# Patient Record
Sex: FEMALE | Race: WHITE | NOT HISPANIC OR LATINO | Employment: OTHER | URBAN - METROPOLITAN AREA
[De-identification: names, ages, dates, MRNs, and addresses within clinical notes are randomized per-mention and may not be internally consistent; named-entity substitution may affect disease eponyms.]

---

## 2023-11-04 ENCOUNTER — APPOINTMENT (OUTPATIENT)
Dept: RADIOLOGY | Facility: HOSPITAL | Age: 83
End: 2023-11-04
Payer: COMMERCIAL

## 2023-11-04 ENCOUNTER — HOSPITAL ENCOUNTER (EMERGENCY)
Facility: HOSPITAL | Age: 83
Discharge: HOME | End: 2023-11-04
Attending: EMERGENCY MEDICINE
Payer: COMMERCIAL

## 2023-11-04 VITALS
WEIGHT: 158 LBS | BODY MASS INDEX: 31.02 KG/M2 | TEMPERATURE: 98.1 F | RESPIRATION RATE: 18 BRPM | SYSTOLIC BLOOD PRESSURE: 145 MMHG | HEART RATE: 85 BPM | DIASTOLIC BLOOD PRESSURE: 76 MMHG | HEIGHT: 60 IN | OXYGEN SATURATION: 97 %

## 2023-11-04 DIAGNOSIS — S09.90XA CLOSED HEAD INJURY, INITIAL ENCOUNTER: Primary | ICD-10-CM

## 2023-11-04 PROCEDURE — 70450 CT HEAD/BRAIN W/O DYE: CPT

## 2023-11-04 PROCEDURE — 99285 EMERGENCY DEPT VISIT HI MDM: CPT | Mod: 25 | Performed by: EMERGENCY MEDICINE

## 2023-11-04 PROCEDURE — 70450 CT HEAD/BRAIN W/O DYE: CPT | Performed by: SURGERY

## 2023-11-04 PROCEDURE — 72125 CT NECK SPINE W/O DYE: CPT

## 2023-11-04 PROCEDURE — 72125 CT NECK SPINE W/O DYE: CPT | Performed by: SURGERY

## 2023-11-04 ASSESSMENT — PAIN - FUNCTIONAL ASSESSMENT: PAIN_FUNCTIONAL_ASSESSMENT: 0-10

## 2023-11-04 ASSESSMENT — COLUMBIA-SUICIDE SEVERITY RATING SCALE - C-SSRS
1. IN THE PAST MONTH, HAVE YOU WISHED YOU WERE DEAD OR WISHED YOU COULD GO TO SLEEP AND NOT WAKE UP?: NO
6. HAVE YOU EVER DONE ANYTHING, STARTED TO DO ANYTHING, OR PREPARED TO DO ANYTHING TO END YOUR LIFE?: NO
2. HAVE YOU ACTUALLY HAD ANY THOUGHTS OF KILLING YOURSELF?: NO

## 2023-11-04 ASSESSMENT — LIFESTYLE VARIABLES
EVER FELT BAD OR GUILTY ABOUT YOUR DRINKING: NO
HAVE PEOPLE ANNOYED YOU BY CRITICIZING YOUR DRINKING: NO
EVER HAD A DRINK FIRST THING IN THE MORNING TO STEADY YOUR NERVES TO GET RID OF A HANGOVER: NO
HAVE YOU EVER FELT YOU SHOULD CUT DOWN ON YOUR DRINKING: NO
REASON UNABLE TO ASSESS: NO

## 2023-11-04 ASSESSMENT — PAIN SCALES - GENERAL: PAINLEVEL_OUTOF10: 0 - NO PAIN

## 2023-11-04 NOTE — ED PROVIDER NOTES
HPI   Chief Complaint   Patient presents with    Fall     Dropped menu, tried to pick it up with foot and fell. Hit forehead and left back of head. No LOC, no thinners. No current complaints.        Patient presents to the emergency department secondary to head injury.  Patient was bending over to pick something up and lost her balance and hit her head.  She then lost her balance again and fell over backwards hitting the back of her head.  No loss of consciousness.  She is not on blood thinning medication.  No chest pain or shortness of breath.  No abdominal pain.  No nausea or vomiting.  No change in bowel movements.  No numbness or paresthesia in the extremities.  No back pain.  Patient denies actual head pain at this time also.                          No data recorded                Patient History   Past Medical History:   Diagnosis Date    Hypertensive heart disease without heart failure 12/17/2019    Hypertensive heart disease without CHF    Other nondisplaced fracture of seventh cervical vertebra, subsequent encounter for fracture with routine healing 12/17/2019    Other closed nondisplaced fracture of seventh cervical vertebra with routine healing, subsequent encounter    Other nondisplaced fracture of sixth cervical vertebra, subsequent encounter for fracture with routine healing 12/17/2019    Other closed nondisplaced fracture of sixth cervical vertebra with routine healing, subsequent encounter    Personal history of other diseases of the female genital tract 12/17/2019    History of uterine prolapse    Personal history of other endocrine, nutritional and metabolic disease 12/17/2019    History of hyperlipidemia    Personal history of other mental and behavioral disorders 06/14/2021    History of schizoaffective disorder    Personal history of other specified conditions 06/14/2021    History of chronic pain    Schizoaffective disorder, bipolar type (CMS/[facility]) 12/17/2019    Schizoaffective disorder,  bipolar type    Unspecified fracture of left femur, initial encounter for closed fracture (CMS/[facility]) 06/14/2021    Fracture of left femur     Past Surgical History:   Procedure Laterality Date    CT NECK ANGIO W AND WO IV CONTRAST  12/13/2019    CT NECK ANGIO W AND WO IV CONTRAST 12/13/2019 [facility]INICAL LEGACY    OTHER SURGICAL HISTORY  12/17/2019    Tonsillectomy    OTHER SURGICAL HISTORY  12/17/2019    Rotator cuff repair    OTHER SURGICAL HISTORY  12/17/2019    Wrist surgery    OTHER SURGICAL HISTORY  12/17/2019    Uterine surgery     No family history on file.  Social History     Tobacco Use    Smoking status: Not on file    Smokeless tobacco: Not on file   Substance Use Topics    Alcohol use: Not on file    Drug use: Not on file       Physical Exam   ED Triage Vitals [11/04/23 1934]   Temp Heart Rate Resp BP   36.7 °C (98.1 °F) 85 18 145/76      SpO2 Temp Source Heart Rate Source Patient Position   97 % Temporal Monitor Sitting      BP Location FiO2 (%)     Left arm --       Physical Exam  Vitals and nursing note reviewed.   Constitutional:       Appearance: Normal appearance.   HENT:      Head:      Comments: Patient has a small amount of ecchymosis to the anterior forehead between the eyes.     Right Ear: Tympanic membrane normal.      Left Ear: Tympanic membrane normal.      Nose: Nose normal.      Mouth/Throat:      Mouth: Mucous membranes are moist.   Eyes:      Extraocular Movements: Extraocular movements intact.      Pupils: Pupils are equal, round, and reactive to light.   Neck:      Comments: No step-offs or tenderness palpated.  Cardiovascular:      Rate and Rhythm: Normal rate and regular rhythm.      Pulses: Normal pulses.      Heart sounds: Normal heart sounds.   Pulmonary:      Effort: Pulmonary effort is normal.      Breath sounds: Normal breath sounds.   Abdominal:      General: Abdomen is flat. Bowel sounds are normal.      Palpations: Abdomen is soft.   Musculoskeletal:         General: No  swelling, tenderness or deformity. Normal range of motion.      Cervical back: Normal range of motion and neck supple.   Skin:     General: Skin is warm and dry.      Capillary Refill: Capillary refill takes less than 2 seconds.   Neurological:      General: No focal deficit present.      Mental Status: She is alert and oriented to person, place, and time.   Psychiatric:         Mood and Affect: Mood normal.         Behavior: Behavior normal.       Labs Reviewed - No data to display  Pain Management Panel           No data to display              CT head wo IV contrast   Final Result   No evidence of acute intracranial abnormality.        No acute cervical spine fracture or malalignment.             MACRO:   None        Signed by: Cornel Tolliver 11/4/2023 9:09 PM   Dictation workstation:   [id number]      CT cervical spine wo IV contrast   Final Result   No evidence of acute intracranial abnormality.        No acute cervical spine fracture or malalignment.             MACRO:   None        Signed by: Cornel Tolliver 11/4/2023 9:09 PM   Dictation workstation:   [id number]        ED Course & MDM   Diagnoses as of 11/04/23 2113   Closed head injury, initial encounter       Medical Decision Making  Patient presents secondary to head injury.  Patient is evaluated using CT head and cervical spine.  Patient does have high risk of occult injury secondary to history of osteoporosis and chronic prednisone use.  She does have a history of a spontaneous lumbar compression fracture.  CT head and cervical spine are negative for acute process.  At this time patient is hemodynamically stable with no new complaints.  She is discharged to follow-up with her primary care physician or return here for any worsening symptoms.        Procedure  Procedures     Francheska Del Cid MD  11/04/23 2113

## 2024-02-08 ENCOUNTER — APPOINTMENT (OUTPATIENT)
Dept: RADIOLOGY | Facility: HOSPITAL | Age: 84
DRG: 493 | End: 2024-02-08
Payer: COMMERCIAL

## 2024-02-08 ENCOUNTER — APPOINTMENT (OUTPATIENT)
Dept: RADIOLOGY | Facility: HOSPITAL | Age: 84
End: 2024-02-08
Payer: COMMERCIAL

## 2024-02-08 ENCOUNTER — HOSPITAL ENCOUNTER (EMERGENCY)
Facility: HOSPITAL | Age: 84
Discharge: OTHER INSTITUTION | End: 2024-02-08
Attending: EMERGENCY MEDICINE
Payer: COMMERCIAL

## 2024-02-08 ENCOUNTER — HOSPITAL ENCOUNTER (INPATIENT)
Facility: HOSPITAL | Age: 84
LOS: 8 days | Discharge: NURSING, REHAB OR LONG-TERM CARE | DRG: 493 | End: 2024-02-16
Attending: EMERGENCY MEDICINE | Admitting: STUDENT IN AN ORGANIZED HEALTH CARE EDUCATION/TRAINING PROGRAM
Payer: COMMERCIAL

## 2024-02-08 ENCOUNTER — CLINICAL SUPPORT (OUTPATIENT)
Dept: EMERGENCY MEDICINE | Facility: HOSPITAL | Age: 84
DRG: 493 | End: 2024-02-08
Payer: COMMERCIAL

## 2024-02-08 VITALS
DIASTOLIC BLOOD PRESSURE: 68 MMHG | TEMPERATURE: 96.7 F | SYSTOLIC BLOOD PRESSURE: 114 MMHG | HEIGHT: 61 IN | OXYGEN SATURATION: 95 % | BODY MASS INDEX: 28.32 KG/M2 | RESPIRATION RATE: 18 BRPM | HEART RATE: 98 BPM | WEIGHT: 150 LBS

## 2024-02-08 DIAGNOSIS — S42.412A CLOSED SUPRACONDYLAR FRACTURE OF LEFT HUMERUS, INITIAL ENCOUNTER: ICD-10-CM

## 2024-02-08 DIAGNOSIS — R01.1 CARDIAC MURMUR, UNSPECIFIED: ICD-10-CM

## 2024-02-08 DIAGNOSIS — S02.2XXA CLOSED FRACTURE OF NASAL BONE, INITIAL ENCOUNTER: ICD-10-CM

## 2024-02-08 DIAGNOSIS — S32.001A CLOSED BURST FRACTURE OF LUMBAR VERTEBRA, INITIAL ENCOUNTER (MULTI): Primary | ICD-10-CM
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
## 2024-02-08 DIAGNOSIS — S32.031A: ICD-10-CM

## 2024-02-08 DIAGNOSIS — I10 PRIMARY HYPERTENSION: ICD-10-CM

## 2024-02-08 DIAGNOSIS — S42.412A LEFT SUPRACONDYLAR HUMERUS FRACTURE, CLOSED, INITIAL ENCOUNTER: ICD-10-CM

## 2024-02-08 DIAGNOSIS — S42.409A CLOSED FRACTURE OF DISTAL END OF HUMERUS, UNSPECIFIED FRACTURE MORPHOLOGY, INITIAL ENCOUNTER: ICD-10-CM

## 2024-02-08 DIAGNOSIS — G47.33 OSA (OBSTRUCTIVE SLEEP APNEA): ICD-10-CM

## 2024-02-08 DIAGNOSIS — E11.9 TYPE 2 DIABETES MELLITUS WITHOUT COMPLICATION, UNSPECIFIED WHETHER LONG TERM INSULIN USE (MULTI): ICD-10-CM
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
## 2024-02-08 LAB
ABO GROUP (TYPE) IN BLOOD: NORMAL
ALBUMIN SERPL BCP-MCNC: 2.9 G/DL (ref 3.4–5)
ALP SERPL-CCNC: 67 U/L (ref 33–136)
ALT SERPL W P-5'-P-CCNC: 25 U/L (ref 7–45)
ANION GAP BLDV CALCULATED.4IONS-SCNC: 7 MMOL/L (ref 10–25)
ANION GAP SERPL CALC-SCNC: 12 MMOL/L (ref 10–20)
ANION GAP SERPL CALC-SCNC: 9 MMOL/L (ref 10–20)
ANTIBODY SCREEN: NORMAL
APTT PPP: 24 SECONDS (ref 27–38)
APTT PPP: 25 SECONDS (ref 27–38)
AST SERPL W P-5'-P-CCNC: 25 U/L (ref 9–39)
ATRIAL RATE: 98 BPM
BASE EXCESS BLDV CALC-SCNC: 1.4 MMOL/L (ref -2–3)
BASOPHILS # BLD AUTO: 0.02 X10*3/UL (ref 0–0.1)
BASOPHILS # BLD AUTO: 0.02 X10*3/UL (ref 0–0.1)
BASOPHILS NFR BLD AUTO: 0.2 %
BASOPHILS NFR BLD AUTO: 0.2 %
BILIRUB SERPL-MCNC: 0.3 MG/DL (ref 0–1.2)
BODY TEMPERATURE: 37 DEGREES CELSIUS
BUN SERPL-MCNC: 6 MG/DL (ref 6–23)
BUN SERPL-MCNC: 8 MG/DL (ref 6–23)
CA-I BLDV-SCNC: 1.11 MMOL/L (ref 1.1–1.33)
CALCIUM SERPL-MCNC: 8.1 MG/DL (ref 8.6–10.6)
CALCIUM SERPL-MCNC: 9 MG/DL (ref 8.6–10.3)
CHLORIDE BLDV-SCNC: 103 MMOL/L (ref 98–107)
CHLORIDE SERPL-SCNC: 103 MMOL/L (ref 98–107)
CHLORIDE SERPL-SCNC: 98 MMOL/L (ref 98–107)
CO2 SERPL-SCNC: 27 MMOL/L (ref 21–32)
CO2 SERPL-SCNC: 29 MMOL/L (ref 21–32)
CREAT SERPL-MCNC: 0.37 MG/DL (ref 0.5–1.05)
CREAT SERPL-MCNC: 0.47 MG/DL (ref 0.5–1.05)
EGFRCR SERPLBLD CKD-EPI 2021: >90 ML/MIN/1.73M*2
EGFRCR SERPLBLD CKD-EPI 2021: >90 ML/MIN/1.73M*2
EOSINOPHIL # BLD AUTO: 0.06 X10*3/UL (ref 0–0.4)
EOSINOPHIL # BLD AUTO: 0.09 X10*3/UL (ref 0–0.4)
EOSINOPHIL NFR BLD AUTO: 0.6 %
EOSINOPHIL NFR BLD AUTO: 1.1 %
ERYTHROCYTE [DISTWIDTH] IN BLOOD BY AUTOMATED COUNT: 17.2 % (ref 11.5–14.5)
ERYTHROCYTE [DISTWIDTH] IN BLOOD BY AUTOMATED COUNT: 17.2 % (ref 11.5–14.5)
EST. AVERAGE GLUCOSE BLD GHB EST-MCNC: 189 MG/DL
GLUCOSE BLDV-MCNC: 163 MG/DL (ref 74–99)
GLUCOSE SERPL-MCNC: 156 MG/DL (ref 74–99)
GLUCOSE SERPL-MCNC: 214 MG/DL (ref 74–99)
HBA1C MFR BLD: 8.2 %
HCO3 BLDV-SCNC: 28.3 MMOL/L (ref 22–26)
HCT VFR BLD AUTO: 30.9 % (ref 36–46)
HCT VFR BLD AUTO: 37.4 % (ref 36–46)
HCT VFR BLD EST: 33 % (ref 36–46)
HGB BLD-MCNC: 10.4 G/DL (ref 12–16)
HGB BLD-MCNC: 12.3 G/DL (ref 12–16)
HGB BLDV-MCNC: 10.9 G/DL (ref 12–16)
IMM GRANULOCYTES # BLD AUTO: 0.1 X10*3/UL (ref 0–0.5)
IMM GRANULOCYTES # BLD AUTO: 0.13 X10*3/UL (ref 0–0.5)
IMM GRANULOCYTES NFR BLD AUTO: 1 % (ref 0–0.9)
IMM GRANULOCYTES NFR BLD AUTO: 1.5 % (ref 0–0.9)
INHALED O2 CONCENTRATION: 0 %
INR PPP: 1.1 (ref 0.9–1.1)
INR PPP: 1.1 (ref 0.9–1.1)
LACTATE BLDV-SCNC: 1.1 MMOL/L (ref 0.4–2)
LYMPHOCYTES # BLD AUTO: 0.68 X10*3/UL (ref 0.8–3)
LYMPHOCYTES # BLD AUTO: 0.74 X10*3/UL (ref 0.8–3)
LYMPHOCYTES NFR BLD AUTO: 6.8 %
LYMPHOCYTES NFR BLD AUTO: 8.7 %
MCH RBC QN AUTO: 30.9 PG (ref 26–34)
MCH RBC QN AUTO: 31.1 PG (ref 26–34)
MCHC RBC AUTO-ENTMCNC: 32.9 G/DL (ref 32–36)
MCHC RBC AUTO-ENTMCNC: 33.7 G/DL (ref 32–36)
MCV RBC AUTO: 93 FL (ref 80–100)
MCV RBC AUTO: 94 FL (ref 80–100)
MONOCYTES # BLD AUTO: 0.61 X10*3/UL (ref 0.05–0.8)
MONOCYTES # BLD AUTO: 0.82 X10*3/UL (ref 0.05–0.8)
MONOCYTES NFR BLD AUTO: 7.2 %
MONOCYTES NFR BLD AUTO: 8.2 %
NEUTROPHILS # BLD AUTO: 6.93 X10*3/UL (ref 1.6–5.5)
NEUTROPHILS # BLD AUTO: 8.33 X10*3/UL (ref 1.6–5.5)
NEUTROPHILS NFR BLD AUTO: 81.3 %
NEUTROPHILS NFR BLD AUTO: 83.2 %
NRBC BLD-RTO: 0 /100 WBCS (ref 0–0)
NRBC BLD-RTO: 0 /100 WBCS (ref 0–0)
OXYHGB MFR BLDV: 45.4 % (ref 45–75)
P AXIS: 62 DEGREES
P OFFSET: 202 MS
P ONSET: 151 MS
PCO2 BLDV: 55 MM HG (ref 41–51)
PH BLDV: 7.32 PH (ref 7.33–7.43)
PLATELET # BLD AUTO: 283 X10*3/UL (ref 150–450)
PLATELET # BLD AUTO: 318 X10*3/UL (ref 150–450)
PO2 BLDV: 36 MM HG (ref 35–45)
POTASSIUM BLDV-SCNC: 3.2 MMOL/L (ref 3.5–5.3)
POTASSIUM SERPL-SCNC: 3.1 MMOL/L (ref 3.5–5.3)
POTASSIUM SERPL-SCNC: 3.5 MMOL/L (ref 3.5–5.3)
PR INTERVAL: 134 MS
PROT SERPL-MCNC: 5.6 G/DL (ref 6.4–8.2)
PROTHROMBIN TIME: 12.6 SECONDS (ref 9.8–12.8)
PROTHROMBIN TIME: 12.8 SECONDS (ref 9.8–12.8)
Q ONSET: 218 MS
QRS COUNT: 16 BEATS
QRS DURATION: 72 MS
QT INTERVAL: 342 MS
QTC CALCULATION(BAZETT): 436 MS
QTC FREDERICIA: 402 MS
R AXIS: 26 DEGREES
RBC # BLD AUTO: 3.34 X10*6/UL (ref 4–5.2)
RBC # BLD AUTO: 3.98 X10*6/UL (ref 4–5.2)
RH FACTOR (ANTIGEN D): NORMAL
SAO2 % BLDV: 46 % (ref 45–75)
SODIUM BLDV-SCNC: 135 MMOL/L (ref 136–145)
SODIUM SERPL-SCNC: 133 MMOL/L (ref 136–145)
SODIUM SERPL-SCNC: 138 MMOL/L (ref 136–145)
T AXIS: 47 DEGREES
T OFFSET: 389 MS
VENTRICULAR RATE: 98 BPM
WBC # BLD AUTO: 10 X10*3/UL (ref 4.4–11.3)
WBC # BLD AUTO: 8.5 X10*3/UL (ref 4.4–11.3)

## 2024-02-08 PROCEDURE — 51702 INSERT TEMP BLADDER CATH: CPT

## 2024-02-08 PROCEDURE — 73560 X-RAY EXAM OF KNEE 1 OR 2: CPT | Mod: LEFT SIDE | Performed by: RADIOLOGY

## 2024-02-08 PROCEDURE — 96374 THER/PROPH/DIAG INJ IV PUSH: CPT | Mod: 59

## 2024-02-08 PROCEDURE — 36415 COLL VENOUS BLD VENIPUNCTURE: CPT | Performed by: NURSE PRACTITIONER

## 2024-02-08 PROCEDURE — 99222 1ST HOSP IP/OBS MODERATE 55: CPT | Performed by: ORTHOPAEDIC SURGERY

## 2024-02-08 PROCEDURE — 72146 MRI CHEST SPINE W/O DYE: CPT

## 2024-02-08 PROCEDURE — 74177 CT ABD & PELVIS W/CONTRAST: CPT

## 2024-02-08 PROCEDURE — G0390 TRAUMA RESPONS W/HOSP CRITI: HCPCS

## 2024-02-08 PROCEDURE — 76376 3D RENDER W/INTRP POSTPROCES: CPT | Mod: LEFT SIDE | Performed by: RADIOLOGY

## 2024-02-08 PROCEDURE — 86901 BLOOD TYPING SEROLOGIC RH(D): CPT | Performed by: STUDENT IN AN ORGANIZED HEALTH CARE EDUCATION/TRAINING PROGRAM

## 2024-02-08 PROCEDURE — 73200 CT UPPER EXTREMITY W/O DYE: CPT | Mod: LT

## 2024-02-08 PROCEDURE — 85025 COMPLETE CBC W/AUTO DIFF WBC: CPT | Performed by: NURSE PRACTITIONER

## 2024-02-08 PROCEDURE — 72125 CT NECK SPINE W/O DYE: CPT | Performed by: RADIOLOGY

## 2024-02-08 PROCEDURE — 29105 APPLICATION LONG ARM SPLINT: CPT | Mod: LT

## 2024-02-08 PROCEDURE — 84132 ASSAY OF SERUM POTASSIUM: CPT | Performed by: STUDENT IN AN ORGANIZED HEALTH CARE EDUCATION/TRAINING PROGRAM

## 2024-02-08 PROCEDURE — 73110 X-RAY EXAM OF WRIST: CPT | Mod: LEFT SIDE | Performed by: RADIOLOGY

## 2024-02-08 PROCEDURE — 73090 X-RAY EXAM OF FOREARM: CPT | Mod: LT

## 2024-02-08 PROCEDURE — 73110 X-RAY EXAM OF WRIST: CPT | Mod: LT

## 2024-02-08 PROCEDURE — 36415 COLL VENOUS BLD VENIPUNCTURE: CPT | Performed by: STUDENT IN AN ORGANIZED HEALTH CARE EDUCATION/TRAINING PROGRAM

## 2024-02-08 PROCEDURE — 96376 TX/PRO/DX INJ SAME DRUG ADON: CPT | Mod: 59

## 2024-02-08 PROCEDURE — 2500000004 HC RX 250 GENERAL PHARMACY W/ HCPCS (ALT 636 FOR OP/ED)

## 2024-02-08 PROCEDURE — 99285 EMERGENCY DEPT VISIT HI MDM: CPT | Mod: 25 | Performed by: EMERGENCY MEDICINE

## 2024-02-08 PROCEDURE — 99285 EMERGENCY DEPT VISIT HI MDM: CPT | Performed by: EMERGENCY MEDICINE

## 2024-02-08 PROCEDURE — 72146 MRI CHEST SPINE W/O DYE: CPT | Performed by: STUDENT IN AN ORGANIZED HEALTH CARE EDUCATION/TRAINING PROGRAM

## 2024-02-08 PROCEDURE — 2500000004 HC RX 250 GENERAL PHARMACY W/ HCPCS (ALT 636 FOR OP/ED): Performed by: PHYSICIAN ASSISTANT

## 2024-02-08 PROCEDURE — 93010 ELECTROCARDIOGRAM REPORT: CPT | Performed by: NURSE PRACTITIONER

## 2024-02-08 PROCEDURE — 72131 CT LUMBAR SPINE W/O DYE: CPT

## 2024-02-08 PROCEDURE — 96374 THER/PROPH/DIAG INJ IV PUSH: CPT

## 2024-02-08 PROCEDURE — 99291 CRITICAL CARE FIRST HOUR: CPT | Mod: 25 | Performed by: NURSE PRACTITIONER

## 2024-02-08 PROCEDURE — 99221 1ST HOSP IP/OBS SF/LOW 40: CPT | Performed by: ORTHOPAEDIC SURGERY

## 2024-02-08 PROCEDURE — 96375 TX/PRO/DX INJ NEW DRUG ADDON: CPT | Mod: 59

## 2024-02-08 PROCEDURE — 72125 CT NECK SPINE W/O DYE: CPT

## 2024-02-08 PROCEDURE — 73060 X-RAY EXAM OF HUMERUS: CPT | Mod: LT

## 2024-02-08 PROCEDURE — 70450 CT HEAD/BRAIN W/O DYE: CPT

## 2024-02-08 PROCEDURE — 72128 CT CHEST SPINE W/O DYE: CPT | Performed by: RADIOLOGY

## 2024-02-08 PROCEDURE — 99222 1ST HOSP IP/OBS MODERATE 55: CPT | Performed by: PHYSICIAN ASSISTANT

## 2024-02-08 PROCEDURE — 2500000001 HC RX 250 WO HCPCS SELF ADMINISTERED DRUGS (ALT 637 FOR MEDICARE OP)

## 2024-02-08 PROCEDURE — 70450 CT HEAD/BRAIN W/O DYE: CPT | Performed by: RADIOLOGY

## 2024-02-08 PROCEDURE — 70486 CT MAXILLOFACIAL W/O DYE: CPT | Performed by: RADIOLOGY

## 2024-02-08 PROCEDURE — 73070 X-RAY EXAM OF ELBOW: CPT | Mod: LT

## 2024-02-08 PROCEDURE — 73560 X-RAY EXAM OF KNEE 1 OR 2: CPT | Mod: LT

## 2024-02-08 PROCEDURE — 80048 BASIC METABOLIC PNL TOTAL CA: CPT | Performed by: NURSE PRACTITIONER

## 2024-02-08 PROCEDURE — 2500000001 HC RX 250 WO HCPCS SELF ADMINISTERED DRUGS (ALT 637 FOR MEDICARE OP): Performed by: PHYSICIAN ASSISTANT

## 2024-02-08 PROCEDURE — 73200 CT UPPER EXTREMITY W/O DYE: CPT | Mod: LEFT SIDE | Performed by: RADIOLOGY

## 2024-02-08 PROCEDURE — 74177 CT ABD & PELVIS W/CONTRAST: CPT | Performed by: RADIOLOGY

## 2024-02-08 PROCEDURE — 2500000004 HC RX 250 GENERAL PHARMACY W/ HCPCS (ALT 636 FOR OP/ED): Performed by: NURSE PRACTITIONER

## 2024-02-08 PROCEDURE — 72131 CT LUMBAR SPINE W/O DYE: CPT | Performed by: RADIOLOGY

## 2024-02-08 PROCEDURE — 72128 CT CHEST SPINE W/O DYE: CPT

## 2024-02-08 PROCEDURE — 85610 PROTHROMBIN TIME: CPT | Performed by: NURSE PRACTITIONER

## 2024-02-08 PROCEDURE — 72148 MRI LUMBAR SPINE W/O DYE: CPT

## 2024-02-08 PROCEDURE — 72148 MRI LUMBAR SPINE W/O DYE: CPT | Performed by: STUDENT IN AN ORGANIZED HEALTH CARE EDUCATION/TRAINING PROGRAM

## 2024-02-08 PROCEDURE — 83036 HEMOGLOBIN GLYCOSYLATED A1C: CPT | Performed by: PHYSICIAN ASSISTANT

## 2024-02-08 PROCEDURE — 76377 3D RENDER W/INTRP POSTPROCES: CPT

## 2024-02-08 PROCEDURE — 70486 CT MAXILLOFACIAL W/O DYE: CPT

## 2024-02-08 PROCEDURE — 85730 THROMBOPLASTIN TIME PARTIAL: CPT | Performed by: STUDENT IN AN ORGANIZED HEALTH CARE EDUCATION/TRAINING PROGRAM

## 2024-02-08 PROCEDURE — 2550000001 HC RX 255 CONTRASTS: Performed by: NURSE PRACTITIONER

## 2024-02-08 PROCEDURE — 85025 COMPLETE CBC W/AUTO DIFF WBC: CPT | Performed by: STUDENT IN AN ORGANIZED HEALTH CARE EDUCATION/TRAINING PROGRAM

## 2024-02-08 PROCEDURE — 71260 CT THORAX DX C+: CPT | Performed by: RADIOLOGY

## 2024-02-08 PROCEDURE — 1200000002 HC GENERAL ROOM WITH TELEMETRY DAILY

## 2024-02-08 PROCEDURE — 93005 ELECTROCARDIOGRAM TRACING: CPT

## 2024-02-08 PROCEDURE — 71045 X-RAY EXAM CHEST 1 VIEW: CPT

## 2024-02-08 RX ORDER — POTASSIUM CHLORIDE 14.9 MG/ML
20 INJECTION INTRAVENOUS
Status: DISPENSED | OUTPATIENT
Start: 2024-02-08 | End: 2024-02-09

## 2024-02-08 RX ORDER — ASPIRIN 81 MG/1
81 TABLET ORAL DAILY
COMMUNITY

## 2024-02-08 RX ORDER — CETIRIZINE HYDROCHLORIDE 10 MG/1
10 TABLET ORAL DAILY
COMMUNITY

## 2024-02-08 RX ORDER — ONDANSETRON HYDROCHLORIDE 2 MG/ML
4 INJECTION, SOLUTION INTRAVENOUS ONCE
Status: COMPLETED | OUTPATIENT
Start: 2024-02-08 | End: 2024-02-08

## 2024-02-08 RX ORDER — CLOBETASOL PROPIONATE 0.05 G/100ML
1 SHAMPOO TOPICAL
COMMUNITY

## 2024-02-08 RX ORDER — BENZONATATE 100 MG/1
100 CAPSULE ORAL 3 TIMES DAILY PRN
COMMUNITY

## 2024-02-08 RX ORDER — KETOCONAZOLE 20 MG/ML
1 SHAMPOO, SUSPENSION TOPICAL 2 TIMES WEEKLY
COMMUNITY

## 2024-02-08 RX ORDER — OLANZAPINE 20 MG/1
20 TABLET ORAL NIGHTLY
COMMUNITY

## 2024-02-08 RX ORDER — HALOPERIDOL 0.5 MG/1
0.5 TABLET ORAL DAILY
COMMUNITY

## 2024-02-08 RX ORDER — VENLAFAXINE HYDROCHLORIDE 37.5 MG/1
37.5 CAPSULE, EXTENDED RELEASE ORAL DAILY
COMMUNITY

## 2024-02-08 RX ORDER — METFORMIN HYDROCHLORIDE 1000 MG/1
1000 TABLET ORAL
COMMUNITY

## 2024-02-08 RX ORDER — ACETAMINOPHEN 500 MG
5 TABLET ORAL NIGHTLY
Status: DISCONTINUED | OUTPATIENT
Start: 2024-02-08 | End: 2024-02-16 | Stop reason: HOSPADM

## 2024-02-08 RX ORDER — METHOTREXATE 2.5 MG/1
6 TABLET ORAL
COMMUNITY

## 2024-02-08 RX ORDER — LATANOPROST 50 UG/ML
1 SOLUTION/ DROPS OPHTHALMIC NIGHTLY
COMMUNITY

## 2024-02-08 RX ORDER — METHOTREXATE 2.5 MG/1
15 TABLET ORAL
Status: DISCONTINUED | OUTPATIENT
Start: 2024-02-09 | End: 2024-02-16 | Stop reason: HOSPADM

## 2024-02-08 RX ORDER — FAMOTIDINE 20 MG/1
40 TABLET, FILM COATED ORAL NIGHTLY
Status: DISCONTINUED | OUTPATIENT
Start: 2024-02-08 | End: 2024-02-16 | Stop reason: HOSPADM

## 2024-02-08 RX ORDER — DOCUSATE SODIUM 100 MG/1
100 CAPSULE, LIQUID FILLED ORAL 2 TIMES DAILY
COMMUNITY

## 2024-02-08 RX ORDER — FOLIC ACID 1 MG/1
1 TABLET ORAL DAILY
COMMUNITY

## 2024-02-08 RX ORDER — FAMOTIDINE 40 MG/1
40 TABLET, FILM COATED ORAL NIGHTLY
COMMUNITY

## 2024-02-08 RX ORDER — LIDOCAINE HYDROCHLORIDE 20 MG/ML
SOLUTION OROPHARYNGEAL
COMMUNITY

## 2024-02-08 RX ORDER — FLUTICASONE PROPIONATE 50 MCG
1 SPRAY, SUSPENSION (ML) NASAL DAILY
COMMUNITY

## 2024-02-08 RX ORDER — MORPHINE SULFATE 4 MG/ML
4 INJECTION, SOLUTION INTRAMUSCULAR; INTRAVENOUS ONCE
Status: COMPLETED | OUTPATIENT
Start: 2024-02-08 | End: 2024-02-08

## 2024-02-08 RX ORDER — DEXTROMETHORPHAN HYDROBROMIDE, GUAIFENESIN 5; 100 MG/5ML; MG/5ML
650 LIQUID ORAL 3 TIMES DAILY
COMMUNITY

## 2024-02-08 RX ORDER — ACETAMINOPHEN 500 MG
1 TABLET ORAL DAILY
COMMUNITY

## 2024-02-08 RX ORDER — PREDNISONE 2.5 MG/1
7.5 TABLET ORAL DAILY
COMMUNITY

## 2024-02-08 RX ORDER — VENLAFAXINE HYDROCHLORIDE 37.5 MG/1
37.5 CAPSULE, EXTENDED RELEASE ORAL DAILY
Status: DISCONTINUED | OUTPATIENT
Start: 2024-02-08 | End: 2024-02-16 | Stop reason: HOSPADM

## 2024-02-08 RX ORDER — KETOCONAZOLE 20 MG/G
1 CREAM TOPICAL 3 TIMES DAILY
COMMUNITY

## 2024-02-08 RX ORDER — HYDROCORTISONE 25 MG/G
1 CREAM TOPICAL 2 TIMES DAILY
COMMUNITY

## 2024-02-08 RX ORDER — PREDNISONE 5 MG/1
7.5 TABLET ORAL DAILY
Status: DISCONTINUED | OUTPATIENT
Start: 2024-02-08 | End: 2024-02-16 | Stop reason: HOSPADM

## 2024-02-08 RX ORDER — ATORVASTATIN CALCIUM 80 MG/1
80 TABLET, FILM COATED ORAL NIGHTLY
COMMUNITY

## 2024-02-08 RX ORDER — POTASSIUM CHLORIDE 20 MEQ/1
40 TABLET, EXTENDED RELEASE ORAL ONCE
Status: DISCONTINUED | OUTPATIENT
Start: 2024-02-08 | End: 2024-02-08

## 2024-02-08 RX ORDER — GUAIFENESIN 100 MG/5ML
100 SOLUTION ORAL 4 TIMES DAILY PRN
COMMUNITY

## 2024-02-08 RX ORDER — HYDROMORPHONE HYDROCHLORIDE 1 MG/ML
0.4 INJECTION, SOLUTION INTRAMUSCULAR; INTRAVENOUS; SUBCUTANEOUS ONCE
Status: COMPLETED | OUTPATIENT
Start: 2024-02-08 | End: 2024-02-08

## 2024-02-08 RX ORDER — SODIUM CHLORIDE, SODIUM LACTATE, POTASSIUM CHLORIDE, CALCIUM CHLORIDE 600; 310; 30; 20 MG/100ML; MG/100ML; MG/100ML; MG/100ML
50 INJECTION, SOLUTION INTRAVENOUS CONTINUOUS
Status: DISCONTINUED | OUTPATIENT
Start: 2024-02-08 | End: 2024-02-10

## 2024-02-08 RX ORDER — ACETAMINOPHEN 325 MG/1
975 TABLET ORAL EVERY 8 HOURS
Status: DISCONTINUED | OUTPATIENT
Start: 2024-02-08 | End: 2024-02-16 | Stop reason: HOSPADM

## 2024-02-08 RX ORDER — GUAIFENESIN 600 MG/1
600 TABLET, EXTENDED RELEASE ORAL 2 TIMES DAILY
COMMUNITY

## 2024-02-08 RX ORDER — OXYCODONE HYDROCHLORIDE 5 MG/1
2.5 TABLET ORAL EVERY 6 HOURS PRN
Status: DISCONTINUED | OUTPATIENT
Start: 2024-02-08 | End: 2024-02-16 | Stop reason: HOSPADM

## 2024-02-08 RX ORDER — CLOBETASOL PROPIONATE 0.5 MG/G
1 CREAM TOPICAL 2 TIMES DAILY
COMMUNITY

## 2024-02-08 RX ORDER — ZINC SULFATE 50(220)MG
50 CAPSULE ORAL DAILY
COMMUNITY

## 2024-02-08 RX ORDER — HALOPERIDOL 0.5 MG/1
0.5 TABLET ORAL DAILY
Status: DISCONTINUED | OUTPATIENT
Start: 2024-02-08 | End: 2024-02-16 | Stop reason: HOSPADM

## 2024-02-08 RX ORDER — FERROUS SULFATE 325(65) MG
65 TABLET, DELAYED RELEASE (ENTERIC COATED) ORAL 2 TIMES DAILY
COMMUNITY

## 2024-02-08 RX ORDER — OXYCODONE HYDROCHLORIDE 5 MG/1
5 TABLET ORAL EVERY 6 HOURS PRN
Status: DISCONTINUED | OUTPATIENT
Start: 2024-02-08 | End: 2024-02-16 | Stop reason: HOSPADM

## 2024-02-08 RX ORDER — POLYETHYLENE GLYCOL 3350 17 G/17G
17 POWDER, FOR SOLUTION ORAL DAILY
COMMUNITY

## 2024-02-08 RX ORDER — OLANZAPINE 5 MG/1
20 TABLET ORAL NIGHTLY
Status: DISCONTINUED | OUTPATIENT
Start: 2024-02-08 | End: 2024-02-16 | Stop reason: HOSPADM

## 2024-02-08 RX ORDER — ASCORBIC ACID 500 MG
500 TABLET ORAL 2 TIMES DAILY
COMMUNITY

## 2024-02-08 RX ORDER — LANOLIN ALCOHOL/MO/W.PET/CERES
1000 CREAM (GRAM) TOPICAL DAILY
COMMUNITY

## 2024-02-08 RX ORDER — POLYETHYLENE GLYCOL 3350 17 G/17G
17 POWDER, FOR SOLUTION ORAL DAILY
Status: DISCONTINUED | OUTPATIENT
Start: 2024-02-08 | End: 2024-02-13

## 2024-02-08 RX ORDER — FENTANYL CITRATE 50 UG/ML
50 INJECTION, SOLUTION INTRAMUSCULAR; INTRAVENOUS ONCE
Status: COMPLETED | OUTPATIENT
Start: 2024-02-08 | End: 2024-02-08

## 2024-02-08 RX ORDER — MULTIVITAMIN/IRON/FOLIC ACID 18MG-0.4MG
1 TABLET ORAL 2 TIMES DAILY
COMMUNITY

## 2024-02-08 RX ORDER — AMOXICILLIN 250 MG
2 CAPSULE ORAL 2 TIMES DAILY
Status: DISCONTINUED | OUTPATIENT
Start: 2024-02-08 | End: 2024-02-16 | Stop reason: HOSPADM

## 2024-02-08 RX ORDER — LOSARTAN POTASSIUM 50 MG/1
50 TABLET ORAL 2 TIMES DAILY
COMMUNITY

## 2024-02-08 RX ORDER — FUROSEMIDE 40 MG/1
40 TABLET ORAL DAILY
COMMUNITY

## 2024-02-08 RX ORDER — NALOXONE HYDROCHLORIDE 0.4 MG/ML
0.2 INJECTION, SOLUTION INTRAMUSCULAR; INTRAVENOUS; SUBCUTANEOUS EVERY 5 MIN PRN
Status: DISCONTINUED | OUTPATIENT
Start: 2024-02-08 | End: 2024-02-16 | Stop reason: HOSPADM

## 2024-02-08 RX ORDER — MULTIVIT-MIN/IRON FUM/FOLIC AC 7.5 MG-4
1 TABLET ORAL DAILY
COMMUNITY

## 2024-02-08 RX ORDER — FENTANYL CITRATE 50 UG/ML
25 INJECTION, SOLUTION INTRAMUSCULAR; INTRAVENOUS ONCE
Status: COMPLETED | OUTPATIENT
Start: 2024-02-08 | End: 2024-02-08

## 2024-02-08 RX ORDER — HYDROMORPHONE HYDROCHLORIDE 1 MG/ML
INJECTION, SOLUTION INTRAMUSCULAR; INTRAVENOUS; SUBCUTANEOUS
Status: COMPLETED
Start: 2024-02-08 | End: 2024-02-08

## 2024-02-08 RX ORDER — BENZOCAINE AND MENTHOL 15; 3.6 MG/1; MG/1
1 LOZENGE ORAL EVERY 4 HOURS PRN
COMMUNITY

## 2024-02-08 RX ORDER — LATANOPROST 50 UG/ML
1 SOLUTION/ DROPS OPHTHALMIC NIGHTLY
Status: DISCONTINUED | OUTPATIENT
Start: 2024-02-08 | End: 2024-02-16 | Stop reason: HOSPADM

## 2024-02-08 RX ADMIN — SODIUM CHLORIDE, POTASSIUM CHLORIDE, SODIUM LACTATE AND CALCIUM CHLORIDE 50 ML/HR: 600; 310; 30; 20 INJECTION, SOLUTION INTRAVENOUS at 18:09

## 2024-02-08 RX ADMIN — ONDANSETRON 4 MG: 2 INJECTION INTRAMUSCULAR; INTRAVENOUS at 11:11

## 2024-02-08 RX ADMIN — HYDROMORPHONE HYDROCHLORIDE 0.4 MG: 1 INJECTION, SOLUTION INTRAMUSCULAR; INTRAVENOUS; SUBCUTANEOUS at 13:35

## 2024-02-08 RX ADMIN — OXYCODONE HYDROCHLORIDE 5 MG: 5 TABLET ORAL at 22:06

## 2024-02-08 RX ADMIN — POTASSIUM CHLORIDE 20 MEQ: 14.9 INJECTION, SOLUTION INTRAVENOUS at 22:05

## 2024-02-08 RX ADMIN — ONDANSETRON 4 MG: 2 INJECTION INTRAMUSCULAR; INTRAVENOUS at 06:35

## 2024-02-08 RX ADMIN — Medication 5 MG: at 22:07

## 2024-02-08 RX ADMIN — MORPHINE SULFATE 4 MG: 4 INJECTION, SOLUTION INTRAMUSCULAR; INTRAVENOUS at 06:35

## 2024-02-08 RX ADMIN — FENTANYL CITRATE 50 MCG: 50 INJECTION INTRAMUSCULAR; INTRAVENOUS at 11:11

## 2024-02-08 RX ADMIN — SENNOSIDES AND DOCUSATE SODIUM 2 TABLET: 8.6; 5 TABLET ORAL at 21:00

## 2024-02-08 RX ADMIN — OLANZAPINE 20 MG: 5 TABLET, FILM COATED ORAL at 22:06

## 2024-02-08 RX ADMIN — IOHEXOL 100 ML: 350 INJECTION, SOLUTION INTRAVENOUS at 10:41

## 2024-02-08 RX ADMIN — FENTANYL CITRATE 25 MCG: 50 INJECTION INTRAMUSCULAR; INTRAVENOUS at 08:13

## 2024-02-08 RX ADMIN — FAMOTIDINE 40 MG: 20 TABLET, FILM COATED ORAL at 22:06

## 2024-02-08 SDOH — SOCIAL STABILITY: SOCIAL INSECURITY: DO YOU FEEL UNSAFE GOING BACK TO THE PLACE WHERE YOU ARE LIVING?: NO

## 2024-02-08 SDOH — SOCIAL STABILITY: SOCIAL INSECURITY: ABUSE: ADULT

## 2024-02-08 SDOH — SOCIAL STABILITY: SOCIAL INSECURITY: HAVE YOU HAD THOUGHTS OF HARMING ANYONE ELSE?: YES

## 2024-02-08 SDOH — SOCIAL STABILITY: SOCIAL INSECURITY: ARE YOU OR HAVE YOU BEEN THREATENED OR ABUSED PHYSICALLY, EMOTIONALLY, OR SEXUALLY BY ANYONE?: NO

## 2024-02-08 SDOH — SOCIAL STABILITY: SOCIAL INSECURITY: ARE THERE ANY APPARENT SIGNS OF INJURIES/BEHAVIORS THAT COULD BE RELATED TO ABUSE/NEGLECT?: NO

## 2024-02-08 SDOH — SOCIAL STABILITY: SOCIAL INSECURITY: DO YOU FEEL ANYONE HAS EXPLOITED OR TAKEN ADVANTAGE OF YOU FINANCIALLY OR OF YOUR PERSONAL PROPERTY?: NO

## 2024-02-08 SDOH — SOCIAL STABILITY: SOCIAL INSECURITY: HAS ANYONE EVER THREATENED TO HURT YOUR FAMILY OR YOUR PETS?: NO

## 2024-02-08 SDOH — SOCIAL STABILITY: SOCIAL INSECURITY: DOES ANYONE TRY TO KEEP YOU FROM HAVING/CONTACTING OTHER FRIENDS OR DOING THINGS OUTSIDE YOUR HOME?: NO

## 2024-02-08 SDOH — SOCIAL STABILITY: SOCIAL INSECURITY: WERE YOU ABLE TO COMPLETE ALL THE BEHAVIORAL HEALTH SCREENINGS?: YES

## 2024-02-08 ASSESSMENT — PAIN - FUNCTIONAL ASSESSMENT
PAIN_FUNCTIONAL_ASSESSMENT: 0-10

## 2024-02-08 ASSESSMENT — PAIN DESCRIPTION - LOCATION
LOCATION: ARM

## 2024-02-08 ASSESSMENT — LIFESTYLE VARIABLES
HAVE YOU EVER FELT YOU SHOULD CUT DOWN ON YOUR DRINKING: NO
HOW OFTEN DO YOU HAVE A DRINK CONTAINING ALCOHOL: NEVER
EVER FELT BAD OR GUILTY ABOUT YOUR DRINKING: NO
AUDIT-C TOTAL SCORE: 0
HAVE PEOPLE ANNOYED YOU BY CRITICIZING YOUR DRINKING: NO
HOW OFTEN DO YOU HAVE 6 OR MORE DRINKS ON ONE OCCASION: NEVER
SKIP TO QUESTIONS 9-10: 1
EVER HAD A DRINK FIRST THING IN THE MORNING TO STEADY YOUR NERVES TO GET RID OF A HANGOVER: NO
HOW MANY STANDARD DRINKS CONTAINING ALCOHOL DO YOU HAVE ON A TYPICAL DAY: PATIENT DOES NOT DRINK
AUDIT-C TOTAL SCORE: 0

## 2024-02-08 ASSESSMENT — ENCOUNTER SYMPTOMS
FEVER: 0
CONFUSION: 0
FLANK PAIN: 0
CHILLS: 0
COUGH: 0
DECREASED CONCENTRATION: 0
DIZZINESS: 0
WEAKNESS: 0
ABDOMINAL PAIN: 0
BACK PAIN: 1
FACIAL SWELLING: 1
EYE REDNESS: 0
PALPITATIONS: 0
VOMITING: 0
NECK PAIN: 0
SHORTNESS OF BREATH: 0
PHOTOPHOBIA: 0
WOUND: 1

## 2024-02-08 ASSESSMENT — COLUMBIA-SUICIDE SEVERITY RATING SCALE - C-SSRS
1. IN THE PAST MONTH, HAVE YOU WISHED YOU WERE DEAD OR WISHED YOU COULD GO TO SLEEP AND NOT WAKE UP?: NO
2. HAVE YOU ACTUALLY HAD ANY THOUGHTS OF KILLING YOURSELF?: NO
2. HAVE YOU ACTUALLY HAD ANY THOUGHTS OF KILLING YOURSELF?: NO
6. HAVE YOU EVER DONE ANYTHING, STARTED TO DO ANYTHING, OR PREPARED TO DO ANYTHING TO END YOUR LIFE?: NO
1. IN THE PAST MONTH, HAVE YOU WISHED YOU WERE DEAD OR WISHED YOU COULD GO TO SLEEP AND NOT WAKE UP?: NO
6. HAVE YOU EVER DONE ANYTHING, STARTED TO DO ANYTHING, OR PREPARED TO DO ANYTHING TO END YOUR LIFE?: NO
1. IN THE PAST MONTH, HAVE YOU WISHED YOU WERE DEAD OR WISHED YOU COULD GO TO SLEEP AND NOT WAKE UP?: NO

## 2024-02-08 ASSESSMENT — PAIN SCALES - GENERAL
PAINLEVEL_OUTOF10: 10 - WORST POSSIBLE PAIN
PAINLEVEL_OUTOF10: 9
PAINLEVEL_OUTOF10: 10 - WORST POSSIBLE PAIN
PAINLEVEL_OUTOF10: 10 - WORST POSSIBLE PAIN
PAINLEVEL_OUTOF10: 0 - NO PAIN
PAINLEVEL_OUTOF10: 10 - WORST POSSIBLE PAIN

## 2024-02-08 ASSESSMENT — PATIENT HEALTH QUESTIONNAIRE - PHQ9
2. FEELING DOWN, DEPRESSED OR HOPELESS: NOT AT ALL
1. LITTLE INTEREST OR PLEASURE IN DOING THINGS: NOT AT ALL
SUM OF ALL RESPONSES TO PHQ9 QUESTIONS 1 & 2: 0

## 2024-02-08 ASSESSMENT — PAIN DESCRIPTION - FREQUENCY
FREQUENCY: CONSTANT/CONTINUOUS
FREQUENCY: CONSTANT/CONTINUOUS

## 2024-02-08 ASSESSMENT — ACTIVITIES OF DAILY LIVING (ADL): LACK_OF_TRANSPORTATION: NO

## 2024-02-08 ASSESSMENT — PAIN DESCRIPTION - ORIENTATION
ORIENTATION: LEFT

## 2024-02-08 ASSESSMENT — PAIN DESCRIPTION - PROGRESSION: CLINICAL_PROGRESSION: NOT CHANGED

## 2024-02-08 ASSESSMENT — PAIN DESCRIPTION - PAIN TYPE
TYPE: ACUTE PAIN

## 2024-02-08 ASSESSMENT — PAIN DESCRIPTION - ONSET: ONSET: SUDDEN

## 2024-02-08 ASSESSMENT — PAIN DESCRIPTION - DESCRIPTORS: DESCRIPTORS: SHARP

## 2024-02-08 NOTE — CONSULTS
Orthopaedic Surgery Consult H&P    HPI:   Orthopaedic Problems/Injuries: L extra-articular distal humerus fx  Other Injuries: L3 burst fx    83 y.o. female PMH dermatomyositis, DM, schizoaffective disorder presents after transfer from [place] after fall from bed sustaining above. Denies numbness, tingling, and open wounds on the affected limb. RHD.    PMH: per above/EMR  PSH: per above/EMR  SocHx:      -  Denies tobacco use      -  Denies EtOH use      -  Denies other drug use  FamHx:  Non-contributory to this patient's acute orthopaedic problem.   Allergies: Reviewed in EMR  Meds: Reviewed in EMR    ROS      - 14 point ROS negative except as above    Physical Exam:  Gen: AOx3, NAD  HEENT: normocephalic atraumatic  Psych: appropriate mood and affect  Resp: nonlabored breathing  Cardiac: Extremities WWP, RRR to peripheral palpation  Neuro: CN 2-12 grossly intact  Skin: no rashes    Left upper extremity:   -Skin intact, obvious deformity  -Tender at site of injury with painful ROM.  -Fires axillary/AIN/PIN/ulnar distributions  -SILT axillary/radial/median/ulnar distributions  -Palpable radial pulse, cap refill brisk  -Compartments soft and compressible    A full secondary exam was performed and all relevant findings discussed and noted above.    Imaging:  Imaging of the L humerus and elbow demonstrate an extra-articular distal humerus fx. There is previous hardware in the proximal humerus. The ulnohumeral joint is concentrically reduced.    Assessment:  Orthopaedic Problems/Injuries: L extra-articular distal humerus fx    83F (dermatomyositis, DM, schizoaffective disorder) transfer from [place] after mGLF. Also w L3 burst fx. NVI. Placed in a LAS.    Plan:  - admit to trauma  - plan for operative fixation  - patient is c/p for L elbow ORIF w/ Dr. Dover on 2/9/2024.  - Please document medical clearance prior to OR  - WB: DINORA MURRIETA in LAS  - Abx: Not indicated preop  - Diet: NPO  - DVT: per trauma  - Please obtain all  preop labs (CBC,BMP,EKG, CXR, Coags, Type and Screen)  - Goel: in place    - Dispo: admit to trauma, OR 2/9    Desean Aguilar MD  Orthopaedic Surgery, PGY3    While admitted, this patient will be followed by the Ortho Trauma Team. Please contact the residents listed below with any questions (available via Epic Chat weekdays). Please page [phone] (ortho on-call) after 6pm and on weekends.    Ortho Trauma  First Call: Keagan Ratliff, PGY-1  Second Call: Balaji Perez, PGY-2  Third Call: Desean Aguilar, PGY-3

## 2024-02-08 NOTE — ED TRIAGE NOTES
Pt arrived as a transfer from [place] after a fall this morning at her nursing home. Pt had an unwitnessed fall and was believed to be down for an hour before anyone found her. Pt is not on blood thinners. Pt was found to have a broken humerus, neck fractures and spine fractures

## 2024-02-08 NOTE — ED PROVIDER NOTES
HPI   Chief Complaint   Patient presents with    Fall       83-year-old female with a past medical history of dermatomyositis on methotrexate, history of compression fractures of L1, schizoaffective disorder, hypertension, hyperlipidemia, diabetes, WEN untreated presents to the emergency department today as a limited trauma after a fall.  Patient states that she woke up this morning was going to ambulate to the bathroom when she tripped and fell falling onto her face and left side.  States that she remembers the entire incident and did not lose consciousness.  She is not on any blood thinners.  She is having significant pain in the left arm and face.  States that she is up-to-date on her tetanus vaccination.  Denies any vision changes or dizziness.  No nausea or vomiting.  Denies any back or abdominal pain.  No lower extremity discomfort.  No chest pain or shortness of breath.                          [place] Coma Scale Score: 15         NIH Stroke Scale: 0          Patient History   Past Medical History:   Diagnosis Date    Hypertensive heart disease without heart failure 12/17/2019    Hypertensive heart disease without CHF    Other nondisplaced fracture of seventh cervical vertebra, subsequent encounter for fracture with routine healing 12/17/2019    Other closed nondisplaced fracture of seventh cervical vertebra with routine healing, subsequent encounter    Other nondisplaced fracture of sixth cervical vertebra, subsequent encounter for fracture with routine healing 12/17/2019    Other closed nondisplaced fracture of sixth cervical vertebra with routine healing, subsequent encounter    Personal history of other diseases of the female genital tract 12/17/2019    History of uterine prolapse    Personal history of other endocrine, nutritional and metabolic disease 12/17/2019    History of hyperlipidemia    Personal history of other mental and behavioral disorders 06/14/2021    History of schizoaffective disorder     Personal history of other specified conditions 06/14/2021    History of chronic pain    Schizoaffective disorder, bipolar type (CMS/[facility]) 12/17/2019    Schizoaffective disorder, bipolar type    Unspecified fracture of left femur, initial encounter for closed fracture (CMS/[facility]) 06/14/2021    Fracture of left femur     Past Surgical History:   Procedure Laterality Date    CT ANGIO NECK W  12/13/2019    CT NECK ANGIO W AND WO IV CONTRAST 12/13/2019 [facility] CLINICAL LEGACY    OTHER SURGICAL HISTORY  12/17/2019    Tonsillectomy    OTHER SURGICAL HISTORY  12/17/2019    Rotator cuff repair    OTHER SURGICAL HISTORY  12/17/2019    Wrist surgery    OTHER SURGICAL HISTORY  12/17/2019    Uterine surgery     No family history on file.  Social History     Tobacco Use    Smoking status: Not on file    Smokeless tobacco: Not on file   Substance Use Topics    Alcohol use: Not on file    Drug use: Not on file       Physical Exam   ED Triage Vitals   Temperature Heart Rate Respirations BP   02/08/24 0616 02/08/24 0616 02/08/24 0616 02/08/24 0616   36.7 °C (98 °F) (!) 113 16 (!) 141/94      Pulse Ox Temp src Heart Rate Source Patient Position   02/08/24 0616 -- 02/08/24 0620 --   98 %  Monitor       BP Location FiO2 (%)     -- --             Physical Exam  Vitals and nursing note reviewed.   Constitutional:       General: She is not in acute distress.     Appearance: Normal appearance. She is not toxic-appearing.   HENT:      Head: Raccoon eyes and contusion present.        Right Ear: Tympanic membrane normal. No hemotympanum.      Left Ear: Tympanic membrane normal. No hemotympanum.      Nose: Nasal deformity and septal deviation present.      Right Nostril: No septal hematoma.      Left Nostril: No septal hematoma.      Right Sinus: Maxillary sinus tenderness present.      Left Sinus: Maxillary sinus tenderness present.      Mouth/Throat:      Lips: Pink.      Mouth: Mucous membranes are moist.      Pharynx: Oropharynx is clear.       Comments: Patient is missing teeth however that is baseline for her  Eyes:      Extraocular Movements: Extraocular movements intact.      Pupils: Pupils are equal, round, and reactive to light.   Cardiovascular:      Rate and Rhythm: Normal rate and regular rhythm.      Pulses: Normal pulses.      Heart sounds: Normal heart sounds.   Pulmonary:      Effort: Pulmonary effort is normal.      Breath sounds: Normal breath sounds.   Abdominal:      General: Abdomen is flat. Bowel sounds are normal.      Palpations: Abdomen is soft.      Tenderness: There is no abdominal tenderness.   Musculoskeletal:      Right elbow: Normal.      Left elbow: Swelling and deformity present. Decreased range of motion. Tenderness present.      Cervical back: Normal range of motion and neck supple.   Skin:     General: Skin is warm and dry.      Capillary Refill: Capillary refill takes less than 2 seconds.   Neurological:      General: No focal deficit present.      Mental Status: She is alert and oriented to person, place, and time.   Psychiatric:         Mood and Affect: Mood normal.         Behavior: Behavior normal.         Judgment: Judgment normal.         Labs Reviewed   CBC WITH AUTO DIFFERENTIAL - Abnormal       Result Value    WBC 8.5      nRBC 0.0      RBC 3.98 (*)     Hemoglobin 12.3      Hematocrit 37.4      MCV 94      MCH 30.9      MCHC 32.9      RDW 17.2 (*)     Platelets 318      Neutrophils % 81.3      Immature Granulocytes %, Automated 1.5 (*)     Lymphocytes % 8.7      Monocytes % 7.2      Eosinophils % 1.1      Basophils % 0.2      Neutrophils Absolute 6.93 (*)     Immature Granulocytes Absolute, Automated 0.13      Lymphocytes Absolute 0.74 (*)     Monocytes Absolute 0.61      Eosinophils Absolute 0.09      Basophils Absolute 0.02     BASIC METABOLIC PANEL - Abnormal    Glucose 214 (*)     Sodium 133 (*)     Potassium 3.5      Chloride 98      Bicarbonate 27      Anion Gap 12      Urea Nitrogen 8      Creatinine 0.47  (*)     eGFR >90      Calcium 9.0     COAGULATION SCREEN - Abnormal    Protime 12.6      INR 1.1      aPTT 24 (*)     Narrative:     The APTT is no longer used for monitoring Unfractionated Heparin Therapy. For monitoring Heparin Therapy, use the Heparin Assay.     Pain Management Panel           No data to display              XR elbow left 1-2 views   Final Result   Displaced supracondylar fracture of the distal humerus. These   findings are detailed above.        Severe osteopenia.        No dislocation.        Signed by: Dorian Garcia 2/8/2024 9:24 AM   Dictation workstation:   [id number]      XR forearm left 2 views   Final Result   Displaced supracondylar fracture of the distal humerus. These   findings are detailed above.        Severe osteopenia.        No dislocation.        Signed by: Dorian Garcia 2/8/2024 9:24 AM   Dictation workstation:   [id number]      XR wrist left 3+ views   Final Result   Chronic deformities of the 4th and 5th metacarpal and distal radius   compatible with sites of healed fracture.        Chondrocalcinosis.        Multifocal osteoarthritis.        Severe osteopenia.        No sign of acute fracture or dislocation. If there remains clinical   concern for occult fracture, further workup with CT or MRI can be   performed.        Signed by: Dorian Garcia 2/8/2024 9:26 AM   Dictation workstation:   [id number]      CT thoracic spine wo IV contrast   Final Result   Findings of ankylosing spondylitis.        Osteopenia.        No sign of acute thoracic spine fracture.        Chronic appearing mild compression deformity of the T12 vertebral   body.        Multilevel discogenic degenerative changes of the thoracic spine more   pronounced at the upper thoracic levels. There is multilevel   hypertrophic facet arthrosis bilaterally.        No sign of central canal narrowing.        Incidental findings include large hiatal hernia, cholelithiasis, and   interstitial and  ground-glass density in both lungs, greater on the   right. The lung findings may be chronic though are poorly assessed on   this examination.        Signed by: Dorian Garcia 2/8/2024 9:21 AM   Dictation workstation:   [id number]      CT lumbar spine wo IV contrast   Final Result   Compression fracture of the L3 vertebral body with burst component.   There is diffuse sclerosis suggesting both subacute and acute   components. There is retropulsion of the posterior vertebral cortex   at the superior endplate with moderate central canal narrowing at   this level.        Mild-to-moderate compression deformities of T12 and L1 are stable   from a previous CT examination from 2019.        Findings of ankylosing spondylitis at the sacroiliac joints and lower   thoracic spine.        Osteopenia.        Multilevel discogenic degenerative changes of the lumbar spine. There   is multilevel bulging disc with facet and uncovertebral arthrosis.   There is mild to moderate multilevel central canal narrowing as   detailed above.        4.8 x 3.2 cm intermediate density mass at the L2-3 neural foramen on   the left extending laterally. Soft tissue mass such as neurogenic   tumor this is of primary concern. Hematoma is less likely. This can   be further evaluated with follow-up MRI with gadolinium.        Incidental findings include diffuse bladder distention,   cholelithiasis, small hiatal hernia.        Signed by: Dorian Garcia 2/8/2024 9:12 AM   Dictation workstation:   [id number]      CT head wo IV contrast   Final Result   Scalp hematoma overlies the frontal calvarium to the left of midline   and extends between the orbits over the nasal bones.        Displaced fracture of the mid right nasal bone.        Age-related parenchymal atrophy and diffuse small vessel ischemic   changes of the cerebral white matter and basal ganglia.        No CT evidence of acute intracranial hemorrhage or mass effect.        Signed by:  Dorian Garcia 2/8/2024 8:30 AM   Dictation workstation:   [id number]      CT cervical spine wo IV contrast   Final Result   Status post anterior and interbody fusion at C6-7. There is no   hardware failure.        Anterior subluxation of C5 and to a lesser extent C4 and C3 which is   likely degenerative. There is levo convexity of the cervical spine   and some straightening of the cervical lordosis. This appearance is   unchanged from 11/04/2023.        Persistent cervical spondylosis which is severe at C5-6. There is   multilevel hypertrophic facet arthrosis with bulging disc and   marginal osteophyte complex. There is mild to moderate central canal   narrowing at C5-6 and C6-7 which is unchanged. Effacement of the   anterior surface of spinal cord at both levels.        No sign of acute fracture or subluxation.        Signed by: Dorian Garcia 2/8/2024 8:46 AM   Dictation workstation:   [id number]      CT maxillofacial bones wo IV contrast   Final Result   Comminuted and mildly displaced fracture of the right nasal bone.        Possible tiny avulsion fracture at the tip of the anterior maxillary   process.        Lobular opacity in the left nasal cavity primarily surrounding the   inferior turbinate. This extends into the nasopharynx. This could be   related to hematoma or could be on an inflammatory or post   inflammatory basis. Further workup with direct visualization is   recommended.        Scalp hematoma overlies the frontal calvarium and extends between the   orbits and over the nasal bones. There is no underlying calvarial   fracture.        The nasal septum is bowed to the right. The ostiomeatal units remain   patent.        Signed by: Dorian Garcia 2/8/2024 8:37 AM   Dictation workstation:   [id number]      CT 3D reconstruction    (Results Pending)   CT chest abdomen pelvis w IV contrast    (Results Pending)       ED Course & MDM   ED Course as of 02/08/24 1101   Thu Feb 08, 2024    0755 Pt continuing to have pain; morphine did decrease her respirations slightly therefore we will give a small dose of fentanyl to see if this will help with her pain [RB]   0848 Reevaluated pt; pain is better controlled at this time. [RB]   0937 Called Dr. Mendez to speak about patient burst fracture as well as urinary retention at this time and left a voicemail. [RB]   0944 Spoke with trauma surgeon at  [facility] about the patient at this time states that the patient does require [facility] for [facility] evaluation.  I ordered a CT chest abdomen and pelvis which is currently pending and placed a consult [place] [facility]. [RB]      ED Course User Index  [RB] Juana Bender, APRN-CNP         Diagnoses as of 02/08/24 1101   Closed burst fracture of lumbar vertebra, initial encounter (CMS/[facility])   Closed supracondylar fracture of left humerus, initial encounter   Closed fracture of nasal bone, initial encounter   Fall, initial encounter       Medical Decision Making  On initial evaluation patient is obviously having discomfort at this time she does have a large contusion as well as an obvious left arm deformity.  She is neurovascularly intact.  No saddle anesthesia bowel or bladder incontinence.  She is declining any back pain currently.  She was initially given morphine for her pain which is caused her to become slightly hypoxic therefore repeat of fentanyl which did improve her discomfort.  Labs were obtained CBC shows no leukocytosis or signs of anemia coag screen and BMP shows hyperglycemia at 214 with slight hyponatremia at 133.  CT of the head facial bones C-spine T-spine and L-spine were ordered as well as x-rays x-ray does show a displaced supra condylar fracture of the distal humerus again patient remains neurovascularly intact good pulses with no deficit.  Wrist shows chronic deformities.  CT of the T-spine show enclosing spondylitis chronic mild compression deformity of T12 with  cholelithiasis patient is not having any abdominal pain.  She does have a compression fracture of L3 vertebral body with burst components there is retropulsion of the posterior vertebral cortex at the superior endplate.  CT of the head shows a scalp hematoma and displaced fracture of the right nasal bone C-spine shows no hardware failure and persistent cervical spondylosis severe but no sign of acute fracture or subluxation.  CT chest abdomen pelvis is currently pending at this time patient continues to decline any abdominal discomfort..  Patient was acutely retaining urine and Goel catheter was placed.  Spoke with [facility] trauma surgery Dr. Gamez as well as ER doctor dr mee tay.  [facility].  Our trauma team is aware.  [facility] an additional [facility]  Due to the volume and acuity of patients in the emergency department Dr. Rodriguez was unable to address the supracondylar fracture and she was placed in a splint.  [facility] is aware of this.        Procedure  Critical Care    Performed by: DENNIS Cordero-CNP  Authorized by: Stevan Rodriguez MD    Critical care provider statement:     Critical care time (minutes):  30    Critical care time was exclusive of:  Separately billable procedures and treating other patients    Critical care was necessary to treat or prevent imminent or life-threatening deterioration of the following conditions:  Trauma    Critical care was time spent personally by me on the following activities:  Blood draw for specimens, discussions with consultants, evaluation of patient's response to treatment, examination of patient, re-evaluation of patient's condition, ordering and review of radiographic studies, ordering and review of laboratory studies and ordering and performing treatments and interventions    Care discussed with: accepting provider at another facility           Juana Bender,  DENNIS-CNP  02/08/24 1107

## 2024-02-08 NOTE — HOSPITAL COURSE
83F with presents as trauma consult from [facility] [place] s/p mechanical fall out of bed onto left side at assisted living facility earlier today. On ground for less than 1 hour before staff at facility helped her get up. +face strike without LOC, no blood thinners reported. Workup at OSH notable for left humerus fracture, L3 burst fracture, and nasal bone fracture. Ortho trauma/spine consulted, recommending additional imaging of humerus and spine. Plan for operative fixation of humerus. Admitted to [facility], and subsequently taken to the OR on 2/9 for ORIF of humerus. Patient nonweightbearing left upper extremity in splint until follow-up. Pt returned to [facility] in stable condition and evaluated by PT and OT who recommended moderate intensity level of continued care. Geriatrics team consulted for recommendations on restarting home medications during hospital stay. Home steroid, methotrexate, H2 blocker, evening haldol/zyprexa started.  Patient is to follow up outpatient with Dr. Quick for spinal fractures and with [facility] clinic for nasal bone fracture.     Diabetes team was consulted for post discharge management given A1c greater than 8.    Patient seen and evaluated by PT/OT, recommended moderate intensity level of continue care at discharge.    At the time of discharge, patient's pain was controlled with oral analgesia, patient was urinating, having BMs, sleeping, and eating well. Based on PT/OT's recommendation, patient was discharged to SNF with scripts and follow up appointments. Discharge plan was discussed with the patient and all of the patient's questions were answered. Patient and family agreeable to plan.

## 2024-02-08 NOTE — CONSULTS
Orthopaedic Surgery Spine Consult Note    Subjective:    Injury: L3 burst fx  HPI: 83F (dermatomyositis, DM, schizoaffective disorder) transfer from [place] after mGLF. Also w L distal humerus fx. Denies b/b or SA. 3/5 L HF, 4/5 R HF otherwise 5/5 strength BLE. SILT throughout. No UMN signs. Weak but intact rectal tone. CT w bridging osteophytes outside of zone of injury, concerning for stiff spine.     Orthopaedic Problems/Injuries: as above in addition to L extra-articular distal humerus fx  Other Injuries: nasal bone fx    PMH: per above/EMR  PSH: per above/EMR  SocHx:      - Lives at home  FamHx:  Non-contributory to this patient's acute orthopaedic problem other than as mentioned in HPI  All: Reviewed in EMR  Meds: Reviewed in EMR    Objective:  · Physical Exam:  - Constitutional: No acute distress, cooperative  - Eyes: EOM grossly intact  - Head/Neck: Trachea midline  - Respiratory/Thorax: Normal work of breathing  - Cardiovascular: RRR on peripheral palpation  - Gastrointestinal: Nondistended  - Psychological: Appropriate mood/behavior  - Skin: Warm and dry. Additional findings in musculoskeletal evaluation  - Musculoskeletal:  Spine Exam:  **Upper extremity exam deferred given UE fracture.    No TTP along CTL spine    L1: SILT       L2: SILT      Hip flexors 3/5 Left; 4/5 Right  L3: SILT      Knee extension 5/5 Left; 5/5 Right  L4: SILT      Tib Ant. (Dorsiflexion) 5/5 Left; 5/5 Right  L5: SILT      EHL 5/5 Left; 5/5 Right  S1: SILT      Plantarflexion 5/5 Left; 5/5 Right    Patellar reflex: 2+   Bilaterally    Rectal sensation and tone intact, but weak  No clonus     ROS      - 14 point ROS negative except as above    Results for orders placed or performed during the hospital encounter of 02/08/24 (from the past 24 hour(s))   CBC and Auto Differential   Result Value Ref Range    WBC 10.0 4.4 - 11.3 x10*3/uL    nRBC 0.0 0.0 - 0.0 /100 WBCs    RBC 3.34 (L) 4.00 - 5.20 x10*6/uL    Hemoglobin 10.4 (L) 12.0 -  16.0 g/dL    Hematocrit 30.9 (L) 36.0 - 46.0 %    MCV 93 80 - 100 fL    MCH 31.1 26.0 - 34.0 pg    MCHC 33.7 32.0 - 36.0 g/dL    RDW 17.2 (H) 11.5 - 14.5 %    Platelets 283 150 - 450 x10*3/uL    Neutrophils % 83.2 40.0 - 80.0 %    Immature Granulocytes %, Automated 1.0 (H) 0.0 - 0.9 %    Lymphocytes % 6.8 13.0 - 44.0 %    Monocytes % 8.2 2.0 - 10.0 %    Eosinophils % 0.6 0.0 - 6.0 %    Basophils % 0.2 0.0 - 2.0 %    Neutrophils Absolute 8.33 (H) 1.60 - 5.50 x10*3/uL    Immature Granulocytes Absolute, Automated 0.10 0.00 - 0.50 x10*3/uL    Lymphocytes Absolute 0.68 (L) 0.80 - 3.00 x10*3/uL    Monocytes Absolute 0.82 (H) 0.05 - 0.80 x10*3/uL    Eosinophils Absolute 0.06 0.00 - 0.40 x10*3/uL    Basophils Absolute 0.02 0.00 - 0.10 x10*3/uL   Comprehensive metabolic panel   Result Value Ref Range    Glucose 156 (H) 74 - 99 mg/dL    Sodium 138 136 - 145 mmol/L    Potassium 3.1 (L) 3.5 - 5.3 mmol/L    Chloride 103 98 - 107 mmol/L    Bicarbonate 29 21 - 32 mmol/L    Anion Gap 9 (L) 10 - 20 mmol/L    Urea Nitrogen 6 6 - 23 mg/dL    Creatinine 0.37 (L) 0.50 - 1.05 mg/dL    eGFR >90 >60 mL/min/1.73m*2    Calcium 8.1 (L) 8.6 - 10.6 mg/dL    Albumin 2.9 (L) 3.4 - 5.0 g/dL    Alkaline Phosphatase 67 33 - 136 U/L    Total Protein 5.6 (L) 6.4 - 8.2 g/dL    AST 25 9 - 39 U/L    Bilirubin, Total 0.3 0.0 - 1.2 mg/dL    ALT 25 7 - 45 U/L   BLOOD GAS VENOUS FULL PANEL   Result Value Ref Range    POCT pH, Venous 7.32 (L) 7.33 - 7.43 pH    POCT pCO2, Venous 55 (H) 41 - 51 mm Hg    POCT pO2, Venous 36 35 - 45 mm Hg    POCT SO2, Venous 46 45 - 75 %    POCT Oxy Hemoglobin, Venous 45.4 45.0 - 75.0 %    POCT Hematocrit Calculated, Venous 33.0 (L) 36.0 - 46.0 %    POCT Sodium, Venous 135 (L) 136 - 145 mmol/L    POCT Potassium, Venous 3.2 (L) 3.5 - 5.3 mmol/L    POCT Chloride, Venous 103 98 - 107 mmol/L    POCT Ionized Calicum, Venous 1.11 1.10 - 1.33 mmol/L    POCT Glucose, Venous 163 (H) 74 - 99 mg/dL    POCT Lactate, Venous 1.1 0.4 - 2.0  mmol/L    POCT Base Excess, Venous 1.4 -2.0 - 3.0 mmol/L    POCT HCO3 Calculated, Venous 28.3 (H) 22.0 - 26.0 mmol/L    POCT Hemoglobin, Venous 10.9 (L) 12.0 - 16.0 g/dL    POCT Anion Gap, Venous 7.0 (L) 10.0 - 25.0 mmol/L    Patient Temperature 37.0 degrees Celsius    FiO2 0 %   Type and Screen   Result Value Ref Range    ABO TYPE A     Rh TYPE POS     ANTIBODY SCREEN NEG    Coagulation Screen   Result Value Ref Range    Protime 12.8 9.8 - 12.8 seconds    INR 1.1 0.9 - 1.1    aPTT 25 (L) 27 - 38 seconds       CT humerus left wo IV contrast   Preliminary Result   1. Comminuted supracondylar fracture of the left humerus with   fracture lines extending into the joint space. Posterior displacement   and foreshortening of the distal fracture fragment again noted.   2. No acute fracture of the ulna or radius, although evaluation is   limited secondary to severe osteopenia.   3. Diffuse soft tissue edema and blood products surrounding the   supracondylar fracture. Small volume elbow hemarthrosis.             I personally reviewed the images/study and I agree with the resident   findings as stated. This study was interpreted at [place], [place].        MACRO:   None             Dictation workstation:   [id number]      XR chest 1 view   Final Result   As above without acute cardiopulmonary process evident.        I personally reviewed the images/study and I agree with the findings   as stated by Dr. Tj Penaloza. This study was interpreted at   [place], [place].        MACRO:   None        Signed by: Renny Ford 2/8/2024 2:30 PM   Dictation workstation:   [id number]      XR humerus left   Final Result   Fracturing of the distal humerus as above which is felt to be similar   to the prior radiographs.        I personally reviewed the images/study and I agree with the findings   as stated by Dr. Tj Penaloza. This study was  interpreted at   [place], [place].        MACRO:   None        Signed by: Renny Ford 2/8/2024 2:36 PM   Dictation workstation:   [id number]      XR elbow left 1-2 views   Final Result   Fracturing of the distal humerus as above which is felt to be similar   to the prior radiographs.        I personally reviewed the images/study and I agree with the findings   as stated by Dr. Tj Penaloza. This study was interpreted at   [place], [place].        MACRO:   None        Signed by: Renny Ford 2/8/2024 2:36 PM   Dictation workstation:   [id number]      XR knee left 1-2 views   Final Result   As above without osseous injury evident.        MACRO:   None        Signed by: Renny Ford 2/8/2024 2:21 PM   Dictation workstation:   [id number]      MR lumbar spine wo IV contrast    (Results Pending)   MR thoracic spine wo IV contrast    (Results Pending)       Imaging: CT w L3 burst fracture, no obvious retropulsion. There are bridging osteophytes outside of zone of injury, concerning for stiff spine.     Assessment/Plan:    Injury: L3 burst fx  HPI: 83F (dermatomyositis, DM, schizoaffective disorder) transfer from [place] after mGLF. Also w L distal humerus fx. Denies b/b or SA. 3/5 L HF, 4/5 R HF otherwise 5/5 strength BLE. SILT throughout. No UMN signs. Weak but intact rectal tone. CT w bridging osteophytes outside of zone of injury, concerning for stiff spine.   Plan: MRI T/L spine wo contrast. Strict T/L spine precautions.     Plan:   - NPO pending MRI T/L wo contrast.  - MRI T/L spine STAT, ordered  - Maintain strict T/L spine precautions, log roll only  - Admit to trauma , clearance pending MRI T/L spine for OR tomorrow 2/9 for ORIF L humerus with Dr. Dover  - Strict Bedrest, strict T/L spine precautions  - DVT PPx: please hold DVT ppx  - Remainder of plan pending MRI    Consult seen and evaluated within 30 minutes  of notification.     This consult was staffed with attending physician, Dr. Quick.    Francisco Damico MD  Orthopaedic Surgery PGY-1  Resident On-Call  Pager: [phone]  Available via Epic Chat    While admitted, this patient will be followed by the Ortho Spine Team. Please contact below residents with any questions (available via Epic Chat).     First call: John Morin, PGY-2   Second call: Rober De La Torre, PGY-4    Orthopedic Spine Attending    As noted, the patient does have an L3 vertebral body fracture.  There does not appear to be malalignment.  However, the patient does have a stiff spine.  There likely is significant degenerative stenosis.  Given the clinical and radiographic presentation, I would recommend an MRI to further assess the stability of this fracture.  Maintain spinal precautions until the MRI is performed.    ** Dictated with voice recognition software and not immediately reviewed for errors in grammar and/or spelling **

## 2024-02-08 NOTE — PROGRESS NOTES
Pharmacy Medication History Review    Jennifer Mancini is a 83 y.o. female admitted for Left supracondylar humerus fracture, closed, initial encounter. Pharmacy reviewed the patient's [id number] medications and allergies for accuracy.    The list below reflects the updated PTA list. Comments regarding how patient may be taking medications differently can be found in the Admit Orders Activity  Prior to Admission Medications   Prescriptions Last Dose   Fish OiL 60- mg capsule    Sig: Take 1 capsule (500 mg) by mouth once daily.   OLANZapine (ZyPREXA) 20 mg tablet    Sig: Take 1 tablet (20 mg) by mouth once daily at bedtime.   acetaminophen (Tylenol 8 HOUR) 650 mg ER tablet    Sig: Take 1 tablet (650 mg) by mouth 3 times a day. Do not crush, chew, or split.   ascorbic acid (Vitamin C) 500 mg tablet    Sig: Take 1 tablet (500 mg) by mouth 2 times a day. With ferrous sulfate for absorption   aspirin 81 mg EC tablet    Sig: Take 1 tablet (81 mg) by mouth once daily.   atorvastatin (Lipitor) 80 mg tablet    Sig: Take 1 tablet (80 mg) by mouth once daily at bedtime.   b complex 0.4 mg tablet    Sig: Take 1 tablet by mouth 2 times a day.   benzocaine-menthol (Cepacol Sore Throat, nargis-men,) 15-3.6 mg lozenge    Sig: Dissolve 1 lozenge in the mouth every 4 hours if needed for sore throat.   benzonatate (Tessalon) 100 mg capsule    Sig: Take 1 capsule (100 mg) by mouth 3 times a day as needed for cough. Do not crush or chew.   calcium carbonate-vitamin D3 600 mg-20 mcg (800 unit) tablet    Sig: Take 1 tablet by mouth once daily.   cetirizine (ZyrTEC) 10 mg tablet    Sig: Take 1 tablet (10 mg) by mouth once daily.   clobetasoL 0.05 % shampoo    Sig: Apply 1 Application topically 1 (one) time per week.   clobetasol (Temovate) 0.05 % cream    Sig: Apply 1 Application topically 2 times a day.   cyanocobalamin (Vitamin B-12) 1,000 mcg tablet    Sig: Take 1 tablet (1,000 mcg) by mouth once daily.   docusate sodium  (Colace) 100 mg capsule    Sig: Take 1 capsule (100 mg) by mouth 2 times a day.   famotidine (Pepcid) 40 mg tablet    Sig: Take 1 tablet (40 mg) by mouth once daily at bedtime.   ferrous sulfate 325 (65 Fe) MG EC tablet    Sig: Take 65 mg by mouth 2 times a day. Do not crush, chew, or split.   fluticasone (Flonase) 50 mcg/actuation nasal spray    Sig: Administer 1 spray into each nostril once daily. Shake gently. Before first use, prime pump. After use, clean tip and replace cap.   folic acid (Folvite) 1 mg tablet    Sig: Take 1 tablet (1 mg) by mouth once daily.   furosemide (Lasix) 40 mg tablet    Sig: Take 1 tablet (40 mg) by mouth once daily.   glucosam/robert-msm1/C/bailey/bosw (OSTEO BI-FLEX TRIPLE STRENGTH ORAL)    Sig: Take 1 tablet by mouth 2 times a day.   guaiFENesin (Mucinex) 600 mg 12 hr tablet    Sig: Take 1 tablet (600 mg) by mouth 2 times a day. Do not crush, chew, or split.   guaiFENesin (Robitussin) 100 mg/5 mL syrup    Sig: Take 5 mL (100 mg) by mouth 4 times a day as needed for cough or congestion.   haloperidol (Haldol) 0.5 mg tablet    Sig: Take 1 tablet (0.5 mg) by mouth once daily.   hydrocortisone 2.5 % cream    Sig: Apply 1 Application topically 2 times a day.   ketoconazole (NIZOral) 2 % cream    Sig: Apply 1 Application topically 3 times a day.   ketoconazole (NIZOral) 2 % shampoo    Sig: Apply 1 Application topically 2 times a week. As needed   lactobacillus acidophilus & bulgar (Lactinex) 1 million cell chewable tablet    Sig: Chew 1 tablet once daily.   latanoprost (Xalatan) 0.005 % ophthalmic solution    Sig: Administer 1 drop into both eyes once daily at bedtime.   lidocaine (Xylocaine) 2 % solution    Sig: Lidocaine 2% viscous   Swish and spit 10mg every 4 hours as needed   losartan (Cozaar) 50 mg tablet    Sig: Take 1 tablet (50 mg) by mouth 2 times a day.   metFORMIN (Glucophage) 1,000 mg tablet    Sig: Take 1 tablet (1,000 mg) by mouth 2 times a day with meals.   methotrexate  (Trexall) 2.5 mg tablet    Sig: Take 6 tablets (15 mg total) by mouth 1 (one) time per week.  6 tablets (15mg) once a week on Fridays   multivitamin with minerals tablet    Sig: Take 1 tablet by mouth once daily.   neomycin-bacitracnZn-polymyxnB (Neosporin Original) ointment    Sig: Apply 1 Application topically 3 times a day.   polyethylene glycol (Glycolax, Miralax) 17 gram packet    Sig: Take 17 g by mouth once daily.   predniSONE (Deltasone) 2.5 mg tablet    Sig: Take 3 tablets (7.5 mg) by mouth once daily.   venlafaxine XR (Effexor-XR) 37.5 mg 24 hr capsule    Sig: Take 1 capsule (37.5 mg) by mouth once daily. Do not crush or chew.   zinc sulfate (Zincate) 220 (50 Zn) MG capsule    Sig: Take 1 capsule (50 mg of elemental zinc) by mouth once daily.      Facility-Administered Medications: None        The list below reflects the updated allergy list. Please review each documented allergy for additional clarification and justification.  Allergies  Reviewed by Susan Agarwal RN on 2/8/2024   No Known Allergies         Patient declines M2B at discharge. Patient is resident at [facility].    Sources:    -received faxed medication list of active med orders from [facility] (paper copy in chart).    Below are additional concerns with the patient's PTA list:  none      David Goldberg, PharmD  Transitions of Care Pharmacist   Meds Ambulatory and Retail Services  Please reach out via Secure Chat for questions, or if no response call [facility] or [facility]    English

## 2024-02-08 NOTE — H&P
[facility]  TRAUMA SERVICE - HISTORY AND PHYSICAL / CONSULT    Patient Name: Jennifer Mancini  MRN: [id number]  Admit Date: 208  : 1940  AGE: 83 y.o.   GENDER: female  ==============================================================================  MECHANISM OF INJURY / CHIEF COMPLAINT:   83F with presents as trauma consult from [facility] s/p mechanical fall out of bed onto left side at assisted living facility earlier today. On ground for less than 1 hour before staff at facility helped her get up. +face strike without LOC, no blood thinners reported. Workup at OSH notable for left humerus fracture, L3 burst fracture, and nasal bone fracture.     Complains of moderate left arm pain, exacerbated to touch/movement. Denies numbness/tingling to legs or current back pain. Does c/o mild facial pain without vision changes.     INJURIES:   - L3 burst fracture, comminuted. 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, DM, HTN, HLD, WEN, schizoaffective, leg swelling    INCIDENTAL FINDINGS:  Large hiatal hernia  Cervical spine degenerative changes    ==============================================================================  ADMISSION PLAN OF CARE:    ## L3 burst fx, ?age T12/L1 fx's  - Ortho spine consulted, maintain strict TL precautions, MRI TL spine pending to assess for instability, hold chemoppx    ## Left humerus fx  - Ortho trauma consulted, NWB LUE, extremity splinted, ORIF elbow tomorrow  - EKG NSR, RCRI 0    ## Nasal bone fracture  - ED to consult face trauma, anticipated no acute intervention, outpt follow up as needed, sinus precautions    ## comorbids  - pharmacy med rec team involved, restart home steroid, methotrexate, H2 blocker, evening haldol/zyprexa for now    Dispo: TBD, clear for  OR with orthopedics tomorrow.     Discussed with Dr. Gamez    ==============================================================================  PAST MEDICAL HISTORY:   PMH:   Past Medical History:   Diagnosis Date    Hypertensive heart disease without heart failure 2019    Hypertensive heart disease without CHF    Other nondisplaced fracture of seventh cervical vertebra, subsequent encounter for fracture with routine healing 2019    Other closed nondisplaced fracture of seventh cervical vertebra with routine healing, subsequent encounter    Other nondisplaced fracture of sixth cervical vertebra, subsequent encounter for fracture with routine healing 2019    Other closed nondisplaced fracture of sixth cervical vertebra with routine healing, subsequent encounter    Personal history of other diseases of the female genital tract 2019    History of uterine prolapse    Personal history of other endocrine, nutritional and metabolic disease 2019    History of hyperlipidemia    Personal history of other mental and behavioral disorders 2021    History of schizoaffective disorder    Personal history of other specified conditions 2021    History of chronic pain    Schizoaffective disorder, bipolar type (CMS/[facility]) 2019    Schizoaffective disorder, bipolar type    Unspecified fracture of left femur, initial encounter for closed fracture (CMS/[facility]) 2021    Fracture of left femur     Last menstrual period: n/a    PSH:   Past Surgical History:   Procedure Laterality Date    CT ANGIO NECK W  2019    CT NECK ANGIO W AND WO IV CONTRAST 2019 [facility] CLINICAL LEGACY    OTHER SURGICAL HISTORY  2019    Tonsillectomy    OTHER SURGICAL HISTORY  2019    Rotator cuff repair    OTHER SURGICAL HISTORY  2019    Wrist surgery    OTHER SURGICAL HISTORY  2019    Uterine surgery     FH: colon cancer in mother,   No family history on file.  SOCIAL HISTORY:     Smoking: denies   Social History     Tobacco Use   Smoking Status Not on file   Smokeless Tobacco Not on file       Alcohol: denies   Social History     Substance and Sexual Activity   Alcohol Use None       Drug use: denies    MEDICATIONS: pending review  Prior to Admission medications    Not on File     ALLERGIES: dust/pollen  No Known Allergies    REVIEW OF SYSTEMS:  Review of Systems   Constitutional:  Negative for chills and fever.   HENT:  Positive for facial swelling. Negative for ear pain.    Eyes:  Negative for photophobia and redness.   Respiratory:  Negative for cough and shortness of breath.    Cardiovascular:  Negative for chest pain and palpitations.   Gastrointestinal:  Negative for abdominal pain and vomiting.   Genitourinary:  Negative for flank pain and pelvic pain.   Musculoskeletal:  Positive for back pain. Negative for neck pain.   Skin:  Positive for wound. Negative for rash.   Neurological:  Negative for dizziness and weakness.   Psychiatric/Behavioral:  Negative for confusion and decreased concentration.      PHYSICAL EXAM:  PRIMARY SURVEY:  Primary Survey  A: Airway intact  B: Breathing spontaneously, breath sounds are bilateral and equal  C: Pulses 1-2+throughout and equal.    D: Pupils equal and reactive, GCS 15 (E4, V5, M6). Moving all 4 extremities  E: Patient exposed   SECONDARY SURVEY/PHYSICAL EXAM:  Physical Exam  Secondary Survey:  NEURO: A&O x3, GCS 15, CN II-XII grossly intact, PICHARDO equally, muscle strength 5/5 distally x4, no sensory deficits  HEAD: NC/AT scalp  EENT: PERRL, EOMI. External ear without laceration. Scattered facial bruising, small cephalohematoma between eyebrows. Oral mucosa and tongue without lacerations, teeth in place but multiple chronically missing   NECK: No cervical spine tenderness or step offs, no lacerations or abrasions, trachea midline. No JVD.  RESPIRATORY/CHEST: No abrasions, contusions, crepitus or tenderness to palpation. Non-labored, equal chest  expansion on room air.  CV: RRR, nml S1 and S2.  Pulses bilateral: 2+ radial, 2+DP, 2+PT. No TTP of chest  ABDOMEN: soft, nontender, nondistended, obese. No abrasions or lacerations.  PELVIS: Stable to compression.  : indwelling [place]ana draining clear yellow urine  BACK/SPINE: No thoracic midline tenderness, step-offs or deformities. +mild midline lumbar midline tenderness without step-offs, or deformities.  No abrasions, hematomas or lacerations noted.  EXTREMITIES: non-tender BLEs, RUE. L dorsal hand ecchymosis that is non-tender. +left proximal upper arm deformity that is tender. +scar to left shoulder.  IMAGING SUMMARY:  (summary of findings, not a copy of dictation)  CT Head/Face: nasal bone fracture on right, negative CTH acute injury  CT C-Spine: chronic degen changes, no acute injury  CT Chest/Abd/Pelvis: large hiatal hernia in left hemithorax, no acute injury  CT TL spine: L3 burst fracture, indeterminate age T12/L1 fractures  CXR/PXR: no injury  Other(s): XR LUE with supracondylar fracture    LABS:  Results from last 7 days   Lab Units 02/08/24  1257 02/08/24  0635   WBC AUTO x10*3/uL 10.0 8.5   HEMOGLOBIN g/dL 10.4* 12.3   HEMATOCRIT % 30.9* 37.4   PLATELETS AUTO x10*3/uL 283 318   NEUTROS PCT AUTO % 83.2 81.3   LYMPHS PCT AUTO % 6.8 8.7   MONOS PCT AUTO % 8.2 7.2   EOS PCT AUTO % 0.6 1.1     Results from last 7 days   Lab Units 02/08/24  0635   APTT seconds 24*   INR  1.1     Results from last 7 days   Lab Units 02/08/24  0635   SODIUM mmol/L 133*   POTASSIUM mmol/L 3.5   CHLORIDE mmol/L 98   CO2 mmol/L 27   BUN mg/dL 8   CREATININE mg/dL 0.47*   CALCIUM mg/dL 9.0   GLUCOSE mg/dL 214*               I have reviewed all laboratory and imaging results ordered/pertinent for this encounter.     John Magallon PA-C

## 2024-02-08 NOTE — PROGRESS NOTES
Orthopaedic Surgery Consult H&P    HPI:   Orthopaedic Problems/Injuries: L extra-articular distal humerus fx  Other Injuries: L3 burst fx    83 y.o. female PMH dermatomyositis, DM, schizoaffective disorder presents after transfer from [place] after fall from bed sustaining above. Denies numbness, tingling, and open wounds on the affected limb. RHD.    PMH: per above/EMR  PSH: per above/EMR  SocHx:      -  Denies tobacco use      -  Denies EtOH use      -  Denies other drug use  FamHx:  Non-contributory to this patient's acute orthopaedic problem.   Allergies: Reviewed in EMR  Meds: Reviewed in EMR    ROS      - 14 point ROS negative except as above    Physical Exam:  Gen: AOx3, NAD  HEENT: normocephalic atraumatic  Psych: appropriate mood and affect  Resp: nonlabored breathing  Cardiac: Extremities WWP, RRR to peripheral palpation  Neuro: CN 2-12 grossly intact  Skin: no rashes    Left upper extremity:   -Skin intact, obvious deformity  -Tender at site of injury with painful ROM.  -Fires axillary/AIN/PIN/ulnar distributions  -SILT axillary/radial/median/ulnar distributions  -Palpable radial pulse, cap refill brisk  -Compartments soft and compressible    A full secondary exam was performed and all relevant findings discussed and noted above.    Imaging:  Imaging of the L humerus and elbow demonstrate an extra-articular distal humerus fx. There is previous hardware in the proximal humerus. The ulnohumeral joint is concentrically reduced.    Assessment:  Orthopaedic Problems/Injuries: L extra-articular distal humerus fx    83F (dermatomyositis, DM, schizoaffective disorder) transfer from [facility] after mGLF. Also w L3 burst fx. NVI. Placed in a LAS.    Plan:  - admit to trauma  - plan for operative fixation  - patient is c/p for L elbow ORIF w/ Dr. Dover on 2/9/2024.  - Please document medical clearance prior to OR  - WB: DINORA MURRIETA in LAS  - Abx: Not indicated preop  - Diet: NPO  - DVT: per trauma  - Please obtain all  preop labs (CBC,BMP,EKG, CXR, Coags, Type and Screen)  - Goel: in place    - Dispo: admit to trauma, OR 2/9    Desean Aguilar MD  Orthopaedic Surgery, PGY3    While admitted, this patient will be followed by the Ortho Trauma Team. Please contact the residents listed below with any questions (available via Epic Chat weekdays). Please page [phone] (ortho on-call) after 6pm and on weekends.    Ortho Trauma  First Call: Keagan Ratliff, PGY-1  Second Call: Balaji Perez, PGY-2  Third Call: Desean Aguilar, PGY-3

## 2024-02-08 NOTE — CONSULTS
Reason For Consult  Nasal bone fracture    History Of Present Illness  Jennifer Mancini is a 83 y.o. female  presented to the [facility] ED as a transfer from [place] s/p ground level home at nursing home. Denies LOC, not on blood thinner     Past Medical History    Hypertensive heart disease without heart failure 12/17/2019     Hypertensive heart disease without CHF   Other nondisplaced fracture of seventh cervical vertebra, subsequent encounter for fracture with routine healing 12/17/2019    Other closed nondisplaced fracture of seventh cervical vertebra with routine healing, subsequent encounter   Other nondisplaced fracture of sixth cervical vertebra, subsequent encounter for fracture with routine healing 12/17/2019    Other closed nondisplaced fracture of sixth cervical vertebra with routine healing, subsequent encounter   Personal history of other diseases of the female genital tract 12/17/2019    History of uterine prolapse   Personal history of other endocrine, nutritional and metabolic disease 12/17/2019    History of hyperlipidemia   Personal history of other mental and behavioral disorders 06/14/2021    History of schizoaffective disorder   Personal history of other specified conditions 06/14/2021    History of chronic pain   Schizoaffective disorder, bipolar type (CMS/[facility]) 12/17/2019    Schizoaffective disorder, bipolar type   Unspecified fracture of left femur, initial encounter for closed fracture (CMS/[facility]) 06/14/2021    Fracture of left femur        Surgical History   CT ANGIO NECK W   12/13/2019    CT NECK ANGIO W AND WO IV CONTRAST 12/13/2019 [facility] CLINICAL LEGACY   OTHER SURGICAL HISTORY   12/17/2019    Tonsillectomy   OTHER SURGICAL HISTORY   12/17/2019    Rotator cuff repair   OTHER SURGICAL HISTORY   12/17/2019    Wrist surgery   OTHER SURGICAL HISTORY   12/17/2019    Uterine surgery      Social History  She has no history on file for tobacco use, alcohol use, and drug use.    Family History  No  family history on file.     Allergies  Patient has no known allergies.    Review of Systems     Review of Systems (notable positive and negative findings bolded)  Constitutional: Patient denies fever, chills  Ears: Patient denies changes in hearing, deafness, drainage from ears.  Mouth/Neck: Patient denies changes in speech, odynophagia dysphagia, drooling, throat pain, inability to swallow.  Respiratory: Patient denies cough, shortness of breath.  Cardiac: Patient denies chest pain, palpitations.  Gastrointestinal: Patient denies nausea, vomiting, abdominal pain.  Genitourinary: Patient denies flank pain, increased frequency, hematuria.  Neurological: Patient denies dizziness,  seizures, syncope  Psychiatric: Patient denies mood changes  Skin: Patient denies masses, ulcer.       Physical Exam   GENERAL: Well appearing. No acute distress. Laying comfortably in bed.  HEAD: Symmetric facial features, Atraumatic, normocephalic.   EYES: EOM intact, sclera normal, periorbital ecchymosis.  EARS: Normal external ears, external auditory canals.  NOSE: External nose midline, No bleeding or drainage, no septal hematoma  Sinuses non-tender to palpation bilaterally.  ORAL: Normal, dry mucus membranes. Normal floor of mouth without induration or raising. Normal tongue without laceration or edema. Normal oropharynx without asymmetry. Uvula symmetric. Multiple missing teeth. No fractures or avulsion of teeth. Stable and reproducible occlusion. No TMJ clicking or popping. Maximal incisal opening ~40mm. Patient tolerating own secretions.  NECK: Trachea midline without masses.  RESPIRATION/VOICE: Breathing comfortable with 2L O2 via nasal canula.   CARDIOVASCULAR: Regular rate and rhythm on monitor  NEURO: Awake and alert. CN5. CN7 intact b/l  PSYCH: Appropriate mood and affect.      Last Recorded Vitals  Blood pressure (!) 169/93, pulse (!) 104, temperature 36.7 °C (98.1 °F), temperature source Temporal, resp. rate 19, height 1.549  "m (5' 1\"), weight 68 kg (150 lb), SpO2 99 %.    Relevant Results  Comminuted and mildly displaced fracture of the right nasal bone.         Assessment/Plan   Jennifer Mancini is a 83 y.o. female  presented to the [facility] ED  s/p ground level home at nursing home. Patient have a mildly displaced nasal bone fracture.    Plan:  -No acute surgical intervention indicated  -follow up in the  OMFS clinic in 2 weeks  -OMFS will sign off ,please page with any question [phone]    Department of Oral & Maxillofacial Surgery  [address] ([facility] [employer] [facility])  [place] [address]    Office phone number: [phone].  Office fax number: [phone].  Team Pager: [phone].  Patients can contact the [id number] through the hosptial  [phone].       Shana Day, DMD    "

## 2024-02-09 ENCOUNTER — ANESTHESIA (OUTPATIENT)
Dept: OPERATING ROOM | Facility: HOSPITAL | Age: 84
DRG: 493 | End: 2024-02-09
Payer: COMMERCIAL

## 2024-02-09 ENCOUNTER — APPOINTMENT (OUTPATIENT)
Dept: RADIOLOGY | Facility: HOSPITAL | Age: 84
DRG: 493 | End: 2024-02-09
Payer: COMMERCIAL

## 2024-02-09 ENCOUNTER — APPOINTMENT (OUTPATIENT)
Dept: CARDIOLOGY | Facility: HOSPITAL | Age: 84
DRG: 493 | End: 2024-02-09
Payer: COMMERCIAL

## 2024-02-09 ENCOUNTER — ANESTHESIA EVENT (OUTPATIENT)
Dept: OPERATING ROOM | Facility: HOSPITAL | Age: 84
DRG: 493 | End: 2024-02-09
Payer: COMMERCIAL

## 2024-02-09 PROBLEM — I35.0 AORTIC STENOSIS: Status: ACTIVE | Noted: 2024-02-09

## 2024-02-09 PROBLEM — R01.1 MURMUR: Status: ACTIVE | Noted: 2024-02-09

## 2024-02-09 PROBLEM — D64.9 ANEMIA: Status: ACTIVE | Noted: 2024-02-09

## 2024-02-09 PROBLEM — I25.10 CAD (CORONARY ARTERY DISEASE): Status: ACTIVE | Noted: 2024-02-09

## 2024-02-09 PROBLEM — I10 HTN (HYPERTENSION): Status: ACTIVE | Noted: 2024-02-09

## 2024-02-09 PROBLEM — I21.9 MI (MYOCARDIAL INFARCTION) (MULTI): Status: ACTIVE | Noted: 2024-02-09

## 2024-02-09 LAB
ALBUMIN SERPL BCP-MCNC: 2.7 G/DL (ref 3.4–5)
ANION GAP SERPL CALC-SCNC: 11 MMOL/L (ref 10–20)
AORTIC VALVE MEAN GRADIENT: 14 MMHG
AORTIC VALVE PEAK VELOCITY: 2.47 M/S
AV PEAK GRADIENT: 24.4 MMHG
AVA (PEAK VEL): 1.52 CM2
AVA (VTI): 1.5 CM2
BUN SERPL-MCNC: 8 MG/DL (ref 6–23)
CALCIUM SERPL-MCNC: 9.1 MG/DL (ref 8.6–10.6)
CHLORIDE SERPL-SCNC: 101 MMOL/L (ref 98–107)
CO2 SERPL-SCNC: 29 MMOL/L (ref 21–32)
CREAT SERPL-MCNC: 0.39 MG/DL (ref 0.5–1.05)
EGFRCR SERPLBLD CKD-EPI 2021: >90 ML/MIN/1.73M*2
ERYTHROCYTE [DISTWIDTH] IN BLOOD BY AUTOMATED COUNT: 17.4 % (ref 11.5–14.5)
GLUCOSE BLD MANUAL STRIP-MCNC: 174 MG/DL (ref 74–99)
GLUCOSE SERPL-MCNC: 160 MG/DL (ref 74–99)
HCT VFR BLD AUTO: 30.6 % (ref 36–46)
HGB BLD-MCNC: 9.8 G/DL (ref 12–16)
LEFT VENTRICLE INTERNAL DIMENSION DIASTOLE: 1.1 CM (ref 3.5–6)
LEFT VENTRICULAR OUTFLOW TRACT DIAMETER: 1.8 CM
MAGNESIUM SERPL-MCNC: 1.49 MG/DL (ref 1.6–2.4)
MCH RBC QN AUTO: 30.5 PG (ref 26–34)
MCHC RBC AUTO-ENTMCNC: 32 G/DL (ref 32–36)
MCV RBC AUTO: 95 FL (ref 80–100)
MITRAL VALVE E/A RATIO: 0.87
NRBC BLD-RTO: 0 /100 WBCS (ref 0–0)
PHOSPHATE SERPL-MCNC: 3.6 MG/DL (ref 2.5–4.9)
PLATELET # BLD AUTO: 280 X10*3/UL (ref 150–450)
POTASSIUM SERPL-SCNC: 3.9 MMOL/L (ref 3.5–5.3)
RBC # BLD AUTO: 3.21 X10*6/UL (ref 4–5.2)
SODIUM SERPL-SCNC: 137 MMOL/L (ref 136–145)
WBC # BLD AUTO: 9.4 X10*3/UL (ref 4.4–11.3)

## 2024-02-09 PROCEDURE — 99222 1ST HOSP IP/OBS MODERATE 55: CPT

## 2024-02-09 PROCEDURE — 2500000005 HC RX 250 GENERAL PHARMACY W/O HCPCS

## 2024-02-09 PROCEDURE — A4649 SURGICAL SUPPLIES: HCPCS | Performed by: ORTHOPAEDIC SURGERY

## 2024-02-09 PROCEDURE — 93325 DOPPLER ECHO COLOR FLOW MAPG: CPT

## 2024-02-09 PROCEDURE — 3600000009 HC OR TIME - EACH INCREMENTAL 1 MINUTE - PROCEDURE LEVEL FOUR: Performed by: ORTHOPAEDIC SURGERY

## 2024-02-09 PROCEDURE — 2500000005 HC RX 250 GENERAL PHARMACY W/O HCPCS: Performed by: ANESTHESIOLOGY

## 2024-02-09 PROCEDURE — 2500000005 HC RX 250 GENERAL PHARMACY W/O HCPCS: Performed by: NURSE ANESTHETIST, CERTIFIED REGISTERED

## 2024-02-09 PROCEDURE — 3700000001 HC GENERAL ANESTHESIA TIME - INITIAL BASE CHARGE: Performed by: ORTHOPAEDIC SURGERY

## 2024-02-09 PROCEDURE — A24545 PR OPEN TX HUMERAL SUPRACONDYLAR FRACTURE W/O XTN: Performed by: ANESTHESIOLOGY

## 2024-02-09 PROCEDURE — A24545 PR OPEN TX HUMERAL SUPRACONDYLAR FRACTURE W/O XTN: Performed by: NURSE ANESTHETIST, CERTIFIED REGISTERED

## 2024-02-09 PROCEDURE — 93321 DOPPLER ECHO F-UP/LMTD STD: CPT | Performed by: INTERNAL MEDICINE

## 2024-02-09 PROCEDURE — 2500000001 HC RX 250 WO HCPCS SELF ADMINISTERED DRUGS (ALT 637 FOR MEDICARE OP)

## 2024-02-09 PROCEDURE — 2500000004 HC RX 250 GENERAL PHARMACY W/ HCPCS (ALT 636 FOR OP/ED): Performed by: NURSE ANESTHETIST, CERTIFIED REGISTERED

## 2024-02-09 PROCEDURE — 2500000004 HC RX 250 GENERAL PHARMACY W/ HCPCS (ALT 636 FOR OP/ED)

## 2024-02-09 PROCEDURE — 1200000002 HC GENERAL ROOM WITH TELEMETRY DAILY

## 2024-02-09 PROCEDURE — 2500000004 HC RX 250 GENERAL PHARMACY W/ HCPCS (ALT 636 FOR OP/ED): Performed by: PHYSICIAN ASSISTANT

## 2024-02-09 PROCEDURE — 2500000004 HC RX 250 GENERAL PHARMACY W/ HCPCS (ALT 636 FOR OP/ED): Performed by: ANESTHESIOLOGY

## 2024-02-09 PROCEDURE — 24545 OPTX HUM FX WO NTRCNDYLR XTN: CPT | Performed by: STUDENT IN AN ORGANIZED HEALTH CARE EDUCATION/TRAINING PROGRAM

## 2024-02-09 PROCEDURE — 80069 RENAL FUNCTION PANEL: CPT

## 2024-02-09 PROCEDURE — 82947 ASSAY GLUCOSE BLOOD QUANT: CPT

## 2024-02-09 PROCEDURE — 3600000004 HC OR TIME - INITIAL BASE CHARGE - PROCEDURE LEVEL FOUR: Performed by: ORTHOPAEDIC SURGERY

## 2024-02-09 PROCEDURE — A4217 STERILE WATER/SALINE, 500 ML: HCPCS | Performed by: ORTHOPAEDIC SURGERY

## 2024-02-09 PROCEDURE — 7100000001 HC RECOVERY ROOM TIME - INITIAL BASE CHARGE: Performed by: ORTHOPAEDIC SURGERY

## 2024-02-09 PROCEDURE — 99100 ANES PT EXTEME AGE<1 YR&>70: CPT | Performed by: ANESTHESIOLOGY

## 2024-02-09 PROCEDURE — 2500000002 HC RX 250 W HCPCS SELF ADMINISTERED DRUGS (ALT 637 FOR MEDICARE OP, ALT 636 FOR OP/ED): Performed by: PHYSICIAN ASSISTANT

## 2024-02-09 PROCEDURE — C1713 ANCHOR/SCREW BN/BN,TIS/BN: HCPCS | Performed by: ORTHOPAEDIC SURGERY

## 2024-02-09 PROCEDURE — 2780000003 HC OR 278 NO HCPCS: Performed by: ORTHOPAEDIC SURGERY

## 2024-02-09 PROCEDURE — 0PSG04Z REPOSITION LEFT HUMERAL SHAFT WITH INTERNAL FIXATION DEVICE, OPEN APPROACH: ICD-10-PCS | Performed by: ORTHOPAEDIC SURGERY

## 2024-02-09 PROCEDURE — 83735 ASSAY OF MAGNESIUM: CPT

## 2024-02-09 PROCEDURE — 2500000001 HC RX 250 WO HCPCS SELF ADMINISTERED DRUGS (ALT 637 FOR MEDICARE OP): Performed by: PHYSICIAN ASSISTANT

## 2024-02-09 PROCEDURE — P9045 ALBUMIN (HUMAN), 5%, 250 ML: HCPCS | Mod: JZ | Performed by: NURSE ANESTHETIST, CERTIFIED REGISTERED

## 2024-02-09 PROCEDURE — 93325 DOPPLER ECHO COLOR FLOW MAPG: CPT | Performed by: INTERNAL MEDICINE

## 2024-02-09 PROCEDURE — 24545 OPTX HUM FX WO NTRCNDYLR XTN: CPT | Performed by: ORTHOPAEDIC SURGERY

## 2024-02-09 PROCEDURE — 93308 TTE F-UP OR LMTD: CPT | Performed by: INTERNAL MEDICINE

## 2024-02-09 PROCEDURE — 51702 INSERT TEMP BLADDER CATH: CPT

## 2024-02-09 PROCEDURE — 36415 COLL VENOUS BLD VENIPUNCTURE: CPT

## 2024-02-09 PROCEDURE — 2500000004 HC RX 250 GENERAL PHARMACY W/ HCPCS (ALT 636 FOR OP/ED): Performed by: ORTHOPAEDIC SURGERY

## 2024-02-09 PROCEDURE — 85027 COMPLETE CBC AUTOMATED: CPT

## 2024-02-09 PROCEDURE — 2720000007 HC OR 272 NO HCPCS: Performed by: ORTHOPAEDIC SURGERY

## 2024-02-09 PROCEDURE — 7100000002 HC RECOVERY ROOM TIME - EACH INCREMENTAL 1 MINUTE: Performed by: ORTHOPAEDIC SURGERY

## 2024-02-09 PROCEDURE — 3700000002 HC GENERAL ANESTHESIA TIME - EACH INCREMENTAL 1 MINUTE: Performed by: ORTHOPAEDIC SURGERY

## 2024-02-09 DEVICE — IMPLANTABLE DEVICE: Type: IMPLANTABLE DEVICE | Site: ARM | Status: FUNCTIONAL

## 2024-02-09 DEVICE — SCREW, CORTICAL, SELF-TAPPING, 3.5 X 20 MM, STAINLESS STEEL: Type: IMPLANTABLE DEVICE | Site: ARM | Status: FUNCTIONAL

## 2024-02-09 DEVICE — SCREW LOCKING 3.5 W/RECESS 18: Type: IMPLANTABLE DEVICE | Site: ARM | Status: FUNCTIONAL

## 2024-02-09 DEVICE — SCREW, CORTICAL, SELF-TAPPING, 3.5 X 22 MM, STAINLESS STEEL: Type: IMPLANTABLE DEVICE | Site: ARM | Status: FUNCTIONAL

## 2024-02-09 DEVICE — SCREW, CORTICAL, SELF-TAPPING, 3.5 X 26 MM, STAINLESS STEEL: Type: IMPLANTABLE DEVICE | Site: ARM | Status: FUNCTIONAL

## 2024-02-09 DEVICE — SCREW, LOCK VA 2.7 X 38 ST T8: Type: IMPLANTABLE DEVICE | Site: ARM | Status: FUNCTIONAL

## 2024-02-09 DEVICE — SCREW, LOCK 2.7 X 18 VA ST T8 STARDRIVE RECESS: Type: IMPLANTABLE DEVICE | Site: ARM | Status: FUNCTIONAL

## 2024-02-09 DEVICE — SCREW, LOCK 2.7 X 20 VA ST T8 STARDRIVE RECESS: Type: IMPLANTABLE DEVICE | Site: ARM | Status: FUNCTIONAL

## 2024-02-09 DEVICE — SCREW, LOCK VA 2.7 X 46 ST T8: Type: IMPLANTABLE DEVICE | Site: ARM | Status: FUNCTIONAL

## 2024-02-09 RX ORDER — SODIUM CHLORIDE 0.9 G/100ML
IRRIGANT IRRIGATION AS NEEDED
Status: DISCONTINUED | OUTPATIENT
Start: 2024-02-09 | End: 2024-02-09 | Stop reason: HOSPADM

## 2024-02-09 RX ORDER — TOBRAMYCIN 1.2 G/30ML
INJECTION, POWDER, LYOPHILIZED, FOR SOLUTION INTRAVENOUS AS NEEDED
Status: DISCONTINUED | OUTPATIENT
Start: 2024-02-09 | End: 2024-02-09 | Stop reason: HOSPADM

## 2024-02-09 RX ORDER — LIDOCAINE HCL/PF 100 MG/5ML
SYRINGE (ML) INTRAVENOUS AS NEEDED
Status: DISCONTINUED | OUTPATIENT
Start: 2024-02-09 | End: 2024-02-09

## 2024-02-09 RX ORDER — FENTANYL CITRATE 50 UG/ML
INJECTION, SOLUTION INTRAMUSCULAR; INTRAVENOUS AS NEEDED
Status: DISCONTINUED | OUTPATIENT
Start: 2024-02-09 | End: 2024-02-09

## 2024-02-09 RX ORDER — PROPOFOL 10 MG/ML
INJECTION, EMULSION INTRAVENOUS AS NEEDED
Status: DISCONTINUED | OUTPATIENT
Start: 2024-02-09 | End: 2024-02-09

## 2024-02-09 RX ORDER — SODIUM CHLORIDE, SODIUM LACTATE, POTASSIUM CHLORIDE, CALCIUM CHLORIDE 600; 310; 30; 20 MG/100ML; MG/100ML; MG/100ML; MG/100ML
INJECTION, SOLUTION INTRAVENOUS CONTINUOUS PRN
Status: DISCONTINUED | OUTPATIENT
Start: 2024-02-09 | End: 2024-02-09

## 2024-02-09 RX ORDER — METOPROLOL TARTRATE 1 MG/ML
5 INJECTION, SOLUTION INTRAVENOUS ONCE
Status: COMPLETED | OUTPATIENT
Start: 2024-02-09 | End: 2024-02-09

## 2024-02-09 RX ORDER — CEFAZOLIN 1 G/1
INJECTION, POWDER, FOR SOLUTION INTRAVENOUS AS NEEDED
Status: DISCONTINUED | OUTPATIENT
Start: 2024-02-09 | End: 2024-02-09

## 2024-02-09 RX ORDER — ONDANSETRON HYDROCHLORIDE 2 MG/ML
INJECTION, SOLUTION INTRAVENOUS AS NEEDED
Status: DISCONTINUED | OUTPATIENT
Start: 2024-02-09 | End: 2024-02-09

## 2024-02-09 RX ORDER — DEXTROSE 50 % IN WATER (D50W) INTRAVENOUS SYRINGE
25
Status: DISCONTINUED | OUTPATIENT
Start: 2024-02-09 | End: 2024-02-16 | Stop reason: HOSPADM

## 2024-02-09 RX ORDER — SODIUM CHLORIDE, SODIUM LACTATE, POTASSIUM CHLORIDE, CALCIUM CHLORIDE 600; 310; 30; 20 MG/100ML; MG/100ML; MG/100ML; MG/100ML
100 INJECTION, SOLUTION INTRAVENOUS CONTINUOUS
Status: DISCONTINUED | OUTPATIENT
Start: 2024-02-09 | End: 2024-02-09

## 2024-02-09 RX ORDER — HYDROMORPHONE HYDROCHLORIDE 1 MG/ML
INJECTION, SOLUTION INTRAMUSCULAR; INTRAVENOUS; SUBCUTANEOUS AS NEEDED
Status: DISCONTINUED | OUTPATIENT
Start: 2024-02-09 | End: 2024-02-09

## 2024-02-09 RX ORDER — PHENYLEPHRINE 10 MG/250 ML(40 MCG/ML)IN 0.9 % SOD.CHLORIDE INTRAVENOUS
CONTINUOUS PRN
Status: DISCONTINUED | OUTPATIENT
Start: 2024-02-09 | End: 2024-02-09

## 2024-02-09 RX ORDER — CEFAZOLIN SODIUM 2 G/100ML
2 INJECTION, SOLUTION INTRAVENOUS EVERY 8 HOURS
Status: COMPLETED | OUTPATIENT
Start: 2024-02-09 | End: 2024-02-10

## 2024-02-09 RX ORDER — LIDOCAINE HYDROCHLORIDE 10 MG/ML
0.1 INJECTION INFILTRATION; PERINEURAL ONCE
Status: DISCONTINUED | OUTPATIENT
Start: 2024-02-09 | End: 2024-02-09 | Stop reason: HOSPADM

## 2024-02-09 RX ORDER — METOPROLOL TARTRATE 1 MG/ML
INJECTION, SOLUTION INTRAVENOUS AS NEEDED
Status: DISCONTINUED | OUTPATIENT
Start: 2024-02-09 | End: 2024-02-09

## 2024-02-09 RX ORDER — FERROUS SULFATE, DRIED 160(50) MG
1 TABLET, EXTENDED RELEASE ORAL 2 TIMES DAILY
Qty: 180 TABLET | Refills: 0 | Status: SHIPPED | OUTPATIENT
Start: 2024-02-09 | End: 2024-05-09

## 2024-02-09 RX ORDER — ALBUMIN HUMAN 50 G/1000ML
SOLUTION INTRAVENOUS AS NEEDED
Status: DISCONTINUED | OUTPATIENT
Start: 2024-02-09 | End: 2024-02-09

## 2024-02-09 RX ORDER — DEXTROSE MONOHYDRATE 100 MG/ML
0.3 INJECTION, SOLUTION INTRAVENOUS ONCE AS NEEDED
Status: DISCONTINUED | OUTPATIENT
Start: 2024-02-09 | End: 2024-02-16 | Stop reason: HOSPADM

## 2024-02-09 RX ORDER — TRANEXAMIC ACID 100 MG/ML
INJECTION, SOLUTION INTRAVENOUS AS NEEDED
Status: DISCONTINUED | OUTPATIENT
Start: 2024-02-09 | End: 2024-02-09

## 2024-02-09 RX ORDER — HYDROMORPHONE HYDROCHLORIDE 1 MG/ML
0.5 INJECTION, SOLUTION INTRAMUSCULAR; INTRAVENOUS; SUBCUTANEOUS EVERY 5 MIN PRN
Status: DISCONTINUED | OUTPATIENT
Start: 2024-02-09 | End: 2024-02-09 | Stop reason: HOSPADM

## 2024-02-09 RX ORDER — DEXAMETHASONE SODIUM PHOSPHATE 4 MG/ML
INJECTION, SOLUTION INTRA-ARTICULAR; INTRALESIONAL; INTRAMUSCULAR; INTRAVENOUS; SOFT TISSUE AS NEEDED
Status: DISCONTINUED | OUTPATIENT
Start: 2024-02-09 | End: 2024-02-09

## 2024-02-09 RX ORDER — PHENYLEPHRINE HCL IN 0.9% NACL 0.4MG/10ML
SYRINGE (ML) INTRAVENOUS AS NEEDED
Status: DISCONTINUED | OUTPATIENT
Start: 2024-02-09 | End: 2024-02-09

## 2024-02-09 RX ORDER — ROCURONIUM BROMIDE 10 MG/ML
INJECTION, SOLUTION INTRAVENOUS AS NEEDED
Status: DISCONTINUED | OUTPATIENT
Start: 2024-02-09 | End: 2024-02-09

## 2024-02-09 RX ORDER — MAGNESIUM SULFATE HEPTAHYDRATE 40 MG/ML
2 INJECTION, SOLUTION INTRAVENOUS ONCE
Status: COMPLETED | OUTPATIENT
Start: 2024-02-09 | End: 2024-02-09

## 2024-02-09 RX ORDER — VANCOMYCIN HYDROCHLORIDE 1 G/20ML
INJECTION, POWDER, LYOPHILIZED, FOR SOLUTION INTRAVENOUS AS NEEDED
Status: DISCONTINUED | OUTPATIENT
Start: 2024-02-09 | End: 2024-02-09 | Stop reason: HOSPADM

## 2024-02-09 RX ORDER — HYDROMORPHONE HYDROCHLORIDE 1 MG/ML
0.2 INJECTION, SOLUTION INTRAMUSCULAR; INTRAVENOUS; SUBCUTANEOUS EVERY 5 MIN PRN
Status: DISCONTINUED | OUTPATIENT
Start: 2024-02-09 | End: 2024-02-09 | Stop reason: HOSPADM

## 2024-02-09 RX ADMIN — FENTANYL CITRATE 50 MCG: 50 INJECTION, SOLUTION INTRAMUSCULAR; INTRAVENOUS at 12:55

## 2024-02-09 RX ADMIN — OLANZAPINE 20 MG: 5 TABLET, FILM COATED ORAL at 20:36

## 2024-02-09 RX ADMIN — METHOTREXATE 15 MG: 2.5 TABLET ORAL at 19:04

## 2024-02-09 RX ADMIN — VENLAFAXINE HYDROCHLORIDE 37.5 MG: 37.5 CAPSULE, EXTENDED RELEASE ORAL at 19:06

## 2024-02-09 RX ADMIN — ONDANSETRON 4 MG: 2 INJECTION INTRAMUSCULAR; INTRAVENOUS at 15:06

## 2024-02-09 RX ADMIN — SODIUM CHLORIDE, POTASSIUM CHLORIDE, SODIUM LACTATE AND CALCIUM CHLORIDE 100 ML/HR: 600; 310; 30; 20 INJECTION, SOLUTION INTRAVENOUS at 16:10

## 2024-02-09 RX ADMIN — LIDOCAINE HYDROCHLORIDE 100 MG: 20 INJECTION INTRAVENOUS at 12:57

## 2024-02-09 RX ADMIN — Medication 200 MCG: at 13:33

## 2024-02-09 RX ADMIN — Medication 0.4 MCG/KG/MIN: at 13:46

## 2024-02-09 RX ADMIN — SUGAMMADEX 200 MG: 100 INJECTION, SOLUTION INTRAVENOUS at 15:40

## 2024-02-09 RX ADMIN — OXYCODONE HYDROCHLORIDE 5 MG: 5 TABLET ORAL at 07:00

## 2024-02-09 RX ADMIN — PROPOFOL 150 MG: 10 INJECTION, EMULSION INTRAVENOUS at 12:57

## 2024-02-09 RX ADMIN — HYDROMORPHONE HYDROCHLORIDE 0.5 MG: 1 INJECTION, SOLUTION INTRAMUSCULAR; INTRAVENOUS; SUBCUTANEOUS at 18:06

## 2024-02-09 RX ADMIN — DEXAMETHASONE SODIUM PHOSPHATE 10 MG: 4 INJECTION, SOLUTION INTRA-ARTICULAR; INTRALESIONAL; INTRAMUSCULAR; INTRAVENOUS; SOFT TISSUE at 13:24

## 2024-02-09 RX ADMIN — SENNOSIDES AND DOCUSATE SODIUM 2 TABLET: 8.6; 5 TABLET ORAL at 20:30

## 2024-02-09 RX ADMIN — METOPROLOL TARTRATE 5 MG: 1 INJECTION, SOLUTION INTRAVENOUS at 16:59

## 2024-02-09 RX ADMIN — FENTANYL CITRATE 50 MCG: 50 INJECTION, SOLUTION INTRAMUSCULAR; INTRAVENOUS at 12:57

## 2024-02-09 RX ADMIN — PROPOFOL 50 MG: 10 INJECTION, EMULSION INTRAVENOUS at 13:12

## 2024-02-09 RX ADMIN — Medication 5 MG: at 20:30

## 2024-02-09 RX ADMIN — HYDROMORPHONE HYDROCHLORIDE 0.1 MG: 1 INJECTION, SOLUTION INTRAMUSCULAR; INTRAVENOUS; SUBCUTANEOUS at 15:09

## 2024-02-09 RX ADMIN — Medication: at 08:00

## 2024-02-09 RX ADMIN — ACETAMINOPHEN 975 MG: 325 TABLET ORAL at 02:40

## 2024-02-09 RX ADMIN — MAGNESIUM SULFATE HEPTAHYDRATE 2 G: 40 INJECTION, SOLUTION INTRAVENOUS at 19:04

## 2024-02-09 RX ADMIN — HYDROMORPHONE HYDROCHLORIDE 0.2 MG: 1 INJECTION, SOLUTION INTRAMUSCULAR; INTRAVENOUS; SUBCUTANEOUS at 15:24

## 2024-02-09 RX ADMIN — HYDROMORPHONE HYDROCHLORIDE 0.1 MG: 1 INJECTION, SOLUTION INTRAMUSCULAR; INTRAVENOUS; SUBCUTANEOUS at 15:17

## 2024-02-09 RX ADMIN — CEFAZOLIN 2 G: 1 INJECTION, POWDER, FOR SOLUTION INTRAMUSCULAR; INTRAVENOUS at 13:24

## 2024-02-09 RX ADMIN — CEFAZOLIN SODIUM 2 G: 2 INJECTION, SOLUTION INTRAVENOUS at 20:32

## 2024-02-09 RX ADMIN — Medication 200 MCG: at 13:07

## 2024-02-09 RX ADMIN — METOPROLOL TARTRATE 5 MG: 1 INJECTION, SOLUTION INTRAVENOUS at 13:19

## 2024-02-09 RX ADMIN — Medication 200 MCG: at 12:57

## 2024-02-09 RX ADMIN — HALOPERIDOL 0.5 MG: 0.5 TABLET ORAL at 19:05

## 2024-02-09 RX ADMIN — SODIUM CHLORIDE, POTASSIUM CHLORIDE, SODIUM LACTATE AND CALCIUM CHLORIDE: 600; 310; 30; 20 INJECTION, SOLUTION INTRAVENOUS at 12:54

## 2024-02-09 RX ADMIN — ROCURONIUM BROMIDE 80 MG: 10 INJECTION, SOLUTION INTRAVENOUS at 12:57

## 2024-02-09 RX ADMIN — TRANEXAMIC ACID 1000 MG: 100 INJECTION, SOLUTION INTRAVENOUS at 13:24

## 2024-02-09 RX ADMIN — ACETAMINOPHEN 975 MG: 325 TABLET ORAL at 20:28

## 2024-02-09 RX ADMIN — ALBUMIN HUMAN 250 ML: 0.05 INJECTION, SOLUTION INTRAVENOUS at 13:33

## 2024-02-09 RX ADMIN — ROCURONIUM BROMIDE 20 MG: 10 INJECTION, SOLUTION INTRAVENOUS at 14:32

## 2024-02-09 RX ADMIN — FAMOTIDINE 40 MG: 20 TABLET, FILM COATED ORAL at 20:30

## 2024-02-09 ASSESSMENT — PAIN - FUNCTIONAL ASSESSMENT
PAIN_FUNCTIONAL_ASSESSMENT: 0-10

## 2024-02-09 ASSESSMENT — PAIN SCALES - GENERAL
PAINLEVEL_OUTOF10: 0 - NO PAIN
PAINLEVEL_OUTOF10: 8
PAINLEVEL_OUTOF10: 0 - NO PAIN
PAINLEVEL_OUTOF10: 0 - NO PAIN
PAINLEVEL_OUTOF10: 10 - WORST POSSIBLE PAIN
PAINLEVEL_OUTOF10: 0 - NO PAIN

## 2024-02-09 ASSESSMENT — ACTIVITIES OF DAILY LIVING (ADL)
PATIENT'S MEMORY ADEQUATE TO SAFELY COMPLETE DAILY ACTIVITIES?: YES
DRESSING YOURSELF: NEEDS ASSISTANCE
HEARING - RIGHT EAR: FUNCTIONAL
GROOMING: NEEDS ASSISTANCE
LACK_OF_TRANSPORTATION: NO
WALKS IN HOME: UNABLE TO ASSESS
BATHING: NEEDS ASSISTANCE
HEARING - LEFT EAR: FUNCTIONAL
TOILETING: NEEDS ASSISTANCE
JUDGMENT_ADEQUATE_SAFELY_COMPLETE_DAILY_ACTIVITIES: YES
ADEQUATE_TO_COMPLETE_ADL: YES
FEEDING YOURSELF: NEEDS ASSISTANCE

## 2024-02-09 ASSESSMENT — COGNITIVE AND FUNCTIONAL STATUS - GENERAL: PATIENT BASELINE BEDBOUND: NO

## 2024-02-09 NOTE — PROGRESS NOTES
"Orthopaedic Surgery Progress Note    S: Seen in PACU. Resting comfortably.    O:  /59   Pulse 106   Temp 36.1 °C (97 °F) (Temporal)   Resp 18   Ht 1.549 m (5' 1\")   Wt 68 kg (149 lb 14.6 oz)   SpO2 98%   BMI 28.33 kg/m²     Gen: arousable, NAD, appropriately conversational  Cardiac: RRR to peripheral palpation  Resp: nonlabored on RA  GI: soft, nondistended    MSK:  Left upper extremity:  -Postoperative splint CDI w/o strikethru  -Tender at site of injury with painful ROM.  -Fires axillary/AIN/PIN/ulnar/median distributions  -Unable to report on sensation 2/2 recent anesthesia.  -Hand warm, well perfused  -Palpable radial pulse, cap refill brisk  -Compartments soft and compressible      A/P: 83 y.o. female now s/p L distal humerus ORIF on 2/9 with Dr. Dover.  Doing well postop.    PLAN:   Weightbearing Status: NWB LUE in splint  Pain control per primary team  Perioperative ABx: Ancef x24h postop  DVT PPx: SCDs, chemoprophylaxis per Trauma protocol  Dressing: LUE long arm splint to remain in place until follow up  Drain: No drain  Goel: per primary team  PT/OT consult    Ortho will continue to follow    Desean Aguilar MD  Orthopedic Surgery, PGY3  Available by Epic Message    While admitted, this patient will be followed by the Ortho Trauma Team. Please contact the residents listed below with any questions (available via Epic Chat weekdays). Please page [phone] (ortho on-call) after 6pm and on weekends.    Ortho Trauma  First Call: Keagan Ratliff, PGY-1  Second Call: Balaji Perez, PGY-2  Third Call: Desean Aguilar, PGY-3  " Addended by: JEANETTE ESQUIVEL on: 12/21/2017 03:23 PM     Modules accepted: Orders

## 2024-02-09 NOTE — CONSULTS
Inpatient consult to Geriatric Medicine  Consult performed by: Elvia Banks MD  Consult ordered by: Qing Bernabe MD  Reason for consult: trauma and complex PMH          Primary Team: Trauma    Admit Date: 2/8/2024    Emergency Contact:   Extended Emergency Contact Information  Primary Emergency Contact: Ivette Tan Phone: [phone]  Relation: Child     Reason For Consult: Trauma with complex PMH    History Of Present Illness:  Jennifer Mancini is a 83 y.o. female with PMH Hx dermatomyositis on methotrexate/steroids, DM, HTN, HLD, WEN, schizoaffective disorder, aortic stenosis, CAD, Hx MI,  presenting after a mechanical fall out of bed onto left side while at assisted living facility. Patient was on the floor for less than 1 hour until found by facility staff. Patient did sustain strike to the face but did not loose consciousness. Patient does not use blood thinners.  Patient sustained L humerus fracture, L3 burst fracture and nasal bone fracture. Patient endorsed left arm pain but did not endorse numbness/tingling. Denied vision changes. Patient admitted to trauma service with ortho consult.  Patient to the OR 2/9 for L elbow ORIF.    History per patient:  Unable to obtain, pt in OR      History per family/caregiver: (obtained in addition due to patient in OR)  Obtained per patient's daughter/ Guardian Ivette:    Patient living in assisted living at [facility] for 3 years. Per daughter patient fell out of bed picking up candy which she had dropped.  Patient denied CP, SOB, NVD, lightheadedness or dizziness prior to falling. Patient's daughter does endorse baseline cognitive impairment including memory, misplacing things, short-term forgetfulness and sundowning. Patient uses a walker and a wheel chair to ambulate. Patient does have strong family support. Patient is reliant on facility for majority of instrumental ADLs as below, as well as basic ADLs including bathing, dressing, and  toileting.     What matters most to the patient:  Family     Prior to Admission Meds  Prior to Admission Medications   Prescriptions Last Dose Informant Patient Reported? Taking?   Fish OiL 60- mg capsule   Yes No   Sig: Take 1 capsule (500 mg) by mouth once daily.   OLANZapine (ZyPREXA) 20 mg tablet   Yes No   Sig: Take 1 tablet (20 mg) by mouth once daily at bedtime.   acetaminophen (Tylenol 8 HOUR) 650 mg ER tablet   Yes No   Sig: Take 1 tablet (650 mg) by mouth 3 times a day. Do not crush, chew, or split.   ascorbic acid (Vitamin C) 500 mg tablet   Yes No   Sig: Take 1 tablet (500 mg) by mouth 2 times a day. With ferrous sulfate for absorption   aspirin 81 mg EC tablet   Yes No   Sig: Take 1 tablet (81 mg) by mouth once daily.   atorvastatin (Lipitor) 80 mg tablet   Yes No   Sig: Take 1 tablet (80 mg) by mouth once daily at bedtime.   b complex 0.4 mg tablet   Yes No   Sig: Take 1 tablet by mouth 2 times a day.   benzocaine-menthol (Cepacol Sore Throat, nargis-men,) 15-3.6 mg lozenge   Yes No   Sig: Dissolve 1 lozenge in the mouth every 4 hours if needed for sore throat.   benzonatate (Tessalon) 100 mg capsule   Yes No   Sig: Take 1 capsule (100 mg) by mouth 3 times a day as needed for cough. Do not crush or chew.   calcium carbonate-vitamin D3 600 mg-20 mcg (800 unit) tablet   Yes No   Sig: Take 1 tablet by mouth once daily.   cetirizine (ZyrTEC) 10 mg tablet   Yes No   Sig: Take 1 tablet (10 mg) by mouth once daily.   clobetasoL 0.05 % shampoo   Yes No   Sig: Apply 1 Application topically 1 (one) time per week.   clobetasol (Temovate) 0.05 % cream   Yes No   Sig: Apply 1 Application topically 2 times a day.   cyanocobalamin (Vitamin B-12) 1,000 mcg tablet   Yes No   Sig: Take 1 tablet (1,000 mcg) by mouth once daily.   docusate sodium (Colace) 100 mg capsule   Yes No   Sig: Take 1 capsule (100 mg) by mouth 2 times a day.   famotidine (Pepcid) 40 mg tablet   Yes No   Sig: Take 1 tablet (40 mg) by mouth once  daily at bedtime.   ferrous sulfate 325 (65 Fe) MG EC tablet   Yes No   Sig: Take 65 mg by mouth 2 times a day. Do not crush, chew, or split.   fluticasone (Flonase) 50 mcg/actuation nasal spray   Yes No   Sig: Administer 1 spray into each nostril once daily. Shake gently. Before first use, prime pump. After use, clean tip and replace cap.   folic acid (Folvite) 1 mg tablet   Yes No   Sig: Take 1 tablet (1 mg) by mouth once daily.   furosemide (Lasix) 40 mg tablet   Yes No   Sig: Take 1 tablet (40 mg) by mouth once daily.   glucosam/robert-msm1/C/bailey/bosw (OSTEO BI-FLEX TRIPLE STRENGTH ORAL)   Yes No   Sig: Take 1 tablet by mouth 2 times a day.   guaiFENesin (Mucinex) 600 mg 12 hr tablet   Yes No   Sig: Take 1 tablet (600 mg) by mouth 2 times a day. Do not crush, chew, or split.   guaiFENesin (Robitussin) 100 mg/5 mL syrup   Yes No   Sig: Take 5 mL (100 mg) by mouth 4 times a day as needed for cough or congestion.   haloperidol (Haldol) 0.5 mg tablet   Yes No   Sig: Take 1 tablet (0.5 mg) by mouth once daily.   hydrocortisone 2.5 % cream   Yes No   Sig: Apply 1 Application topically 2 times a day.   ketoconazole (NIZOral) 2 % cream   Yes No   Sig: Apply 1 Application topically 3 times a day.   ketoconazole (NIZOral) 2 % shampoo   Yes No   Sig: Apply 1 Application topically 2 times a week. As needed   lactobacillus acidophilus & bulgar (Lactinex) 1 million cell chewable tablet   Yes No   Sig: Chew 1 tablet once daily.   latanoprost (Xalatan) 0.005 % ophthalmic solution   Yes No   Sig: Administer 1 drop into both eyes once daily at bedtime.   lidocaine (Xylocaine) 2 % solution   Yes No   Sig: Lidocaine 2% viscous   Swish and spit 10mg every 4 hours as needed   losartan (Cozaar) 50 mg tablet   Yes No   Sig: Take 1 tablet (50 mg) by mouth 2 times a day.   metFORMIN (Glucophage) 1,000 mg tablet   Yes No   Sig: Take 1 tablet (1,000 mg) by mouth 2 times a day with meals.   methotrexate (Trexall) 2.5 mg tablet   Yes No    Sig: Take 6 tablets (15 mg total) by mouth 1 (one) time per week.  6 tablets (15mg) once a week on Fridays   multivitamin with minerals tablet   Yes No   Sig: Take 1 tablet by mouth once daily.   neomycin-bacitracnZn-polymyxnB (Neosporin Original) ointment   Yes No   Sig: Apply 1 Application topically 3 times a day.   polyethylene glycol (Glycolax, Miralax) 17 gram packet   Yes No   Sig: Take 17 g by mouth once daily.   predniSONE (Deltasone) 2.5 mg tablet   Yes No   Sig: Take 3 tablets (7.5 mg) by mouth once daily.   venlafaxine XR (Effexor-XR) 37.5 mg 24 hr capsule   Yes No   Sig: Take 1 capsule (37.5 mg) by mouth once daily. Do not crush or chew.   zinc sulfate (Zincate) 220 (50 Zn) MG capsule   Yes No   Sig: Take 1 capsule (50 mg of elemental zinc) by mouth once daily.      Facility-Administered Medications: None        Current Meds in Hospital  Current Facility-Administered Medications   Medication Dose Route Frequency Provider Last Rate Last Admin    acetaminophen (Tylenol) tablet 975 mg  975 mg oral q8h Warren Mayberry PA-C   975 mg at 02/09/24 0240    dextrose 10 % in water (D10W) infusion  0.3 g/kg/hr intravenous Once PRN Warren Mayberry PA-C        dextrose 50 % injection 25 g  25 g intravenous q15 min PRN Warren Mayberry PA-C        famotidine (Pepcid) tablet 40 mg  40 mg oral Nightly John Magallon PA-C   40 mg at 02/08/24 2206    glucagon (Glucagen) injection 1 mg  1 mg intramuscular q15 min PRN Warren Mayberry PA-C        haloperidol (Haldol) tablet 0.5 mg  0.5 mg oral Daily Johnlakeshia Magallon PA-C        lactated Ringer's infusion  50 mL/hr intravenous Continuous John Magallon PA-C   Stopped at 02/09/24 1244    latanoprost (Xalatan) 0.005 % ophthalmic solution 1 drop  1 drop Both Eyes Nightly John Magallon PA-C        magnesium sulfate IV 2 g  2 g intravenous Once Xavi Elam DO        melatonin tablet 5 mg  5 mg oral Nightly Warren Mayberry PA-C   5 mg at 02/08/24 6866    methotrexate (Trexall)  tablet 15 mg  15 mg oral Weekly John Magallon PA-C        naloxone (Narcan) injection 0.2 mg  0.2 mg intravenous q5 min PRN Warren Mayberry PA-C        OLANZapine (ZyPREXA) tablet 20 mg  20 mg oral Nightly John Magallon PA-C   20 mg at 02/08/24 2206    oxyCODONE (Roxicodone) immediate release tablet 2.5 mg  2.5 mg oral q6h PRN Warren Mayberry PA-C        oxyCODONE (Roxicodone) immediate release tablet 5 mg  5 mg oral q6h PRN Warren Mayberry PA-C   5 mg at 02/09/24 0700    oxygen (O2) therapy   inhalation Continuous - 02/gases Warren Mayberry PA-C   Start at 02/09/24 0800    oxygen (O2) therapy   inhalation Continuous PRN - O2/gases Warren Mayberry PA-C        perflutren lipid microspheres (Definity) injection 0.5-10 mL of dilution  0.5-10 mL of dilution intravenous Once in imaging Xavi Elam DO        perflutren protein A microsphere (Optison) injection 0.5 mL  0.5 mL intravenous Once in imaging Xavi Elam DO        polyethylene glycol (Glycolax, Miralax) packet 17 g  17 g oral Daily Warren Mayberry PA-C        predniSONE (Deltasone) tablet 7.5 mg  7.5 mg oral Daily John Magallon PA-C        sennosides-docusate sodium (Mary-Colace) 8.6-50 mg per tablet 2 tablet  2 tablet oral BID Warren Mayberry PA-C   2 tablet at 02/08/24 2100    sulfur hexafluoride microsphr (Lumason) injection 24.28 mg  2 mL intravenous Once in imaging Xavi Elam DO        venlafaxine XR (Effexor-XR) 24 hr capsule 37.5 mg  37.5 mg oral Daily John Magallon PA-C                Past Medical History  Past Medical History:   Diagnosis Date    HTN (hypertension) 2/9/2024    Hypertensive heart disease without heart failure 12/17/2019    Hypertensive heart disease without CHF    Other nondisplaced fracture of seventh cervical vertebra, subsequent encounter for fracture with routine healing 12/17/2019    Other closed nondisplaced fracture of seventh cervical vertebra with routine healing, subsequent encounter    Other nondisplaced  fracture of sixth cervical vertebra, subsequent encounter for fracture with routine healing 12/17/2019    Other closed nondisplaced fracture of sixth cervical vertebra with routine healing, subsequent encounter    Personal history of other diseases of the female genital tract 12/17/2019    History of uterine prolapse    Personal history of other endocrine, nutritional and metabolic disease 12/17/2019    History of hyperlipidemia    Personal history of other mental and behavioral disorders 06/14/2021    History of schizoaffective disorder    Personal history of other specified conditions 06/14/2021    History of chronic pain    Schizoaffective disorder, bipolar type (CMS/[facility]) 12/17/2019    Schizoaffective disorder, bipolar type    Unspecified fracture of left femur, initial encounter for closed fracture (CMS/[facility]) 06/14/2021    Fracture of left femur        Surgical History  Past Surgical History:   Procedure Laterality Date    CT ANGIO NECK  12/13/2019    CT NECK ANGIO W AND WO IV CONTRAST 12/13/2019 [facility] CLINICAL LEGACY    OTHER SURGICAL HISTORY  12/17/2019    Tonsillectomy    OTHER SURGICAL HISTORY  12/17/2019    Rotator cuff repair    OTHER SURGICAL HISTORY  12/17/2019    Wrist surgery    OTHER SURGICAL HISTORY  12/17/2019    Uterine surgery   Spine surgery   Hip surgery    Family History  Denies FH of stroke, DM, HTN, alzheimer's, SHELBY  Mother with colon cancer     Social History  -Alcohol use: No  -Tobacco use: No  -Illicit drug use: No  -Exercise: PT through assisted living, rubber band stretching, balance exercises. Has a hard time walking.   -Spiritual needs: None  -Marital Status:   Occupation: Retired  Highest Level of Education: Some College Has been disabled most of her life, was a . Has not been able to work due to schizophrenia  Community Resources: none as she is in assisted living  : No  Current living environment: assisted living    Activities of Daily Living:  Basic ADLs: (I=  "independent, A= assistance, D= dependent)  Bathing: A , Dressing: A , Toileting: A , Transferring: I, Continence: I, Feeding: I    Chery Index:  3  Instrumental ADLs: (I= independent, A= assistance, D= dependent)  Ability to use phone: I, Shopping: D , Cooking: D , Housekeeping: D , Laundry: D , Transportation: D , Medications: D , Handle Finances: D    [place] Scale:  1    Allergies  Patient has no known allergies.    Review of Systems  Unable to obtain pt in OR     Documents on file and valid:  Advance Directive/Living Will: No   Health Care Power of : Yes  Other: Daughter Ivette is Guardian   Code Status: Full Code      Confusion Assessment Method (CAM)  Not on file.    [place]ano Cognitive Assessment (MoCA)  Not on file.    Geriatric Depression Scale (GDS)  Not on file.    Mini-Cog (Early Dementia Detection)  Not on file.    Folstein Mini-Mental State Exam (MMSE)  Not on file.    Patient Health Questionnaire (PHQ-9)  Not on file.     Physical Exam  Unable to obtain pt in OR     Last Recorded Vitals      2/8/2024     6:17 PM 2/8/2024    10:02 PM 2/9/2024     1:21 AM 2/9/2024     1:23 AM 2/9/2024     4:34 AM 2/9/2024     7:31 AM 2/9/2024    10:20 AM   Vitals   Systolic 119 144  107 99 98 125   Diastolic 67 85  68 63 60 59   Heart Rate 102 109  114 106 106 106   Temp  36.6 °C (97.9 °F) 36.7 °C (98 °F)  36.4 °C (97.6 °F) 36.6 °C (97.9 °F) 36.1 °C (97 °F)   Resp 18 18  18 18 17 18   Height (in)       1.549 m (5' 1\")   Weight (lb)       149.91   BMI       28.33 kg/m2   BSA (m2)       1.71 m2      Vitals:    02/09/24 1020   Weight: 68 kg (149 lb 14.6 oz)        Confusion Assessment Method(CAM) for diagnosis of delirium:    Unable to obtain pt in OR     AT Score For Assessment of Delirium and Cognitive Impairment:    Unable to obtain pt in OR      Relevant Results  Lab Results   Component Value Date    TSH 4.58 (H) 03/15/2021    HGBA1C 8.2 (H) 02/08/2024     Results from last 7 days   Lab Units 02/09/24  0754 " 02/08/24  1257 02/08/24  0635   WBC AUTO x10*3/uL 9.4 10.0 8.5   HEMOGLOBIN g/dL 9.8* 10.4* 12.3   HEMATOCRIT % 30.6* 30.9* 37.4   ALT U/L  --  25  --    AST U/L  --  25  --    SODIUM mmol/L 137 138 133*   POTASSIUM mmol/L 3.9 3.1* 3.5   CHLORIDE mmol/L 101 103 98   CREATININE mg/dL 0.39* 0.37* 0.47*   BUN mg/dL 8 6 8   CO2 mmol/L 29 29 27   INR   --  1.1 1.1   HEMOGLOBIN A1C %  --  8.2*  --        Recent Imaging Results      Transthoracic Echo (TTE) [facility], [address] [place] [address]                 Tel [phone] and Fax [phone]    TRANSTHORACIC ECHOCARDIOGRAM REPORT       Patient Name:      ANYA Peralta Physician:    [id number] Natanael Thompson MD  Study Date:        2/9/2024             Ordering Provider:    [id number] HAMLET BECERRA  MRN/PID:           [id number]             Fellow:               [id number] Emory Odom MD  Accession#:        [id number]         Nurse:  Date of Birth/Age: 1940 / 83 years Sonographer:          Sarah López                                                                Cardiac                                                                Sonographer Intern  Gender:            F                    Additional Staff:     Jelani Mac RDCS, AMIE, JACOB,                                                                CRISTOBAL  Height:            154.94 cm            Admit Date:           2/8/2024  Weight:            67.59 kg             Admission Status:     Inpatient - STAT  BSA:               1.67 m2              Encounter#:           [id number]                                          Department Location:  [facility]  Blood Pressure: 125 /59 mmHg    Study Type:    TRANSTHORACIC  ECHO (TTE) LIMITED  Diagnosis/ICD: Essential (primary) hypertension-I10; Cardiac murmur,                 unspecified-R01.1  Indication:    Murmur, pre-op for humerus fracture  CPT Code:      Echo Limited-35359; Color Doppler-82737; Doppler Limited-38839    Patient History:  Pertinent History: HTN, HLD.    Study Detail: The following Echo studies were performed: 2D, M-Mode, Doppler and                color flow.       PHYSICIAN INTERPRETATION:  Left Ventricle: The left ventricular systolic function is normal, with an estimated ejection fraction of 60-65%. There are no regional wall motion abnormalities. The left ventricular cavity size is normal. Left ventricular diastolic filling was not assessed.  Left Atrium: The left atrium is normal in size.  Right Ventricle: The right ventricle is normal in size. There is normal right ventricular global systolic function.  Right Atrium: The right atrium is normal in size.  Aortic Valve: The aortic valve is trileaflet. There is mild aortic valve cusp calcification. There is evidence of mild aortic valve stenosis.  There is no evidence of aortic valve regurgitation. The peak instantaneous gradient of the aortic valve is 24.4 mmHg. The mean gradient of the aortic valve is 14.0 mmHg.  Mitral Valve: The mitral valve is mildly thickened. There is mild mitral annular calcification. There is trace mitral valve regurgitation.  Tricuspid Valve: The tricuspid valve is structurally normal. There is trace tricuspid regurgitation. The right ventricular systolic pressure is unable to be estimated.  Pulmonic Valve: The pulmonic valve is not well visualized. There is trace pulmonic valve regurgitation.  Pericardium: There is a trivial pericardial effusion. There is an anterior clear space.  Aorta: The aortic root is normal. The aortic root appears normal in size and measures 2.90 cm. The Asc Ao is 2.80 cm. There is no dilatation of the ascending aorta.  Systemic Veins: The inferior vena cava  was not well visualized.  In comparison to the previous echocardiogram(s): There are no prior studies on this patient for comparison purposes.       CONCLUSIONS:   1. Left ventricular systolic function is normal with a 60-65% estimated ejection fraction.   2. Mild aortic valve stenosis.   3. Normal aortic root.    QUANTITATIVE DATA SUMMARY:  2D MEASUREMENTS:                     Normal Ranges:  Ao Root d: 2.90 cm (2.0-3.7cm)  IVSd:      0.70 cm (0.6-1.1cm)  LVIDd:     1.10 cm (3.9-5.9cm)    LA VOLUME:                               Normal Ranges:  LA Vol A4C:        21.5 ml   (22+/-6mL/m2)  LA Vol Index A4C:  12.9ml/m2  LA Area A4C:       12.0 cm2  LA Major Axis A4C: 5.7 cm    RA VOLUME BY A/L METHOD:                       Normal Ranges:  RA Area A4C: 8.0 cm2    AORTA MEASUREMENTS:                     Normal Ranges:  Asc Ao, d: 2.80 cm (2.1-3.4cm)    LV DIASTOLIC FUNCTION:                      Normal Ranges:  MV Peak E: 1.22 m/s (0.7-1.2 m/s)  MV Peak A: 1.41 m/s (0.42-0.7 m/s)  E/A Ratio: 0.87     (1.0-2.2)    MITRAL VALVE:                       Normal Ranges:  MV Vmax:    1.42 m/s (<=1.3m/s)  MV peak P.1 mmHg (<5mmHg)  MV mean P.0 mmHg (<2mmHg)  MV DT:      210 msec (150-240msec)    AORTIC VALVE:                                     Normal Ranges:  AoV Vmax:                2.47 m/s  (<=1.7m/s)  AoV Peak P.4 mmHg (<20mmHg)  AoV Mean P.0 mmHg (1.7-11.5mmHg)  LVOT Max Doug:            1.48 m/s  (<=1.1m/s)  AoV VTI:                 41.80 cm  (18-25cm)  LVOT VTI:                24.60 cm  LVOT Diameter:           1.80 cm   (1.8-2.4cm)  AoV Area, VTI:           1.50 cm2  (2.5-5.5cm2)  AoV Area,Vmax:           1.52 cm2  (2.5-4.5cm2)  AoV Dimensionless Index: 0.59       RIGHT VENTRICLE:  RV Basal 3.10 cm  RV Mid   1.90 cm  RV Major 5.7 cm    PULMONIC VALVE:                          Normal Ranges:  PV Accel Time: 111 msec (>120ms)  PV Max Doug:    1.0 m/s  (0.6-0.9m/s)  PV Max PG:      3.7 mmHg       [id number] Natanael Thompson MD  Electronically signed on 2/9/2024 at 12:37:35 PM       ** Final **  CT maxillofacial bones wo IV contrast, CT 3D reconstruction  Narrative: Interpreted By:  Dorian Garcia,   STUDY:  CT FACIAL BONES WO IV CONTRAST;  2/8/2024 7:18 am      INDICATION:  Signs/Symptoms:pain post fall with head injury.      COMPARISON:  07/14/2012      ACCESSION NUMBER(S):  [id number]      ORDERING CLINICIAN:  SONIA DOSS      TECHNIQUE:  Contiguous axial CT sections are performed through the facial bones  and orbits and supplemented with coronal and sagittal reformatted  images.      Multiplanar 3D reformations of the facial bones and orbits are  performed and reviewed on independent workstation.      FINDINGS:  There is scalp hematoma overlying the frontal calvarium to left of  midline. This extends between the orbits over the nasal bones.      There is a displaced and mildly comminuted fracture of the mid to  distal right nasal bone. There is lobular opacity within the left  nasal cavity extending posteriorly into the nasopharynx. This  appearance could reflect hemorrhage or inflammatory density.  Increased density surrounds the left inferior turbinate and partially  surrounds the middle turbinate. There is questionable avulsion  fracture at the maxillary process anteriorly at the midline.      The nasal septum is bowed to the right the ostiomeatal units remain  patent bilaterally. The remaining visualized osseous structures are  intact. The medial and inferior orbital walls are intact. The orbital  contents are unremarkable. There is no intraorbital fluid collection  or air density.      There is mild mucoperiosteal thickening of the ethmoid air cells. The  paranasal sinuses and mastoid air cells are otherwise clear.      There are osteoarthritic changes of both temporomandibular joints  with flattening and irregularity of the articular surfaces, greater  on the right.       Impression: Comminuted and mildly displaced fracture of the right nasal bone.      Possible tiny avulsion fracture at the tip of the anterior maxillary  process.      Lobular opacity in the left nasal cavity primarily surrounding the  inferior turbinate. This extends into the nasopharynx. This could be  related to hematoma or could be on an inflammatory or post  inflammatory basis. Further workup with direct visualization is  recommended.      Scalp hematoma overlies the frontal calvarium and extends between the  orbits and over the nasal bones. There is no underlying calvarial  fracture.      The nasal septum is bowed to the right. The ostiomeatal units remain  patent.      Signed by: Dorian Garcia 2/8/2024 8:37 AM  Dictation workstation:   [id number]  MR lumbar spine wo IV contrast, MR thoracic spine wo IV contrast  Narrative: Interpreted By:  Fatuma Grier and Bamfo Rose   STUDY:  MR LUMBAR SPINE WO IV CONTRAST; MR THORACIC SPINE WO IV CONTRAST;  2/8/2024 8:42 pm      INDICATION:  Signs/Symptoms:lumbar fx.      COMPARISON:  CT cervical spine on 11/04/2023; CT lumbar spine on 02/08/2024; CT  thoracic spine on 02/08/2024; lumbosacral spine radiograph on  05/19/2017      ACCESSION NUMBER(S):  [id number]; [id number]      ORDERING CLINICIAN:  RIVKA YOST      TECHNIQUE:  Sagittal T1, T2, STIR and axial T2 and T1 weighted MR images of the  thoracic and lumbar spine were obtained.      FINDINGS:  THORACIC SPINE:      Thoracic vertebral alignment is maintained without significant  spondylolisthesis.      Thoracic vertebral body heights are preserved without evidence of  acute compression fracture. Mild deformity without associated edema  is present along the superior endplate of T12, likely representing  chronic injury.      No acute trauma to the osseous structures of the posterior elements  of the thoracic spine is present. No abnormal intra spinous distance  widening or STIR hyperintense  edema is identified.      Multilevel intervertebral disc desiccation with mild associated disc  height loss is present in the thoracic spine.      No significant posterior disc contour abnormality is identified in  the thoracic spine. No spinal canal stenosis is evident.      Visualized thoracic spinal cord does not demonstrate any acute  abnormality. No intramedullary T2 signal changes are present.      STIR hyperintense edema is present in the cutaneous fat overlying the  upper thoracic spine at the level of T2-T3, just to right of midline,  suspected to represent changes of acute soft tissue trauma.      No fluid collections are identified.      LUMBAR SPINE:      There are 5 lumbar type non rib-bearing vertebral bodies with lowest  well-formed intervertebral disc space labeled L5-S1.      Mild 1-2 mm retrolisthesis is present at the level of T12 on L1 in  the L1 and L2.      Likely subacute compression deformity with up to 30% height loss is  present within the L3 vertebral body, with minimal 1-2 mm posterior  retropulsion of bony material into the spinal canal. The fracture  line appears to extend into the pedicles bilaterally, worse on the  left, and better seen on same day CT.      No additional areas of T2 hyperintense marrow edema are present in  the lumbar spine.      Multilevel intervertebral disc desiccation is present without  significant disc height loss.      T12-L1: No significant posterior disc contour abnormality or spinal  canal stenosis is evident.      L1-L2: Mild posterior disc osteophyte complex slightly deforms the  anterior subarachnoid space without spinal canal stenosis.      L2-L3: Mild disc osteophyte complex and hypertrophic facet changes  somewhat efface the subarachnoid space without significant spinal  canal stenosis. There is moderate to severe left-sided neural  foraminal stenosis due to hematoma centered in the left psoas  musculature, which measures a proximally 3.6 x 2.5 cm in  size (series  12, image 8). There may be extension of some hemorrhagic products  into the left neural foramen at L2-L3 and left lateral epidural space  without significant spinal canal stenosis.      L3-L4: No significant posterior disc contour abnormality is present.  No spinal canal or neural foraminal stenosis is evident. Mild  bilateral neural foraminal stenosis is present due to endplate  spurring and hypertrophic facet changes.      L4-L5: Some effacement of the anterior subarachnoid space is present  by the spondylolisthesis without significant spinal canal stenosis.  Mild bilateral neural foraminal narrowing is present due to endplate  spurring and hypertrophic facet changes with spondylolisthesis.      S1: No significant spinal canal stenosis or neural foraminal  narrowing is evident.      There is fatty atrophy of the paraspinal musculature.      Impression: 1.  Likely subacute compression deformity of the L3 vertebral body  with up to 40% height loss and 1-2 mm retropulsion posterior into the  spinal canal. Although no high-grade spinal canal stenosis is present  due to the compression fracture, there is a 3.6 x 2.5 cm hematoma  centered in the left psoas musculature at the level of L2-L3, with  extension into the adjacent left neural foramen and possibly left  lateral epidural space, without significant spinal canal stenosis,  but likely contributing to at least moderate if not severe left  neural foraminal narrowing.  2. No additional acute compression fractures are identified in the  thoracic or lumbar spine.  3. Multilevel degenerative changes of the lumbar spine as detailed  above.      MACRO:  None      Signed by: Fatuma Grier 2/9/2024 12:55 AM  Dictation workstation:   [id number]      Head/Brain Imaging  === Results for orders placed during the hospital encounter of 02/08/24 ===    CT head wo IV contrast [[id number]] 02/08/2024    Status: Normal  Scalp hematoma overlies the frontal calvarium  to the left of midline  and extends between the orbits over the nasal bones.    Displaced fracture of the mid right nasal bone.    Age-related parenchymal atrophy and diffuse small vessel ischemic  changes of the cerebral white matter and basal ganglia.    No CT evidence of acute intracranial hemorrhage or mass effect.    Signed by: Dorian Garcia 2/8/2024 8:30 AM  Dictation workstation:   [id number]  No results found for this or any previous visit.        DATA:  EKG: QTC  Encounter Date: 02/08/24   ECG 12 lead   Result Value    Ventricular Rate 98    Atrial Rate 98    SC Interval 134    QRS Duration 72    QT Interval 342    QTC Calculation(Bazett) 436    P Axis 62    R Axis 26    T Axis 47    QRS Count 16    Q Onset 218    P Onset 151    P Offset 202    T Offset 389    QTC Fredericia 402    Narrative    Normal sinus rhythm  Normal ECG  When compared with ECG of 09-JUL-2021 11:29,  PREVIOUS ECG IS PRESENT  See ED provider note for full interpretation and clinical correlation  Confirmed by Caleb Charlton ([id number]) on 2/8/2024 4:28:04 PM     Anti-psychotics in 48 hours: haldol, olanzapine  Opioids/Benzodiazepines in 48 hours: dilaudid, fentanyl, oxycodone  Anticholinergics on board:Yes - haldol, olanzapine  Restraints:No  Indwelling catheters:Yes - for OR  Last BM: none charted   UO in 24 hours: -500cc  Activity in the past 24 hours: in bed s/p trauma  Need for ambulatory devices: wheel chair and cane at baseline    Assessment/Plan   This is a/an 83 y.o. 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    Geriatric team unable to assess patient as she was in OR. Will plan to assess  patient on Monday. Collateral obtained from daughter per H&P above.    #Mechanical Fall  :: Patient reportedly fell out of bed while reaching for piece of candy on the floor  :: Denied CP, SOB, lightheadedness, dizziness, palpitations prior to event  :: Patient's baseline cognitive impairment iso schizoaffective disorder on mobility impairment requiring wheel chair and walker contributory  :: CTH without acute intracranial hemorrhage or mass effect  :: EKG with NSR  :: TTE 2/9 with LVEF 60-65%, mild aortic stenosis  - mgmt of traumatic injuries per trauma and ortho as below    #L humerus fracture, L3 burst fracture and nasal bone fracture s/p OR on 2/9 for ORIF L humerus  #Hx osteoporosis  :: DXA scan 1/17/24 with lowest T score -3.7 in the right hip and T12, L1 and L4 fractures consistent with x-rays 10/30/2023   :: Vit D of 52.8 4/2023  :: ionized Ca 1.11  - start home calcium Vit D supplementation  - recommend outpatient follow up with rheum or endo to discuss starting denosumab / bisphosphonates  - post op pain mgmt with:  - c/w tylenol 975 tid  - small doses of opiods as needed for pain- oxycodone 2.5mg prn  q6h with holding parameters for lethargy or respiratory depression  - lidocaine patches     #Schizoaffective disorder  #moderate severe dementia iso Hx sundowning  #High Risk for delirium  :: Most recent Qtc 402  :: TSH elevated 4.58 3/21, Free T4 0.86  :: B12, folate on patient home med list  - send TSH, T4 b12, folate to assess for organic causes which could contribute to cognitive impairment  - c/w home olanzapine 20mg nightly  - c/w home haldol 0.5mg daily  - c/w home venlafaxine 37.5mg daily  Please consider the following general measures for minimizing delirium in a hospitalized patient:   -Bright lights during the day, keeps blinds up, switch all lights on   -avoid disturbances at night. Encourage at least 6 hours uninterrupted sleep. Consider d/c 4am vitals check  -avoid benzodiazepines, sedatives.  Minimize opioids   -avoid anti-cholinergics    -avoid restraints. D/c [place] if possible.   - if requiring no to little insulin coverage, d/c sliding scale  -use low dose haldol 0.5mg PO (IM if PO not possible) only PRN severe agitation where pt exhibits volatile behavior and is a threat to self or others. EKGs to monitor QTc   -daily orientation to time and place by the staff   -out of bed to chair few hours everyday  - encourage stimulating activities during the day if possible  - watch for acute urinary retention or constipation which could contribute to delirium    #HTN  - hold home losartan 50mg BID  - permissive HTN in geriatric patients with baseline cognitive impairment, especially post-op, can use prn metop IV 5mg for SBP >170    #HLD  #CAD  #Hx MI  - c/w home asa  - c/w home atorvastatin 80 at bedtime     #Dermatomyositis   - c/w home prednisone 7.5mg daily  - c/w home famotidine 40mg daily  - c/w home methotrexate 15mg on Fridays    #DM  :: most recent A1c 8.2 (2/24)  - mild SSI inpatient    #WEN  - nightly CPAP    #Risk of constipation iso opiod use  - schedule miralax daily, uptitrate to 1 BM daily as needed  - miriam-colace BID prn    Medication Evaluation:   High risk medications: patient on anticholinergics including haldol and olanzapine at home and continued inpatient. Please limit use of additional anti-cholinergics, serial EKGs to assess QTC if prn haldol or other Qtc prolonging medication required for agitation  Other concerning issues regarding medications: please start home calcium vit D, patient with Hx osteoporosis as evidenced on recent DXA scan. Please arrange outpatient rheum or endo follow up for initiation of bisphosphonates/denosumab for further osteoporosis mgmt    Care Transitions:  -Recommended level for discharge: Pending PT/OT eval  -Home going considerations: discharge to facility  -Primary care physician: Ivy Ayala MD      Goals of Care:  -Primary goals: quality of  life  -Health care power of : none, although daughter Ivette is Guardian   -Living will: none  Code status: Full    4M AGE-FRIENDLY INITIATIVE:  What matters most to patient: Family  Medications: As above  Mentation: per CAM, 4AT  Mobility: Pending PT/OT    Recommendations are not final until attending attestation    Geriatric medicine will continue to follow the patient. Thank you for allowing geriatric medicine to be involved in the care of your patient. Geriatric medicine consultation team is available during work hours Monday through Friday. For any emergency issues requiring immediate assistance over the weekend, please page Geriatrics pager [phone]    Consult Billing Time  Prep time on date of patient encounter(minutes):  Time directly with patient/family/caregiver(minutes):  Documentation time(minutes):  TOTAL TIME(minutes):    Elvia Banks MD

## 2024-02-09 NOTE — SIGNIFICANT EVENT
Trauma Updated Plan of Care:    Assessment:     83 F s/p mechanical fall out of bed    INJURIES:   L3 burst fracture, comminuted. 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, DM, HTN, HLD, WEN, schizoaffective, leg swelling     INCIDENTAL FINDINGS:  Large hiatal hernia  Cervical spine degenerative changes    ADMISSION PLAN OF CARE:     ## L3 burst fx, ?age T12/L1 fx's  - Ortho spine consulted, appreciate recs        - Maintain strict TL precautions        - MRI TL spine to assess for instability, pending updated recs from orthospine after completion        - Hold chemoppx     ## Left humerus fx  - Ortho trauma consulted, appreciate assistance        - NWB LUE, extremity splinted, ORIF elbow tomorrow        - EKG NSR, RCRI 0     ## Nasal bone fracture  - ED to consult face trauma, appreciate assistance        - No acute surgical intervention indicated        - Follow up in the OMFS clinic in 2 weeks        - OMFS will sign off, please page with any question [phone]     ## comorbids  - pharmacy med rec team involved, restart home steroid, methotrexate, H2 blocker, evening haldol/zyprexa for now     Dispo: Admit to regular nursing floor with telemetry.    Patient discussed with trauma attending, Dr. Lockhart.    Warren Mayberry PA-C  Trauma, Critical Care, and Acute Care Surgery  Floor: [id number]   TSICU: [id number]

## 2024-02-09 NOTE — ADDENDUM NOTE
Addendum  created 02/09/24 9396 by Mor Coon, DO    Review and Sign - Ready for Procedure, Review and Sign - Signed

## 2024-02-09 NOTE — DISCHARGE INSTRUCTIONS
Follow-Up Instructions  You will need to be seen in clinic by Dr. Dover in 3 weeks for a post-operative evaluation.  This appointment will be in the outpatient surgical specialty services [place], on the campus of [facility].    You will need to call and schedule an appointment, unless there is a previous appointment that appears on your discharge instructions.  The direct orthopaedic clinic appointment line phone number is [phone].  Please do not delay in calling to make this appointment.    You should also follow up with your primary care provider in 1-2 weeks.    Activity Restrictions  1) No driving until further instructed by your orthopaedic physician, which will be addressed at your outpatient appointments.    2) No driving or operating heavy machinery while taking narcotic pain medication.    3) Weight bearing status --> Non-weight bearing left arm.     Splint/Cast care instructions:   1) Keep your splint on until your follow up visit with your surgeon.    2) Do not get your splint/cast wet for any reason. This includes protecting it from shower water, bath water, and the rain. If the cast/splint becomes wet for any reason, you need to be seen immediately, either in the emergency department or in the first available clinic appointment, in order to have the splint/cast changed. Allowing a wet splint/cast to sit on your skin may cause skin breakdown and infection.    3) Do not stick any sharp objects (knives, forks, clothes hangers, etc) inside your splint/cast to itch. These objects scratch the skin, which may become infected. Alternatively, you may blow a hair dryer, on the cool air setting, in order to provide some relief.    4) You should keep your operative or injured extremity elevated at or above the level of your heart for the first 48-72 hours. This will help minimize the swelling in the immediate aftermath from surgery or from an acute fracture/injury.    5) You may ice your  injured/operative extremity, which is especially useful to minimize swelling, in the first 48-72 hours. Make sure that the ice is not in direct contact with your skin, and that the ice does not leak out of it's bag. It will take ~30 minutes for the ice/cooling to move through your splint/cast material, but it will do so. Double-bagging ice is an effective technique.    6) If you begin to experience progressive and rapidly increasing pain that seems out of proportion to what you normally have been experiencing from your baseline pain after surgery/injury, or if your hand or foot become numb or turn blue and cold - you NEED TO CALL US IMMEDIATELY. Alternatively, you may come into the emergency department IMMEDIATELY for an emergent evaluation.     Follow up instructions  You need to follow up with:  Department of Oral & Maxillofacial Surgery  [address] ([facility]  [place] [address]    Office phone number: [phone].  Office fax number: [phone].    Patients can contact the [id number] through the hosptial  [phone].    Please call and schedule follow up in 1 - 2 weeks    Trauma Clinic Follow-Up  If you have not been contacted within 2-3 days of discharge from the hospital, please call the trauma clinic at [phone] to schedule your appointment within the next 2 weeks for follow up concerning your recent admission and/or surgery. Also, do not hesitate to call our outpatient nurse coordinator at [phone] with any questions/concerns. The nurse will get back to you within 48-72 hours. If you feel it is an emergency, please proceed to your nearest Emergency Room.

## 2024-02-09 NOTE — SIGNIFICANT EVENT
Rapid Response RN Note    Rapid response RN at bedside for RADAR score 6 due to the following VS: T 36.7 °Celsius;  ; RR 18; /68; SPO2 90%.     Reviewed above VS with bedside RN. RN increased pt from 2L to 3L NC. SpO2 90-91%.  RT to evaluate. No interventions by rapid response team indicated at this time.      Staff to page rapid response for any concerns or acute change in condition/VS. Panchito Mackenzie RN.

## 2024-02-09 NOTE — SIGNIFICANT EVENT
Significant Event Note      Trauma surgery have reviewed patient's medical records. Bedside echo performed today revealing for normal EF and mild aortic stenosis. At this time, patient clear for OR with orthopedics. Discussed with Anesthesiology attending, Dr. Coon.    Xavi Elam DO  Department of Surgery / IR Integrated PGY1  Trauma [id number]

## 2024-02-09 NOTE — SIGNIFICANT EVENT
s/p ORIF L distal humerus with Dr. Dover on 2/9/24    PLAN:   Weightbearing Status: NWB LUE in splint  Pain control per primary team  Perioperative ABx: Ancef x24h postop  DVT PPx: SCDs, chemoprophylaxis per Trauma protocol  Dressing: LUE long arm splint to remain in place until follow up  Drain: No drain  Goel: per primary team  PT/OT consult    Please call or page with questions or concerns.    Vincenzo Fleming MD  PGY-5, Orthopedic Surgery  Available on [employer]    Please call [phone] from 6 pm - 6 am and on weekends

## 2024-02-09 NOTE — ANESTHESIA POSTPROCEDURE EVALUATION
Patient: Jennifer Mancini    Procedure Summary       Date: 02/09/24 Room / Location: [facility] OR 09 / [facility] OR    Anesthesia Start: 1251 Anesthesia Stop: 1617    Procedure: Open Reduction Internal Fixation Distal Humerus (Left: Arm Upper) Diagnosis:       Left supracondylar humerus fracture, closed, initial encounter      (Left supracondylar humerus fracture, closed, initial encounter [S42.412A])    Surgeons: Slim Dover MD Responsible Provider: EDWARD Olivia    Anesthesia Type: general ASA Status: 3            Anesthesia Type: general    Vitals Value Taken Time   /86 02/09/24 1609   Temp 36.4 °C (97.5 °F) 02/09/24 1610   Pulse 104 02/09/24 1616   Resp 14 02/09/24 1616   SpO2 100 % 02/09/24 1616   Vitals shown include unvalidated device data.    Anesthesia Post Evaluation    Patient location during evaluation: PACU  Patient participation: complete - patient participated  Level of consciousness: awake  Pain management: adequate  Airway patency: patent  There was medical reason for not screening for obstructive sleep apnea and/or not using of two or more mitigation strategies.Cardiovascular status: acceptable, hemodynamically stable and blood pressure returned to baseline  Respiratory status: acceptable and face mask  Hydration status: acceptable  Postoperative Nausea and Vomiting: none    There were no known notable events for this encounter.

## 2024-02-09 NOTE — H&P
[facility] Department of Orthopaedic Surgery   Surgical History & Physical <30 Days    Reason for Surgery: L extra-articular distal humerus fx   Planned Procedure: ORIF L humerus    History & Physical Reviewed:  I have reviewed the History and Physical for obtained within the last 30 days. Relevant findings and updates are noted below:  No significant changes.    Home medications were reviewed with significant updates noted below:  No significant changes.    ERAS patient?: No    COVID-19 Risk Consent:   Surgeon has reviewed the key risks related to mike COVID-19 and subsequent sequelae.     02/09/24 at 4:43 AM - Elyssa Ratliff MD

## 2024-02-09 NOTE — OP NOTE
ORTHOPAEDIC SURGERY OPERATIVE REPORT      Date of Surgery: 2/9/2024    Surgeon: Slim Dover MD    Assistant Surgeon: MD Dr. Harry Wolf participated in this case as the assistant surgeon, performing components of the positioning, approach, debridement, reduction, fixation, and closure. Due to the nature and complexity of the case, no qualified resident of an appropriate level was available to assist.    Assistants:  Vincenzo Fleming, PGY5    Preoperative Diagnosis:  Left extra-articular distal humerus fracture    Postoperative Diagnosis:  Left extra-articular distal humerus fracture    Procedures Performed:  Left extra-articular distal humerus fracture open reduction internal fixation  Left ulnar nerve neurolysis    Anesthesia: General anesthesia    IV Fluids: Per anesthesia record    Estimated Blood Loss: 30 mL    Complications: None    Implants:   Synthes 2.7/3.5 mm posterior lateral distal humerus plate with associated cortical locking screws  Synthes 2.7/3.5 mm direct medial distal humerus plate with associated cortical and locking screws  Smith & NephPageFreezer EVOS 2.4 mm mini frag plate with associated cortical and locking screws    Specimens: None    Intraoperative Findings: Stable fracture fixation and safe extra-articular hardware placement noted at the conclusion of the case.    Indications For Procedure:  Jennifer Mancini is a 83 y.o. female who sustained a ground-level fall at which time she sustained multiple injuries including a left extra-articular distal humerus fracture. Due to the nature of the patient's injury, they were indicated for operative stabilization.  Informed consent was obtained after discussing the risks, benefits, and alternatives to the procedure.  Risks include pain, bleeding, infection, damage to nearby structures, malunion, nonunion, hardware complications, need for further procedures, blood clots, anesthesia risks, and death.  Decision was made to proceed.  The  patient was then brought to the preoperative holding area on the day of surgery.    Procedure Detail:  The patient was met in the preoperative holding area where their identity was confirmed and the operative extremity was marked. The patient was taken back to the operating theater where a preinduction timeout was performed which confirmed the patient's identity, procedure to be performed, correct operative site, availability of imaging and implants, allergies, and preoperative antibiotics. Everyone present was in agreement. They were placed under general anesthesia without complication. The patient was transferred to the operating table and placed in the prone position. All bony prominences were well-padded. The patient's left lower extremity was then prepped and draped in the usual sterile fashion. A preprocedure timeout was performed which again confirmed the patient's identity, procedure to be performed, and correct operative site.  Everyone present was in agreement. Preoperative antibiotics were administered.    We began by first marking out a standard direct posterior approach to the distal humerus.  The limb was then exsanguinated with the use of a Hemoclear tourniquet.  Incision was made through skin and subcutaneous tissue.  Deeper dissection was taken down Bovie electrocautery down to the level of the triceps tendon, creating full-thickness flaps as we proceeded.  On the radial side, we elevated the triceps off the intermuscular septum and carried this dissection down to the level of the capitellum.  We then extended our dissection proximally.  The proximal extent of the lateral window did not extend to the point where the radial nerve was within the field.  Once we had exposed the lateral window, we turned attention medially.  We identified the ulnar nerve and perform a neurolysis of the nerve along its course down through the cubital tunnel into the FCU muscle bellies.  Once we had performed this, we  elevated the nerve in a subperiosteal fashion from the distal humerus, reflecting and protecting this through the case.    At this point we evaluated the distal humerus fracture.  We performed a reduction maneuver using  manual manipulation until we felt that we had appropriate cortical read both medially and laterally.  We then provisionally stabilized the distal humerus with 2 crossing 0.062 inch K wires.  Fluoroscopy obtained at this time demonstrated appropriate appearing reduction in the AP view, but it was noted that the distal articular block was extended with the lateral view.  We then pulled back to the wires and we reduced the elbow with more flexion.  We did note that there did appear to be some posterior bone loss which did affect this read.  Once we had improved our reduction we passed the K wires again to stabilize this.  We then selected a Smith [employer] Nephew 2.4 mm mini frag plate and positioned this posteriorly over the medial aspect to help hold this reduced and prevent torsional stress as we placed our main medial plate.  We secured this proximally with a bicortical nonlocking screw and placed 2 unicortical distal screws.  Following this, we selected our Synthes 2.7/3.5 mm direct medial distal humerus plate and provisionally pinned this in position.  Fluoroscopy demonstrated appropriate plate position.  This was then secured proximally with bicortical nonlocking screw as well as a locking screw in the distal segment.  Following this, we turned our attention radially and selected our Synthes 2.7/3.5 mm posterior lateral distal humerus plate and provisionally positioned this.  Fluoroscopy demonstrated appropriate plate position.  We first brought the plate down to bone just proximal to the fracture using a bicortical nonlocking screw.  This brought the plate down to bone very well and in addition the contour of the plate helped improve some additional extension deformity that remained within the distal  segment.  Fluoroscopy obtained at this time demonstrated appropriate fracture reduction as well as safe hardware placement.  We then placed additional locking and nonlocking screws in both medial and lateral plates, taking care to avoid any intra-articular penetration as well as penetration to the olecranon fossa.  We obtain our final fluoroscopic images at this time which again demonstrated maintained fracture reduction as well as appropriate hardware positioning.    The wound was copious irrigated with normal saline.  Vancomycin tobramycin powder was placed within the wound.  The elevated some periosteal layer of the cubital floor was repaired back using 0 PDS suture.  We then approximated the fascial layer on the medial and lateral aspects of the triceps using 0 PDS suture as well.  The deep dermal layer was closed with 2-0 Monocryl followed by staples for the skin.  All counts were correct x 2 at this time.  Sterile dressings were applied.  The tourniquet was let down.  The patient was then placed into a well-padded long-arm posterior slab splint.    The drapes were taken down and the patient was awoken from anesthesia without complication. They were transferred back to their hospital bed and taken to PACU in stable condition.    Postoperative Plan:  The patient will be nonweightbearing to left upper extremity at this time.  She will receive postoperative antibiotics.  Patient may resume DVT prophylaxis per the primary team, we recommend at least aspirin 81 mg twice daily.  Patient will receive evaluations by PT and OT. The patient will follow up with Dr. Slim Dover MD in approximately 3 weeks time for clinical check, staple removal, and 3 view xrays of the left elbow (AP/lateral/oblique) at that time.      Dr. Slim Dover MD was present for the critical portions of the case and was otherwise immediately available to assist.      Herman Mcdonald MD  Orthopaedic Trauma Fellow  2/9/2024

## 2024-02-09 NOTE — PROGRESS NOTES
Occupational Therapy                 Therapy Communication Note    Patient Name: Jennifer Mancini  MRN: [id number]  Today's Date: 2/9/2024     Discipline: Occupational Therapy    Missed Visit Reason: Missed Visit Reason: Patient in a medical procedure    Missed Time: Attempt

## 2024-02-09 NOTE — CARE PLAN
Problem: Skin  Goal: Decreased wound size/increased tissue granulation at next dressing change  2/9/2024 0443 by Apoorva Summers RN  Outcome: Progressing  Flowsheets (Taken 2/9/2024 0443)  Decreased wound size/increased tissue granulation at next dressing change:   Promote sleep for wound healing   Protective dressings over bony prominences  2/9/2024 0427 by Apoorva Summers RN  Outcome: Progressing  Goal: Participates in plan/prevention/treatment measures  2/9/2024 0443 by Apoorva Summers RN  Outcome: Progressing  Flowsheets (Taken 2/9/2024 0443)  Participates in plan/prevention/treatment measures:   Elevate heels   Increase activity/out of bed for meals  2/9/2024 0427 by Apoorva Summers RN  Outcome: Progressing  Goal: Prevent/manage excess moisture  2/9/2024 0443 by Apoorva Summers RN  Outcome: Progressing  Flowsheets (Taken 2/9/2024 0443)  Prevent/manage excess moisture:   Cleanse incontinence/protect with barrier cream   Monitor for/manage infection if present   Follow provider orders for dressing changes  2/9/2024 0427 by Apoorva Summers RN  Outcome: Progressing  Goal: Prevent/minimize sheer/friction injuries  2/9/2024 0443 by Apoorva Summers RN  Outcome: Progressing  Flowsheets (Taken 2/9/2024 0443)  Prevent/minimize sheer/friction injuries:   Use pull sheet   HOB 30 degrees or less   Turn/reposition every 2 hours/use positioning/transfer devices  2/9/2024 0427 by Apoorva Summers RN  Outcome: Progressing  Goal: Promote/optimize nutrition  2/9/2024 0443 by Apoorva Summers RN  Outcome: Progressing  Flowsheets (Taken 2/9/2024 0443)  Promote/optimize nutrition:   Consume > 50% meals/supplements   Offer water/supplements/favorite foods   Monitor/record intake including meals  2/9/2024 0427 by Apoorva Summers RN  Outcome: Progressing  Goal: Promote skin healing  2/9/2024 0443 by Apoorva Summers RN  Outcome: Progressing  Flowsheets (Taken 2/9/2024 0443)  Promote skin healing:   Assess skin/pad under line(s)/device(s)    Turn/reposition every 2 hours/use positioning/transfer devices   Protective dressings over bony prominences  2/9/2024 0427 by Apoorva Summers RN  Outcome: Progressing   The patient's goals for the shift include      The clinical goals for the shift include pt pain will be controlled

## 2024-02-09 NOTE — BRIEF OP NOTE
Date: 2024 - 2024  OR Location: [facility] OR    Name: Jennifer Mancini, : 1940, Age: 83 y.o., MRN: [id number], Sex: female    Diagnosis  Pre-op Diagnosis     * Left supracondylar humerus fracture, closed, initial encounter [S42.412A] Post-op Diagnosis     * Left supracondylar humerus fracture, closed, initial encounter [S42.412A]     Procedures  Open Reduction Internal Fixation Distal Humerus  07161 - MD OPEN TX HUMERAL SUPRACONDYLAR FRACTURE W/O XTN      Surgeons      * Slim Dover - Primary    Resident/Fellow/Other Assistant:  Surgeon(s) and Role:     * Vincenzo Fleming MD - Resident - Assisting     * Herman Mcdonald MD - Fellow    Procedure Summary  Anesthesia: General  ASA: III  Anesthesia Staff: Anesthesiologist: Mor Coon DO  CRNA: Jose Galvan APRN-CRNA  C-AA: CRISTOBAL Womack  Estimated Blood Loss: 10mL  Intra-op Medications: Administrations occurring from 0955 to 1220 on 24:  * No intraprocedure medications in log *           Anesthesia Record               Intraprocedure I/O Totals          Intake    Tranexamic Acid 0.00 mL    The total shown is the total volume documented since Anesthesia Start was filed.    Phenylephrine Drip 0.00 mL    The total shown is the total volume documented since Anesthesia Start was filed.    Total Intake 0 mL       Output    Urine 275 mL    Total Output 275 mL       Net    Net Volume -275 mL          Specimen: No specimens collected     Staff:   Circulator: Dorian Schroeder RN  Relief Circulator: Suztete Dickey RN; Stephanie Sutton RN  Relief Scrub: Bianca Boston  Scrub Person: Isaiah Lyles          Findings: See op note    Complications:  None; patient tolerated the procedure well.     Disposition: PACU - hemodynamically stable.  Condition: stable  Specimens Collected: No specimens collected  Attending Attestation: Dr. Dover was present and scrubbed for the key portions of the procedure.

## 2024-02-09 NOTE — PROGRESS NOTES
"Jennifer Mancini is a 83 y.o. female on day 1 of admission presenting with Left supracondylar humerus fracture, closed, initial encounter.    Subjective   MRI obtained, reviewed with Dr. Quick.  No posterior involvement.  Plan for nonoperative management.  Patient denies back pain this morning.  No radicular pain down either leg.  Patient denies fevers, chills, nausea, vomiting, chest pain or shortness of breath.       Objective     Physical Exam  Constitutional: NAD, resting comfortably in bed  Skin: Warm and dry, no rashes   Eyes: EOMI, clear sclera   ENMT: MMM   HEENT: Neck supple without apparent injury, EOMI, MMM  Respiratory: NWOB on RA   CV: RRR per peripheral pulses, limbs wwp  GI: soft, non-distended   Lymph: No apparent LAD  Neuro: PICHARDO spontaneously, CNs II - XII grossly intact   Psych: Appropriate mood and behavior   Spine Exam:  No tenderness to palpation over cervical, thoracic, or lumbar spine  No bruising, swelling, or erythema    L1: SILT       L2: SILT      Hip flexors 4/5 Left; 5/5 Right  L3: SILT      Knee extension 5/5 Left; 5/5 Right  L4: SILT      Tib Ant. (Dorsiflexion) 5/5 Left; 5/5 Right  L5: SILT      EHL 5/5 Left; 5/5 Right  S1: SILT      Plantar flexion 5/5 Left; 5/5 Right      Babinkski: Intact  No clonus    Last Recorded Vitals  Blood pressure 98/60, pulse 106, temperature 36.6 °C (97.9 °F), resp. rate 17, height 1.549 m (5' 1\"), weight 68 kg (150 lb), SpO2 93 %.  Intake/Output last 3 Shifts:  I/O last 3 completed shifts:  In: - (0 mL/kg)   Out: 550 (8.1 mL/kg) [Urine:550 (0.2 mL/kg/hr)]  Weight: 68 kg     Relevant Results      Scheduled medications  acetaminophen, 975 mg, oral, q8h  famotidine, 40 mg, oral, Nightly  haloperidol, 0.5 mg, oral, Daily  latanoprost, 1 drop, Both Eyes, Nightly  melatonin, 5 mg, oral, Nightly  methotrexate, 15 mg, oral, Weekly  OLANZapine, 20 mg, oral, Nightly  oxygen, , inhalation, Continuous - 02/gases  polyethylene glycol, 17 g, oral, Daily  predniSONE, " 7.5 mg, oral, Daily  sennosides-docusate sodium, 2 tablet, oral, BID  venlafaxine XR, 37.5 mg, oral, Daily      Continuous medications  lactated Ringer's, 50 mL/hr, Last Rate: 50 mL/hr (02/08/24 1809)      PRN medications  PRN medications: dextrose 10 % in water (D10W), dextrose, glucagon, naloxone, oxyCODONE, oxyCODONE, oxygen  Results for orders placed or performed during the hospital encounter of 02/08/24 (from the past 24 hour(s))   CBC and Auto Differential   Result Value Ref Range    WBC 10.0 4.4 - 11.3 x10*3/uL    nRBC 0.0 0.0 - 0.0 /100 WBCs    RBC 3.34 (L) 4.00 - 5.20 x10*6/uL    Hemoglobin 10.4 (L) 12.0 - 16.0 g/dL    Hematocrit 30.9 (L) 36.0 - 46.0 %    MCV 93 80 - 100 fL    MCH 31.1 26.0 - 34.0 pg    MCHC 33.7 32.0 - 36.0 g/dL    RDW 17.2 (H) 11.5 - 14.5 %    Platelets 283 150 - 450 x10*3/uL    Neutrophils % 83.2 40.0 - 80.0 %    Immature Granulocytes %, Automated 1.0 (H) 0.0 - 0.9 %    Lymphocytes % 6.8 13.0 - 44.0 %    Monocytes % 8.2 2.0 - 10.0 %    Eosinophils % 0.6 0.0 - 6.0 %    Basophils % 0.2 0.0 - 2.0 %    Neutrophils Absolute 8.33 (H) 1.60 - 5.50 x10*3/uL    Immature Granulocytes Absolute, Automated 0.10 0.00 - 0.50 x10*3/uL    Lymphocytes Absolute 0.68 (L) 0.80 - 3.00 x10*3/uL    Monocytes Absolute 0.82 (H) 0.05 - 0.80 x10*3/uL    Eosinophils Absolute 0.06 0.00 - 0.40 x10*3/uL    Basophils Absolute 0.02 0.00 - 0.10 x10*3/uL   Comprehensive metabolic panel   Result Value Ref Range    Glucose 156 (H) 74 - 99 mg/dL    Sodium 138 136 - 145 mmol/L    Potassium 3.1 (L) 3.5 - 5.3 mmol/L    Chloride 103 98 - 107 mmol/L    Bicarbonate 29 21 - 32 mmol/L    Anion Gap 9 (L) 10 - 20 mmol/L    Urea Nitrogen 6 6 - 23 mg/dL    Creatinine 0.37 (L) 0.50 - 1.05 mg/dL    eGFR >90 >60 mL/min/1.73m*2    Calcium 8.1 (L) 8.6 - 10.6 mg/dL    Albumin 2.9 (L) 3.4 - 5.0 g/dL    Alkaline Phosphatase 67 33 - 136 U/L    Total Protein 5.6 (L) 6.4 - 8.2 g/dL    AST 25 9 - 39 U/L    Bilirubin, Total 0.3 0.0 - 1.2 mg/dL    ALT  25 7 - 45 U/L   BLOOD GAS VENOUS FULL PANEL   Result Value Ref Range    POCT pH, Venous 7.32 (L) 7.33 - 7.43 pH    POCT pCO2, Venous 55 (H) 41 - 51 mm Hg    POCT pO2, Venous 36 35 - 45 mm Hg    POCT SO2, Venous 46 45 - 75 %    POCT Oxy Hemoglobin, Venous 45.4 45.0 - 75.0 %    POCT Hematocrit Calculated, Venous 33.0 (L) 36.0 - 46.0 %    POCT Sodium, Venous 135 (L) 136 - 145 mmol/L    POCT Potassium, Venous 3.2 (L) 3.5 - 5.3 mmol/L    POCT Chloride, Venous 103 98 - 107 mmol/L    POCT Ionized Calicum, Venous 1.11 1.10 - 1.33 mmol/L    POCT Glucose, Venous 163 (H) 74 - 99 mg/dL    POCT Lactate, Venous 1.1 0.4 - 2.0 mmol/L    POCT Base Excess, Venous 1.4 -2.0 - 3.0 mmol/L    POCT HCO3 Calculated, Venous 28.3 (H) 22.0 - 26.0 mmol/L    POCT Hemoglobin, Venous 10.9 (L) 12.0 - 16.0 g/dL    POCT Anion Gap, Venous 7.0 (L) 10.0 - 25.0 mmol/L    Patient Temperature 37.0 degrees Celsius    FiO2 0 %   Type and Screen   Result Value Ref Range    ABO TYPE A     Rh TYPE POS     ANTIBODY SCREEN NEG    Coagulation Screen   Result Value Ref Range    Protime 12.8 9.8 - 12.8 seconds    INR 1.1 0.9 - 1.1    aPTT 25 (L) 27 - 38 seconds   Hemoglobin A1c   Result Value Ref Range    Hemoglobin A1C 8.2 (H) see below %    Estimated Average Glucose 189 Not Established mg/dL   ECG 12 lead   Result Value Ref Range    Ventricular Rate 98 BPM    Atrial Rate 98 BPM    AK Interval 134 ms    QRS Duration 72 ms    QT Interval 342 ms    QTC Calculation(Bazett) 436 ms    P Axis 62 degrees    R Axis 26 degrees    T Axis 47 degrees    QRS Count 16 beats    Q Onset 218 ms    P Onset 151 ms    P Offset 202 ms    T Offset 389 ms    QTC Fredericia 402 ms   POCT GLUCOSE   Result Value Ref Range    POCT Glucose 174 (H) 74 - 99 mg/dL               This patient has a urinary catheter   Reason for the urinary catheter remaining today? perioperative use for selected surgical procedures               Assessment/Plan   Principal Problem:    Left supracondylar humerus  fracture, closed, initial encounter    Injury: L3 burst fx  HPI: 83F (dermatomyositis, DM, schizoaffective disorder) transfer from [place] after mGLF. Also w L distal humerus fx. Denies b/b or SA. 3/5 L HF, 4/5 R HF otherwise 5/5 strength BLE. SILT throughout. No UMN signs. Weak but intact rectal tone. CT w bridging osteophytes outside of zone of injury, concerning for stiff spine.   Plan: MRI T/L spine wo contrast. Strict T/L spine precautions.      Plan:   -No acute orthopedic intervention.  -Weightbearing as tolerated bilateral lower extremities.  No lifting greater than 10 pounds.  No twisting.  -MRI T and L-spine reviewed with on-call attending.  -Okay for lateral position in OR with orthopedic trauma.  -Per trauma/Ortho trauma    Follow-up with outpatient with Dr. Quick.     Will follow peripherally while in house.     While admitted, this patient will be followed by the Ortho Spine Team. Please contact below residents with any questions (available via Epic Chat). After 5pm during weekdays or on holidays/weekends please contact the oncall resident at pg [phone] with any urgent questions/concerns.     First call: John Morin PGY2  Second call: Jori Johnson PGY3  Third call: Xavi De La Torre PGY4         6pm-6am M-F, Holidays, and weekends page Ortho on-call [phone] with urgent questions/concerns.     Bo Johnson DO   PGY-III  Orthopaedic Surgery   Pager: [phone]  Epic Chat Preferred              Bo Johnson, DO    Orthopedic spine attending    Patient evaluated, imaging reviewed.    This appears to be a stable fracture.  No immediate surgical intervention for the spine necessary.    She may proceed with her orthopedic surgery.    We will continue to follow clinically and radiographically.    ** Dictated with voice recognition software and not immediately reviewed for errors in grammar and/or spelling **

## 2024-02-09 NOTE — ANESTHESIA PREPROCEDURE EVALUATION
Patient: Jennifer Mancini    Procedure Information       Date/Time: 02/09/24 0955    Procedure: Open Reduction Internal Fixation Distal Humerus (Left: Arm Upper)    Location: [facility] OR 09 / [facility] [place] [place]    Surgeons: Slim Dover MD            Relevant Problems   Cardiovascular   (+) Aortic stenosis   (+) CAD (coronary artery disease)   (+) HTN (hypertension)   (+) MI (myocardial infarction) (CMS/HCC)   (+) Murmur      Hematology   (+) Anemia       Clinical information reviewed:   Tobacco  Allergies  Meds  Problems  Med Hx  Surg Hx  OB Status    Fam Hx  Soc Hx        NPO Detail:  NPO/Void Status  Date of Last Liquid: 02/09/24  Time of Last Liquid: 0000  Date of Last Solid: 02/09/24  Time of Last Solid: 0000         Physical Exam    Airway  Mallampati: II  TM distance: >3 FB  Neck ROM: full     Cardiovascular - normal exam  Rhythm: regular  Rate: normal  (+) murmur     Dental    Pulmonary - normal exam  Breath sounds clear to auscultation     Abdominal            Anesthesia Plan    History of general anesthesia?: yes  History of complications of general anesthesia?: no    ASA 3     general     intravenous induction   Postoperative administration of opioids is intended.  Anesthetic plan and risks discussed with patient.  Use of blood products discussed with patient who.    Plan discussed with attending.

## 2024-02-09 NOTE — CARE PLAN
The patient's goals for the shift include      The clinical goals for the shift include pt pain will be controlled      Problem: Pain  Goal: Takes deep breaths with improved pain control throughout the shift  Outcome: Progressing  Goal: Turns in bed with improved pain control throughout the shift  Outcome: Progressing  Goal: Walks with improved pain control throughout the shift  Outcome: Progressing  Goal: Performs ADL's with improved pain control throughout shift  Outcome: Progressing  Goal: Participates in PT with improved pain control throughout the shift  Outcome: Progressing  Goal: Free from opioid side effects throughout the shift  Outcome: Progressing  Goal: Free from acute confusion related to pain meds throughout the shift  Outcome: Progressing     Problem: Fall/Injury  Goal: Not fall by end of shift  Outcome: Progressing  Goal: Be free from injury by end of the shift  Outcome: Progressing  Goal: Verbalize understanding of personal risk factors for fall in the hospital  Outcome: Progressing  Goal: Verbalize understanding of risk factor reduction measures to prevent injury from fall in the home  Outcome: Progressing  Goal: Use assistive devices by end of the shift  Outcome: Progressing  Goal: Pace activities to prevent fatigue by end of the shift  Outcome: Progressing     Problem: Skin  Goal: Decreased wound size/increased tissue granulation at next dressing change  Outcome: Progressing  Goal: Participates in plan/prevention/treatment measures  Outcome: Progressing  Goal: Prevent/manage excess moisture  Outcome: Progressing  Goal: Prevent/minimize sheer/friction injuries  Outcome: Progressing  Goal: Promote/optimize nutrition  Outcome: Progressing  Goal: Promote skin healing  Outcome: Progressing

## 2024-02-09 NOTE — SIGNIFICANT EVENT
DEWEY pg - SpO2 90%    Upon assessment pt SpO2 86% on 3L NC - pt open mouth breathing. Placed on 4L/31% venti mask to compensate for mouth breathing and SpO2 now 93%    RN encouraged to call Rapid for additional concerns

## 2024-02-09 NOTE — PROGRESS NOTES
[facility]  TRAUMA SERVICE - PROGRESS NOTE    Patient Name: Jennifer Manicni  MRN: [id number]  Admit Date: 208  : 1940  AGE: 83 y.o.   GENDER: female  ==============================================================================  MECHANISM OF INJURY:   Mechanical fall out of bed onto L side at assisted living facility    LOC (yes/no?): No  Anticoagulant / Anti-platelet Rx? (for what dx?): No  Referring Facility Name (N/A for scene EMR run):  [facility]    INJURIES:   - L3 burst fracture, comminuted. 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 Follow-up outpatient with Dr. Quick    ## Left humerus fx  - Ortho trauma consulted, NWB LUE, extremity splinted, ORIF today   - EKG NSR, RCRI 0  - Echo  showing normal EF and mild aortic stenosis     ## Nasal bone fracture  - No acute intervention at this time  - follow up in OMFS clinic in 2 weeks     ## comorbids  - Geriatrics team consulted, appreciate recommendations  - pharmacy med rec team involved, restart home steroid, methotrexate, H2 blocker, evening haldol/zyprexa for now     Dispo: TBD, clear for OR with orthopedics tomorrow.     Patient discussed with attending, Dr. Janak Hurley  "DO Papa  Department of Surgery / IR Integrated PGY1  Trauma [id number]  ==============================================================================  CHIEF COMPLAINT / OVERNIGHT EVENTS:   Overnight, had an acute desaturation while sleeping. RR was called, patient briefly placed on Venturi mask. Patient states she is on oxygen at home. Vital signs improved following respirator therapy interventions. She is somnolent this AM. Pain in LUE    MEDICAL HISTORY / ROS:  Admission history and ROS reviewed. Pertinent changes as follows:  N/A    PHYSICAL EXAM:  Heart Rate:  [102-114]   Temp:  [36.1 °C (97 °F)-36.7 °C (98.1 °F)]   Resp:  [17-19]   BP: ()/(59-93)   Height:  [154.9 cm (5' 1\")]   Weight:  [68 kg (149 lb 14.6 oz)]   SpO2:  [88 %-100 %]       GENERAL APPEARANCE:  AxOx4, generally well-appearing no acute distress.  HEENT: Scattered facial bruising, small cephalohematoma between eyebrows. Oral mucosa and tongue without lacerations, teeth in place but multiple chronically missing. Bilateral, erythematous heliotrope rash surrounding eyes  HEART:  Normal rate and regular rhythm  LUNGS:  Equal chest rise and fall, non-labored breathing  ABDOMEN:  Soft, nontender, nondistended  EXTREMITIES:  LAS in place. Tender to palpation  NEUROLOGICAL:  Grossly nonfocal. Alert and oriented, moving all 4 extremities.   Skin:  Warm and dry       LABS:  Results for orders placed or performed during the hospital encounter of 02/08/24 (from the past 24 hour(s))   ECG 12 lead   Result Value Ref Range    Ventricular Rate 98 BPM    Atrial Rate 98 BPM    WA Interval 134 ms    QRS Duration 72 ms    QT Interval 342 ms    QTC Calculation(Bazett) 436 ms    P Axis 62 degrees    R Axis 26 degrees    T Axis 47 degrees    QRS Count 16 beats    Q Onset 218 ms    P Onset 151 ms    P Offset 202 ms    T Offset 389 ms    QTC Fredericia 402 ms   POCT GLUCOSE   Result Value Ref Range    POCT Glucose 174 (H) 74 - 99 mg/dL   CBC   Result Value Ref Range "    WBC 9.4 4.4 - 11.3 x10*3/uL    nRBC 0.0 0.0 - 0.0 /100 WBCs    RBC 3.21 (L) 4.00 - 5.20 x10*6/uL    Hemoglobin 9.8 (L) 12.0 - 16.0 g/dL    Hematocrit 30.6 (L) 36.0 - 46.0 %    MCV 95 80 - 100 fL    MCH 30.5 26.0 - 34.0 pg    MCHC 32.0 32.0 - 36.0 g/dL    RDW 17.4 (H) 11.5 - 14.5 %    Platelets 280 150 - 450 x10*3/uL   Renal Function Panel   Result Value Ref Range    Glucose 160 (H) 74 - 99 mg/dL    Sodium 137 136 - 145 mmol/L    Potassium 3.9 3.5 - 5.3 mmol/L    Chloride 101 98 - 107 mmol/L    Bicarbonate 29 21 - 32 mmol/L    Anion Gap 11 10 - 20 mmol/L    Urea Nitrogen 8 6 - 23 mg/dL    Creatinine 0.39 (L) 0.50 - 1.05 mg/dL    eGFR >90 >60 mL/min/1.73m*2    Calcium 9.1 8.6 - 10.6 mg/dL    Phosphorus 3.6 2.5 - 4.9 mg/dL    Albumin 2.7 (L) 3.4 - 5.0 g/dL   Magnesium   Result Value Ref Range    Magnesium 1.49 (L) 1.60 - 2.40 mg/dL   Transthoracic Echo (TTE) Limited   Result Value Ref Range    AV pk cindy 2.47 m/s    AV mn grad 14.0 mmHg    LVOT diam 1.80 cm    MV E/A ratio 0.87     LVIDd 1.10 cm    Aortic Valve Area by Continuity of VTI 1.50 cm2    Aortic Valve Area by Continuity of Peak Velocity 1.52 cm2    AV pk grad 24.4 mmHg     MEDICATIONS:  Current Facility-Administered Medications   Medication Dose Route Frequency Provider Last Rate Last Admin    acetaminophen (Tylenol) tablet 975 mg  975 mg oral q8h Warren Mayberry PA-C   975 mg at 02/09/24 0240    dextrose 10 % in water (D10W) infusion  0.3 g/kg/hr intravenous Once PRN Warren Mayberry PA-C        dextrose 50 % injection 25 g  25 g intravenous q15 min PRN Warren J Mayberry, PA-C        famotidine (Pepcid) tablet 40 mg  40 mg oral Nightly John Magallon PA-C   40 mg at 02/08/24 2206    glucagon (Glucagen) injection 1 mg  1 mg intramuscular q15 min PRN Warren Mayberry PA-C        haloperidol (Haldol) tablet 0.5 mg  0.5 mg oral Daily John Magallon PA-C        lactated Ringer's infusion  50 mL/hr intravenous Continuous John Magallon PA-C   Stopped at 02/09/24  1244    latanoprost (Xalatan) 0.005 % ophthalmic solution 1 drop  1 drop Both Eyes Nightly John Magallon PA-C        magnesium sulfate IV 2 g  2 g intravenous Once Xavi Elam DO        melatonin tablet 5 mg  5 mg oral Nightly Warren Mayberry PA-C   5 mg at 02/08/24 2207    methotrexate (Trexall) tablet 15 mg  15 mg oral Weekly John Magallon PA-C        naloxone (Narcan) injection 0.2 mg  0.2 mg intravenous q5 min PRN Warren Mayberry PA-C        OLANZapine (ZyPREXA) tablet 20 mg  20 mg oral Nightly John Magallon PA-C   20 mg at 02/08/24 2206    oxyCODONE (Roxicodone) immediate release tablet 2.5 mg  2.5 mg oral q6h PRN Warren Mayberry PA-C        oxyCODONE (Roxicodone) immediate release tablet 5 mg  5 mg oral q6h PRN Warren Mayberry PA-C   5 mg at 02/09/24 0700    oxygen (O2) therapy   inhalation Continuous - 02/gases Warren Mayberry PA-C   Start at 02/09/24 0800    oxygen (O2) therapy   inhalation Continuous PRN - O2/gases Warren Mayberry PA-C        perflutren lipid microspheres (Definity) injection 0.5-10 mL of dilution  0.5-10 mL of dilution intravenous Once in imaging Xavi Elam DO        perflutren protein A microsphere (Optison) injection 0.5 mL  0.5 mL intravenous Once in imaging Xavi Elam DO        polyethylene glycol (Glycolax, Miralax) packet 17 g  17 g oral Daily Warren Mayberry PA-C        predniSONE (Deltasone) tablet 7.5 mg  7.5 mg oral Daily John Magallon PA-C        sennosides-docusate sodium (Mary-Colace) 8.6-50 mg per tablet 2 tablet  2 tablet oral BID Warren Mayberry PA-C   2 tablet at 02/08/24 2100    sulfur hexafluoride microsphr (Lumason) injection 24.28 mg  2 mL intravenous Once in imaging Xavi Elam DO        venlafaxine XR (Effexor-XR) 24 hr capsule 37.5 mg  37.5 mg oral Daily John Magallon PA-C           IMAGING SUMMARY:  (summary of new imaging findings, not a copy of dictation)  CT Head/Face: nasal bone fracture on right, negative CTH acute injury  CT C-Spine:  chronic degen changes, no acute injury  CT Chest/Abd/Pelvis: large hiatal hernia in left hemithorax, no acute injury  CT TL spine: L3 burst fracture, indeterminate age T12/L1 fractures  MR T/L 40% height loss L3 burst    I have reviewed all laboratory and imaging results ordered/pertinent for today's encounter.

## 2024-02-09 NOTE — PROGRESS NOTES
"Orthopaedic Surgery Progress Note    S:  No acute events overnight. She is concerned that she has been NPO with her hx of DM. Denies chest pain, shortness of breath, or fevers.    O:  BP 99/63 (BP Location: Left arm, Patient Position: Lying)   Pulse 106   Temp 36.4 °C (97.6 °F) (Temporal)   Resp 18   Ht 1.549 m (5' 1\")   Wt 68 kg (150 lb)   SpO2 96%   BMI 28.34 kg/m²     Gen: arousable, NAD, appropriately conversational  Cardiac: RRR to peripheral palpation  Resp: nonlabored on RA  GI: soft, nondistended    MSK:  Left upper extremity:   -LAS in place  -Tender at site of injury with painful ROM.  -Fires axillary/AIN/PIN/ulnar/median distributions  -SILT axillary/radial/median/ulnar distributions  -Hand warm, well perfused    A/P: 83 y.o. female w/ PMH significant for dermatomyositis, DM, schizoaffective disorder presents after a mGLF sustaining a closed NVI L extra-articular distal humerus fx. Also sustained an L3 burst fx in the setting of a stiff spine    Plan:  - plan for operative fixation  - patient is c/p for L elbow ORIF w/ Dr. Dover on 2/9/2024.  - Please document medical clearance prior to OR  - WB: DINORA MURRIETA in LAS  - Abx: Not indicated preop  - Diet: NPO  - DVT: please hold today for OR  - Please obtain all preop labs (CBC,BMP,EKG, CXR, Coags, Type and Screen)  - Goel: in place  - remainder of care per trauma    Desean Aguilar MD  Orthopedic Surgery, PGY3  Available by Epic Message    While admitted, this patient will be followed by the Ortho Trauma Team. Please contact the residents listed below with any questions (available via Epic Chat weekdays). Please page [phone] (ortho on-call) after 6pm and on weekends.    Ortho Trauma  First Call: Keagan Ratliff, PGY-1  Second Call: Balaji Perez, PGY-2  Third Call: Desean Aguilar, PGY-3  "

## 2024-02-09 NOTE — PROGRESS NOTES
Patient was handed off to me from the previous team. For full history, physical, and prior ED course, please see previous provider note prior to patient handoff. This is an addendum to the record.    Briefly, this is an 83-year-old female who presents to the emergency department following a ground-level fall as a limited trauma activation.  Found to have an L3 burst fracture, distal humerus fracture, and displaced nasal bone fracture.  Pending recommendations from consultants and admitted to trauma service at time of signout.    Hospital Course/MDM:  Patient evaluated by trauma surgery, face, orthopedic surgery, and spine.  He is currently stable, will be admitted to trauma floor service, telemetry, for further evaluation and management.    Disposition:  Admit to trauma surgery    --  Joi Rivera MD  Emergency Medicine, PGY-3

## 2024-02-09 NOTE — PROGRESS NOTES
Physical Therapy                 Therapy Communication Note    Patient Name: Jennifer Mancini  MRN: [id number]  Today's Date: 2/9/2024     Discipline: Physical Therapy    Missed Visit Reason: Missed Visit Reason:  (Pt currently in OR. Will reattempt as appropriate.)    Missed Time: Attempt

## 2024-02-10 LAB
ALBUMIN SERPL BCP-MCNC: 2.8 G/DL (ref 3.4–5)
ALP SERPL-CCNC: 63 U/L (ref 33–136)
ALT SERPL W P-5'-P-CCNC: 19 U/L (ref 7–45)
ANION GAP SERPL CALC-SCNC: 11 MMOL/L (ref 10–20)
AST SERPL W P-5'-P-CCNC: 25 U/L (ref 9–39)
BILIRUB SERPL-MCNC: 0.4 MG/DL (ref 0–1.2)
BUN SERPL-MCNC: 8 MG/DL (ref 6–23)
CALCIUM SERPL-MCNC: 8.5 MG/DL (ref 8.6–10.6)
CHLORIDE SERPL-SCNC: 96 MMOL/L (ref 98–107)
CO2 SERPL-SCNC: 28 MMOL/L (ref 21–32)
CREAT SERPL-MCNC: 0.41 MG/DL (ref 0.5–1.05)
EGFRCR SERPLBLD CKD-EPI 2021: >90 ML/MIN/1.73M*2
ERYTHROCYTE [DISTWIDTH] IN BLOOD BY AUTOMATED COUNT: 17.3 % (ref 11.5–14.5)
GLUCOSE BLD MANUAL STRIP-MCNC: 155 MG/DL (ref 74–99)
GLUCOSE BLD MANUAL STRIP-MCNC: 228 MG/DL (ref 74–99)
GLUCOSE BLD MANUAL STRIP-MCNC: 233 MG/DL (ref 74–99)
GLUCOSE BLD MANUAL STRIP-MCNC: 245 MG/DL (ref 74–99)
GLUCOSE SERPL-MCNC: 226 MG/DL (ref 74–99)
HCT VFR BLD AUTO: 30.2 % (ref 36–46)
HGB BLD-MCNC: 10 G/DL (ref 12–16)
MAGNESIUM SERPL-MCNC: 1.53 MG/DL (ref 1.6–2.4)
MCH RBC QN AUTO: 31.7 PG (ref 26–34)
MCHC RBC AUTO-ENTMCNC: 33.1 G/DL (ref 32–36)
MCV RBC AUTO: 96 FL (ref 80–100)
NRBC BLD-RTO: 0 /100 WBCS (ref 0–0)
PLATELET # BLD AUTO: 240 X10*3/UL (ref 150–450)
POTASSIUM SERPL-SCNC: 4 MMOL/L (ref 3.5–5.3)
PROT SERPL-MCNC: 4.9 G/DL (ref 6.4–8.2)
RBC # BLD AUTO: 3.15 X10*6/UL (ref 4–5.2)
SODIUM SERPL-SCNC: 131 MMOL/L (ref 136–145)
WBC # BLD AUTO: 8.7 X10*3/UL (ref 4.4–11.3)

## 2024-02-10 PROCEDURE — 84439 ASSAY OF FREE THYROXINE: CPT

## 2024-02-10 PROCEDURE — 2500000001 HC RX 250 WO HCPCS SELF ADMINISTERED DRUGS (ALT 637 FOR MEDICARE OP): Performed by: PHYSICIAN ASSISTANT

## 2024-02-10 PROCEDURE — 2500000001 HC RX 250 WO HCPCS SELF ADMINISTERED DRUGS (ALT 637 FOR MEDICARE OP)

## 2024-02-10 PROCEDURE — 2500000002 HC RX 250 W HCPCS SELF ADMINISTERED DRUGS (ALT 637 FOR MEDICARE OP, ALT 636 FOR OP/ED)

## 2024-02-10 PROCEDURE — 36415 COLL VENOUS BLD VENIPUNCTURE: CPT

## 2024-02-10 PROCEDURE — 2500000005 HC RX 250 GENERAL PHARMACY W/O HCPCS

## 2024-02-10 PROCEDURE — 99222 1ST HOSP IP/OBS MODERATE 55: CPT | Performed by: SURGERY

## 2024-02-10 PROCEDURE — 2500000004 HC RX 250 GENERAL PHARMACY W/ HCPCS (ALT 636 FOR OP/ED): Performed by: PHYSICIAN ASSISTANT

## 2024-02-10 PROCEDURE — 82746 ASSAY OF FOLIC ACID SERUM: CPT

## 2024-02-10 PROCEDURE — 2500000004 HC RX 250 GENERAL PHARMACY W/ HCPCS (ALT 636 FOR OP/ED)

## 2024-02-10 PROCEDURE — 2500000004 HC RX 250 GENERAL PHARMACY W/ HCPCS (ALT 636 FOR OP/ED): Performed by: SURGERY

## 2024-02-10 PROCEDURE — 80053 COMPREHEN METABOLIC PANEL: CPT

## 2024-02-10 PROCEDURE — 84443 ASSAY THYROID STIM HORMONE: CPT

## 2024-02-10 PROCEDURE — 1200000002 HC GENERAL ROOM WITH TELEMETRY DAILY

## 2024-02-10 PROCEDURE — 82947 ASSAY GLUCOSE BLOOD QUANT: CPT

## 2024-02-10 PROCEDURE — 85027 COMPLETE CBC AUTOMATED: CPT

## 2024-02-10 PROCEDURE — 97161 PT EVAL LOW COMPLEX 20 MIN: CPT | Mod: GP | Performed by: PHYSICAL MEDICINE & REHABILITATION

## 2024-02-10 PROCEDURE — 83735 ASSAY OF MAGNESIUM: CPT

## 2024-02-10 PROCEDURE — 82607 VITAMIN B-12: CPT

## 2024-02-10 PROCEDURE — 2500000002 HC RX 250 W HCPCS SELF ADMINISTERED DRUGS (ALT 637 FOR MEDICARE OP, ALT 636 FOR OP/ED): Performed by: PHYSICIAN ASSISTANT

## 2024-02-10 RX ORDER — SODIUM CHLORIDE 9 MG/ML
50 INJECTION, SOLUTION INTRAVENOUS CONTINUOUS
Status: DISCONTINUED | OUTPATIENT
Start: 2024-02-10 | End: 2024-02-11

## 2024-02-10 RX ORDER — INSULIN LISPRO 100 [IU]/ML
0-5 INJECTION, SOLUTION INTRAVENOUS; SUBCUTANEOUS
Status: DISCONTINUED | OUTPATIENT
Start: 2024-02-10 | End: 2024-02-12

## 2024-02-10 RX ADMIN — ACETAMINOPHEN 975 MG: 325 TABLET ORAL at 03:01

## 2024-02-10 RX ADMIN — INSULIN LISPRO 2 UNITS: 100 INJECTION, SOLUTION INTRAVENOUS; SUBCUTANEOUS at 14:10

## 2024-02-10 RX ADMIN — SODIUM CHLORIDE 50 ML/HR: 9 INJECTION, SOLUTION INTRAVENOUS at 10:28

## 2024-02-10 RX ADMIN — POLYETHYLENE GLYCOL 3350 17 G: 17 POWDER, FOR SOLUTION ORAL at 08:35

## 2024-02-10 RX ADMIN — OXYCODONE HYDROCHLORIDE 5 MG: 5 TABLET ORAL at 08:35

## 2024-02-10 RX ADMIN — CEFAZOLIN SODIUM 2 G: 2 INJECTION, SOLUTION INTRAVENOUS at 10:28

## 2024-02-10 RX ADMIN — ACETAMINOPHEN 975 MG: 325 TABLET ORAL at 20:11

## 2024-02-10 RX ADMIN — CEFAZOLIN SODIUM 2 G: 2 INJECTION, SOLUTION INTRAVENOUS at 00:44

## 2024-02-10 RX ADMIN — OXYCODONE HYDROCHLORIDE 5 MG: 5 TABLET ORAL at 14:40

## 2024-02-10 RX ADMIN — Medication 5 MG: at 20:12

## 2024-02-10 RX ADMIN — VENLAFAXINE HYDROCHLORIDE 37.5 MG: 37.5 CAPSULE, EXTENDED RELEASE ORAL at 08:35

## 2024-02-10 RX ADMIN — INSULIN LISPRO 2 UNITS: 100 INJECTION, SOLUTION INTRAVENOUS; SUBCUTANEOUS at 17:59

## 2024-02-10 RX ADMIN — LATANOPROST 1 DROP: 50 SOLUTION/ DROPS OPHTHALMIC at 00:43

## 2024-02-10 RX ADMIN — OLANZAPINE 20 MG: 5 TABLET, FILM COATED ORAL at 20:11

## 2024-02-10 RX ADMIN — INSULIN LISPRO 2 UNITS: 100 INJECTION, SOLUTION INTRAVENOUS; SUBCUTANEOUS at 08:34

## 2024-02-10 RX ADMIN — OXYCODONE HYDROCHLORIDE 5 MG: 5 TABLET ORAL at 21:48

## 2024-02-10 RX ADMIN — SENNOSIDES AND DOCUSATE SODIUM 2 TABLET: 8.6; 5 TABLET ORAL at 20:11

## 2024-02-10 RX ADMIN — ACETAMINOPHEN 975 MG: 325 TABLET ORAL at 10:28

## 2024-02-10 RX ADMIN — HALOPERIDOL 0.5 MG: 0.5 TABLET ORAL at 10:28

## 2024-02-10 RX ADMIN — FAMOTIDINE 40 MG: 20 TABLET, FILM COATED ORAL at 20:12

## 2024-02-10 RX ADMIN — SENNOSIDES AND DOCUSATE SODIUM 2 TABLET: 8.6; 5 TABLET ORAL at 08:35

## 2024-02-10 RX ADMIN — OXYCODONE HYDROCHLORIDE 5 MG: 5 TABLET ORAL at 00:43

## 2024-02-10 RX ADMIN — PREDNISONE 7.5 MG: 5 TABLET ORAL at 08:35

## 2024-02-10 RX ADMIN — LATANOPROST 1 DROP: 50 SOLUTION/ DROPS OPHTHALMIC at 20:12

## 2024-02-10 RX ADMIN — Medication: at 08:00

## 2024-02-10 ASSESSMENT — COGNITIVE AND FUNCTIONAL STATUS - GENERAL
CLIMB 3 TO 5 STEPS WITH RAILING: TOTAL
TURNING FROM BACK TO SIDE WHILE IN FLAT BAD: A LOT
MOBILITY SCORE: 8
STANDING UP FROM CHAIR USING ARMS: TOTAL
WALKING IN HOSPITAL ROOM: TOTAL
MOVING FROM LYING ON BACK TO SITTING ON SIDE OF FLAT BED WITH BEDRAILS: A LOT
MOVING TO AND FROM BED TO CHAIR: TOTAL

## 2024-02-10 ASSESSMENT — PAIN SCALES - WONG BAKER: WONGBAKER_NUMERICALRESPONSE: HURTS WORST

## 2024-02-10 ASSESSMENT — PAIN SCALES - GENERAL
PAINLEVEL_OUTOF10: 0 - NO PAIN
PAINLEVEL_OUTOF10: 10 - WORST POSSIBLE PAIN

## 2024-02-10 ASSESSMENT — PAIN SCALES - PAIN ASSESSMENT IN ADVANCED DEMENTIA (PAINAD)
CONSOLABILITY: DISTRACTED OR REASSURED BY VOICE/TOUCH
FACIALEXPRESSION: FACIAL GRIMACING
BREATHING: NORMAL

## 2024-02-10 ASSESSMENT — PAIN - FUNCTIONAL ASSESSMENT
PAIN_FUNCTIONAL_ASSESSMENT: 0-10
PAIN_FUNCTIONAL_ASSESSMENT: 0-10

## 2024-02-10 ASSESSMENT — ACTIVITIES OF DAILY LIVING (ADL): ADL_ASSISTANCE: NEEDS ASSISTANCE

## 2024-02-10 ASSESSMENT — PAIN DESCRIPTION - ORIENTATION: ORIENTATION: LEFT

## 2024-02-10 NOTE — NURSING NOTE
Patient has discoloration and swelling to R medial foot extending to great toe and 2nd toe. Uncertain if this is a new finding. Pedal pulse is +2. Patient denies pain. Area is not warm to touch and is non-blanchable. MD notified of finding

## 2024-02-10 NOTE — PROGRESS NOTES
Physical Therapy    Physical Therapy Evaluation    Patient Name: Jennifer Mancini  MRN: [id number]  Today's Date: 2/10/2024   Time Calculation  Start Time: 1225  Stop Time: 1243  Time Calculation (min): 18 min    Assessment/Plan   PT Assessment  PT Assessment Results: Decreased strength, Decreased range of motion, Impaired balance, Decreased mobility, Decreased cognition, Impaired judgement, Decreased safety awareness, Pain, Decreased skin integrity  Rehab Prognosis: Fair  Strengths: Coping skills, Support of Caregivers, Support of extended family/friends  Barriers to Participation: Comorbidities, Premorbid level of function, Ability to acquire knowledge  End of Session Communication: Bedside nurse  Assessment Comment: Patient presents wtih decline in functional mobility as compared to baseline and could benefit from continued PT services in order to address deficits and maximize function.  End of Session Patient Position: Bed, 3 rail up, Alarm on  IP OR SWING BED PT PLAN  Inpatient or Swing Bed: Inpatient  PT Plan  Treatment/Interventions: Transfer training, Bed mobility, Gait training, Neuromuscular re-education, Balance training, Strengthening, Endurance training, Range of motion, Therapeutic exercise, Therapeutic activity, Positioning  PT Plan: Skilled PT  PT Frequency: 4 times per week  PT Discharge Recommendations: Moderate intensity level of continued care  PT Recommended Transfer Status: Total assist  PT - OK to Discharge: Yes (Eval completed and D/C recommendation made.)      Subjective   General Visit Information:  General  Reason for Referral: 83 y.o. female sustained fall and now s/p L distal humerus ORIF. Patient also with L3 burst fracture. Per ortho, no surgical intervention. Strict spinal precautions and no need for brace  Past Medical History Relevant to Rehab: Schizoaffective DO, frequent falls  Family/Caregiver Present: Yes  Caregiver Feedback: Daughter Ivette in room to assist wtih PLOF and social  history  Prior to Session Communication: Bedside nurse  Patient Position Received: Bed, 3 rail up  Preferred Learning Style: verbal, visual  General Comment: Patient able to follow one step commands ~75% with need for repetition, good adherence to spinal precautions with cues, confused as to situation otherwise AO x 3  Home Living:  Home Living  Type of Home: Assisted living  Lives With: Alone (Frequent falls when OOB per daughter report)  Home Adaptive Equipment: Walker rolling or standard  Home Layout: One level  Home Access: No concerns, Level entry  Prior Level of Function:  Prior Function Per Pt/Caregiver Report  Level of [place]: Needs assistance with ADLs  Receives Help From: Other (Comment) (Caregivers at [facility])  ADL Assistance: Needs assistance (Daughter reports minimal assistance needed for dressing and bathing)  Ambulatory Assistance: Independent (with RW but frequent falls)  Precautions:  Precautions  UE Weight Bearing Status: Left Non-Weight Bearing  LE Weight Bearing Status: Weight Bearing as Tolerated (BLEs)  Medical Precautions: Fall precautions, Spinal precautions  Precautions Comment: Strict spinal precautions  Vital Signs:  Vital Signs  Heart Rate: 102 (102 at beginning of session. 119 BPM sitting EOB. RN aware)    Objective   Pain:  Pain Assessment  Pain Assessment: 0-10  Pain Score: 0 - No pain (Patient denied pain. No apparent distress at beginning or end of session)  Cognition:  Cognition  Orientation Level: Disoriented to situation    General Assessments:     Sensation  Light Touch: No apparent deficits       Coordination  Movements are Fluid and Coordinated: Yes         Static Sitting Balance  Static Sitting-Balance Support: Feet supported, Right upper extremity supported  Static Sitting-Level of Assistance: Contact guard  Dynamic Sitting Balance  Dynamic Sitting-Balance Support: Feet supported, Right upper extremity supported  Dynamic Sitting-Balance: Forward lean, Lateral lean  Dynamic  Sitting-Comments: Min A       Functional Assessments:  Bed Mobility  Bed Mobility: Yes  Bed Mobility 1  Bed Mobility 1: Supine to sitting (via log roll rolling (R))  Level of Assistance 1: Maximum assistance, Maximum verbal cues, Maximum tactile cues  Bed Mobility Comments 1: Patient able to position BLEs into knee flexion with cues. Max A for further sequencing and technique  Bed Mobility 2  Bed Mobility  2: Sitting to supine (via reverse log roll)  Level of Assistance 2: Maximum assistance, Maximum verbal cues, Maximum tactile cues  Bed Mobility Comments 2: Max A mostly at trunk and for sequencing BLEs  Bed Mobility 3  Bed Mobility 3: Scooting (to HOB from supine)  Level of Assistance 3: Dependent (x 2)    Transfers  Transfer: Yes  Transfer 1  Transfer From 1: Sit to  Transfer to 1: Stand  Transfer Device 1:  (HHA RUE)  Transfer Level of Assistance 1: Dependent  Trials/Comments 1: Unable to clear buttocks  Extremity/Trunk Assessments:  RUE   RUE :  (Grossly at least 3/5)  LUE   LUE:  (L shoulder flexion <3/5, L elbow and wrist splinted)  RLE   RLE :  (RLE at least 3/5 grossly able to perform SLR without quad lag)  LLE   LLE :  (LLE at least 3/5 grossly able to perform SLR without quad lag)  Outcome Measures:  [facility] Basic Mobility  Turning from your back to your side while in a flat bed without using bedrails: A lot  Moving from lying on your back to sitting on the side of a flat bed without using bedrails: A lot  Moving to and from bed to chair (including a wheelchair): Total  Standing up from a chair using your arms (e.g. wheelchair or bedside chair): Total  To walk in hospital room: Total  Climbing 3-5 steps with railing: Total  Basic Mobility - Total Score: 8    Encounter Problems       Encounter Problems (Active)       Mobility       STG - Patient will ambulate >/= 15 ft with WBQC mod A  (Progressing)       Start:  02/10/24    Expected End:  02/24/24               Transfers       STG - Transfer from bed to  chair Min A with WBQC (Progressing)       Start:  02/10/24    Expected End:  02/24/24            STG - Patient will perform bed mobility with min A (Progressing)       Start:  02/10/24    Expected End:  02/24/24            STG - Patient will transfer sit to and from stand Min A with WBQC (Progressing)       Start:  02/10/24    Expected End:  02/24/24                   Education Documentation  Precautions, taught by Zuleyma Mccloud PT at 2/10/2024  2:52 PM.  Learner: Family, Patient  Readiness: Acceptance  Method: Explanation, Demonstration  Response: Verbalizes Understanding, Demonstrated Understanding  Comment: Patient and daughter educated on spinal precautions and NWB LUE    Education Comments  No comments found.

## 2024-02-10 NOTE — PROGRESS NOTES
"Orthopaedic Surgery Progress Note    S: Seen on the floor today. Pain control has been acceptable. Denies CP/SOB. Denies N/T/W in the L hand/fingers.    O:  /73   Pulse 109   Temp 37.4 °C (99.3 °F) (Temporal)   Resp 18   Ht 1.549 m (5' 1\")   Wt 68 kg (149 lb 14.6 oz)   SpO2 95%   BMI 28.33 kg/m²     Gen: arousable, NAD, appropriately conversational  Cardiac: RRR to peripheral palpation  Resp: nonlabored on RA  GI: soft, nondistended    MSK:  Left upper extremity:  -Postoperative splint CDI w/o strikethru  - appropriate postop pian  -Fires axillary/AIN/PIN/ulnar/median distributions  -SILT in AIN/PIN/U/M/R nerve distributions  -Hand warm, well perfused    A/P: 83 y.o. female now s/p L distal humerus ORIF on 2/9 with Dr. Dover.  Doing well postop.    PLAN:   Weightbearing Status: NWB LUE in splint  Pain control per primary team  Perioperative ABx: Ancef x24h postop  DVT PPx: SCDs, chemoprophylaxis per Trauma protocol  Dressing: LUE long arm splint to remain in place until follow up  Drain: No drain  Goel: per primary team  PT/OT consult    Ortho will follow peripherally. Please have patient follow up with Dr. Dover In 3 weeks.    Desean Aguilar MD  Orthopedic Surgery, PGY3  Available by Epic Message    While admitted, this patient will be followed peripherally by the Ortho Trauma Team. Please contact the residents listed below with any questions (available via Epic Chat weekdays). Please page [phone] (ortho on-call) after 6pm and on weekends.    Ortho Trauma  First Call: Keagan Ratliff, PGY-1  Second Call: Balaji Perez, PGY-2  Third Call: Desean Aguilar, PGY-3  "

## 2024-02-10 NOTE — PROGRESS NOTES
Patient was seen by PT and recommended moderate intensity upon discharge.  SW called and spoke to patient's daughter Ivette to discuss.  Per Ivette, patient lives in an assisted living facility that does not have a snf affiliated with it, but patient has been to [facility] in the past and she would like patient to go there if possible.  Two back up options identified were [facility] and [facility].  Referrals sent.  SW will continue to follow to assist with a safe discharge plan.

## 2024-02-10 NOTE — PROGRESS NOTES
[facility]  TRAUMA SERVICE - PROGRESS NOTE    Patient Name: Jennifer Mancini  MRN: [id number]  Admit Date: 208  : 1940  AGE: 83 y.o.   GENDER: female  ==============================================================================  MECHANISM OF INJURY:   Mechanical fall out of bed onto L side at assisted living facility    LOC (yes/no?): No  Anticoagulant / Anti-platelet Rx? (for what dx?): No  Referring Facility Name (N/A for scene EMR run):  [facility]    INJURIES:   - L3 burst fracture, comminuted. 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   - Follow-up outpatient with Dr. Quick.   - Will further clarify with ortho spine if patient needs to be in brace, etc.    ## Left humerus fx  -  underwent ORIF.   - NWB LUE in splint, will remain until follow-up appointment    ## Nasal bone fracture  - No acute intervention at this time  - follow up in OMFS clinic in 2 weeks     ## comorbids  - Geriatrics team consulted, appreciate recommendations  - pharmacy med rec team involved, restart home steroid, methotrexate, H2 blocker, evening haldol/zyprexa for now  - PT/OT assessment     Dispo: Will determine based on PT/OT eval. Will touch base with [facility]  "to determine if patient's facility that she arrived from has a SNF associated with it. She will likely need some form of rehabilitation following discharge.    Patient discussed with attending, Dr. Janak Elam DO  Department of Surgery / IR Integrated PGY1  Trauma [id number]  ==============================================================================  CHIEF COMPLAINT / OVERNIGHT EVENTS:   Underwent ORIF of left humerus with ortho yesterday. Doing well following surgery. States that she has moderate pain in the LUE. Otherwise, no acute events.    MEDICAL HISTORY / ROS:  Admission history and ROS reviewed. Pertinent changes as follows:  N/A    PHYSICAL EXAM:  Heart Rate:  []   Temp:  [36.1 °C (97 °F)-36.8 °C (98.2 °F)]   Resp:  [12-22]   BP: ()/(59-93)   Height:  [154.9 cm (5' 1\")]   Weight:  [68 kg (149 lb 14.6 oz)]   SpO2:  [93 %-100 %]       GENERAL APPEARANCE:  AxOx4, generally well-appearing no acute distress.  HEENT: Scattered facial bruising, small cephalohematoma between eyebrows. Oral mucosa and tongue without lacerations, teeth in place but multiple chronically missing. Bilateral, erythematous heliotrope rash surrounding eyes  HEART:  Normal rate and regular rhythm  LUNGS:  Equal chest rise and fall, non-labored breathing  ABDOMEN:  Soft, nontender, nondistended  EXTREMITIES:  LAS in place. Tender to palpation  NEUROLOGICAL:  Grossly nonfocal. Alert and oriented, moving all 4 extremities.   Skin:  Warm and dry       LABS:  Results for orders placed or performed during the hospital encounter of 02/08/24 (from the past 24 hour(s))   CBC   Result Value Ref Range    WBC 9.4 4.4 - 11.3 x10*3/uL    nRBC 0.0 0.0 - 0.0 /100 WBCs    RBC 3.21 (L) 4.00 - 5.20 x10*6/uL    Hemoglobin 9.8 (L) 12.0 - 16.0 g/dL    Hematocrit 30.6 (L) 36.0 - 46.0 %    MCV 95 80 - 100 fL    MCH 30.5 26.0 - 34.0 pg    MCHC 32.0 32.0 - 36.0 g/dL    RDW 17.4 (H) 11.5 - 14.5 %    Platelets 280 150 - 450 x10*3/uL   Renal " Function Panel   Result Value Ref Range    Glucose 160 (H) 74 - 99 mg/dL    Sodium 137 136 - 145 mmol/L    Potassium 3.9 3.5 - 5.3 mmol/L    Chloride 101 98 - 107 mmol/L    Bicarbonate 29 21 - 32 mmol/L    Anion Gap 11 10 - 20 mmol/L    Urea Nitrogen 8 6 - 23 mg/dL    Creatinine 0.39 (L) 0.50 - 1.05 mg/dL    eGFR >90 >60 mL/min/1.73m*2    Calcium 9.1 8.6 - 10.6 mg/dL    Phosphorus 3.6 2.5 - 4.9 mg/dL    Albumin 2.7 (L) 3.4 - 5.0 g/dL   Magnesium   Result Value Ref Range    Magnesium 1.49 (L) 1.60 - 2.40 mg/dL   Transthoracic Echo (TTE) Limited   Result Value Ref Range    AV pk cindy 2.47 m/s    AV mn grad 14.0 mmHg    LVOT diam 1.80 cm    MV E/A ratio 0.87     LVIDd 1.10 cm    Aortic Valve Area by Continuity of VTI 1.50 cm2    Aortic Valve Area by Continuity of Peak Velocity 1.52 cm2    AV pk grad 24.4 mmHg     MEDICATIONS:  Current Facility-Administered Medications   Medication Dose Route Frequency Provider Last Rate Last Admin    acetaminophen (Tylenol) tablet 975 mg  975 mg oral q8h Warren Mayberry PA-C   975 mg at 02/10/24 0301    ceFAZolin in dextrose (iso-os) (Ancef) IVPB 2 g  2 g intravenous q8h Xavi Elam DO   Stopped at 02/10/24 0114    dextrose 10 % in water (D10W) infusion  0.3 g/kg/hr intravenous Once PRN Warren Mayberry PA-C        dextrose 50 % injection 25 g  25 g intravenous q15 min PRN Warren Mayberry PA-C        famotidine (Pepcid) tablet 40 mg  40 mg oral Nightly Johnlakeshia Magallon PA-C   40 mg at 02/09/24 2030    glucagon (Glucagen) injection 1 mg  1 mg intramuscular q15 min PRN Warren Mayberry PA-C        haloperidol (Haldol) tablet 0.5 mg  0.5 mg oral Daily John LAYLA Magallon PA-C   0.5 mg at 02/09/24 1905    lactated Ringer's infusion  50 mL/hr intravenous Continuous JohnKENYA PollockC   Stopped at 02/09/24 1244    latanoprost (Xalatan) 0.005 % ophthalmic solution 1 drop  1 drop Both Eyes Nightly John Magallon PA-C   1 drop at 02/10/24 0043    melatonin tablet 5 mg  5 mg oral Nightly Warren ERAZO  SONI Mayberry   5 mg at 02/09/24 2030    methotrexate (Trexall) tablet 15 mg  15 mg oral Weekly John Magallon PA-C   15 mg at 02/09/24 1904    naloxone (Narcan) injection 0.2 mg  0.2 mg intravenous q5 min PRN Warren Mayberry PA-C        OLANZapine (ZyPREXA) tablet 20 mg  20 mg oral Nightly John Magallon PA-C   20 mg at 02/09/24 2036    oxyCODONE (Roxicodone) immediate release tablet 2.5 mg  2.5 mg oral q6h PRN Warren Mayberry PA-C        oxyCODONE (Roxicodone) immediate release tablet 5 mg  5 mg oral q6h PRN Warren Mayberry PA-C   5 mg at 02/10/24 0043    oxygen (O2) therapy   inhalation Continuous - 02/gases Warren Mayberry PA-C   Start at 02/09/24 0800    oxygen (O2) therapy   inhalation Continuous PRN - O2/gases Warren Mayberry PA-C        perflutren lipid microspheres (Definity) injection 0.5-10 mL of dilution  0.5-10 mL of dilution intravenous Once in imaging Xavi Elam DO        perflutren protein A microsphere (Optison) injection 0.5 mL  0.5 mL intravenous Once in imaging Xavi Elam DO        polyethylene glycol (Glycolax, Miralax) packet 17 g  17 g oral Daily Warren Mayberry PA-C        predniSONE (Deltasone) tablet 7.5 mg  7.5 mg oral Daily John Magallon PA-C        sennosides-docusate sodium (Mary-Colace) 8.6-50 mg per tablet 2 tablet  2 tablet oral BID Warren Mayberry PA-C   2 tablet at 02/09/24 2030    sulfur hexafluoride microsphr (Lumason) injection 24.28 mg  2 mL intravenous Once in imaging Xavi Elam DO        venlafaxine XR (Effexor-XR) 24 hr capsule 37.5 mg  37.5 mg oral Daily John Magallon PA-C   37.5 mg at 02/09/24 1906       IMAGING SUMMARY:  (summary of new imaging findings, not a copy of dictation)  CT Head/Face: nasal bone fracture on right, negative CTH acute injury  CT C-Spine: chronic degen changes, no acute injury  CT Chest/Abd/Pelvis: large hiatal hernia in left hemithorax, no acute injury  CT TL spine: L3 burst fracture, indeterminate age T12/L1 fractures  MR T/L 40%  height loss L3 burst    I have reviewed all laboratory and imaging results ordered/pertinent for today's encounter.

## 2024-02-10 NOTE — SIGNIFICANT EVENT
"RADAR initiated Rapid Response Note     02/10/24 1252   Onset Documentation   Rapid Response Initiated By Radar auto page   Location/Room [facility] [place]   Pager Time 1252   Arrival Time 1300   Event End Time 1313   Level II Called No   Primary Reason for Call Radar auto page     RADAR of 6 for VS of 36.6, 108, 18, 128/82, 91%.  Upon arrival to bedside, RN placing IV.  Patient wearing VM as \"she is a mouth breather\".  Reviewed VS with bedside RN.  No acute change in patient condition.  RN encouraged to page Rapid Response if further concern in patient condition arises.  "

## 2024-02-11 LAB
ALBUMIN SERPL BCP-MCNC: 2.7 G/DL (ref 3.4–5)
ANION GAP SERPL CALC-SCNC: 10 MMOL/L (ref 10–20)
BUN SERPL-MCNC: 7 MG/DL (ref 6–23)
CALCIUM SERPL-MCNC: 8.8 MG/DL (ref 8.6–10.6)
CHLORIDE SERPL-SCNC: 96 MMOL/L (ref 98–107)
CO2 SERPL-SCNC: 30 MMOL/L (ref 21–32)
CREAT SERPL-MCNC: 0.35 MG/DL (ref 0.5–1.05)
EGFRCR SERPLBLD CKD-EPI 2021: >90 ML/MIN/1.73M*2
GLUCOSE BLD MANUAL STRIP-MCNC: 212 MG/DL (ref 74–99)
GLUCOSE BLD MANUAL STRIP-MCNC: 222 MG/DL (ref 74–99)
GLUCOSE BLD MANUAL STRIP-MCNC: 228 MG/DL (ref 74–99)
GLUCOSE BLD MANUAL STRIP-MCNC: 259 MG/DL (ref 74–99)
GLUCOSE SERPL-MCNC: 193 MG/DL (ref 74–99)
PHOSPHATE SERPL-MCNC: 2.9 MG/DL (ref 2.5–4.9)
POTASSIUM SERPL-SCNC: 4 MMOL/L (ref 3.5–5.3)
SODIUM SERPL-SCNC: 132 MMOL/L (ref 136–145)

## 2024-02-11 PROCEDURE — 82947 ASSAY GLUCOSE BLOOD QUANT: CPT

## 2024-02-11 PROCEDURE — 2500000004 HC RX 250 GENERAL PHARMACY W/ HCPCS (ALT 636 FOR OP/ED)

## 2024-02-11 PROCEDURE — 2500000001 HC RX 250 WO HCPCS SELF ADMINISTERED DRUGS (ALT 637 FOR MEDICARE OP)

## 2024-02-11 PROCEDURE — 2500000004 HC RX 250 GENERAL PHARMACY W/ HCPCS (ALT 636 FOR OP/ED): Performed by: PHYSICIAN ASSISTANT

## 2024-02-11 PROCEDURE — 36415 COLL VENOUS BLD VENIPUNCTURE: CPT | Performed by: SURGERY

## 2024-02-11 PROCEDURE — 51701 INSERT BLADDER CATHETER: CPT

## 2024-02-11 PROCEDURE — 99222 1ST HOSP IP/OBS MODERATE 55: CPT | Performed by: SURGERY

## 2024-02-11 PROCEDURE — 2500000001 HC RX 250 WO HCPCS SELF ADMINISTERED DRUGS (ALT 637 FOR MEDICARE OP): Performed by: PHYSICIAN ASSISTANT

## 2024-02-11 PROCEDURE — 80069 RENAL FUNCTION PANEL: CPT | Performed by: SURGERY

## 2024-02-11 PROCEDURE — 1200000002 HC GENERAL ROOM WITH TELEMETRY DAILY

## 2024-02-11 RX ORDER — FLUTICASONE PROPIONATE 50 MCG
1 SPRAY, SUSPENSION (ML) NASAL DAILY
Status: DISCONTINUED | OUTPATIENT
Start: 2024-02-11 | End: 2024-02-16 | Stop reason: HOSPADM

## 2024-02-11 RX ORDER — GUAIFENESIN 100 MG/5ML
200 SOLUTION ORAL EVERY 4 HOURS PRN
Status: DISCONTINUED | OUTPATIENT
Start: 2024-02-11 | End: 2024-02-16 | Stop reason: HOSPADM

## 2024-02-11 RX ORDER — GUAIFENESIN 600 MG/1
600 TABLET, EXTENDED RELEASE ORAL 2 TIMES DAILY
Status: DISCONTINUED | OUTPATIENT
Start: 2024-02-11 | End: 2024-02-14

## 2024-02-11 RX ORDER — HYDROCORTISONE 25 MG/G
1 CREAM TOPICAL 2 TIMES DAILY
Status: DISCONTINUED | OUTPATIENT
Start: 2024-02-11 | End: 2024-02-16 | Stop reason: HOSPADM

## 2024-02-11 RX ORDER — CLOBETASOL PROPIONATE 0.5 MG/G
1 CREAM TOPICAL 2 TIMES DAILY
Status: DISCONTINUED | OUTPATIENT
Start: 2024-02-11 | End: 2024-02-16 | Stop reason: HOSPADM

## 2024-02-11 RX ADMIN — VENLAFAXINE HYDROCHLORIDE 37.5 MG: 37.5 CAPSULE, EXTENDED RELEASE ORAL at 10:10

## 2024-02-11 RX ADMIN — HYDROCORTISONE 1 APPLICATION: 25 CREAM TOPICAL at 22:31

## 2024-02-11 RX ADMIN — INSULIN LISPRO 3 UNITS: 100 INJECTION, SOLUTION INTRAVENOUS; SUBCUTANEOUS at 12:25

## 2024-02-11 RX ADMIN — SENNOSIDES AND DOCUSATE SODIUM 2 TABLET: 8.6; 5 TABLET ORAL at 22:13

## 2024-02-11 RX ADMIN — LATANOPROST 1 DROP: 50 SOLUTION/ DROPS OPHTHALMIC at 22:31

## 2024-02-11 RX ADMIN — POLYETHYLENE GLYCOL 3350 17 G: 17 POWDER, FOR SOLUTION ORAL at 10:10

## 2024-02-11 RX ADMIN — SENNOSIDES AND DOCUSATE SODIUM 2 TABLET: 8.6; 5 TABLET ORAL at 10:10

## 2024-02-11 RX ADMIN — PREDNISONE 7.5 MG: 5 TABLET ORAL at 10:10

## 2024-02-11 RX ADMIN — ACETAMINOPHEN 975 MG: 325 TABLET ORAL at 22:12

## 2024-02-11 RX ADMIN — ACETAMINOPHEN 975 MG: 325 TABLET ORAL at 10:10

## 2024-02-11 RX ADMIN — SALINE NASAL SPRAY 1 SPRAY: 1.5 SOLUTION NASAL at 22:31

## 2024-02-11 RX ADMIN — HALOPERIDOL 0.5 MG: 0.5 TABLET ORAL at 11:14

## 2024-02-11 RX ADMIN — INSULIN LISPRO 2 UNITS: 100 INJECTION, SOLUTION INTRAVENOUS; SUBCUTANEOUS at 10:10

## 2024-02-11 RX ADMIN — CLOBETASOL PROPIONATE 1 APPLICATION: 0.5 CREAM TOPICAL at 22:16

## 2024-02-11 RX ADMIN — GUAIFENESIN 600 MG: 600 TABLET ORAL at 22:11

## 2024-02-11 RX ADMIN — Medication 5 MG: at 21:00

## 2024-02-11 RX ADMIN — OXYCODONE HYDROCHLORIDE 5 MG: 5 TABLET ORAL at 22:33

## 2024-02-11 RX ADMIN — OXYCODONE HYDROCHLORIDE 5 MG: 5 TABLET ORAL at 12:25

## 2024-02-11 RX ADMIN — FAMOTIDINE 40 MG: 20 TABLET, FILM COATED ORAL at 22:13

## 2024-02-11 RX ADMIN — INSULIN LISPRO 2 UNITS: 100 INJECTION, SOLUTION INTRAVENOUS; SUBCUTANEOUS at 17:41

## 2024-02-11 RX ADMIN — OXYCODONE HYDROCHLORIDE 5 MG: 5 TABLET ORAL at 06:21

## 2024-02-11 ASSESSMENT — COGNITIVE AND FUNCTIONAL STATUS - GENERAL
TURNING FROM BACK TO SIDE WHILE IN FLAT BAD: A LOT
MOVING TO AND FROM BED TO CHAIR: TOTAL
HELP NEEDED FOR BATHING: TOTAL
EATING MEALS: A LITTLE
WALKING IN HOSPITAL ROOM: TOTAL
PERSONAL GROOMING: A LITTLE
MOBILITY SCORE: 8
MOVING FROM LYING ON BACK TO SITTING ON SIDE OF FLAT BED WITH BEDRAILS: A LOT
TOILETING: TOTAL
CLIMB 3 TO 5 STEPS WITH RAILING: TOTAL
DAILY ACTIVITIY SCORE: 10
DRESSING REGULAR UPPER BODY CLOTHING: TOTAL
STANDING UP FROM CHAIR USING ARMS: TOTAL
DRESSING REGULAR LOWER BODY CLOTHING: TOTAL

## 2024-02-11 NOTE — ED PROVIDER NOTES
HPI   Chief Complaint   Patient presents with    Fall       HPI  The patient is a 83-year-old female past medical history of hypertension hyperlipidemia and schizoaffective disorder bipolar type who presents emergency department as a transfer from an outside hospital  in order to undergo trauma, face, Ortho and spine consultation due to ground-level fall causing her to sustain a left supracondylar humerus fracture, L3 burst fracture and right nasal bone fracture.  Patient sustained a purely mechanical fall and had immediate left shoulder pain, back pain and facial pain.  She was essentially pan scanned plus x-rays at the outside hospital which were significant for the above noted injuries.  Pan scan was otherwise negative including CT head.    She denies any prodromal chest pain or shortness of breath that led her to fall.  She was in her normal state of health prior to that fall.    PMH:as above.  Meds:reviewed in EMR.  PSH:reviewed in EMR.  allergies:reviewed in EMR.  social:Denies X3.  Family History: non-contributory to acute presentation.    A full 10 point Review of Systems was reviewed with the patient and is negative unless stated in the HPI.                  Kamini Coma Scale Score: 15                     Patient History   Past Medical History:   Diagnosis Date    HTN (hypertension) 2/9/2024    Hypertensive heart disease without heart failure 12/17/2019    Hypertensive heart disease without CHF    Other nondisplaced fracture of seventh cervical vertebra, subsequent encounter for fracture with routine healing 12/17/2019    Other closed nondisplaced fracture of seventh cervical vertebra with routine healing, subsequent encounter    Other nondisplaced fracture of sixth cervical vertebra, subsequent encounter for fracture with routine healing 12/17/2019    Other closed nondisplaced fracture of sixth cervical vertebra with routine healing, subsequent encounter    Personal history of other diseases of the female  genital tract 12/17/2019    History of uterine prolapse    Personal history of other endocrine, nutritional and metabolic disease 12/17/2019    History of hyperlipidemia    Personal history of other mental and behavioral disorders 06/14/2021    History of schizoaffective disorder    Personal history of other specified conditions 06/14/2021    History of chronic pain    Schizoaffective disorder, bipolar type (CMS/[facility]) 12/17/2019    Schizoaffective disorder, bipolar type    Unspecified fracture of left femur, initial encounter for closed fracture (CMS/[facility]) 06/14/2021    Fracture of left femur     Past Surgical History:   Procedure Laterality Date    CT ANGIO NECK  12/13/2019    CT NECK ANGIO W AND WO IV CONTRAST 12/13/2019 [facility]    OTHER SURGICAL HISTORY  12/17/2019    Tonsillectomy    OTHER SURGICAL HISTORY  12/17/2019    Rotator cuff repair    OTHER SURGICAL HISTORY  12/17/2019    Wrist surgery    OTHER SURGICAL HISTORY  12/17/2019    Uterine surgery     No family history on file.  Social History     Tobacco Use    Smoking status: Not on file    Smokeless tobacco: Not on file   Substance Use Topics    Alcohol use: Not on file    Drug use: Not on file       Physical Exam   ED Triage Vitals [02/08/24 1240]   Temp Heart Rate Resp BP   36.5 °C (97.7 °F) 97 18 (!) 150/91      SpO2 Temp Source Heart Rate Source Patient Position   (!) 92 % Tympanic Monitor Lying      BP Location FiO2 (%)     Right arm --       Physical Exam    Physical Exam:    Appearance: Alert, oriented , cooperative,  in no acute distress. Well nourished & well hydrated.    Skin: Intact,  dry skin, no lesions, rash, petechiae or purpura.     Eyes: PERRLA, EOMs intact,  No scleral injection. No scleral icterus.     ENT: Hearing grossly intact. External auditory canals patent. Nares patent, mucus membranes moist. Dentition without lesions.     Neck: Supple, without meningismus. Trachea at midline.     Pulmonary: Clear bilaterally with good  chest wall excursion. No rales, rhonchi or wheezing. No accessory muscle use or stridor.    Cardiac: Normal S1, S2 without murmur, rub, gallop or extrasystole. No JVD, Carotids without bruits.    Abdomen: Soft, nontender.  No palpable organomegaly.  No rebound or guarding.      Genitourinary: Exam deferred.    Musculoskeletal: Palpation of the spine and arm deferred due to known significant injuries.  Patient was neurovascularly intact with gross sensation and motor function intact in the left upper extremity and bilateral legs distal to her injuries.    Neurological: Oriented x 3 GCS is 15.  no focal findings identified.    Psychiatric: Appropriate mood and affect.       ED Course & MDM   Diagnoses as of 02/10/24 1916   Fall, initial encounter   Closed fracture of distal end of humerus, unspecified fracture morphology, initial encounter   Closed stable burst fracture of third lumbar vertebra, initial encounter (CMS/[facility])   Closed fracture of nasal bone, initial encounter       Medical Decision Making  The patient is a 83-year-old female with hypertension, hyperlipidemia and schizoaffective disorder bipolar type who presented as a transfer from outside hospital noted to undergo trauma surgery consultation, Ortho consultation, spine consultation and face consultation after sustaining a mechanical fall with left supracondylar humeral fracture, L3 burst fracture and right nasal bone fracture.    Basic labs and preop labs were obtained and I discussed the case with trauma who agreed to see the patient.  Discussed the case with orthospine and orthopedics who saw the patient and stated that she should be on L-spine precautions for the time being.  Also discussed the case with face surgery who agreed to see the patient.    Patient was signed out to incoming team providers pending final recommendations from the services and likely admission to the trauma floors.  She was in stable condition at the time my  signout.    Discussed with Dr. Lucia Alejandar who agrees.    Kerwin Rosas MD  Emergency Medicine, PGY3    Procedure  Procedures     Ronn Rosas MD  Resident  02/10/24 1922

## 2024-02-11 NOTE — PROGRESS NOTES
[facility]  TRAUMA SERVICE - PROGRESS NOTE    Patient Name: Jennifer Mancini  MRN: [id number]  Admit Date: 208  : 1940  AGE: 83 y.o.   GENDER: female  ==============================================================================  MECHANISM OF INJURY:   Mechanical fall out of bed onto L side at assisted living facility    LOC (yes/no?): No  Anticoagulant / Anti-platelet Rx? (for what dx?): No  Referring Facility Name (N/A for scene EMR run):  [place]    INJURIES:   - L3 burst fracture, comminuted. 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   - Follow-up outpatient with Dr. Quick.   - No bracing required    ## Left humerus fx  -  underwent ORIF.   - NWB LUE in splint, will remain until follow-up appointment    ## Nasal bone fracture  - No acute intervention at this time  - follow up in OMFS clinic in 2 weeks     ## comorbids  - Geriatrics team consulted, appreciate recommendations  - pharmacy med rec team involved, restart home steroid, methotrexate, H2 blocker, evening haldol/zyprexa for now  - PT/OT      Dispo: Patient lives in an assisted living facility that does not have a SNF affiliated with it. Currently searching for an  accepting facility at this time.     Patient discussed with attending, Dr. Janak Elam DO  Department of Surgery / IR Integrated PGY1  Trauma [id number]  ==============================================================================  CHIEF COMPLAINT / OVERNIGHT EVENTS:   No acute events overnight. Patient resting comfortably this AM. Goel removed at 7 pm last night, patient had to be straight-cathed this AM.     MEDICAL HISTORY / ROS:  Admission history and ROS reviewed. Pertinent changes as follows:  N/A    PHYSICAL EXAM:  Heart Rate:  []   Temp:  [36.5 °C (97.7 °F)-37.4 °C (99.3 °F)]   Resp:  [18]   BP: (124-150)/(73-87)   SpO2:  [91 %-97 %]       GENERAL APPEARANCE:  AxOx4, generally well-appearing no acute distress.  HEENT: Scattered facial bruising, small cephalohematoma between eyebrows. Oral mucosa and tongue without lacerations, teeth in place but multiple chronically missing. Bilateral, erythematous heliotrope rash surrounding eyes  HEART:  Normal rate and regular rhythm  LUNGS:  Equal chest rise and fall, non-labored breathing  ABDOMEN:  Soft, nontender, nondistended  EXTREMITIES:  LAS in place. Tender to palpation  NEUROLOGICAL:  Grossly nonfocal. Alert and oriented, moving all 4 extremities.   Skin:  Warm and dry       LABS:  Results for orders placed or performed during the hospital encounter of 02/08/24 (from the past 24 hour(s))   POCT GLUCOSE   Result Value Ref Range    POCT Glucose 228 (H) 74 - 99 mg/dL   CBC   Result Value Ref Range    WBC 8.7 4.4 - 11.3 x10*3/uL    nRBC 0.0 0.0 - 0.0 /100 WBCs    RBC 3.15 (L) 4.00 - 5.20 x10*6/uL    Hemoglobin 10.0 (L) 12.0 - 16.0 g/dL    Hematocrit 30.2 (L) 36.0 - 46.0 %    MCV 96 80 - 100 fL    MCH 31.7 26.0 - 34.0 pg    MCHC 33.1 32.0 - 36.0 g/dL    RDW 17.3 (H) 11.5 - 14.5 %    Platelets 240 150 - 450 x10*3/uL   Comprehensive Metabolic Panel   Result Value Ref Range    Glucose 226 (H) 74 - 99 mg/dL    Sodium 131 (L) 136 - 145 mmol/L     Potassium 4.0 3.5 - 5.3 mmol/L    Chloride 96 (L) 98 - 107 mmol/L    Bicarbonate 28 21 - 32 mmol/L    Anion Gap 11 10 - 20 mmol/L    Urea Nitrogen 8 6 - 23 mg/dL    Creatinine 0.41 (L) 0.50 - 1.05 mg/dL    eGFR >90 >60 mL/min/1.73m*2    Calcium 8.5 (L) 8.6 - 10.6 mg/dL    Albumin 2.8 (L) 3.4 - 5.0 g/dL    Alkaline Phosphatase 63 33 - 136 U/L    Total Protein 4.9 (L) 6.4 - 8.2 g/dL    AST 25 9 - 39 U/L    Bilirubin, Total 0.4 0.0 - 1.2 mg/dL    ALT 19 7 - 45 U/L   Magnesium   Result Value Ref Range    Magnesium 1.53 (L) 1.60 - 2.40 mg/dL   POCT GLUCOSE   Result Value Ref Range    POCT Glucose 245 (H) 74 - 99 mg/dL   POCT GLUCOSE   Result Value Ref Range    POCT Glucose 233 (H) 74 - 99 mg/dL   POCT GLUCOSE   Result Value Ref Range    POCT Glucose 155 (H) 74 - 99 mg/dL     MEDICATIONS:  Current Facility-Administered Medications   Medication Dose Route Frequency Provider Last Rate Last Admin    acetaminophen (Tylenol) tablet 975 mg  975 mg oral q8h Warren Mayberry PA-C   975 mg at 02/10/24 2011    dextrose 10 % in water (D10W) infusion  0.3 g/kg/hr intravenous Once PRN Warren Mayberyr PA-C        dextrose 50 % injection 25 g  25 g intravenous q15 min PRN Warren Mayberry PA-C        famotidine (Pepcid) tablet 40 mg  40 mg oral Nightly Johnlakeshia Magallon PA-C   40 mg at 02/10/24 2012    glucagon (Glucagen) injection 1 mg  1 mg intramuscular q15 min PRN Warren Mayberry PA-C        haloperidol (Haldol) tablet 0.5 mg  0.5 mg oral Daily Johnlakeshia Magallon PA-C   0.5 mg at 02/10/24 1028    insulin lispro (HumaLOG) injection 0-5 Units  0-5 Units subcutaneous TID with meals Xavi Elam DO   2 Units at 02/10/24 1759    latanoprost (Xalatan) 0.005 % ophthalmic solution 1 drop  1 drop Both Eyes Nightly John Magallon PA-C   1 drop at 02/10/24 2012    melatonin tablet 5 mg  5 mg oral Nightly Warren Mayberry PA-C   5 mg at 02/10/24 2012    methotrexate (Trexall) tablet 15 mg  15 mg oral Weekly John Magallon PA-C   15 mg at 02/09/24  1904    naloxone (Narcan) injection 0.2 mg  0.2 mg intravenous q5 min PRN Warren Mayberry PA-C        OLANZapine (ZyPREXA) tablet 20 mg  20 mg oral Nightly John Magallon PA-C   20 mg at 02/10/24 2011    oxyCODONE (Roxicodone) immediate release tablet 2.5 mg  2.5 mg oral q6h PRN Warren Mayberry PA-C        oxyCODONE (Roxicodone) immediate release tablet 5 mg  5 mg oral q6h PRN Warren Mayberry PA-C   5 mg at 02/11/24 0621    oxygen (O2) therapy   inhalation Continuous - 02/gases Warren Mayberry PA-C   Start at 02/10/24 0800    oxygen (O2) therapy   inhalation Continuous PRN - O2/gases Warren Mayberry PA-C        polyethylene glycol (Glycolax, Miralax) packet 17 g  17 g oral Daily Warren Mayberry PA-C   17 g at 02/10/24 0835    predniSONE (Deltasone) tablet 7.5 mg  7.5 mg oral Daily John Magallon PA-C   7.5 mg at 02/10/24 0835    sennosides-docusate sodium (Mary-Colace) 8.6-50 mg per tablet 2 tablet  2 tablet oral BID Warren Mayberry PA-C   2 tablet at 02/10/24 2011    sodium chloride 0.9% infusion  50 mL/hr intravenous Continuous Marylin Medrano DO 50 mL/hr at 02/10/24 1440 50 mL/hr at 02/10/24 1440    sulfur hexafluoride microsphr (Lumason) injection 24.28 mg  2 mL intravenous Once in imaging Xavi Elam DO        venlafaxine XR (Effexor-XR) 24 hr capsule 37.5 mg  37.5 mg oral Daily John Magallon PA-C   37.5 mg at 02/10/24 0835       IMAGING SUMMARY:  (summary of new imaging findings, not a copy of dictation)  CT Head/Face: nasal bone fracture on right, negative CTH acute injury  CT C-Spine: chronic degen changes, no acute injury  CT Chest/Abd/Pelvis: large hiatal hernia in left hemithorax, no acute injury  CT TL spine: L3 burst fracture, indeterminate age T12/L1 fractures  MR T/L 40% height loss L3 burst    I have reviewed all laboratory and imaging results ordered/pertinent for today's encounter.

## 2024-02-11 NOTE — CARE PLAN
Problem: Pain  Goal: Turns in bed with improved pain control throughout the shift  Outcome: Not Progressing  Goal: Walks with improved pain control throughout the shift  Outcome: Not Progressing  Goal: Performs ADL's with improved pain control throughout shift  Outcome: Not Progressing  Goal: Participates in PT with improved pain control throughout the shift  Outcome: Not Progressing   The patient's goals for the shift include      The clinical goals for the shift include manage pt pain level    Over the shift, the patient did not make progress toward the following goals. Barriers to progression include pain. Recommendations to address these barriers include pain medication.    Problem: Pain  Goal: Turns in bed with improved pain control throughout the shift  Outcome: Not Progressing  Goal: Walks with improved pain control throughout the shift  Outcome: Not Progressing  Goal: Performs ADL's with improved pain control throughout shift  Outcome: Not Progressing  Goal: Participates in PT with improved pain control throughout the shift  Outcome: Not Progressing

## 2024-02-11 NOTE — PROGRESS NOTES
Patient remains under review at SNF. Additional information provided to facilities. SW will continue to follow to assist with a safe discharge plan.    Viridiana Gonzales LCSW

## 2024-02-12 ENCOUNTER — APPOINTMENT (OUTPATIENT)
Dept: CARDIOLOGY | Facility: HOSPITAL | Age: 84
DRG: 493 | End: 2024-02-12
Payer: COMMERCIAL

## 2024-02-12 LAB
ALBUMIN SERPL BCP-MCNC: 2.9 G/DL (ref 3.4–5)
ALP SERPL-CCNC: 76 U/L (ref 33–136)
ALT SERPL W P-5'-P-CCNC: 43 U/L (ref 7–45)
ANION GAP SERPL CALC-SCNC: 12 MMOL/L (ref 10–20)
AST SERPL W P-5'-P-CCNC: 43 U/L (ref 9–39)
ATRIAL RATE: 106 BPM
BILIRUB SERPL-MCNC: 0.5 MG/DL (ref 0–1.2)
BUN SERPL-MCNC: 13 MG/DL (ref 6–23)
CALCIUM SERPL-MCNC: 9.3 MG/DL (ref 8.6–10.6)
CHLORIDE SERPL-SCNC: 90 MMOL/L (ref 98–107)
CO2 SERPL-SCNC: 29 MMOL/L (ref 21–32)
CREAT SERPL-MCNC: 0.41 MG/DL (ref 0.5–1.05)
EGFRCR SERPLBLD CKD-EPI 2021: >90 ML/MIN/1.73M*2
ERYTHROCYTE [DISTWIDTH] IN BLOOD BY AUTOMATED COUNT: 16.5 % (ref 11.5–14.5)
FOLATE SERPL-MCNC: 11.6 NG/ML
GLUCOSE BLD MANUAL STRIP-MCNC: 159 MG/DL (ref 74–99)
GLUCOSE BLD MANUAL STRIP-MCNC: 182 MG/DL (ref 74–99)
GLUCOSE BLD MANUAL STRIP-MCNC: 224 MG/DL (ref 74–99)
GLUCOSE BLD MANUAL STRIP-MCNC: 280 MG/DL (ref 74–99)
GLUCOSE BLD MANUAL STRIP-MCNC: 306 MG/DL (ref 74–99)
GLUCOSE SERPL-MCNC: 236 MG/DL (ref 74–99)
HCT VFR BLD AUTO: 32.6 % (ref 36–46)
HGB BLD-MCNC: 10.7 G/DL (ref 12–16)
MAGNESIUM SERPL-MCNC: 1.57 MG/DL (ref 1.6–2.4)
MCH RBC QN AUTO: 31.2 PG (ref 26–34)
MCHC RBC AUTO-ENTMCNC: 32.8 G/DL (ref 32–36)
MCV RBC AUTO: 95 FL (ref 80–100)
NRBC BLD-RTO: 0 /100 WBCS (ref 0–0)
P AXIS: 63 DEGREES
P OFFSET: 205 MS
P ONSET: 155 MS
PLATELET # BLD AUTO: 312 X10*3/UL (ref 150–450)
POTASSIUM SERPL-SCNC: 4.9 MMOL/L (ref 3.5–5.3)
PR INTERVAL: 130 MS
PROT SERPL-MCNC: 5.3 G/DL (ref 6.4–8.2)
Q ONSET: 220 MS
QRS COUNT: 18 BEATS
QRS DURATION: 70 MS
QT INTERVAL: 316 MS
QTC CALCULATION(BAZETT): 419 MS
QTC FREDERICIA: 381 MS
R AXIS: 18 DEGREES
RBC # BLD AUTO: 3.43 X10*6/UL (ref 4–5.2)
SODIUM SERPL-SCNC: 126 MMOL/L (ref 136–145)
T AXIS: 58 DEGREES
T OFFSET: 378 MS
T4 FREE SERPL-MCNC: 0.87 NG/DL (ref 0.78–1.48)
TSH SERPL-ACNC: 4.34 MIU/L (ref 0.44–3.98)
VENTRICULAR RATE: 106 BPM
VIT B12 SERPL-MCNC: 991 PG/ML (ref 211–911)
WBC # BLD AUTO: 9.7 X10*3/UL (ref 4.4–11.3)

## 2024-02-12 PROCEDURE — 83735 ASSAY OF MAGNESIUM: CPT

## 2024-02-12 PROCEDURE — 2500000004 HC RX 250 GENERAL PHARMACY W/ HCPCS (ALT 636 FOR OP/ED)

## 2024-02-12 PROCEDURE — 93010 ELECTROCARDIOGRAM REPORT: CPT | Performed by: INTERNAL MEDICINE

## 2024-02-12 PROCEDURE — 2500000002 HC RX 250 W HCPCS SELF ADMINISTERED DRUGS (ALT 637 FOR MEDICARE OP, ALT 636 FOR OP/ED)

## 2024-02-12 PROCEDURE — 85027 COMPLETE CBC AUTOMATED: CPT

## 2024-02-12 PROCEDURE — 82947 ASSAY GLUCOSE BLOOD QUANT: CPT

## 2024-02-12 PROCEDURE — 97165 OT EVAL LOW COMPLEX 30 MIN: CPT | Mod: GO

## 2024-02-12 PROCEDURE — 97530 THERAPEUTIC ACTIVITIES: CPT | Mod: GP | Performed by: PHYSICAL THERAPIST

## 2024-02-12 PROCEDURE — 99231 SBSQ HOSP IP/OBS SF/LOW 25: CPT | Performed by: SURGERY

## 2024-02-12 PROCEDURE — 97530 THERAPEUTIC ACTIVITIES: CPT | Mod: GO

## 2024-02-12 PROCEDURE — 2500000001 HC RX 250 WO HCPCS SELF ADMINISTERED DRUGS (ALT 637 FOR MEDICARE OP)

## 2024-02-12 PROCEDURE — 2500000001 HC RX 250 WO HCPCS SELF ADMINISTERED DRUGS (ALT 637 FOR MEDICARE OP): Performed by: PHYSICIAN ASSISTANT

## 2024-02-12 PROCEDURE — 36415 COLL VENOUS BLD VENIPUNCTURE: CPT

## 2024-02-12 PROCEDURE — 2500000004 HC RX 250 GENERAL PHARMACY W/ HCPCS (ALT 636 FOR OP/ED): Performed by: PHYSICIAN ASSISTANT

## 2024-02-12 PROCEDURE — 80053 COMPREHEN METABOLIC PANEL: CPT

## 2024-02-12 PROCEDURE — 2500000002 HC RX 250 W HCPCS SELF ADMINISTERED DRUGS (ALT 637 FOR MEDICARE OP, ALT 636 FOR OP/ED): Performed by: PHYSICIAN ASSISTANT

## 2024-02-12 PROCEDURE — 99222 1ST HOSP IP/OBS MODERATE 55: CPT | Performed by: PHYSICIAN ASSISTANT

## 2024-02-12 PROCEDURE — 93005 ELECTROCARDIOGRAM TRACING: CPT

## 2024-02-12 PROCEDURE — 1200000002 HC GENERAL ROOM WITH TELEMETRY DAILY

## 2024-02-12 RX ORDER — ENOXAPARIN SODIUM 100 MG/ML
30 INJECTION SUBCUTANEOUS 2 TIMES DAILY
Status: DISCONTINUED | OUTPATIENT
Start: 2024-02-12 | End: 2024-02-16 | Stop reason: HOSPADM

## 2024-02-12 RX ORDER — INSULIN LISPRO 100 [IU]/ML
0-10 INJECTION, SOLUTION INTRAVENOUS; SUBCUTANEOUS
Status: DISCONTINUED | OUTPATIENT
Start: 2024-02-12 | End: 2024-02-16 | Stop reason: HOSPADM

## 2024-02-12 RX ORDER — ENOXAPARIN SODIUM 100 MG/ML
30 INJECTION SUBCUTANEOUS DAILY
Status: DISCONTINUED | OUTPATIENT
Start: 2024-02-12 | End: 2024-02-12

## 2024-02-12 RX ORDER — METFORMIN HYDROCHLORIDE 500 MG/1
1000 TABLET ORAL
Status: DISCONTINUED | OUTPATIENT
Start: 2024-02-12 | End: 2024-02-16 | Stop reason: HOSPADM

## 2024-02-12 RX ADMIN — HYDROCORTISONE 1 APPLICATION: 25 CREAM TOPICAL at 21:00

## 2024-02-12 RX ADMIN — OXYCODONE HYDROCHLORIDE 5 MG: 5 TABLET ORAL at 17:52

## 2024-02-12 RX ADMIN — Medication 5 MG: at 20:35

## 2024-02-12 RX ADMIN — HALOPERIDOL 0.5 MG: 0.5 TABLET ORAL at 10:51

## 2024-02-12 RX ADMIN — HYDROCORTISONE 1 APPLICATION: 25 CREAM TOPICAL at 08:49

## 2024-02-12 RX ADMIN — FAMOTIDINE 40 MG: 20 TABLET, FILM COATED ORAL at 20:35

## 2024-02-12 RX ADMIN — OXYCODONE HYDROCHLORIDE 5 MG: 5 TABLET ORAL at 11:11

## 2024-02-12 RX ADMIN — POLYETHYLENE GLYCOL 3350 17 G: 17 POWDER, FOR SOLUTION ORAL at 08:50

## 2024-02-12 RX ADMIN — METFORMIN HYDROCHLORIDE 1000 MG: 500 TABLET, FILM COATED ORAL at 16:57

## 2024-02-12 RX ADMIN — INSULIN LISPRO 1 UNITS: 100 INJECTION, SOLUTION INTRAVENOUS; SUBCUTANEOUS at 09:04

## 2024-02-12 RX ADMIN — FLUTICASONE PROPIONATE 1 SPRAY: 50 SPRAY, METERED NASAL at 08:55

## 2024-02-12 RX ADMIN — OXYCODONE HYDROCHLORIDE 5 MG: 5 TABLET ORAL at 06:37

## 2024-02-12 RX ADMIN — ACETAMINOPHEN 975 MG: 325 TABLET ORAL at 20:36

## 2024-02-12 RX ADMIN — VENLAFAXINE HYDROCHLORIDE 37.5 MG: 37.5 CAPSULE, EXTENDED RELEASE ORAL at 08:49

## 2024-02-12 RX ADMIN — OLANZAPINE 20 MG: 5 TABLET, FILM COATED ORAL at 20:35

## 2024-02-12 RX ADMIN — INSULIN LISPRO 4 UNITS: 100 INJECTION, SOLUTION INTRAVENOUS; SUBCUTANEOUS at 12:11

## 2024-02-12 RX ADMIN — GUAIFENESIN 600 MG: 600 TABLET ORAL at 08:49

## 2024-02-12 RX ADMIN — GUAIFENESIN 600 MG: 600 TABLET ORAL at 20:35

## 2024-02-12 RX ADMIN — OLANZAPINE 20 MG: 5 TABLET, FILM COATED ORAL at 00:25

## 2024-02-12 RX ADMIN — ACETAMINOPHEN 975 MG: 325 TABLET ORAL at 11:07

## 2024-02-12 RX ADMIN — ENOXAPARIN SODIUM 30 MG: 100 INJECTION SUBCUTANEOUS at 12:01

## 2024-02-12 RX ADMIN — SENNOSIDES AND DOCUSATE SODIUM 2 TABLET: 8.6; 5 TABLET ORAL at 08:49

## 2024-02-12 RX ADMIN — PREDNISONE 7.5 MG: 5 TABLET ORAL at 08:49

## 2024-02-12 RX ADMIN — CLOBETASOL PROPIONATE 1 APPLICATION: 0.5 CREAM TOPICAL at 21:00

## 2024-02-12 RX ADMIN — INSULIN LISPRO 4 UNITS: 100 INJECTION, SOLUTION INTRAVENOUS; SUBCUTANEOUS at 16:13

## 2024-02-12 RX ADMIN — CLOBETASOL PROPIONATE 1 APPLICATION: 0.5 CREAM TOPICAL at 08:54

## 2024-02-12 RX ADMIN — SITAGLIPTIN 100 MG: 100 TABLET, FILM COATED ORAL at 16:57

## 2024-02-12 RX ADMIN — ENOXAPARIN SODIUM 30 MG: 100 INJECTION SUBCUTANEOUS at 20:36

## 2024-02-12 ASSESSMENT — PAIN SCALES - GENERAL
PAINLEVEL_OUTOF10: 0 - NO PAIN
PAINLEVEL_OUTOF10: 0 - NO PAIN
PAINLEVEL_OUTOF10: 4
PAINLEVEL_OUTOF10: 9
PAINLEVEL_OUTOF10: 0 - NO PAIN
PAINLEVEL_OUTOF10: 9
PAINLEVEL_OUTOF10: 7
PAINLEVEL_OUTOF10: 9

## 2024-02-12 ASSESSMENT — COGNITIVE AND FUNCTIONAL STATUS - GENERAL
WALKING IN HOSPITAL ROOM: A LOT
EATING MEALS: A LITTLE
DRESSING REGULAR LOWER BODY CLOTHING: A LOT
MOBILITY SCORE: 11
MOVING TO AND FROM BED TO CHAIR: A LOT
PERSONAL GROOMING: A LITTLE
TOILETING: TOTAL
STANDING UP FROM CHAIR USING ARMS: A LOT
TURNING FROM BACK TO SIDE WHILE IN FLAT BAD: A LOT
DAILY ACTIVITIY SCORE: 13
CLIMB 3 TO 5 STEPS WITH RAILING: TOTAL
DRESSING REGULAR UPPER BODY CLOTHING: A LOT
MOVING FROM LYING ON BACK TO SITTING ON SIDE OF FLAT BED WITH BEDRAILS: A LOT
HELP NEEDED FOR BATHING: A LOT

## 2024-02-12 ASSESSMENT — PAIN DESCRIPTION - ORIENTATION
ORIENTATION: LEFT

## 2024-02-12 ASSESSMENT — PAIN - FUNCTIONAL ASSESSMENT
PAIN_FUNCTIONAL_ASSESSMENT: 0-10
PAIN_FUNCTIONAL_ASSESSMENT: 0-10

## 2024-02-12 ASSESSMENT — PAIN SCALES - WONG BAKER: WONGBAKER_NUMERICALRESPONSE: NO HURT

## 2024-02-12 ASSESSMENT — PAIN DESCRIPTION - LOCATION
LOCATION: ARM

## 2024-02-12 ASSESSMENT — ENCOUNTER SYMPTOMS
ARTHRALGIAS: 1
MYALGIAS: 1
HEADACHES: 1

## 2024-02-12 NOTE — CONSULTS
Inpatient consult to Endocrinology  Consult performed by: Annette Mcgrath PA-C  Consult ordered by: Kingston Lockhart MD  Reason for consult: DM2 with steroid induced hyperglycemia  Assessment/Recommendations: Pt would benefit from start of DPP4i to add to her home metformin with minimal risk for hypoglycemia as well as NPH given with prednisone while she remains in the hospital          Reason For Consult  T2DM with steroid-induced hyperglycemia    History Of Present Illness  Jennifer Mancini is a 83 y.o. female with PMHx dermatomyositis on methotrexate/steroids (since autumn 2023), DM, HTN, HLD, WEN, schizoaffective disorder, aortic stenosis, CAD, Hx MI,  presenting after a mechanical fall out of bed onto left side while at assisted living facility. Patient denies nausea, vomiting, and diarrhea.    Patient was seen, examined, and informed of our plan to add 6u of NPH to be taken daily with prednisone, lispro on a sliding scale for meals and start januvia 100mg daily.    Home Management  Metformin 1000mg BID    Past Medical History  She has a past medical history of HTN (hypertension) (2/9/2024), Hypertensive heart disease without heart failure (12/17/2019), Other nondisplaced fracture of seventh cervical vertebra, subsequent encounter for fracture with routine healing (12/17/2019), Other nondisplaced fracture of sixth cervical vertebra, subsequent encounter for fracture with routine healing (12/17/2019), Personal history of other diseases of the female genital tract (12/17/2019), Personal history of other endocrine, nutritional and metabolic disease (12/17/2019), Personal history of other mental and behavioral disorders (06/14/2021), Personal history of other specified conditions (06/14/2021), Schizoaffective disorder, bipolar type (CMS/HCC) (12/17/2019), and Unspecified fracture of left femur, initial encounter for closed fracture (CMS/[facility]) (06/14/2021).    Surgical History  She has a past surgical history that  includes Other surgical history (12/17/2019); Other surgical history (12/17/2019); Other surgical history (12/17/2019); Other surgical history (12/17/2019); and CT angio neck (12/13/2019).     Social History  She has no history on file for tobacco use, alcohol use, and drug use.    Family History  Pt notes her sister has DM2, mother had colon cx, and father had PAD     Allergies  Patient has no known allergies.    Review of Systems   Musculoskeletal:  Positive for arthralgias and myalgias.   Neurological:  Positive for headaches.   All other systems reviewed and are negative.       Physical Exam  Constitutional:       Appearance: She is normal weight.      Comments: Appears consistent with fall injuries   HENT:      Head: Normocephalic.      Mouth/Throat:      Mouth: Mucous membranes are moist.      Pharynx: Oropharynx is clear.   Eyes:      Extraocular Movements: Extraocular movements intact.      Conjunctiva/sclera: Conjunctivae normal.   Cardiovascular:      Rate and Rhythm: Normal rate and regular rhythm.      Pulses: Normal pulses.      Heart sounds: Normal heart sounds.   Pulmonary:      Effort: Pulmonary effort is normal.      Breath sounds: Normal breath sounds.   Abdominal:      General: Abdomen is flat. Bowel sounds are normal.      Palpations: Abdomen is soft.   Musculoskeletal:         General: Signs of injury present.   Skin:     Findings: Bruising present.   Neurological:      General: No focal deficit present.      Mental Status: She is alert and oriented to person, place, and time. Mental status is at baseline.   Psychiatric:         Mood and Affect: Mood normal.         Behavior: Behavior normal.         Thought Content: Thought content normal.         Judgment: Judgment normal.          ROS, PMH, FH/SH, surgical history and allergies have been reviewed.    Last Recorded Vitals  Blood pressure 120/74, pulse (!) 112, temperature 37.1 °C (98.8 °F), temperature source Temporal, resp. rate 18, height  "1.549 m (5' 1\"), weight 68 kg (149 lb 14.6 oz), SpO2 97 %.    Relevant Results    Results from last 7 days   Lab Units 02/12/24  1643 02/12/24  1546 02/12/24  1208 02/12/24  0801 02/11/24  2131 02/11/24  1620 02/11/24  1109 02/11/24  0827 02/10/24  0758 02/10/24  0728 02/10/24  0647 02/09/24  0754 02/09/24  0634 02/08/24  1257   POCT GLUCOSE mg/dL 280*  --  306* 159* 222* 228*   < >  --    < >  --    < >  --    < >  --    GLUCOSE mg/dL  --  236*  --   --   --   --   --  193*  --  226*  --  160*  --  156*    < > = values in this interval not displayed.       Lab Results   Component Value Date    BILITOT 0.5 02/12/2024    CALCIUM 9.3 02/12/2024    CO2 29 02/12/2024    CL 90 (L) 02/12/2024    CREATININE 0.41 (L) 02/12/2024    GLUCOSE 236 (H) 02/12/2024    ALKPHOS 76 02/12/2024    K 4.9 02/12/2024    PROT 5.3 (L) 02/12/2024     (L) 02/12/2024    AST 43 (H) 02/12/2024    ALT 43 02/12/2024    BUN 13 02/12/2024    ANIONGAP 12 02/12/2024    MG 1.57 (L) 02/12/2024    PHOS 2.9 02/11/2024    ALBUMIN 2.9 (L) 02/12/2024     No results found for: \"TRIG\", \"CHOL\", \"LDLCALC\", \"HDL\"  Lab Results   Component Value Date    HGBA1C 8.2 (H) 02/08/2024    HGBA1C 6.3 09/28/2020     The ASCVD Risk score (Irene DK, et al., 2019) failed to calculate for the following reasons:    The 2019 ASCVD risk score is only valid for ages 40 to 79    The patient has a prior MI or stroke diagnosis    Scheduled medications  acetaminophen, 975 mg, oral, q8h  clobetasol, 1 Application, Topical, BID  enoxaparin, 30 mg, subcutaneous, BID  famotidine, 40 mg, oral, Nightly  fluticasone, 1 spray, Each Nostril, Daily  guaiFENesin, 600 mg, oral, BID  haloperidol, 0.5 mg, oral, Daily  hydrocortisone, 1 Application, Topical, BID  insulin lispro, 0-10 Units, subcutaneous, TID AC  [START ON 2/13/2024] insulin NPH (Isophane), 6 Units, subcutaneous, q AM  latanoprost, 1 drop, Both Eyes, Nightly  melatonin, 5 mg, oral, Nightly  metFORMIN, 1,000 mg, oral, BID with " meals  methotrexate, 15 mg, oral, Weekly  OLANZapine, 20 mg, oral, Nightly  oxygen, , inhalation, Continuous - 02/gases  polyethylene glycol, 17 g, oral, Daily  predniSONE, 7.5 mg, oral, Daily  sennosides-docusate sodium, 2 tablet, oral, BID  SITagliptin phosphate, 100 mg, oral, Daily  sulfur hexafluoride microsphr, 2 mL, intravenous, Once in imaging  venlafaxine XR, 37.5 mg, oral, Daily      Continuous medications     PRN medications  PRN medications: dextrose 10 % in water (D10W), dextrose, glucagon, guaiFENesin, naloxone, oxyCODONE, oxyCODONE, oxygen, sodium chloride       Assessment/Plan   Principal Problem:    Left supracondylar humerus fracture, closed, initial encounter  Active Problems:    Anemia    HTN (hypertension)    CAD (coronary artery disease)    MI (myocardial infarction) (CMS/HCC)    Murmur    Aortic stenosis    T2DM with steroid-induced hyperglycemia    History of Present Illness  Jennifer Mancini is a 83 y.o. 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    - Goal BG <180  - Start Januvia 100mg daily  - continue taking metformin 1000mg BID  - Start 6u of NPH daily and timed with doses of prednisone  - Start lispro corrective scale #2 with meals   = 0u  151-200 = 2u  201-250 = 4u  251-300 = 6u  301-350 = 8u  351-400 = 10u    -Accuchecks (not BMP) TIDAC and QHS- kindly ensure QHS Accucheck is drawn; it is often missed   -Hypoglycemia protocol  -Diabetes Diet- low carb 60 CHO  -Will continue to follow and titrate insulin accordingly     Dispo: Patient can expect to discharge on januvia and metformin regardless of her destination.  She may require some modest insulin if going to a medically assisted living facility, but may not use insulin if leaving to independent housing.      I have spent 60 minutes on care plan  and treatment plan development for this patient.     SONI Tyler PA-S2

## 2024-02-12 NOTE — PROGRESS NOTES
Subjective   Jennifer Mancini 83 y.o. female in bed. Face with multiple ecchymotic bruising down center of forehead and around eyes and cheeks. Healing laceration along right cheek. Calm, cooperative. Having difficulty eating food from lunch tray, assisted patient with cutting sliced turkey, placing milk where patient could reach with her right hand. Left arm with full-arm cast. Rated pain 9/10. Reported to Floor  to relay message to assigned nurse.       Objective     Current Facility-Administered Medications   Medication Dose Route Frequency Provider Last Rate Last Admin    acetaminophen (Tylenol) tablet 975 mg  975 mg oral q8h Warren Mayberry PA-C   975 mg at 02/12/24 1107    clobetasol (Temovate) 0.05 % cream 1 Application  1 Application Topical BID Xavi Elam DO   1 Application at 02/12/24 0854    dextrose 10 % in water (D10W) infusion  0.3 g/kg/hr intravenous Once PRN Warren Mayberry PA-C        dextrose 50 % injection 25 g  25 g intravenous q15 min PRN Warren Mayberry PA-C        enoxaparin (Lovenox) syringe 30 mg  30 mg subcutaneous BID Xavi Elam DO        famotidine (Pepcid) tablet 40 mg  40 mg oral Nightly John Magallon PA-C   40 mg at 02/11/24 2213    fluticasone (Flonase) nasal spray 1 spray  1 spray Each Nostril Daily Xavi Elam DO   1 spray at 02/12/24 0855    glucagon (Glucagen) injection 1 mg  1 mg intramuscular q15 min PRN Warren Mayberry PA-C        guaiFENesin (Mucinex) 12 hr tablet 600 mg  600 mg oral BID Xaiv Elam DO   600 mg at 02/12/24 0849    guaiFENesin (Robitussin) 100 mg/5 mL syrup 200 mg  200 mg oral q4h PRN Xavi Elam DO        haloperidol (Haldol) tablet 0.5 mg  0.5 mg oral Daily John Magallon PA-C   0.5 mg at 02/12/24 1051    hydrocortisone 2.5 % cream 1 Application  1 Application Topical BID Xavi Elam DO   1 Application at 02/12/24 0849    insulin lispro (HumaLOG) injection 0-5 Units  0-5 Units subcutaneous TID with meals Xavi Elam, DO   1 Units at  02/12/24 0904    latanoprost (Xalatan) 0.005 % ophthalmic solution 1 drop  1 drop Both Eyes Nightly John Magallon PA-C   1 drop at 02/11/24 2231    melatonin tablet 5 mg  5 mg oral Nightly Warren Mayberry PA-C   5 mg at 02/11/24 2100    metFORMIN (Glucophage) tablet 1,000 mg  1,000 mg oral BID with meals Xavi Elam DO        methotrexate (Trexall) tablet 15 mg  15 mg oral Weekly John Magallon PA-C   15 mg at 02/09/24 1904    naloxone (Narcan) injection 0.2 mg  0.2 mg intravenous q5 min PRN Warren Mayberry PA-C        OLANZapine (ZyPREXA) tablet 20 mg  20 mg oral Nightly John Magallon PA-C   20 mg at 02/12/24 0025    oxyCODONE (Roxicodone) immediate release tablet 2.5 mg  2.5 mg oral q6h PRN Warren Mayberry PA-C        oxyCODONE (Roxicodone) immediate release tablet 5 mg  5 mg oral q6h PRN Warren Mayberry PA-C   5 mg at 02/12/24 1111    oxygen (O2) therapy   inhalation Continuous - 02/gases Warren Mayberry PA-C   Rate Verify at 02/11/24 0830    oxygen (O2) therapy   inhalation Continuous PRN - O2/gases Warren Mayberry PA-C        polyethylene glycol (Glycolax, Miralax) packet 17 g  17 g oral Daily Warren Mayberry PA-C   17 g at 02/12/24 0850    predniSONE (Deltasone) tablet 7.5 mg  7.5 mg oral Daily John Magallon PA-C   7.5 mg at 02/12/24 0849    sennosides-docusate sodium (Mary-Colace) 8.6-50 mg per tablet 2 tablet  2 tablet oral BID Warren Mayberry PA-C   2 tablet at 02/12/24 0849    sodium chloride (Ocean) 0.65 % nasal spray 1 spray  1 spray Each Nostril 4x daily PRN Xavi Elam DO   1 spray at 02/11/24 2231    sulfur hexafluoride microsphr (Lumason) injection 24.28 mg  2 mL intravenous Once in imaging Xavi Elam DO        venlafaxine XR (Effexor-XR) 24 hr capsule 37.5 mg  37.5 mg oral Daily John Magallon PA-C   37.5 mg at 02/12/24 0849       Physical Exam  HENT:      Head: Raccoon eyes and laceration present.        Mouth/Throat:      Mouth: Mucous membranes are moist.   Musculoskeletal:         Arms:    Skin:     Findings: Bruising, ecchymosis and laceration present.   Neurological:      Mental Status: She is alert.         Confusion Assessment Method(CAM) for diagnosis of delirium:    1.  Acute onset or fluctuating course: absent/present: Absent  2.  Inattention: absent/present: Absent  3.  Disorganized thinking: absent/present: Absent  4.  Altered level of consciousness: absent/present: Absent  CAM: negative    AT Score For Assessment of Delirium and Cognitive Impairment:    Alertness: 0  Normal(fully alert,but not agitated, throughout assessment)=0  Mild sleepiness for <10 seconds after walking, then normal=0  Clearly abnormal=4  2.  AMT4: 2  No mistakes=0  One mistake=1  Two or more mistakes/untestable=2  3.  Attention: 0  Achieves seven months or more correctly=0  Starts but scores <7 months/ refuses to start=1  Untestable(cannot start because unwell, drowsy, inattentive)=2  4.  Acute: 0  No=0  Yes=4    Total Score: 2  4 or above: Possible delirium +/- cognitive impairment  1-3: Possible cognitive impairment  0: Delirium or severe cognitive impairment unlikely(but delirium still possible if (4) information incomplete)      Last Recorded Vitals      2/11/2024     7:50 AM 2/11/2024    11:11 AM 2/11/2024     4:13 PM 2/11/2024     9:00 PM 2/12/2024    12:33 AM 2/12/2024     4:53 AM 2/12/2024     7:00 AM   Vitals   Systolic 167 161 151 142 152 138 153   Diastolic 100 98 85 85 86 86 85   Heart Rate 103 98 110 97 87 100 87   Temp 37 °C (98.6 °F) 36.9 °C (98.4 °F) 36.7 °C (98.1 °F) 37 °C (98.6 °F) 36.4 °C (97.5 °F) 36.1 °C (97 °F) 36.8 °C (98.2 °F)   Resp 16 18 18 18 18 18 18      Vitals:    02/09/24 1020   Weight: 68 kg (149 lb 14.6 oz)        Relevant Results  Lab Results   Component Value Date    TSH 4.58 (H) 03/15/2021    HGBA1C 8.2 (H) 02/08/2024     Results from last 7 days   Lab Units 02/11/24  0827 02/10/24  0728 02/09/24  0754 02/08/24  1257 02/08/24  0635   WBC AUTO x10*3/uL  --  8.7 9.4 10.0 8.5    HEMOGLOBIN g/dL  --  10.0* 9.8* 10.4* 12.3   HEMATOCRIT %  --  30.2* 30.6* 30.9* 37.4   ALT U/L  --  19  --  25  --    AST U/L  --  25  --  25  --    SODIUM mmol/L 132* 131* 137 138 133*   POTASSIUM mmol/L 4.0 4.0 3.9 3.1* 3.5   CHLORIDE mmol/L 96* 96* 101 103 98   CREATININE mg/dL 0.35* 0.41* 0.39* 0.37* 0.47*   BUN mg/dL 7 8 8 6 8   CO2 mmol/L 30 28 29 29 27   INR   --   --   --  1.1 1.1   HEMOGLOBIN A1C %  --   --   --  8.2*  --           FL fluoro images no charge  These images are not reportable by radiology and will not be interpreted   by  Radiologists.  Transthoracic Echo (TTE) Limited     [facility], [address] [place] [address]                 Tel [phone] and Fax [phone]    TRANSTHORACIC ECHOCARDIOGRAM REPORT       Patient Name:      ANYA ERAZO KATHY Peralta Physician:    [id number] Natanael Thompson MD  Study Date:        2/9/2024             Ordering Provider:    [id number] HAMLET BECERRA  MRN/PID:           [id number]             Fellow:               [id number] Emory Odom MD  Accession#:        [id number]         Nurse:  Date of Birth/Age: 1940 / 83 years Sonographer:          Sarah López                                                                Cardiac                                                                Sonographer Intern  Gender:            F                    Additional Staff:     Jelani Mac RDCS, AMIE, JACOB,                                                                CRISTOBAL  Height:            154.94 cm            Admit Date:           2/8/2024  Weight:            67.59 kg             Admission Status:     Inpatient - STAT  BSA:               1.67 m2              Encounter#:            [id number]                                          Department Location:  [facility]  Blood Pressure: 125 /59 mmHg    Study Type:    TRANSTHORACIC ECHO (TTE) LIMITED  Diagnosis/ICD: Essential (primary) hypertension-I10; Cardiac murmur,                 unspecified-R01.1  Indication:    Murmur, pre-op for humerus fracture  CPT Code:      Echo Limited-69867; Color Doppler-38230; Doppler Limited-70874    Patient History:  Pertinent History: HTN, HLD.    Study Detail: The following Echo studies were performed: 2D, M-Mode, Doppler and                color flow.       PHYSICIAN INTERPRETATION:  Left Ventricle: The left ventricular systolic function is normal, with an estimated ejection fraction of 60-65%. There are no regional wall motion abnormalities. The left ventricular cavity size is normal. Left ventricular diastolic filling was not assessed.  Left Atrium: The left atrium is normal in size.  Right Ventricle: The right ventricle is normal in size. There is normal right ventricular global systolic function.  Right Atrium: The right atrium is normal in size.  Aortic Valve: The aortic valve is trileaflet. There is mild aortic valve cusp calcification. There is evidence of mild aortic valve stenosis.  There is no evidence of aortic valve regurgitation. The peak instantaneous gradient of the aortic valve is 24.4 mmHg. The mean gradient of the aortic valve is 14.0 mmHg.  Mitral Valve: The mitral valve is mildly thickened. There is mild mitral annular calcification. There is trace mitral valve regurgitation.  Tricuspid Valve: The tricuspid valve is structurally normal. There is trace tricuspid regurgitation. The right ventricular systolic pressure is unable to be estimated.  Pulmonic Valve: The pulmonic valve is not well visualized. There is trace pulmonic valve regurgitation.  Pericardium: There is a trivial pericardial effusion. There is an anterior clear space.  Aorta: The aortic root is normal. The aortic root  appears normal in size and measures 2.90 cm. The Asc Ao is 2.80 cm. There is no dilatation of the ascending aorta.  Systemic Veins: The inferior vena cava was not well visualized.  In comparison to the previous echocardiogram(s): There are no prior studies on this patient for comparison purposes.       CONCLUSIONS:   1. Left ventricular systolic function is normal with a 60-65% estimated ejection fraction.   2. Mild aortic valve stenosis.   3. Normal aortic root.    QUANTITATIVE DATA SUMMARY:  2D MEASUREMENTS:                     Normal Ranges:  Ao Root d: 2.90 cm (2.0-3.7cm)  IVSd:      0.70 cm (0.6-1.1cm)  LVIDd:     1.10 cm (3.9-5.9cm)    LA VOLUME:                               Normal Ranges:  LA Vol A4C:        21.5 ml   (22+/-6mL/m2)  LA Vol Index A4C:  12.9ml/m2  LA Area A4C:       12.0 cm2  LA Major Axis A4C: 5.7 cm    RA VOLUME BY A/L METHOD:                       Normal Ranges:  RA Area A4C: 8.0 cm2    AORTA MEASUREMENTS:                     Normal Ranges:  Asc Ao, d: 2.80 cm (2.1-3.4cm)    LV DIASTOLIC FUNCTION:                      Normal Ranges:  MV Peak E: 1.22 m/s (0.7-1.2 m/s)  MV Peak A: 1.41 m/s (0.42-0.7 m/s)  E/A Ratio: 0.87     (1.0-2.2)    MITRAL VALVE:                       Normal Ranges:  MV Vmax:    1.42 m/s (<=1.3m/s)  MV peak P.1 mmHg (<5mmHg)  MV mean P.0 mmHg (<2mmHg)  MV DT:      210 msec (150-240msec)    AORTIC VALVE:                                     Normal Ranges:  AoV Vmax:                2.47 m/s  (<=1.7m/s)  AoV Peak P.4 mmHg (<20mmHg)  AoV Mean P.0 mmHg (1.7-11.5mmHg)  LVOT Max Doug:            1.48 m/s  (<=1.1m/s)  AoV VTI:                 41.80 cm  (18-25cm)  LVOT VTI:                24.60 cm  LVOT Diameter:           1.80 cm   (1.8-2.4cm)  AoV Area, VTI:           1.50 cm2  (2.5-5.5cm2)  AoV Area,Vmax:           1.52 cm2  (2.5-4.5cm2)  AoV Dimensionless Index: 0.59       RIGHT VENTRICLE:  RV Basal 3.10 cm  RV Mid   1.90 cm  RV Major  5.7 cm    PULMONIC VALVE:                          Normal Ranges:  PV Accel Time: 111 msec (>120ms)  PV Max Doug:    1.0 m/s  (0.6-0.9m/s)  PV Max PG:     3.7 mmHg       [id number] Natanael Thompson MD  Electronically signed on 2/9/2024 at 12:37:35 PM       ** Final **  CT maxillofacial bones wo IV contrast, CT 3D reconstruction  Narrative: Interpreted By:  Dorian Garcia,   STUDY:  CT FACIAL BONES WO IV CONTRAST;  2/8/2024 7:18 am      INDICATION:  Signs/Symptoms:pain post fall with head injury.      COMPARISON:  07/14/2012      ACCESSION NUMBER(S):  [id number]      ORDERING CLINICIAN:  SONIA DOSS      TECHNIQUE:  Contiguous axial CT sections are performed through the facial bones  and orbits and supplemented with coronal and sagittal reformatted  images.      Multiplanar 3D reformations of the facial bones and orbits are  performed and reviewed on independent workstation.      FINDINGS:  There is scalp hematoma overlying the frontal calvarium to left of  midline. This extends between the orbits over the nasal bones.      There is a displaced and mildly comminuted fracture of the mid to  distal right nasal bone. There is lobular opacity within the left  nasal cavity extending posteriorly into the nasopharynx. This  appearance could reflect hemorrhage or inflammatory density.  Increased density surrounds the left inferior turbinate and partially  surrounds the middle turbinate. There is questionable avulsion  fracture at the maxillary process anteriorly at the midline.      The nasal septum is bowed to the right the ostiomeatal units remain  patent bilaterally. The remaining visualized osseous structures are  intact. The medial and inferior orbital walls are intact. The orbital  contents are unremarkable. There is no intraorbital fluid collection  or air density.      There is mild mucoperiosteal thickening of the ethmoid air cells. The  paranasal sinuses and mastoid air cells are otherwise clear.      There  are osteoarthritic changes of both temporomandibular joints  with flattening and irregularity of the articular surfaces, greater  on the right.      Impression: Comminuted and mildly displaced fracture of the right nasal bone.      Possible tiny avulsion fracture at the tip of the anterior maxillary  process.      Lobular opacity in the left nasal cavity primarily surrounding the  inferior turbinate. This extends into the nasopharynx. This could be  related to hematoma or could be on an inflammatory or post  inflammatory basis. Further workup with direct visualization is  recommended.      Scalp hematoma overlies the frontal calvarium and extends between the  orbits and over the nasal bones. There is no underlying calvarial  fracture.      The nasal septum is bowed to the right. The ostiomeatal units remain  patent.      Signed by: Dorian Garcia 2/8/2024 8:37 AM  Dictation workstation:   [id number]  MR lumbar spine wo IV contrast, MR thoracic spine wo IV contrast  Narrative: Interpreted By:  Fatuma Grier and Bamfo Rose   STUDY:  MR LUMBAR SPINE WO IV CONTRAST; MR THORACIC SPINE WO IV CONTRAST;  2/8/2024 8:42 pm      INDICATION:  Signs/Symptoms:lumbar fx.      COMPARISON:  CT cervical spine on 11/04/2023; CT lumbar spine on 02/08/2024; CT  thoracic spine on 02/08/2024; lumbosacral spine radiograph on  05/19/2017      ACCESSION NUMBER(S):  [id number]; [id number]      ORDERING CLINICIAN:  RIVKA YOST      TECHNIQUE:  Sagittal T1, T2, STIR and axial T2 and T1 weighted MR images of the  thoracic and lumbar spine were obtained.      FINDINGS:  THORACIC SPINE:      Thoracic vertebral alignment is maintained without significant  spondylolisthesis.      Thoracic vertebral body heights are preserved without evidence of  acute compression fracture. Mild deformity without associated edema  is present along the superior endplate of T12, likely representing  chronic injury.      No acute trauma to the  osseous structures of the posterior elements  of the thoracic spine is present. No abnormal intra spinous distance  widening or STIR hyperintense edema is identified.      Multilevel intervertebral disc desiccation with mild associated disc  height loss is present in the thoracic spine.      No significant posterior disc contour abnormality is identified in  the thoracic spine. No spinal canal stenosis is evident.      Visualized thoracic spinal cord does not demonstrate any acute  abnormality. No intramedullary T2 signal changes are present.      STIR hyperintense edema is present in the cutaneous fat overlying the  upper thoracic spine at the level of T2-T3, just to right of midline,  suspected to represent changes of acute soft tissue trauma.      No fluid collections are identified.      LUMBAR SPINE:      There are 5 lumbar type non rib-bearing vertebral bodies with lowest  well-formed intervertebral disc space labeled L5-S1.      Mild 1-2 mm retrolisthesis is present at the level of T12 on L1 in  the L1 and L2.      Likely subacute compression deformity with up to 30% height loss is  present within the L3 vertebral body, with minimal 1-2 mm posterior  retropulsion of bony material into the spinal canal. The fracture  line appears to extend into the pedicles bilaterally, worse on the  left, and better seen on same day CT.      No additional areas of T2 hyperintense marrow edema are present in  the lumbar spine.      Multilevel intervertebral disc desiccation is present without  significant disc height loss.      T12-L1: No significant posterior disc contour abnormality or spinal  canal stenosis is evident.      L1-L2: Mild posterior disc osteophyte complex slightly deforms the  anterior subarachnoid space without spinal canal stenosis.      L2-L3: Mild disc osteophyte complex and hypertrophic facet changes  somewhat efface the subarachnoid space without significant spinal  canal stenosis. There is moderate to  severe left-sided neural  foraminal stenosis due to hematoma centered in the left psoas  musculature, which measures a proximally 3.6 x 2.5 cm in size (series  12, image 8). There may be extension of some hemorrhagic products  into the left neural foramen at L2-L3 and left lateral epidural space  without significant spinal canal stenosis.      L3-L4: No significant posterior disc contour abnormality is present.  No spinal canal or neural foraminal stenosis is evident. Mild  bilateral neural foraminal stenosis is present due to endplate  spurring and hypertrophic facet changes.      L4-L5: Some effacement of the anterior subarachnoid space is present  by the spondylolisthesis without significant spinal canal stenosis.  Mild bilateral neural foraminal narrowing is present due to endplate  spurring and hypertrophic facet changes with spondylolisthesis.      S1: No significant spinal canal stenosis or neural foraminal  narrowing is evident.      There is fatty atrophy of the paraspinal musculature.      Impression: 1.  Likely subacute compression deformity of the L3 vertebral body  with up to 40% height loss and 1-2 mm retropulsion posterior into the  spinal canal. Although no high-grade spinal canal stenosis is present  due to the compression fracture, there is a 3.6 x 2.5 cm hematoma  centered in the left psoas musculature at the level of L2-L3, with  extension into the adjacent left neural foramen and possibly left  lateral epidural space, without significant spinal canal stenosis,  but likely contributing to at least moderate if not severe left  neural foraminal narrowing.  2. No additional acute compression fractures are identified in the  thoracic or lumbar spine.  3. Multilevel degenerative changes of the lumbar spine as detailed  above.      MACRO:  None      Signed by: Fatuma Grier 2/9/2024 12:55 AM  Dictation workstation:   [id number]    Recent Results (from the past 72 hour(s))   POCT GLUCOSE     Collection Time: 02/10/24  6:47 AM   Result Value Ref Range    POCT Glucose 228 (H) 74 - 99 mg/dL   CBC    Collection Time: 02/10/24  7:28 AM   Result Value Ref Range    WBC 8.7 4.4 - 11.3 x10*3/uL    nRBC 0.0 0.0 - 0.0 /100 WBCs    RBC 3.15 (L) 4.00 - 5.20 x10*6/uL    Hemoglobin 10.0 (L) 12.0 - 16.0 g/dL    Hematocrit 30.2 (L) 36.0 - 46.0 %    MCV 96 80 - 100 fL    MCH 31.7 26.0 - 34.0 pg    MCHC 33.1 32.0 - 36.0 g/dL    RDW 17.3 (H) 11.5 - 14.5 %    Platelets 240 150 - 450 x10*3/uL   Comprehensive Metabolic Panel    Collection Time: 02/10/24  7:28 AM   Result Value Ref Range    Glucose 226 (H) 74 - 99 mg/dL    Sodium 131 (L) 136 - 145 mmol/L    Potassium 4.0 3.5 - 5.3 mmol/L    Chloride 96 (L) 98 - 107 mmol/L    Bicarbonate 28 21 - 32 mmol/L    Anion Gap 11 10 - 20 mmol/L    Urea Nitrogen 8 6 - 23 mg/dL    Creatinine 0.41 (L) 0.50 - 1.05 mg/dL    eGFR >90 >60 mL/min/1.73m*2    Calcium 8.5 (L) 8.6 - 10.6 mg/dL    Albumin 2.8 (L) 3.4 - 5.0 g/dL    Alkaline Phosphatase 63 33 - 136 U/L    Total Protein 4.9 (L) 6.4 - 8.2 g/dL    AST 25 9 - 39 U/L    Bilirubin, Total 0.4 0.0 - 1.2 mg/dL    ALT 19 7 - 45 U/L   Magnesium    Collection Time: 02/10/24  7:28 AM   Result Value Ref Range    Magnesium 1.53 (L) 1.60 - 2.40 mg/dL   POCT GLUCOSE    Collection Time: 02/10/24  2:08 PM   Result Value Ref Range    POCT Glucose 245 (H) 74 - 99 mg/dL   POCT GLUCOSE    Collection Time: 02/10/24  4:27 PM   Result Value Ref Range    POCT Glucose 233 (H) 74 - 99 mg/dL   POCT GLUCOSE    Collection Time: 02/10/24  7:36 PM   Result Value Ref Range    POCT Glucose 155 (H) 74 - 99 mg/dL   POCT GLUCOSE    Collection Time: 02/11/24  7:44 AM   Result Value Ref Range    POCT Glucose 212 (H) 74 - 99 mg/dL   Renal function panel    Collection Time: 02/11/24  8:27 AM   Result Value Ref Range    Glucose 193 (H) 74 - 99 mg/dL    Sodium 132 (L) 136 - 145 mmol/L    Potassium 4.0 3.5 - 5.3 mmol/L    Chloride 96 (L) 98 - 107 mmol/L    Bicarbonate 30 21 - 32  mmol/L    Anion Gap 10 10 - 20 mmol/L    Urea Nitrogen 7 6 - 23 mg/dL    Creatinine 0.35 (L) 0.50 - 1.05 mg/dL    eGFR >90 >60 mL/min/1.73m*2    Calcium 8.8 8.6 - 10.6 mg/dL    Phosphorus 2.9 2.5 - 4.9 mg/dL    Albumin 2.7 (L) 3.4 - 5.0 g/dL   POCT GLUCOSE    Collection Time: 02/11/24 11:09 AM   Result Value Ref Range    POCT Glucose 259 (H) 74 - 99 mg/dL   POCT GLUCOSE    Collection Time: 02/11/24  4:20 PM   Result Value Ref Range    POCT Glucose 228 (H) 74 - 99 mg/dL   POCT GLUCOSE    Collection Time: 02/11/24  9:31 PM   Result Value Ref Range    POCT Glucose 222 (H) 74 - 99 mg/dL   POCT GLUCOSE    Collection Time: 02/12/24  8:01 AM   Result Value Ref Range    POCT Glucose 159 (H) 74 - 99 mg/dL   POCT GLUCOSE    Collection Time: 02/12/24 12:08 PM   Result Value Ref Range    POCT Glucose 306 (H) 74 - 99 mg/dL   ECG 12 lead    Collection Time: 02/12/24 12:59 PM   Result Value Ref Range    Ventricular Rate 106 BPM    Atrial Rate 106 BPM    WA Interval 130 ms    QRS Duration 70 ms    QT Interval 316 ms    QTC Calculation(Bazett) 419 ms    P Axis 63 degrees    R Axis 18 degrees    T Axis 58 degrees    QRS Count 18 beats    Q Onset 220 ms    P Onset 155 ms    P Offset 205 ms    T Offset 378 ms    QTC Fredericia 381 ms           DATA:  EKG:  on 2/12/2024  Encounter Date: 02/08/24   ECG 12 lead   Result Value    Ventricular Rate 98    Atrial Rate 98    WA Interval 134    QRS Duration 72    QT Interval 342    QTC Calculation(Bazett) 436    P Axis 62    R Axis 26    T Axis 47    QRS Count 16    Q Onset 218    P Onset 151    P Offset 202    T Offset 389    QTC Fredericia 402    Narrative    Normal sinus rhythm  Normal ECG  When compared with ECG of 09-JUL-2021 11:29,  PREVIOUS ECG IS PRESENT  See ED provider note for full interpretation and clinical correlation  Confirmed by Caleb Charlton ([id number]) on 2/8/2024 4:28:04 PM      Anti-psychotics in 48 hours: haldol 0.5 mg daily ATC x2= 10 mg, Zyprexa 20 mg daily ATC x2=  40 mg  Opioids/Benzodiazepines in 48 hours: Oxycodone 5 mg x9 = 45 mg   Anticholinergics on board:Yes - olanzapine  Restraints:No  Indwelling catheters:No  Last BM: 2/8/2024  UO in 24 hours:   Intake/Output Summary (Last 24 hours) at 2/12/2024 1453  Last data filed at 2/12/2024 1131  Gross per 24 hour   Intake 360 ml   Output 3500 ml   Net -3140 ml       Activity in the past 24 hours: OOB w/ assist x2  Need for ambulatory devices: Wheelchair, cane          Assessment/Plan   Jennifer Mancini is a 83 y.o. female on day 4 of admission presenting with Left supracondylar humerus fracture, closed, initial encounter. 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    Principal Problem:    Left supracondylar humerus fracture, closed, initial encounter  Active Problems:    Anemia    HTN (hypertension)    CAD (coronary artery disease)    MI (myocardial infarction) (CMS/HCC)    Murmur    Aortic stenosis      Mechanical Fall humerus fracture  Patient reportedly fell out of bed while reaching for piece of candy on the floor  Denied CP, SOB, lightheadedness, dizziness, palpitations prior to event  Patient's baseline cognitive impairment iso schizoaffective disorder on mobility impairment requiring wheel chair and walker contributory  CTH without acute intracranial hemorrhage or mass effect  EKG with NSR  TTE 2/9 with LVEF 60-65%, mild aortic stenosis  mgmt of traumatic injuries per trauma and ortho as below  L3 burst fracture and nasal bone fracture s/p OR on 2/9 for ORIF L humerus  Hx osteoporosis  DEXA scan 1/17/24 with lowest T score -3.7 in the right hip and T12, L1 and L4 fractures consistent with x-rays 10/30/2023    Vit D of 52.8 4/2023  ionized Ca 1.11  start home calcium Vit D supplementation  recommend outpatient follow up with rheum or endo to discuss starting denosumab / bisphosphonates  post op pain mgmt with:  c/w tylenol 975 tid  small doses of opiods as needed for pain- oxycodone 2.5mg prn  q6h with holding parameters for lethargy or respiratory depression  lidocaine patches   Schizoaffective disorder  moderate severe dementia iso Hx sundowning  High Risk for delirium  Most recent Qtc 402  TSH elevated 4.58 3/21, Free T4 0.86  B12, folate on patient home med list  Recommend sending TSH, T4, b12, folate to assess for organic causes which could contribute to cognitive impairment  c/w home olanzapine 20mg nightly  c/w home haldol 0.5mg daily  c/w home venlafaxine 37.5mg daily  Please consider the following general measures for minimizing delirium in a hospitalized patient: -  Bright lights during the day, keeps blinds up, switch all lights on   avoid disturbances at night. Encourage at least 6 hours uninterrupted sleep. Consider d/c 4am vitals check  avoid benzodiazepines, sedatives. Minimize opioids   avoid anti-cholinergics    avoid restraints. D/c [place] if possible.   if requiring no to little insulin coverage, d/c sliding scale  use low dose haldol 0.5mg PO (IM if PO not possible) only PRN severe agitation where pt exhibits volatile behavior and is a threat to self or others. EKGs to monitor QTc   daily orientation to time and place by the staff   out of bed to chair few hours everyday  encourage stimulating activities during the day if possible  watch for acute urinary retention or constipation which could contribute to delirium  HTN  hold home losartan 50mg BID  permissive HTN in geriatric patients with baseline cognitive impairment, especially post-op, can use prn metoprolol IV 5mg for SBP >170  HLD  CAD  Hx MI  c/w home asa  c/w home atorvastatin 80 at bedtime  Dermatomyositis   c/w home prednisone 7.5mg  daily  c/w home famotidine 40mg daily  c/w home methotrexate 15mg on Fridays  DM  most recent A1c 8.2 (2/24)  mild SSI inpatient  WEN  nightly CPAP  Constipation: Last recorded BM 2/8/2024   Risk of constipation iso opiod use: In the last 48 hours, received oxycodone 45 mg  C/w schedule miralax daily, uptitrate to 1 BM daily as needed  C/w miriam-colace BID prn    Medication Evaluation:   High risk medications: methotrexate, patient on anticholinergics including haldol and olanzapine at home and continued inpatient.  Please limit use of additional anti-cholinergics,   Recommend serial EKGs to assess QTC if prn haldol or other Qtc prolonging medication required for agitation  Other concerning issues regarding medications:   please start home calcium vit D,   patient with Hx osteoporosis as evidenced on recent DEXA scan.   Please arrange outpatient rheum or endo follow up for initiation of bisphosphonates/denosumab for further osteoporosis mgmt     Care Transitions:  -Recommended level for discharge: Pending PT/OT eval  -Home going considerations: discharge to facility  -Primary care physician: Ivy Ayala MD        Goals of Care:  -Primary goals: quality of life  -Health care power of : none, although daughter Ivette is Guardian   -Living will: none  Code status: Full        4M AGE-FRIENDLY INITIATIVE:   What matters most to patient: Family  Medications: Haldol, Zyprexa, Oxycodone  Mentation: Alert, calm, cooperative. Oriented x3.  Mobility: AMPAC 11. Agree with PT/OT recommendation patient will benefit from skilled rehab    Geriatric medicine will continue to follow the patient. Thank you for allowing geriatric medicine to be involved in the care of your patient. Geriatric medicine consultation team is available during work hours Monday through Friday. For any emergency issues requiring immediate assistance over the weekend, please page Geriatrics pager [phone]    I spent 45 minutes in the professional  and overall care of this patient.      Lore Prasad, APRN-CNP

## 2024-02-12 NOTE — CARE PLAN
The patient's goals for the shift include      The clinical goals for the shift include Pt's pain will be managed throughout the shift.

## 2024-02-12 NOTE — PROGRESS NOTES
Occupational Therapy    Evaluation and Treatment    Patient Name: Jennifer Mancini  MRN: [id number]  Today's Date: 2/12/2024  Time Calculation  Start Time: 1124  Stop Time: 1153  Time Calculation (min): 29 min    Assessment  IP OT Assessment  OT Assessment: Pt presents with decreased strength, balance, and endurance impairing occupational performance of ADLs and funcitional mobility  End of Session Communication: Bedside nurse  End of Session Patient Position: Bed, 3 rail up, Alarm on  Plan:  Treatment Interventions: ADL retraining, Functional transfer training  OT Frequency: 3 times per week  OT Discharge Recommendations: Moderate intensity level of continued care  OT - OK to Discharge: Yes (indicates completed eval and discharge recommendation)    Subjective   Current Problem:  1. Fall, initial encounter        2. Left supracondylar humerus fracture, closed, initial encounter  Case Request Operating Room: Open Reduction Internal Fixation Distal Humerus    Case Request Operating Room: Open Reduction Internal Fixation Distal Humerus      3. Closed fracture of distal end of humerus, unspecified fracture morphology, initial encounter  calcium carbonate-vitamin D3 500 mg-5 mcg (200 unit) tablet      4. Closed stable burst fracture of third lumbar vertebra, initial encounter (CMS/[facility])        5. Closed fracture of nasal bone, initial encounter        6. Primary hypertension  Transthoracic Echo (TTE) Limited    Transthoracic Echo (TTE) Limited      7. Cardiac murmur, unspecified  Transthoracic Echo (TTE) Limited        General:  Reason for Referral: 83 y.o. female sustained fall and now s/p L distal humerus ORIF. Patient also with L3 burst fracture. Per ortho, no surgical intervention. Strict spinal precautions and no need for brace  Past Medical History Relevant to Rehab: Schizoaffective DO, frequent falls  Co-Treatment: PT  Co-Treatment Reason: maximize Pt safety and therapeutic success  Prior to Session Communication:  Bedside nurse  Patient Position Received: Bed, 3 rail up, Alarm on  General Comment: pleasantly confused and cooperative, agreeable to OT   Precautions:  UE Weight Bearing Status: Left Non-Weight Bearing  LE Weight Bearing Status: Weight Bearing as Tolerated  Medical Precautions: Fall precautions, Spinal precautions  Vital Signs:  Heart Rate: (!) 119  Heart Rate Source: Monitor  SpO2: 96 %  Pain:  Pain Assessment  Pain Assessment: 0-10  Pain Score: 0 - No pain (Pt appeared to  grimace when performing mobility with L UE)  Lines/Tubes/Drains:  External Urinary Catheter Female (Active)   Number of days: 1         Objective   Cognition:  Orientation Level: Oriented X4  Working Memory: Impaired  Memory Comments: Pt reported they can be forgetful with things  Complex Functional Tasks: Impaired  Simple Functional Tasks: Impaired  Complex Functional Tasks: Minimal  Routine Tasks: Minimal  Unable to Self-Monitor and Self-Correct Consistently: Minimal  Insight: Mild  Impulsive: Mildly  Planning: Reduced planning skills  Organization: Mildly disorganized  Processing Speed: Delayed  Cognition Test Scores  Cognition Tests: Cognition Test Performed  SBT Test Score: 14/28        Home Living:  Type of Home: Assisted living  Lives With: Alone  Home Adaptive Equipment: Walker rolling or standard, Wheelchair-manual  Home Layout: One level  Home Access: Level entry  Bathroom Shower/Tub: Walk-in shower  Bathroom Toilet: Handicapped height  Bathroom Equipment: Grab bars in shower, Tub transfer bench   Prior Function:  Level of [place]: Independent with ADLs and functional transfers, Needs assistance with homemaking  Receives Help From:  ([facility] staff)  ADL Assistance:  (Pt reported MOD I with dressing and bathing)  Homemaking Assistance:  ([facility] performs house managment ad medication management)  Ambulatory Assistance:  (MOD I with w/c in room, FWW for short distances)  Hand Dominance: Right  IADL History:  Current License:  No  Occupation: Retired  ADL:  Eating Deficit:  (setup only)  Grooming Deficit:  (SBA with setup at least)  Bathing Deficit:  (mod A seated at least 2/2 clothing management and functional mobility)  UE Dressing Deficit:  (mod A at least 2/2 ROM/NWB L UE)  LE Dressing Deficit:  (mod A at least 2/2 ROM/balance)  Toileting Deficit:  (total, external catheter; mod A to BSC at least 2/2 clothing management and functional mobility)  Functional Deficit:  (Pt performed functional mobility from bed to chair back to bed min A x2 B HHA)  Activity Tolerance:  Endurance: Tolerates 10 - 20 min exercise with multiple rests    Bed Mobility/Transfers: Bed Mobility  Bed Mobility: Yes  Bed Mobility 1  Bed Mobility 1: Supine to sitting, Log roll  Level of Assistance 1: Minimum assistance, Moderate verbal cues  Bed Mobility Comments 1: 2 person assist  Bed Mobility 2  Bed Mobility  2: Sitting to supine, Log roll  Level of Assistance 2: Maximum assistance, Maximum verbal cues, Maximum tactile cues   and Transfers  Transfer: Yes  Transfer 1  Transfer From 1: Bed to  Transfer to 1: Stand  Technique 1: Sit to stand  Transfer Level of Assistance 1: Hand held assistance, Minimum assistance, +2, Minimal verbal cues  Trials/Comments 1: 1x  Transfers 2  Transfer From 2: Stand to  Transfer to 2: Chair with arms  Technique 2: Stand to sit  Transfer Level of Assistance 2: Hand held assistance, Minimum assistance, +2, Minimal verbal cues  Trials/Comments 2: 2x  Transfers 3  Transfer From 3: Chair with arms to  Transfer to 3: Stand  Technique 3: Sit to stand  Transfer Level of Assistance 3: Hand held assistance, Minimum assistance, +2, Minimal verbal cues  Trials/Comments 3: 2x  Transfers 4  Transfer From 4: Stand to  Transfer to 4: Bed  Technique 4: Stand to sit  Transfer Level of Assistance 4: Hand held assistance, Minimum assistance, +2, Minimal verbal cues  IADL's:   Current License: No  Occupation: Retired  Vision: Vision - Basic  Assessment  Current Vision: No visual deficits    Sensation:  Light Touch: No apparent deficits    Hand Function:  Hand Function  Gross Grasp:  (RUE WFL, L UE able to wiggle digits and perform thumb opposition)  Coordination: Functional  Extremities: RUE   RUE : Within Functional Limits, LUE   LUE:  (not tested d/t NWB), RLE   RLE :  (at least 3/5 as observed through functional mobility), and LLE   LLE :  (at least 3/5 as observed throguh functional mobility)    Outcome Measures: [facility] Daily Activity  Putting on and taking off regular lower body clothing: A lot  Bathing (including washing, rinsing, drying): A lot  Putting on and taking off regular upper body clothing: A lot  Toileting, which includes using toilet, bedpan or urinal: Total  Taking care of personal grooming such as brushing teeth: A little  Eating Meals: A little  Daily Activity - Total Score: 13         ,     OT Adult Other Outcome Measures  4AT: 4AT-  Short Blessed Test (SBT): 14/28    Education Documentation  Precautions, taught by Laurel Goldman OT at 2/12/2024  3:08 PM.  Learner: Patient  Readiness: Acceptance  Method: Explanation  Response: Verbalizes Understanding    ADL Training, taught by Laurel Goldman OT at 2/12/2024  3:08 PM.  Learner: Patient  Readiness: Acceptance  Method: Explanation  Response: Verbalizes Understanding    Education Comments  OT educated and demo'd spine prec, no bending, lifting, or twisting; adaptive/compensatory strategies during ADLs and functional mobility. OT educated Pt on 1 handed techniques during functional tasks.  Pt performed 1 STS from bed and 2 STS from chair with min A x2. Pt performed functional mobility (3 steps) from bed to chair then back to bed min A x2. Pt able to tolerate ~10 minutes seated in chair.         Goals:   Encounter Problems       Encounter Problems (Active)       ADLs       Patient with complete lower body dressing with stand by assist level of assistance donning and doffing all LE  clothes  with PRN adaptive equipment  (Progressing)       Start:  02/12/24    Expected End:  02/26/24            Patient will complete toileting including hygiene clothing management/hygiene with stand by assist level of assistance and DME prn. (Progressing)       Start:  02/12/24    Expected End:  02/26/24               BALANCE       Pt will maintain dynamic standing balance during ADL task with stand by assist level of assistance in order to demonstrate decreased risk of falling and improved postural control. (Progressing)       Start:  02/12/24    Expected End:  02/26/24               COGNITION/SAFETY       Patient will recall and adhere to spine precautions during all functional mobility/ADL tasks in order to demonstrate improved understanding and promote healing post op (Progressing)       Start:  02/12/24    Expected End:  02/26/24               MOBILITY       Patient will perform Functional mobility min Household distances/Community Distances with modified independent level of assistance and least restrictive device in order to improve safety and functional mobility. (Progressing)       Start:  02/12/24    Expected End:  02/26/24                 TRANSFERS       Patient will perform bed mobility stand by assist level of assistance in order to improve safety and independence with mobility (Progressing)       Start:  02/12/24    Expected End:  02/26/24            Patient will complete functional transfers with least restrictive device with contact guard assist level of assistance. (Progressing)       Start:  02/12/24    Expected End:  02/26/24 02/12/24 at 3:08 PM   Laurel Goldman OT   Rehab Office: [phone]

## 2024-02-12 NOTE — PROGRESS NOTES
Physical Therapy    Physical Therapy Treatment    Patient Name: Jennifer Mancini  MRN: [id number]  Today's Date: 2/12/2024  Time Calculation  Start Time: 1124  Stop Time: 1153  Time Calculation (min): 29 min       Assessment/Plan   PT Assessment  PT Assessment Results: Decreased strength, Decreased range of motion, Impaired balance, Decreased mobility, Decreased cognition, Impaired judgement, Decreased safety awareness, Pain, Decreased skin integrity  Rehab Prognosis: Good  End of Session Communication: Bedside nurse  Assessment Comment: pt with increase tolerance for mobiltiy today and able to transfer from bed to chair and chair to bed with min assist x 2. pt would benefit from continued skilled services to improve mobility  End of Session Patient Position: Bed, 3 rail up, Alarm on  PT Plan  Inpatient/Swing Bed or Outpatient: Inpatient  PT Plan  Treatment/Interventions: Transfer training, Bed mobility, Gait training, Balance training, Strengthening, Therapeutic activity, Therapeutic exercise  PT Plan: PT Eval only  PT Frequency: 4 times per week  PT Discharge Recommendations: Moderate intensity level of continued care  PT Recommended Transfer Status: Assist x2  PT - OK to Discharge: Yes      General Visit Information:   PT  Visit  PT Received On: 02/12/24  General  Reason for Referral: 83 y.o. female sustained fall and now s/p L distal humerus ORIF. Patient also with L3 burst fracture. Per ortho, no surgical intervention. Strict spinal precautions and no need for brace  Past Medical History Relevant to Rehab: Schizoaffective DO, frequent falls  Family/Caregiver Present: No  Co-Treatment: OT  Co-Treatment Reason: to maximize therapeutic benefit and assist with mobiltiy  Prior to Session Communication: Bedside nurse  Patient Position Received: Bed, 3 rail up, Alarm on  General Comment: pt cooperative and willing to participate. pt follows spinal precautions with verbal cueing.    Subjective    Precautions:  Precautions  UE Weight Bearing Status: Left Non-Weight Bearing  LE Weight Bearing Status: Weight Bearing as Tolerated  Medical Precautions: Fall precautions, Spinal precautions  Vital Signs:  Vital Signs  Heart Rate: (!) 119 (HR ranged from 110-121)  Heart Rate Source: Monitor  SpO2: 96 %    Objective   Pain:  Pain Assessment  Pain Assessment: 0-10  Pain Score: 0 - No pain (altthough grimacing with mobility)  Pain Location: Arm  Pain Orientation: Left  Cognition:  Cognition  Orientation Level: Oriented X4  Postural Control:  Static Sitting Balance  Static Sitting-Balance Support: Feet supported, Left upper extremity supported, Right upper extremity supported  Static Sitting-Level of Assistance: Contact guard  Static Sitting-Comment/Number of Minutes: pt sat EOB for  mins between STS  Dynamic Sitting Balance  Dynamic Sitting-Balance Support: Left upper extremity supported, Right upper extremity supported, Feet supported  Dynamic Sitting-Balance: Forward lean, Lateral lean  Dynamic Sitting-Comments: min assist of 1-2    Activity Tolerance:  Activity Tolerance  Endurance: Tolerates 10 - 20 min exercise with multiple rests  Treatments:  Therapeutic Exercise  Therapeutic Exercise Performed: Yes  Therapeutic Exercise Activity 1: sitting : SAQ 1 x 10 each B    Therapeutic Activity  Therapeutic Activity Performed: Yes  Therapeutic Activity 1: pt performed 3 STS for placement of purwick and transfer to and from chair. Pt sat in chair for 10 mins but returned to bed due to fatigue and sleepiness.              Bed Mobility  Bed Mobility: Yes  Bed Mobility 1  Bed Mobility 1: Supine to sitting, Sitting to supine  Level of Assistance 1: Minimum assistance, Moderate verbal cues (assist x 2)  Bed Mobility 2  Bed Mobility  2: Scooting  Level of Assistance 2: Maximum assistance, Maximum verbal cues, Maximum tactile cues  Bed Mobility Comments 2: x2    Ambulation/Gait Training  Ambulation/Gait Training Performed:  Yes  Ambulation/Gait Training 1  Surface 1: Level tile  Device 1: No device  Assistance 1: Minimum assistance  Quality of Gait 1: Inconsistent stride length, Decreased step length, Shuffling gait, Soft knee(s)  Comments/Distance (ft) 1: pt took two steps to chair and then back to bed with min assist x 2  Transfers  Transfer: Yes  Transfer 1  Transfer From 1: Sit to, Stand to  Transfer to 1: Stand, Sit  Technique 1: Sit to stand, Stand to sit  Transfer Level of Assistance 1: Arm in arm assistance, Hand held assistance, Minimum assistance, +2  Trials/Comments 1: performed x 3  Transfers 2  Transfer From 2: Bed to, Chair with arms to  Transfer to 2: Chair with arms, Bed  Technique 2: Stand to sit  Transfer Level of Assistance 2: Hand held assistance, Arm in arm assistance  Trials/Comments 2: pt up in chair for 10 mins and returned to bed.    Outcome Measures:  [facility] Basic Mobility  Turning from your back to your side while in a flat bed without using bedrails: A lot  Moving from lying on your back to sitting on the side of a flat bed without using bedrails: A lot  Moving to and from bed to chair (including a wheelchair): A lot  Standing up from a chair using your arms (e.g. wheelchair or bedside chair): A lot  To walk in hospital room: A lot  Climbing 3-5 steps with railing: Total  Basic Mobility - Total Score: 11    Education Documentation  Precautions, taught by Jaci Bowen, PT at 2/12/2024 12:37 PM.  Learner: Patient  Readiness: Eager  Method: Explanation  Response: Verbalizes Understanding    Education Comments  No comments found.        OP EDUCATION:       Encounter Problems       Encounter Problems (Active)       Mobility       STG - Patient will ambulate >/= 15 ft with WBQC mod A  (Progressing)       Start:  02/10/24    Expected End:  02/24/24               Transfers       STG - Transfer from bed to chair Min A with WBQC (Progressing)       Start:  02/10/24    Expected End:  02/24/24            STG -  Patient will perform bed mobility with min A (Progressing)       Start:  02/10/24    Expected End:  02/24/24            STG - Patient will transfer sit to and from stand Min A with WBQC (Progressing)       Start:  02/10/24    Expected End:  02/24/24

## 2024-02-12 NOTE — PROGRESS NOTES
[facility]  TRAUMA SERVICE - PROGRESS NOTE    Patient Name: Jennifer Mancini  MRN: [id number]  Admit Date: 208  : 1940  AGE: 83 y.o.   GENDER: female  ==============================================================================  MECHANISM OF INJURY:   Mechanical fall out of bed onto L side at assisted living facility    LOC (yes/no?): No  Anticoagulant / Anti-platelet Rx? (for what dx?): No  Referring Facility Name (N/A for scene EMR run):  [place]    INJURIES:   - L3 burst fracture, comminuted. 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   - Follow-up outpatient with Dr. Quick.   - No bracing required    ## Left humerus fx  -  underwent ORIF.   - NWB LUE in splint, will remain until follow-up appointment    ## Nasal bone fracture  - No acute intervention at this time  - follow up in OMFS clinic in 2 weeks     ## comorbids  - Geriatrics team consulted, appreciate recommendations  - pharmacy med rec team involved, restart home steroid, methotrexate, H2 blocker, evening haldol/zyprexa for now  - PT/OT     ## DM  -  Restarted home Metformin. Consulted Diabetes team for post-discharge management. A1c >8.      Dispo: Patient lives in an  assisted living facility that does not have a SNF affiliated with it. In process of finding an accepting facility.    Patient discussed with attending, Dr. Melinda Elam DO  Department of Surgery / IR Integrated PGY1  Trauma [id number]  ==============================================================================  CHIEF COMPLAINT / OVERNIGHT EVENTS:   No acute events overnight. Much more awake/conversant on presentation today. Endorses mild LUE pain, but otherwise doing well.    MEDICAL HISTORY / ROS:  Admission history and ROS reviewed. Pertinent changes as follows:  N/A    PHYSICAL EXAM:  Heart Rate:  []   Temp:  [36.1 °C (97 °F)-37 °C (98.6 °F)]   Resp:  [18]   BP: (138-161)/(85-98)   SpO2:  [92 %-99 %]       GENERAL APPEARANCE:  AxOx4, generally well-appearing no acute distress.  HEENT: Scattered facial bruising, small cephalohematoma between eyebrows. Oral mucosa and tongue without lacerations, teeth in place but multiple chronically missing. Bilateral, erythematous heliotrope rash surrounding eyes  HEART:  Normal rate and regular rhythm  LUNGS:  Equal chest rise and fall, non-labored breathing  ABDOMEN:  Soft, nontender, nondistended  EXTREMITIES:  LAS in place. Tender to palpation  NEUROLOGICAL:  Grossly nonfocal. Alert and oriented, moving all 4 extremities.   Skin:  Warm and dry       LABS:  Results for orders placed or performed during the hospital encounter of 02/08/24 (from the past 24 hour(s))   POCT GLUCOSE   Result Value Ref Range    POCT Glucose 259 (H) 74 - 99 mg/dL   POCT GLUCOSE   Result Value Ref Range    POCT Glucose 228 (H) 74 - 99 mg/dL   POCT GLUCOSE   Result Value Ref Range    POCT Glucose 222 (H) 74 - 99 mg/dL   POCT GLUCOSE   Result Value Ref Range    POCT Glucose 159 (H) 74 - 99 mg/dL     MEDICATIONS:  Current Facility-Administered Medications   Medication Dose Route Frequency Provider Last Rate Last Admin    acetaminophen (Tylenol) tablet 975 mg  975 mg oral q8h Warren ERAZO  SONI Mayberry   975 mg at 02/11/24 2212    clobetasol (Temovate) 0.05 % cream 1 Application  1 Application Topical BID Xavi Elam DO   1 Application at 02/11/24 2216    dextrose 10 % in water (D10W) infusion  0.3 g/kg/hr intravenous Once PRN Warren Mayberry PA-C        dextrose 50 % injection 25 g  25 g intravenous q15 min PRN Warren Mayberry PA-C        famotidine (Pepcid) tablet 40 mg  40 mg oral Nightly John Magallon PA-C   40 mg at 02/11/24 2213    fluticasone (Flonase) nasal spray 1 spray  1 spray Each Nostril Daily Xavi Elam DO        glucagon (Glucagen) injection 1 mg  1 mg intramuscular q15 min PRN Warren Mayberry PA-C        guaiFENesin (Mucinex) 12 hr tablet 600 mg  600 mg oral BID Xavi Elam DO   600 mg at 02/11/24 2211    guaiFENesin (Robitussin) 100 mg/5 mL syrup 200 mg  200 mg oral q4h PRN Xavi Elam DO        haloperidol (Haldol) tablet 0.5 mg  0.5 mg oral Daily John Magallon PA-C   0.5 mg at 02/11/24 1114    hydrocortisone 2.5 % cream 1 Application  1 Application Topical BID Xavi Elam DO   1 Application at 02/11/24 2231    insulin lispro (HumaLOG) injection 0-5 Units  0-5 Units subcutaneous TID with meals Xavi Elam DO   2 Units at 02/11/24 1741    latanoprost (Xalatan) 0.005 % ophthalmic solution 1 drop  1 drop Both Eyes Nightly John Magallon PA-C   1 drop at 02/11/24 2231    melatonin tablet 5 mg  5 mg oral Nightly Warren Mayberry PA-C   5 mg at 02/11/24 2100    methotrexate (Trexall) tablet 15 mg  15 mg oral Weekly John Magallon PA-C   15 mg at 02/09/24 1904    naloxone (Narcan) injection 0.2 mg  0.2 mg intravenous q5 min PRN Warern Mayberry PA-C        OLANZapine (ZyPREXA) tablet 20 mg  20 mg oral Nightly John Magallon PA-C   20 mg at 02/12/24 0025    oxyCODONE (Roxicodone) immediate release tablet 2.5 mg  2.5 mg oral q6h PRN Warren Mayberry PA-C        oxyCODONE (Roxicodone) immediate release tablet 5 mg  5 mg oral q6h PRN Warren Mayberry PA-C   5 mg at 02/12/24 0637     oxygen (O2) therapy   inhalation Continuous - 02/gases Warren Mayberry PA-C   Rate Verify at 02/11/24 0830    oxygen (O2) therapy   inhalation Continuous PRN - O2/gases Warren Mayberry PA-C        polyethylene glycol (Glycolax, Miralax) packet 17 g  17 g oral Daily Warren Mayberry PA-C   17 g at 02/11/24 1010    predniSONE (Deltasone) tablet 7.5 mg  7.5 mg oral Daily John Magallon PA-C   7.5 mg at 02/11/24 1010    sennosides-docusate sodium (Mary-Colace) 8.6-50 mg per tablet 2 tablet  2 tablet oral BID Warren Mayberry PA-C   2 tablet at 02/11/24 2213    sodium chloride (Ocean) 0.65 % nasal spray 1 spray  1 spray Each Nostril 4x daily PRN Xavi Elam DO   1 spray at 02/11/24 2231    sulfur hexafluoride microsphr (Lumason) injection 24.28 mg  2 mL intravenous Once in imaging Xavi Elam DO        venlafaxine XR (Effexor-XR) 24 hr capsule 37.5 mg  37.5 mg oral Daily John Magallon PA-C   37.5 mg at 02/11/24 1010       IMAGING SUMMARY:  (summary of new imaging findings, not a copy of dictation)  CT Head/Face: nasal bone fracture on right, negative CTH acute injury  CT C-Spine: chronic degen changes, no acute injury  CT Chest/Abd/Pelvis: large hiatal hernia in left hemithorax, no acute injury  CT TL spine: L3 burst fracture, indeterminate age T12/L1 fractures  MR T/L 40% height loss L3 burst    I have reviewed all laboratory and imaging results ordered/pertinent for today's encounter.

## 2024-02-13 LAB
ANION GAP SERPL CALC-SCNC: 12 MMOL/L (ref 10–20)
BUN SERPL-MCNC: 16 MG/DL (ref 6–23)
CALCIUM SERPL-MCNC: 9.4 MG/DL (ref 8.6–10.6)
CHLORIDE SERPL-SCNC: 92 MMOL/L (ref 98–107)
CO2 SERPL-SCNC: 29 MMOL/L (ref 21–32)
CREAT SERPL-MCNC: 0.44 MG/DL (ref 0.5–1.05)
EGFRCR SERPLBLD CKD-EPI 2021: >90 ML/MIN/1.73M*2
GLUCOSE BLD MANUAL STRIP-MCNC: 124 MG/DL (ref 74–99)
GLUCOSE BLD MANUAL STRIP-MCNC: 181 MG/DL (ref 74–99)
GLUCOSE BLD MANUAL STRIP-MCNC: 197 MG/DL (ref 74–99)
GLUCOSE BLD MANUAL STRIP-MCNC: 258 MG/DL (ref 74–99)
GLUCOSE SERPL-MCNC: 226 MG/DL (ref 74–99)
OSMOLALITY SERPL: 281 MOSM/KG (ref 280–300)
POTASSIUM SERPL-SCNC: 4.4 MMOL/L (ref 3.5–5.3)
SODIUM SERPL-SCNC: 129 MMOL/L (ref 136–145)

## 2024-02-13 PROCEDURE — 83735 ASSAY OF MAGNESIUM: CPT | Performed by: INTERNAL MEDICINE

## 2024-02-13 PROCEDURE — 97530 THERAPEUTIC ACTIVITIES: CPT | Mod: GP,CQ

## 2024-02-13 PROCEDURE — 99232 SBSQ HOSP IP/OBS MODERATE 35: CPT | Performed by: PHYSICIAN ASSISTANT

## 2024-02-13 PROCEDURE — 2500000001 HC RX 250 WO HCPCS SELF ADMINISTERED DRUGS (ALT 637 FOR MEDICARE OP): Performed by: SURGERY

## 2024-02-13 PROCEDURE — 2500000004 HC RX 250 GENERAL PHARMACY W/ HCPCS (ALT 636 FOR OP/ED)

## 2024-02-13 PROCEDURE — 2500000004 HC RX 250 GENERAL PHARMACY W/ HCPCS (ALT 636 FOR OP/ED): Performed by: PHYSICIAN ASSISTANT

## 2024-02-13 PROCEDURE — 2500000002 HC RX 250 W HCPCS SELF ADMINISTERED DRUGS (ALT 637 FOR MEDICARE OP, ALT 636 FOR OP/ED)

## 2024-02-13 PROCEDURE — 2500000002 HC RX 250 W HCPCS SELF ADMINISTERED DRUGS (ALT 637 FOR MEDICARE OP, ALT 636 FOR OP/ED): Performed by: PHYSICIAN ASSISTANT

## 2024-02-13 PROCEDURE — 83930 ASSAY OF BLOOD OSMOLALITY: CPT

## 2024-02-13 PROCEDURE — 82947 ASSAY GLUCOSE BLOOD QUANT: CPT

## 2024-02-13 PROCEDURE — 99233 SBSQ HOSP IP/OBS HIGH 50: CPT

## 2024-02-13 PROCEDURE — 2500000005 HC RX 250 GENERAL PHARMACY W/O HCPCS

## 2024-02-13 PROCEDURE — 2500000001 HC RX 250 WO HCPCS SELF ADMINISTERED DRUGS (ALT 637 FOR MEDICARE OP): Performed by: PHYSICIAN ASSISTANT

## 2024-02-13 PROCEDURE — 99232 SBSQ HOSP IP/OBS MODERATE 35: CPT | Performed by: SURGERY

## 2024-02-13 PROCEDURE — 2500000001 HC RX 250 WO HCPCS SELF ADMINISTERED DRUGS (ALT 637 FOR MEDICARE OP)

## 2024-02-13 PROCEDURE — 80048 BASIC METABOLIC PNL TOTAL CA: CPT

## 2024-02-13 PROCEDURE — 1200000002 HC GENERAL ROOM WITH TELEMETRY DAILY

## 2024-02-13 PROCEDURE — 36415 COLL VENOUS BLD VENIPUNCTURE: CPT

## 2024-02-13 RX ORDER — LANOLIN ALCOHOL/MO/W.PET/CERES
400 CREAM (GRAM) TOPICAL DAILY
Status: DISCONTINUED | OUTPATIENT
Start: 2024-02-13 | End: 2024-02-16 | Stop reason: HOSPADM

## 2024-02-13 RX ORDER — POLYETHYLENE GLYCOL 3350 17 G/17G
17 POWDER, FOR SOLUTION ORAL 2 TIMES DAILY
Status: DISCONTINUED | OUTPATIENT
Start: 2024-02-13 | End: 2024-02-16 | Stop reason: HOSPADM

## 2024-02-13 RX ORDER — SODIUM CHLORIDE 9 MG/ML
50 INJECTION, SOLUTION INTRAVENOUS CONTINUOUS
Status: DISCONTINUED | OUTPATIENT
Start: 2024-02-13 | End: 2024-02-16 | Stop reason: HOSPADM

## 2024-02-13 RX ORDER — FERROUS SULFATE, DRIED 160(50) MG
1 TABLET, EXTENDED RELEASE ORAL DAILY
Status: DISCONTINUED | OUTPATIENT
Start: 2024-02-13 | End: 2024-02-16 | Stop reason: HOSPADM

## 2024-02-13 RX ADMIN — POLYETHYLENE GLYCOL 3350 17 G: 17 POWDER, FOR SOLUTION ORAL at 08:39

## 2024-02-13 RX ADMIN — ACETAMINOPHEN 975 MG: 325 TABLET ORAL at 10:33

## 2024-02-13 RX ADMIN — ENOXAPARIN SODIUM 30 MG: 100 INJECTION SUBCUTANEOUS at 08:39

## 2024-02-13 RX ADMIN — SENNOSIDES AND DOCUSATE SODIUM 2 TABLET: 8.6; 5 TABLET ORAL at 08:38

## 2024-02-13 RX ADMIN — GUAIFENESIN 600 MG: 600 TABLET ORAL at 08:38

## 2024-02-13 RX ADMIN — CLOBETASOL PROPIONATE 1 APPLICATION: 0.5 CREAM TOPICAL at 20:55

## 2024-02-13 RX ADMIN — CLOBETASOL PROPIONATE 1 APPLICATION: 0.5 CREAM TOPICAL at 08:39

## 2024-02-13 RX ADMIN — INSULIN HUMAN 6 UNITS: 100 INJECTION, SUSPENSION SUBCUTANEOUS at 10:33

## 2024-02-13 RX ADMIN — GUAIFENESIN 600 MG: 600 TABLET ORAL at 20:40

## 2024-02-13 RX ADMIN — SALINE NASAL SPRAY 1 SPRAY: 1.5 SOLUTION NASAL at 14:00

## 2024-02-13 RX ADMIN — Medication: at 08:00

## 2024-02-13 RX ADMIN — HYDROCORTISONE 1 APPLICATION: 25 CREAM TOPICAL at 08:39

## 2024-02-13 RX ADMIN — SALINE NASAL SPRAY 1 SPRAY: 1.5 SOLUTION NASAL at 13:52

## 2024-02-13 RX ADMIN — Medication 1 TABLET: at 13:38

## 2024-02-13 RX ADMIN — OLANZAPINE 20 MG: 5 TABLET, FILM COATED ORAL at 20:40

## 2024-02-13 RX ADMIN — HYDROCORTISONE 1 APPLICATION: 25 CREAM TOPICAL at 20:54

## 2024-02-13 RX ADMIN — Medication 5 MG: at 20:40

## 2024-02-13 RX ADMIN — FLUTICASONE PROPIONATE 1 SPRAY: 50 SPRAY, METERED NASAL at 08:55

## 2024-02-13 RX ADMIN — HALOPERIDOL 0.5 MG: 0.5 TABLET ORAL at 10:34

## 2024-02-13 RX ADMIN — SITAGLIPTIN 100 MG: 100 TABLET, FILM COATED ORAL at 08:38

## 2024-02-13 RX ADMIN — ENOXAPARIN SODIUM 30 MG: 100 INJECTION SUBCUTANEOUS at 20:40

## 2024-02-13 RX ADMIN — METFORMIN HYDROCHLORIDE 1000 MG: 500 TABLET, FILM COATED ORAL at 08:38

## 2024-02-13 RX ADMIN — SODIUM CHLORIDE 125 ML/HR: 9 INJECTION, SOLUTION INTRAVENOUS at 12:13

## 2024-02-13 RX ADMIN — Medication 400 MG: at 13:38

## 2024-02-13 RX ADMIN — ACETAMINOPHEN 975 MG: 325 TABLET ORAL at 20:40

## 2024-02-13 RX ADMIN — OXYCODONE HYDROCHLORIDE 5 MG: 5 TABLET ORAL at 11:33

## 2024-02-13 RX ADMIN — METFORMIN HYDROCHLORIDE 1000 MG: 500 TABLET, FILM COATED ORAL at 18:01

## 2024-02-13 RX ADMIN — INSULIN LISPRO 6 UNITS: 100 INJECTION, SOLUTION INTRAVENOUS; SUBCUTANEOUS at 12:06

## 2024-02-13 RX ADMIN — FAMOTIDINE 40 MG: 20 TABLET, FILM COATED ORAL at 20:40

## 2024-02-13 RX ADMIN — VENLAFAXINE HYDROCHLORIDE 37.5 MG: 37.5 CAPSULE, EXTENDED RELEASE ORAL at 08:38

## 2024-02-13 RX ADMIN — PREDNISONE 7.5 MG: 5 TABLET ORAL at 08:41

## 2024-02-13 RX ADMIN — LATANOPROST 1 DROP: 50 SOLUTION/ DROPS OPHTHALMIC at 20:54

## 2024-02-13 ASSESSMENT — COGNITIVE AND FUNCTIONAL STATUS - GENERAL
DRESSING REGULAR LOWER BODY CLOTHING: A LITTLE
MOBILITY SCORE: 12
EATING MEALS: A LITTLE
DAILY ACTIVITIY SCORE: 18
MOVING FROM LYING ON BACK TO SITTING ON SIDE OF FLAT BED WITH BEDRAILS: A LITTLE
MOVING TO AND FROM BED TO CHAIR: A LOT
CLIMB 3 TO 5 STEPS WITH RAILING: TOTAL
MOVING TO AND FROM BED TO CHAIR: A LITTLE
WALKING IN HOSPITAL ROOM: A LITTLE
CLIMB 3 TO 5 STEPS WITH RAILING: A LOT
TURNING FROM BACK TO SIDE WHILE IN FLAT BAD: A LOT
DRESSING REGULAR UPPER BODY CLOTHING: A LITTLE
WALKING IN HOSPITAL ROOM: A LOT
STANDING UP FROM CHAIR USING ARMS: A LOT
MOVING FROM LYING ON BACK TO SITTING ON SIDE OF FLAT BED WITH BEDRAILS: A LITTLE
HELP NEEDED FOR BATHING: A LITTLE
TOILETING: A LITTLE
MOBILITY SCORE: 17
STANDING UP FROM CHAIR USING ARMS: A LITTLE
PERSONAL GROOMING: A LITTLE
TURNING FROM BACK TO SIDE WHILE IN FLAT BAD: A LITTLE

## 2024-02-13 ASSESSMENT — PAIN SCALES - PAIN ASSESSMENT IN ADVANCED DEMENTIA (PAINAD)
TOTALSCORE: 0
BODYLANGUAGE: RELAXED
FACIALEXPRESSION: SMILING OR INEXPRESSIVE
BREATHING: NORMAL
CONSOLABILITY: NO NEED TO CONSOLE

## 2024-02-13 ASSESSMENT — ENCOUNTER SYMPTOMS
APPETITE CHANGE: 0
COUGH: 0
VOMITING: 0
SPEECH DIFFICULTY: 0
WHEEZING: 0
DIARRHEA: 0
EYE ITCHING: 0
HEADACHES: 0
ABDOMINAL PAIN: 0
POLYDIPSIA: 0
AGITATION: 0
HYPERACTIVE: 0
LIGHT-HEADEDNESS: 0
RHINORRHEA: 0
CHILLS: 0
EYE PAIN: 0
FACIAL SWELLING: 1
FEVER: 0
NAUSEA: 0
DIFFICULTY URINATING: 0
TROUBLE SWALLOWING: 0
SORE THROAT: 0
COLOR CHANGE: 0

## 2024-02-13 ASSESSMENT — PAIN SCALES - GENERAL
PAINLEVEL_OUTOF10: 1
PAINLEVEL_OUTOF10: 0 - NO PAIN
PAINLEVEL_OUTOF10: 10 - WORST POSSIBLE PAIN

## 2024-02-13 ASSESSMENT — PAIN - FUNCTIONAL ASSESSMENT
PAIN_FUNCTIONAL_ASSESSMENT: 0-10
PAIN_FUNCTIONAL_ASSESSMENT: PAINAD (PAIN ASSESSMENT IN ADVANCED DEMENTIA SCALE)

## 2024-02-13 ASSESSMENT — PAIN SCALES - WONG BAKER
WONGBAKER_NUMERICALRESPONSE: NO HURT
WONGBAKER_NUMERICALRESPONSE: HURTS LITTLE MORE

## 2024-02-13 ASSESSMENT — PAIN DESCRIPTION - LOCATION: LOCATION: ARM

## 2024-02-13 ASSESSMENT — PAIN DESCRIPTION - ORIENTATION: ORIENTATION: LEFT

## 2024-02-13 NOTE — PROGRESS NOTES
Jennifer Mancini is a 83 y.o. female on day 5 of admission presenting with Left supracondylar humerus fracture, closed, initial encounter.    Transitional Care Coordinator Note: Met with patient to discuss discharge planning s/p admission.  Patient is a resident of [facility]. Support person/POA (daughter Ivette [phone]) Requires assistance with ADL's. Requires assist devices for ambulation.    Demographics and contact information confirmed.  Will continue to monitor patient for all home going needs.  Patricia Jiménez RN TCC via Epic.    Falls- yes  Equipment- walker or wheelchair  DM- none   Dialysis- none   SW- none  O2/Cpap- none  PCP- Facility provider per patient Dr. Alcantar  Pharm-Facility provides medications  Transport at discharge: Roundtrip

## 2024-02-13 NOTE — CARE PLAN
The patient's goals for the shift include      The clinical goals for the shift include pt pain will be controlled throughout the shift    Over the shift, the patient will continue to progress toward the care plan goals

## 2024-02-13 NOTE — PROGRESS NOTES
Patient denied by [facility] due to beds and care needs. SW requested only accepting facility [facility] to initiate precert. SW will continue to follow for dc planning needs.

## 2024-02-13 NOTE — PROGRESS NOTES
Physical Therapy    Physical Therapy Treatment    Patient Name: Jennifer Mancini  MRN: [id number]  Today's Date: 2/13/2024  Time Calculation  Start Time: 1113  Stop Time: 1127  Time Calculation (min): 14 min       Assessment/Plan   PT Assessment  PT Assessment Results: Decreased strength, Decreased range of motion, Impaired balance, Decreased mobility, Decreased cognition, Impaired judgement, Decreased safety awareness, Pain, Decreased skin integrity  Rehab Prognosis: Good  Medical Staff Made Aware: Yes  Barriers to Participation: Comorbidities  End of Session Communication: Bedside nurse  End of Session Patient Position: Bed, 3 rail up, Alarm on  PT Plan  Inpatient/Swing Bed or Outpatient: Inpatient  PT Plan  Treatment/Interventions: Transfer training, Bed mobility, Gait training, Balance training, Strengthening, Therapeutic activity, Therapeutic exercise  PT Plan: PT Eval only  PT Frequency: 4 times per week  PT Discharge Recommendations: Moderate intensity level of continued care  PT Recommended Transfer Status: Assist x2  PT - OK to Discharge: Yes      General Visit Information:   PT  Visit  PT Received On: 02/13/24  Response to Previous Treatment: Patient with no complaints from previous session.  General  Family/Caregiver Present: No  Prior to Session Communication: Bedside nurse  Patient Position Received: Bed, 3 rail up, Alarm on  Preferred Learning Style: verbal, visual  General Comment: pleasantly confused and cooperative, agreeable to PT session. Anxious at times requiring increased cues to focus on task at hand. Returned 11:13-11:27am to assist with trans back to bed.    Subjective   Precautions:  Precautions  STEADI Fall Risk Score (The score of 4 or more indicates an increased risk of falling): `  UE Weight Bearing Status: Left Non-Weight Bearing  LE Weight Bearing Status: Weight Bearing as Tolerated  Medical Precautions: Fall precautions, Spinal precautions  Vital Signs:  Vital Signs  Heart Rate: (!)  115 (durng mobility)  Heart Rate Source: Monitor  SpO2: 96 %    Objective   Pain:  Pain Assessment  Pain Assessment: 0-10  Pain Score: 0 - No pain  Cognition:  Cognition  Overall Cognitive Status: Within Functional Limits  Insight: Mild  Impulsive: Mildly  Postural Control:  Static Sitting Balance  Static Sitting-Balance Support: Feet supported, Left upper extremity supported, Right upper extremity supported  Static Sitting-Level of Assistance: Contact guard     Treatments:  Therapeutic Exercise  Therapeutic Exercise Performed: Yes  Therapeutic Exercise Activity 1: seated lincoln LE: LAQ x 10, hip flex x 10         Bed Mobility  Bed Mobility: Yes  Bed Mobility 1  Bed Mobility 1: Supine to sitting, Log roll  Level of Assistance 1: Moderate assistance, Minimal verbal cues  Bed Mobility Comments 1: HOB elevated  Bed Mobility 2  Bed Mobility  2: Sitting to supine, Log roll  Level of Assistance 2: Minimal verbal cues, Moderate assistance (assist x 2)    Ambulation/Gait Training  Ambulation/Gait Training Performed: Yes  Ambulation/Gait Training 1  Surface 1: Level tile  Device 1: No device  Assistance 1: Moderate assistance, Minimal verbal cues  Quality of Gait 1: Inconsistent stride length, Decreased step length, Shuffling gait, Soft knee(s)  Comments/Distance (ft) 1: 3 steps during bed to chair trans x 2 trials  Transfers  Transfer: Yes  Transfer 1  Transfer From 1: Bed to  Transfer to 1: Chair with arms  Technique 1: To left  Transfer Level of Assistance 1: Moderate assistance, Minimal verbal cues (R HHA)  Transfers 2  Transfer From 2: Chair with arms to  Transfer to 2: Bed  Technique 2: To right  Transfer Level of Assistance 2: Moderate assistance, Minimal verbal cues (R HHA)  Transfers 3  Transfer From 3: Sit to  Transfer to 3: Stand  Technique 3: Sit to stand  Transfer Level of Assistance 3: Moderate assistance, Minimal verbal cues (R HHA)  Trials/Comments 3: x2 trials  Transfers 4  Transfer From 4: Stand to  Transfer  to 4: Sit  Technique 4: Stand to sit  Transfer Level of Assistance 4: Moderate assistance, Minimal verbal cues (R HHA)  Trials/Comments 4: x2 trials    Outcome Measures:  [facility] Basic Mobility  Turning from your back to your side while in a flat bed without using bedrails: A little  Moving from lying on your back to sitting on the side of a flat bed without using bedrails: A lot  Moving to and from bed to chair (including a wheelchair): A lot  Standing up from a chair using your arms (e.g. wheelchair or bedside chair): A lot  To walk in hospital room: A lot  Climbing 3-5 steps with railing: Total  Basic Mobility - Total Score: 12    Education Documentation  Precautions, taught by Marina Mandujano PTA at 2/13/2024 12:20 PM.  Learner: Patient  Readiness: Eager  Method: Explanation  Response: Needs Reinforcement    Education Comments  No comments found.        OP EDUCATION:       Encounter Problems       Encounter Problems (Active)       Mobility       STG - Patient will ambulate >/= 15 ft with WBQC mod A  (Progressing)       Start:  02/10/24    Expected End:  02/24/24               Transfers       STG - Transfer from bed to chair Min A with WBQC (Progressing)       Start:  02/10/24    Expected End:  02/24/24            STG - Patient will perform bed mobility with min A (Progressing)       Start:  02/10/24    Expected End:  02/24/24            STG - Patient will transfer sit to and from stand Min A with WBQC (Progressing)       Start:  02/10/24    Expected End:  02/24/24

## 2024-02-13 NOTE — PROGRESS NOTES
"Subjective   Patient visited at bedside. Was requesting Gentil eyedrops in the morning and at noon. Also states she has had a hard time keeping her socks on due to RLS. No pain. Last BM 2 days ago. Otherwise no complaints.       Objective     Last Recorded Vitals  Blood pressure 123/82, pulse 85, temperature 36 °C (96.8 °F), temperature source Temporal, resp. rate 17, height 1.549 m (5' 1\"), weight 68 kg (149 lb 14.6 oz), SpO2 100 %.    Intake/Output Summary (Last 24 hours) at 2/13/2024 0820  Last data filed at 2/13/2024 0600  Gross per 24 hour   Intake 960 ml   Output 3100 ml   Net -2140 ml       Physical Exam  Constitutional:       General: She is not in acute distress.  HENT:      Head: Raccoon eyes present.   Cardiovascular:      Rate and Rhythm: Normal rate and regular rhythm.   Pulmonary:      Effort: Pulmonary effort is normal.      Breath sounds: Normal breath sounds.   Abdominal:      General: Abdomen is flat. There is no distension.      Palpations: Abdomen is soft.      Tenderness: There is no abdominal tenderness.   Musculoskeletal:      Right lower leg: No edema.      Left lower leg: No edema.      Comments: SCDs in place   Neurological:      General: No focal deficit present.      Mental Status: She is alert and oriented to person, place, and time.      Comments: Oriented to time, place, self, location. Attention intact, could spell WORLD backwards   Psychiatric:         Mood and Affect: Mood normal.         Behavior: Behavior normal.           Current Facility-Administered Medications:     acetaminophen (Tylenol) tablet 975 mg, 975 mg, oral, q8h, Warren Mayberry PA-C, 975 mg at 02/12/24 2036    clobetasol (Temovate) 0.05 % cream 1 Application, 1 Application, Topical, BID, Xavi Elam DO, 1 Application at 02/12/24 2100    dextrose 10 % in water (D10W) infusion, 0.3 g/kg/hr, intravenous, Once PRN, Warren Mayberry PA-C    dextrose 50 % injection 25 g, 25 g, intravenous, q15 min PRN, Warren Mayberry, " SONI    enoxaparin (Lovenox) syringe 30 mg, 30 mg, subcutaneous, BID, Xavi Elam DO, 30 mg at 02/12/24 2036    famotidine (Pepcid) tablet 40 mg, 40 mg, oral, Nightly, John Magallon PA-C, 40 mg at 02/12/24 2035    fluticasone (Flonase) nasal spray 1 spray, 1 spray, Each Nostril, Daily, Xavi Elam DO, 1 spray at 02/12/24 0855    glucagon (Glucagen) injection 1 mg, 1 mg, intramuscular, q15 min PRN, Warren Mayberry PA-C    guaiFENesin (Mucinex) 12 hr tablet 600 mg, 600 mg, oral, BID, Xavi Elam DO, 600 mg at 02/12/24 2035    guaiFENesin (Robitussin) 100 mg/5 mL syrup 200 mg, 200 mg, oral, q4h PRN, Xavi Elam DO    haloperidol (Haldol) tablet 0.5 mg, 0.5 mg, oral, Daily, John Magallon PA-C, 0.5 mg at 02/12/24 1051    hydrocortisone 2.5 % cream 1 Application, 1 Application, Topical, BID, Xavi Elam DO, 1 Application at 02/12/24 2100    insulin lispro (HumaLOG) injection 0-10 Units, 0-10 Units, subcutaneous, TID AC, Annette Mcgrath PA-C, 4 Units at 02/12/24 1613    insulin NPH (Isophane) (HumuLIN N,NovoLIN N) injection 6 Units, 6 Units, subcutaneous, q AM, Annette Mcgrath PA-C    latanoprost (Xalatan) 0.005 % ophthalmic solution 1 drop, 1 drop, Both Eyes, Nightly, John Magallon PA-C, 1 drop at 02/11/24 2231    melatonin tablet 5 mg, 5 mg, oral, Nightly, Warren Mayberry PA-C, 5 mg at 02/12/24 2035    metFORMIN (Glucophage) tablet 1,000 mg, 1,000 mg, oral, BID with meals, Xavi Elam DO, 1,000 mg at 02/12/24 1657    methotrexate (Trexall) tablet 15 mg, 15 mg, oral, Weekly, John Magallon PA-C, 15 mg at 02/09/24 1904    naloxone (Narcan) injection 0.2 mg, 0.2 mg, intravenous, q5 min PRN, Warren Mayberry PA-C    OLANZapine (ZyPREXA) tablet 20 mg, 20 mg, oral, Nightly, John Magallon PA-C, 20 mg at 02/12/24 2035    oxyCODONE (Roxicodone) immediate release tablet 2.5 mg, 2.5 mg, oral, q6h PRN, Warren Mayberry PA-C    oxyCODONE (Roxicodone) immediate release tablet 5 mg, 5 mg, oral, q6h PRN, Warren  KACEY Mayberry PA-C, 5 mg at 02/12/24 1752    oxygen (O2) therapy, , inhalation, Continuous - 02/gases, Warren Mayberry PA-C, Rate Verify at 02/11/24 0830    oxygen (O2) therapy, , inhalation, Continuous PRN - O2/gases, Warren Mayberry PA-C    polyethylene glycol (Glycolax, Miralax) packet 17 g, 17 g, oral, Daily, Warren Mayberry PA-C, 17 g at 02/12/24 0850    predniSONE (Deltasone) tablet 7.5 mg, 7.5 mg, oral, Daily, John Magallon PA-C, 7.5 mg at 02/12/24 0849    sennosides-docusate sodium (Mary-Colace) 8.6-50 mg per tablet 2 tablet, 2 tablet, oral, BID, Warren Mayberry PA-C, 2 tablet at 02/12/24 0849    SITagliptin phosphate (Januvia) tablet 100 mg, 100 mg, oral, Daily, Xavi Elam DO, 100 mg at 02/12/24 1657    sodium chloride (Ocean) 0.65 % nasal spray 1 spray, 1 spray, Each Nostril, 4x daily PRN, Xavi Elam DO, 1 spray at 02/11/24 2231    sulfur hexafluoride microsphr (Lumason) injection 24.28 mg, 2 mL, intravenous, Once in imaging, Xavi Elam DO    venlafaxine XR (Effexor-XR) 24 hr capsule 37.5 mg, 37.5 mg, oral, Daily, John Magallon PA-C, 37.5 mg at 02/12/24 0849    Relevant Results  Results for orders placed or performed during the hospital encounter of 02/08/24 (from the past 24 hour(s))   POCT GLUCOSE   Result Value Ref Range    POCT Glucose 306 (H) 74 - 99 mg/dL   ECG 12 lead   Result Value Ref Range    Ventricular Rate 106 BPM    Atrial Rate 106 BPM    NJ Interval 130 ms    QRS Duration 70 ms    QT Interval 316 ms    QTC Calculation(Bazett) 419 ms    P Axis 63 degrees    R Axis 18 degrees    T Axis 58 degrees    QRS Count 18 beats    Q Onset 220 ms    P Onset 155 ms    P Offset 205 ms    T Offset 378 ms    QTC Fredericia 381 ms   CBC   Result Value Ref Range    WBC 9.7 4.4 - 11.3 x10*3/uL    nRBC 0.0 0.0 - 0.0 /100 WBCs    RBC 3.43 (L) 4.00 - 5.20 x10*6/uL    Hemoglobin 10.7 (L) 12.0 - 16.0 g/dL    Hematocrit 32.6 (L) 36.0 - 46.0 %    MCV 95 80 - 100 fL    MCH 31.2 26.0 - 34.0 pg    MCHC 32.8 32.0  - 36.0 g/dL    RDW 16.5 (H) 11.5 - 14.5 %    Platelets 312 150 - 450 x10*3/uL   Comprehensive Metabolic Panel   Result Value Ref Range    Glucose 236 (H) 74 - 99 mg/dL    Sodium 126 (L) 136 - 145 mmol/L    Potassium 4.9 3.5 - 5.3 mmol/L    Chloride 90 (L) 98 - 107 mmol/L    Bicarbonate 29 21 - 32 mmol/L    Anion Gap 12 10 - 20 mmol/L    Urea Nitrogen 13 6 - 23 mg/dL    Creatinine 0.41 (L) 0.50 - 1.05 mg/dL    eGFR >90 >60 mL/min/1.73m*2    Calcium 9.3 8.6 - 10.6 mg/dL    Albumin 2.9 (L) 3.4 - 5.0 g/dL    Alkaline Phosphatase 76 33 - 136 U/L    Total Protein 5.3 (L) 6.4 - 8.2 g/dL    AST 43 (H) 9 - 39 U/L    Bilirubin, Total 0.5 0.0 - 1.2 mg/dL    ALT 43 7 - 45 U/L   Magnesium   Result Value Ref Range    Magnesium 1.57 (L) 1.60 - 2.40 mg/dL   POCT GLUCOSE   Result Value Ref Range    POCT Glucose 280 (H) 74 - 99 mg/dL   POCT GLUCOSE   Result Value Ref Range    POCT Glucose 182 (H) 74 - 99 mg/dL   POCT GLUCOSE   Result Value Ref Range    POCT Glucose 124 (H) 74 - 99 mg/dL     ECG 12 lead    Result Date: 2/12/2024  Sinus tachycardia Otherwise normal ECG When compared with ECG of 08-FEB-2024 13:10, No significant change was found Confirmed by Betito Beltran ([id number]) on 2/12/2024 4:35:37 PM    FL fluoro images no charge    Result Date: 2/9/2024  These images are not reportable by radiology and will not be interpreted by  Radiologists.    Transthoracic Echo (TTE) Limited    Result Date: 2/9/2024   [facility], [address] [place] [address]                Tel [phone] and Fax [phone] TRANSTHORACIC ECHOCARDIOGRAM REPORT  Patient Name:      ANYA CHAVEZ     Reading Physician:    [id number] Natanael Thompson MD Study Date:        2/9/2024             Ordering Provider:    [id number] HAMLET BECERRA MRN/PID:           [id number]             Fellow:               [id number] Emory Odom                                                                MD Accession#:        [id number]         Nurse: Date of Birth/Age: 1940 / 83 years Sonographer:          Sarah López                                                               Cardiac                                                               Sonographer Intern Gender:            F                    Additional Staff:     Jelani Mac RDCS, AMIE, CRISTOBAL JAMES Height:            154.94 cm            Admit Date:           2/8/2024 Weight:            67.59 kg             Admission Status:     Inpatient - STAT BSA:               1.67 m2              Encounter#:           [id number]                                         [facility] Blood Pressure: 125 /59 mmHg Study Type:    TRANSTHORACIC ECHO (TTE) LIMITED Diagnosis/ICD: Essential (primary) hypertension-I10; Cardiac murmur,                unspecified-R01.1 Indication:    Murmur, pre-op for humerus fracture CPT Code:      Echo Limited-82109; Color Doppler-12773; Doppler Limited-24347 Patient History: Pertinent History: HTN, HLD. Study Detail: The following Echo studies were performed: 2D, M-Mode, Doppler and               color flow.  PHYSICIAN INTERPRETATION: Left Ventricle: The left ventricular systolic function is normal, with an estimated ejection fraction of 60-65%. There are no regional wall motion abnormalities. The left ventricular cavity size is normal. Left ventricular diastolic filling was not assessed. Left Atrium: The left atrium is normal in size. Right Ventricle: The right ventricle is normal in size. There is normal right ventricular global systolic function. Right Atrium: The right atrium is normal in size. Aortic Valve: The aortic valve is trileaflet. There is mild aortic valve cusp calcification. There is evidence of mild aortic  valve stenosis. There is no evidence of aortic valve regurgitation. The peak instantaneous gradient of the aortic valve is 24.4 mmHg. The mean gradient of the aortic valve is 14.0 mmHg. Mitral Valve: The mitral valve is mildly thickened. There is mild mitral annular calcification. There is trace mitral valve regurgitation. Tricuspid Valve: The tricuspid valve is structurally normal. There is trace tricuspid regurgitation. The right ventricular systolic pressure is unable to be estimated. Pulmonic Valve: The pulmonic valve is not well visualized. There is trace pulmonic valve regurgitation. Pericardium: There is a trivial pericardial effusion. There is an anterior clear space. Aorta: The aortic root is normal. The aortic root appears normal in size and measures 2.90 cm. The Asc Ao is 2.80 cm. There is no dilatation of the ascending aorta. Systemic Veins: The inferior vena cava was not well visualized. In comparison to the previous echocardiogram(s): There are no prior studies on this patient for comparison purposes.  CONCLUSIONS:  1. Left ventricular systolic function is normal with a 60-65% estimated ejection fraction.  2. Mild aortic valve stenosis.  3. Normal aortic root. QUANTITATIVE DATA SUMMARY: 2D MEASUREMENTS:                    Normal Ranges: Ao Root d: 2.90 cm (2.0-3.7cm) IVSd:      0.70 cm (0.6-1.1cm) LVIDd:     1.10 cm (3.9-5.9cm) LA VOLUME:                              Normal Ranges: LA Vol A4C:        21.5 ml   (22+/-6mL/m2) LA Vol Index A4C:  12.9ml/m2 LA Area A4C:       12.0 cm2 LA Major Axis A4C: 5.7 cm RA VOLUME BY A/L METHOD:                      Normal Ranges: RA Area A4C: 8.0 cm2 AORTA MEASUREMENTS:                    Normal Ranges: Asc Ao, d: 2.80 cm (2.1-3.4cm) LV DIASTOLIC FUNCTION:                     Normal Ranges: MV Peak E: 1.22 m/s (0.7-1.2 m/s) MV Peak A: 1.41 m/s (0.42-0.7 m/s) E/A Ratio: 0.87     (1.0-2.2) MITRAL VALVE:                      Normal Ranges: MV Vmax:    1.42 m/s  (<=1.3m/s) MV peak P.1 mmHg (<5mmHg) MV mean P.0 mmHg (<2mmHg) MV DT:      210 msec (150-240msec) AORTIC VALVE:                                    Normal Ranges: AoV Vmax:                2.47 m/s  (<=1.7m/s) AoV Peak P.4 mmHg (<20mmHg) AoV Mean P.0 mmHg (1.7-11.5mmHg) LVOT Max Doug:            1.48 m/s  (<=1.1m/s) AoV VTI:                 41.80 cm  (18-25cm) LVOT VTI:                24.60 cm LVOT Diameter:           1.80 cm   (1.8-2.4cm) AoV Area, VTI:           1.50 cm2  (2.5-5.5cm2) AoV Area,Vmax:           1.52 cm2  (2.5-4.5cm2) AoV Dimensionless Index: 0.59  RIGHT VENTRICLE: RV Basal 3.10 cm RV Mid   1.90 cm RV Major 5.7 cm PULMONIC VALVE:                         Normal Ranges: PV Accel Time: 111 msec (>120ms) PV Max Doug:    1.0 m/s  (0.6-0.9m/s) PV Max PG:     3.7 mmHg  [id number] Natanael Thompson MD Electronically signed on 2024 at 12:37:35 PM  ** Final **     MR lumbar spine wo IV contrast    Result Date: 2024  Interpreted By:  Gerasymchuk, Myroslav,  and Bamfo Melissa STUDY: MR LUMBAR SPINE WO IV CONTRAST; MR THORACIC SPINE WO IV CONTRAST; 2024 8:42 pm   INDICATION: Signs/Symptoms:lumbar fx.   COMPARISON: CT cervical spine on 2023; CT lumbar spine on 2024; CT thoracic spine on 2024; lumbosacral spine radiograph on 2017   ACCESSION NUMBER(S): [id number]; [id number]   ORDERING CLINICIAN: RIVKA YOST   TECHNIQUE: Sagittal T1, T2, STIR and axial T2 and T1 weighted MR images of the thoracic and lumbar spine were obtained.   FINDINGS: THORACIC SPINE:   Thoracic vertebral alignment is maintained without significant spondylolisthesis.   Thoracic vertebral body heights are preserved without evidence of acute compression fracture. Mild deformity without associated edema is present along the superior endplate of T12, likely representing chronic injury.   No acute trauma to the osseous structures of the posterior elements of the thoracic spine  is present. No abnormal intra spinous distance widening or STIR hyperintense edema is identified.   Multilevel intervertebral disc desiccation with mild associated disc height loss is present in the thoracic spine.   No significant posterior disc contour abnormality is identified in the thoracic spine. No spinal canal stenosis is evident.   Visualized thoracic spinal cord does not demonstrate any acute abnormality. No intramedullary T2 signal changes are present.   STIR hyperintense edema is present in the cutaneous fat overlying the upper thoracic spine at the level of T2-T3, just to right of midline, suspected to represent changes of acute soft tissue trauma.   No fluid collections are identified.   LUMBAR SPINE:   There are 5 lumbar type non rib-bearing vertebral bodies with lowest well-formed intervertebral disc space labeled L5-S1.   Mild 1-2 mm retrolisthesis is present at the level of T12 on L1 in the L1 and L2.   Likely subacute compression deformity with up to 30% height loss is present within the L3 vertebral body, with minimal 1-2 mm posterior retropulsion of bony material into the spinal canal. The fracture line appears to extend into the pedicles bilaterally, worse on the left, and better seen on same day CT.   No additional areas of T2 hyperintense marrow edema are present in the lumbar spine.   Multilevel intervertebral disc desiccation is present without significant disc height loss.   T12-L1: No significant posterior disc contour abnormality or spinal canal stenosis is evident.   L1-L2: Mild posterior disc osteophyte complex slightly deforms the anterior subarachnoid space without spinal canal stenosis.   L2-L3: Mild disc osteophyte complex and hypertrophic facet changes somewhat efface the subarachnoid space without significant spinal canal stenosis. There is moderate to severe left-sided neural foraminal stenosis due to hematoma centered in the left psoas musculature, which measures a proximally  3.6 x 2.5 cm in size (series 12, image 8). There may be extension of some hemorrhagic products into the left neural foramen at L2-L3 and left lateral epidural space without significant spinal canal stenosis.   L3-L4: No significant posterior disc contour abnormality is present. No spinal canal or neural foraminal stenosis is evident. Mild bilateral neural foraminal stenosis is present due to endplate spurring and hypertrophic facet changes.   L4-L5: Some effacement of the anterior subarachnoid space is present by the spondylolisthesis without significant spinal canal stenosis. Mild bilateral neural foraminal narrowing is present due to endplate spurring and hypertrophic facet changes with spondylolisthesis.   S1: No significant spinal canal stenosis or neural foraminal narrowing is evident.   There is fatty atrophy of the paraspinal musculature.       1.  Likely subacute compression deformity of the L3 vertebral body with up to 40% height loss and 1-2 mm retropulsion posterior into the spinal canal. Although no high-grade spinal canal stenosis is present due to the compression fracture, there is a 3.6 x 2.5 cm hematoma centered in the left psoas musculature at the level of L2-L3, with extension into the adjacent left neural foramen and possibly left lateral epidural space, without significant spinal canal stenosis, but likely contributing to at least moderate if not severe left neural foraminal narrowing. 2. No additional acute compression fractures are identified in the thoracic or lumbar spine. 3. Multilevel degenerative changes of the lumbar spine as detailed above.   MACRO: None   Signed by: Fatuma Grier 2/9/2024 12:55 AM Dictation workstation:   [id number]    MR thoracic spine wo IV contrast    Result Date: 2/9/2024  Interpreted By:  Gerasymchuk, Myroslav,  and Bamfo Melissa STUDY: MR LUMBAR SPINE WO IV CONTRAST; MR THORACIC SPINE WO IV CONTRAST; 2/8/2024 8:42 pm   INDICATION: Signs/Symptoms:lumbar fx.    COMPARISON: CT cervical spine on 11/04/2023; CT lumbar spine on 02/08/2024; CT thoracic spine on 02/08/2024; lumbosacral spine radiograph on 05/19/2017   ACCESSION NUMBER(S): [id number]; [id number]   ORDERING CLINICIAN: RIVKA YOST   TECHNIQUE: Sagittal T1, T2, STIR and axial T2 and T1 weighted MR images of the thoracic and lumbar spine were obtained.   FINDINGS: THORACIC SPINE:   Thoracic vertebral alignment is maintained without significant spondylolisthesis.   Thoracic vertebral body heights are preserved without evidence of acute compression fracture. Mild deformity without associated edema is present along the superior endplate of T12, likely representing chronic injury.   No acute trauma to the osseous structures of the posterior elements of the thoracic spine is present. No abnormal intra spinous distance widening or STIR hyperintense edema is identified.   Multilevel intervertebral disc desiccation with mild associated disc height loss is present in the thoracic spine.   No significant posterior disc contour abnormality is identified in the thoracic spine. No spinal canal stenosis is evident.   Visualized thoracic spinal cord does not demonstrate any acute abnormality. No intramedullary T2 signal changes are present.   STIR hyperintense edema is present in the cutaneous fat overlying the upper thoracic spine at the level of T2-T3, just to right of midline, suspected to represent changes of acute soft tissue trauma.   No fluid collections are identified.   LUMBAR SPINE:   There are 5 lumbar type non rib-bearing vertebral bodies with lowest well-formed intervertebral disc space labeled L5-S1.   Mild 1-2 mm retrolisthesis is present at the level of T12 on L1 in the L1 and L2.   Likely subacute compression deformity with up to 30% height loss is present within the L3 vertebral body, with minimal 1-2 mm posterior retropulsion of bony material into the spinal canal. The fracture line appears to extend  into the pedicles bilaterally, worse on the left, and better seen on same day CT.   No additional areas of T2 hyperintense marrow edema are present in the lumbar spine.   Multilevel intervertebral disc desiccation is present without significant disc height loss.   T12-L1: No significant posterior disc contour abnormality or spinal canal stenosis is evident.   L1-L2: Mild posterior disc osteophyte complex slightly deforms the anterior subarachnoid space without spinal canal stenosis.   L2-L3: Mild disc osteophyte complex and hypertrophic facet changes somewhat efface the subarachnoid space without significant spinal canal stenosis. There is moderate to severe left-sided neural foraminal stenosis due to hematoma centered in the left psoas musculature, which measures a proximally 3.6 x 2.5 cm in size (series 12, image 8). There may be extension of some hemorrhagic products into the left neural foramen at L2-L3 and left lateral epidural space without significant spinal canal stenosis.   L3-L4: No significant posterior disc contour abnormality is present. No spinal canal or neural foraminal stenosis is evident. Mild bilateral neural foraminal stenosis is present due to endplate spurring and hypertrophic facet changes.   L4-L5: Some effacement of the anterior subarachnoid space is present by the spondylolisthesis without significant spinal canal stenosis. Mild bilateral neural foraminal narrowing is present due to endplate spurring and hypertrophic facet changes with spondylolisthesis.   S1: No significant spinal canal stenosis or neural foraminal narrowing is evident.   There is fatty atrophy of the paraspinal musculature.       1.  Likely subacute compression deformity of the L3 vertebral body with up to 40% height loss and 1-2 mm retropulsion posterior into the spinal canal. Although no high-grade spinal canal stenosis is present due to the compression fracture, there is a 3.6 x 2.5 cm hematoma centered in the left  psoas musculature at the level of L2-L3, with extension into the adjacent left neural foramen and possibly left lateral epidural space, without significant spinal canal stenosis, but likely contributing to at least moderate if not severe left neural foraminal narrowing. 2. No additional acute compression fractures are identified in the thoracic or lumbar spine. 3. Multilevel degenerative changes of the lumbar spine as detailed above.   MACRO: None   Signed by: Fatuma Grier 2/9/2024 12:55 AM Dictation workstation:   [id number]    ECG 12 lead    Result Date: 2/8/2024  Normal sinus rhythm Normal ECG When compared with ECG of 09-JUL-2021 11:29, PREVIOUS ECG IS PRESENT See ED provider note for full interpretation and clinical correlation Confirmed by Caleb Charlton ([id number]) on 2/8/2024 4:28:04 PM    CT humerus left wo IV contrast    Result Date: 2/8/2024  Interpreted By:  Ruben Vogel and Marshall Colin STUDY: CT HUMERUS LEFT WO IV CONTRAST;  2/8/2024 2:22 pm   INDICATION: Signs/Symptoms:include proximal humerus down through elbow.   COMPARISON: Same day radiographs of the left humerus/left elbow.   ACCESSION NUMBER(S): [id number]   ORDERING CLINICIAN: RIVKA YOST   TECHNIQUE: CT imaging of the  left was obtained  without administration of intravenous contrast medium. Coronal and sagittal reformatted images were performed. 3D reformatted images were created and reviewed.   FINDINGS: OSSEOUS STRUCTURES: There is severe osteopenia which limits evaluation of the osseous structures. Within these limitations, there is redemonstration of a comminuted supracondylar fracture of the left humerus with fracture lines extending into the joint space. There is moderate posterior displacement and foreshortening of the distal fragment. There is mild angulation of the fracture fragments as well. No acute fracture of the ulna or radius within limitations previously described.   Postoperative changes related to  fixation of a proximal humeral head/metadiaphysis fracture with orthopedic screws in the humeral head and humeral metadiaphysis. No evidence of hardware complication.   There is moderate osteoarthrosis of the glenohumeral joint with joint space narrowing and marginal osteophytes.   SOFT TISSUES: There is diffuse soft tissue edema and blood products surrounding the supracondylar fracture. Small volume elbow hemarthrosis.       1. Comminuted supracondylar fracture of the left humerus with fracture lines extending into the joint space. Moderate posterior displacement and foreshortening of the distal fracture fragment again noted. 2. No acute fracture of the ulna or radius, although evaluation is limited secondary to severe osteopenia. 3. Moderate to severe soft tissue edema and blood products surrounding the supracondylar fracture. Small volume elbow hemarthrosis.     I personally reviewed the images/study and I agree with the resident findings as stated. This study was interpreted at [place], [place].   MACRO: None   Signed by: Ruben Vogel 2/8/2024 2:57 PM Dictation workstation:   [id number]    XR humerus left    Result Date: 2/8/2024  Interpreted By:  Renny Ford and Ohs Zachary STUDY: XR ELBOW LEFT 1-2 VIEWS; XR HUMERUS LEFT; 2/8/2024 2:08 pm   INDICATION: Signs/Symptoms:need better lateral and traction view - page ortho when at XR; Signs/Symptoms:fx   COMPARISON: Left elbow radiograph 02/08/2024 at 8:09 a.m.   ACCESSION NUMBER(S): [id number]; [id number]   ORDERING CLINICIAN: RIVKA YOST   TECHNIQUE: Three views of the elbow provided.   FINDINGS: Elbow: There is a comminuted distal humerus fracture with dominant transverse supracondylar fracture line with 2.2 cm posterior displacement of the distal fragment and 1.3 cm foreshortening. Given differences in technique the alignment appears similar to the prior radiographs. Moderate elbow joint effusion.  Osteopenia or osteoporosis. Soft tissue swelling about the elbow. No articular involvement or dislocation.   Humerus: Internal fixation screws of the left humeral head and proximal diaphysis without evidence of hardware complication. Multiple calcifications within the soft tissues of the mid diaphyseal humeral soft tissue. Severe swelling about the elbow. Supracondylar fracture as described above.       Fracturing of the distal humerus as above which is felt to be similar to the prior radiographs.   I personally reviewed the images/study and I agree with the findings as stated by Dr. Tj Penaloza. This study was interpreted at [place], [place].   MACRO: None   Signed by: Renny Ford 2/8/2024 2:36 PM Dictation workstation:   [id number]    XR elbow left 1-2 views    Result Date: 2/8/2024  Interpreted By:  Renny Frod and Ohs Zachary STUDY: XR ELBOW LEFT 1-2 VIEWS; XR HUMERUS LEFT; 2/8/2024 2:08 pm   INDICATION: Signs/Symptoms:need better lateral and traction view - page ortho when at XR; Signs/Symptoms:fx   COMPARISON: Left elbow radiograph 02/08/2024 at 8:09 a.m.   ACCESSION NUMBER(S): [id number]; [id number]   ORDERING CLINICIAN: RIVKA YOST   TECHNIQUE: Three views of the elbow provided.   FINDINGS: Elbow: There is a comminuted distal humerus fracture with dominant transverse supracondylar fracture line with 2.2 cm posterior displacement of the distal fragment and 1.3 cm foreshortening. Given differences in technique the alignment appears similar to the prior radiographs. Moderate elbow joint effusion. Osteopenia or osteoporosis. Soft tissue swelling about the elbow. No articular involvement or dislocation.   Humerus: Internal fixation screws of the left humeral head and proximal diaphysis without evidence of hardware complication. Multiple calcifications within the soft tissues of the mid diaphyseal humeral soft tissue. Severe swelling about the  elbow. Supracondylar fracture as described above.       Fracturing of the distal humerus as above which is felt to be similar to the prior radiographs.   I personally reviewed the images/study and I agree with the findings as stated by Dr. Tj Penaloza. This study was interpreted at [place], [place].   MACRO: None   Signed by: Renny Ford 2/8/2024 2:36 PM Dictation workstation:   [id number]    XR chest 1 view    Result Date: 2/8/2024  Interpreted By:  Renny Ford and Ohs Zachary STUDY: XR CHEST 1 VIEW;  2/8/2024 2:08 pm   INDICATION: Signs/Symptoms:pre op.   COMPARISON: Chest radiograph 03/30/2023, CT chest/abdomen/pelvis 02/08/2024..   ACCESSION NUMBER(S): [id number]   ORDERING CLINICIAN: VARUN NEGRETE   FINDINGS: AP radiograph of the chest was provided.   Fixation screws overlie the left humeral head. Spinal fusion hardware overlying the cervical spine.   CARDIOMEDIASTINAL SILHOUETTE: Cardiomediastinal silhouette is normal in size and configuration.   LUNGS: Prominence of the interstitial lung markings. No pneumothorax, pleural effusion, or focal consolidation.   ABDOMEN: No remarkable upper abdominal findings.   BONES: Prior chronic mildly displaced right anterior rib fracture. Known spine fracturing is not well assessed.       As above without acute cardiopulmonary process evident.   I personally reviewed the images/study and I agree with the findings as stated by Dr. Tj Penaloza. This study was interpreted at [place], [place].   MACRO: None   Signed by: Renny Ford 2/8/2024 2:30 PM Dictation workstation:   [id number]    XR knee left 1-2 views    Result Date: 2/8/2024  Interpreted By:  Renny Ford, STUDY: Left knee dated  2/8/2024.   INDICATION: Signs/Symptoms:pain on secondary exam   COMPARISON: Radiographs dated 06/04/2021.   ACCESSION NUMBER(S): [id number]   ORDERING CLINICIAN: RIVKA  ADELSTEIN   TECHNIQUE: Two views of the left knee.   FINDINGS: The bones are demineralized. No fracture or dislocation is evident. No knee effusion is evident. There is severe medial femorotibial moderate lateral femorotibial patellofemoral degenerative change. Vascular calcifications are seen in the soft tissues. There is some irregular foci of mineralization in the distal anterior thigh which can be seen on the previous radiographs. This may represent heterotopic ossification. Reticular increased density is seen in the subcutaneous tissues likely related to lymphedema.       As above without osseous injury evident.   MACRO: None   Signed by: Renny Ford 2/8/2024 2:21 PM Dictation workstation:   [id number]    CT chest abdomen pelvis w IV contrast    Result Date: 2/8/2024  Interpreted By:  Catrachita Barragan, STUDY: CT CHEST ABDOMEN PELVIS W IV CONTRAST;  2/8/2024 11:04 am   INDICATION: Signs/Symptoms:trauma.   COMPARISON: None.   ACCESSION NUMBER(S): [id number]   ORDERING CLINICIAN: SONIA DOSS   TECHNIQUE: CT of the chest, abdomen, and pelvis was performed. Contiguous axial images were obtained at 3 mm slice thickness through the chest, abdomen and pelvis. Coronal and sagittal reconstructions at 3 mm slice thickness were performed. 100 mL Omnipaque 350   FINDINGS: CHEST:   LUNG/PLEURA/LARGE AIRWAYS: There is no infiltrate or pleural fluid.  There is no pulmonary nodule. There is bilateral predominantly bibasilar scarring   VESSELS: The thoracic vessels are unremarkable.   HEART: The heart is unremarkable.   MEDIASTINUM AND VIK: There is no hilar or mediastinal adenopathy. There is a large hiatal hernia with herniation of the entire stomach through the diaphragmatic hiatus.   CHEST WALL AND LOWER NECK: There is moderate compression of the superior endplate of T12 of indeterminate age. Status post ventral fusion lower cervical spine There are remote healed right anterior and posterior rib fractures. There  are remote healed left anterior and posterior rib fractures. There is a remote healed inferior sternal fracture There is a screw the left humeral head. There is no abnormality of the lower neck.   ABDOMEN:   LIVER: There is no hepatic mass.   BILE DUCTS: There is no intrahepatic, common hepatic or common bile ductal dilatation.   GALLBLADDER: The gallbladder is unremarkable.   PANCREAS: There is no abnormality of the pancreas.   SPLEEN: The spleen is unremarkable. There is no splenic mass. There is no splenomegaly   ADRENAL GLANDS: The adrenal glands are unremarkable.   KIDNEYS AND URETERS:. The kidneys function symmetrically. There is no renal mass. There is no intrarenal calculus or hydronephrosis.     PELVIS:   BLADDER: The bladder is unremarkable. There is a Goel catheter in a nondistended urinary bladder.   REPRODUCTIVE ORGANS: There is no abnormality of the reproductive organs.   BOWEL: There is no bowel wall thickening, dilatation or obstruction. There is large amount of stool throughout the colon. The appendix is normal.   VESSELS: The abdominal and pelvic vessels are unremarkable.   PERITONEUM/RETROPERITONEUM/LYMPH NODES: There is no retroperitoneal or pelvic adenopathy.  There is no ascites.   ABDOMINAL WALL: The abdominal wall is unremarkable.   BONES AND SOFT TISSUES: There is marked compression of the superior endplate and mild compression of the inferior endplate of L1 of indeterminate age. There is an acute comminuted fracture of L3 with discrete fracture lines. This is a burst fracture. There is moderate compression of the superior and inferior endplates. There is sclerosis. There is a remote fracture of the left symphysis pubis and left superior pubic ramus. There is a remote fracture of the left symphysis pubis Status post external fixation remote fracture left hip   There are punctate calcified buttock granulomas. There are punctate calcifications in the subcutaneous fat along the left lower  abdominal wall. This is from prior injection.       CHEST: 1.  No acute chest pathology. 2. Large hiatal hernia with complete herniation of the stomach through the diaphragmatic hiatus. 3. Moderate compression superior endplate T12 of indeterminate age. 4. Remote sternal fracture and bilateral remote healed rib fractures.   ABDOMEN AND PELVIS: 1.  Acute comminuted burst fracture L3 with discrete fracture lines. There is moderate compression of the superior and inferior endplates of L3. 2. Marked compression superior endplate and mild compression inferior endplate L1 of indeterminate age. 3. Remote healed fracture left symphysis pubis and superior pubic ramus.   MACRO: None   Signed by: Catrachita Barragan 2/8/2024 11:27 AM Dictation workstation:   [id number]    XR wrist left 3+ views    Result Date: 2/8/2024  Interpreted By:  Dorian Garcia, STUDY: XR WRIST LEFT 3+ VIEWS;  2/8/2024 8:09 am   INDICATION: Signs/Symptoms:pain post fall.   COMPARISON: None.   ACCESSION NUMBER(S): [id number]   ORDERING CLINICIAN: SONIA DOSS   TECHNIQUE: 3 radiographs of the left wrist were performed.   FINDINGS: The osseous structures are diffusely and severely demineralized.   There is chronic appearing deformity of the 4th and 5th metacarpal at the proximal diaphysis and deformity of the distal radial metaphysis compatible with sites of healed fracture.   There is multifocal osteoarthritis which is greater at the 1st CMC joint and triscaphe joint as well as the radiocarpal joint space. There is chronic appearing ulnar styloid fracture versus unfused ossification center. There is chondrocalcinosis of the TFCC and intracarpal ligaments.   There is no sign of acute fracture or dislocation. There is no bone destruction or aggressive periosteal reaction. No lytic or blastic lesion is detected.       Chronic deformities of the 4th and 5th metacarpal and distal radius compatible with sites of healed fracture.   Chondrocalcinosis.    Multifocal osteoarthritis.   Severe osteopenia.   No sign of acute fracture or dislocation. If there remains clinical concern for occult fracture, further workup with CT or MRI can be performed.   Signed by: Dorian Garcia 2/8/2024 9:26 AM Dictation workstation:   [id number]    XR elbow left 1-2 views    Result Date: 2/8/2024  Interpreted By:  Dorian Garcia, STUDY: XR ELBOW LEFT 1-2 VIEWS; XR FOREARM LEFT 2 VIEWS;  2/8/2024 8:09 am   INDICATION: Signs/Symptoms:pain post fall.   COMPARISON: None.   ACCESSION NUMBER(S): [id number]; [id number]   ORDERING CLINICIAN: SONIA DOSS   TECHNIQUE: 3 radiographs of the left elbow are combine with 2 radiographs of the left forearm.   FINDINGS: There is a displaced supracondylar fracture of the distal humerus. The anterior fragment is displaced anteriorly 1.7 cm. There is some overlapping of the fracture fragments. There is some medial angulation at the fracture site. There is lateral displacement measuring 1.2 cm.   The radiocapitellar and humeral ulnar joints appear to maintain their alignment.   The osseous structures are severely demineralized.   There is surrounding soft tissue swelling and joint effusion at the elbow.   There are osteoarthritic changes of the wrist and radiocarpal joint as well as chronic appearing deformity of the distal radius.       Displaced supracondylar fracture of the distal humerus. These findings are detailed above.   Severe osteopenia.   No dislocation.   Signed by: Dorian Garcia 2/8/2024 9:24 AM Dictation workstation:   [id number]    XR forearm left 2 views    Result Date: 2/8/2024  Interpreted By:  Dorian Garcia, STUDY: XR ELBOW LEFT 1-2 VIEWS; XR FOREARM LEFT 2 VIEWS;  2/8/2024 8:09 am   INDICATION: Signs/Symptoms:pain post fall.   COMPARISON: None.   ACCESSION NUMBER(S): [id number]; [id number]   ORDERING CLINICIAN: SONIA DOSS   TECHNIQUE: 3 radiographs of the left elbow are combine with 2  radiographs of the left forearm.   FINDINGS: There is a displaced supracondylar fracture of the distal humerus. The anterior fragment is displaced anteriorly 1.7 cm. There is some overlapping of the fracture fragments. There is some medial angulation at the fracture site. There is lateral displacement measuring 1.2 cm.   The radiocapitellar and humeral ulnar joints appear to maintain their alignment.   The osseous structures are severely demineralized.   There is surrounding soft tissue swelling and joint effusion at the elbow.   There are osteoarthritic changes of the wrist and radiocarpal joint as well as chronic appearing deformity of the distal radius.       Displaced supracondylar fracture of the distal humerus. These findings are detailed above.   Severe osteopenia.   No dislocation.   Signed by: Dorian Garcia 2/8/2024 9:24 AM Dictation workstation:   [id number]    CT thoracic spine wo IV contrast    Result Date: 2/8/2024  Interpreted By:  Dorian Garcia, STUDY: CT THORACIC SPINE WO IV CONTRAST;  2/8/2024 7:21 am   INDICATION: Signs/Symptoms:pain post fall with head injury.   COMPARISON: None.   ACCESSION NUMBER(S): [id number]   ORDERING CLINICIAN: SONIA DOSS   TECHNIQUE: Contiguous axial CT sections are performed through the thoracic spine is supplemented with coronal and sagittal reformatted images.   FINDINGS: There is mild dextro convexity of the thoracic spine. The thoracic vertebral body AP alignment is within normal limits. The facet joints align normally. There is multilevel hypertrophic facet arthrosis bilaterally.   There is multilevel ankylosis with bridging ossification anteriorly suspicious for ankylosing spondylitis.   The osseous structures are diffusely demineralized. There is mild compression deformity of the T12 vertebral body which is stable from a previous CT examination from 2019. The remaining thoracic vertebral body heights are maintained.   There are multilevel  discogenic degenerative changes of the thoracic spine more pronounced at the upper thoracic levels, greatest at T4-5 with disc space narrowing and marginal spurring. There is no evidence of acute thoracic spine fracture. There is no bone destruction or aggressive periosteal reaction. No lytic or blastic lesion is identified. There are multiple chronic appearing rib fractures/deformities more so on the right. There is old fracture/deformity of the mid to distal right clavicular diaphysis and medial right clavicle.   There is no CT evidence of central canal narrowing.   Incidental findings include a large hiatal hernia containing stomach and surrounding fat there is increased stool throughout the visualized colon the need for further workup should be determined clinically.   There are findings of cholelithiasis as well. There are patchy areas of faint ground-glass density in both lungs with scattered areas of septal thickening and fibrosis posteriorly greater on the right. These findings may be chronic there are poorly assessed on this examination.       Findings of ankylosing spondylitis.   Osteopenia.   No sign of acute thoracic spine fracture.   Chronic appearing mild compression deformity of the T12 vertebral body.   Multilevel discogenic degenerative changes of the thoracic spine more pronounced at the upper thoracic levels. There is multilevel hypertrophic facet arthrosis bilaterally.   No sign of central canal narrowing.   Incidental findings include large hiatal hernia, cholelithiasis, and interstitial and ground-glass density in both lungs, greater on the right. The lung findings may be chronic though are poorly assessed on this examination.   Signed by: Dorian Garcia 2/8/2024 9:21 AM Dictation workstation:   [id number]    CT lumbar spine wo IV contrast    Result Date: 2/8/2024  Interpreted By:  Dorian Garcia, STUDY: CT LUMBAR SPINE WO IV CONTRAST;  2/8/2024 7:21 am   INDICATION:  Signs/Symptoms:pain post fall with head injury. Low back pain.   ACCESSION NUMBER(S): [id number]   ORDERING CLINICIAN: SONIA DOSS   TECHNIQUE: Contiguous axial CT sections are performed through the lumbar spine is supplemented with coronal and sagittal reformatted images.   FINDINGS: For the purposes of communication or for to 5 lumbar type vertebra with the 1st non-rib vertebra labeled L1 and the 1st sacralized vertebra labeled S1. There is partial sacralization of L5 at the left transverse process.   There is mild to moderate compression deformity of T12 and moderate compression deformity of L1 which are likely stable from a previous CT thorax examination of 07/09/2021. There is moderate to severe compression deformity of L3 with ill-defined sclerosis. Phenomenon extends deep to the superior endplate. There is a vertical component of fracture extending to the superior and inferior endplates on the right. There is nondisplaced fractures traversing the right and left pedicle. These findings appear acute though there may be also be a subacute component given the sclerosis and productive cortical changes. There is some retropulsion of the posterior vertebral cortex at the superior endplate with moderate central canal narrowing. There is chronic appearing deformities of the right posterior 10th and 11th rib compatible with sites of healed fracture. The remainder of the lumbar spine is intact. There is old fracture/deformity of the left superior pubic ramus. Methylmethacrylate is partially visualized in the left femoral head.   There are findings of SI joint partial ankylosis and multilevel ankylosis at the lower thoracic spine. There is no bone destruction or aggressive periosteal reaction. No lytic or blastic lesion is identified.   There are mild to moderate multilevel discogenic degenerative changes of the lumbar spine with multilevel vacuum phenomenon and marginal osteophytes.   The T12-L1 disc space level  demonstrates mild bilateral facet arthrosis. There is mild bulging disc and marginal osteophyte complex with some flattening of the anterior thecal sac and mild central canal narrowing. There is no significant central canal narrowing.   The L1-2 disc space level demonstrates mild to moderate bilateral facet arthrosis. There is marginal osteophyte and bulging disc with mild bilateral neural foraminal narrowing greater on the left. There is flattening of the anterior thecal sac with mild central canal narrowing.   The elbow 2 3 disc space level demonstrates moderate bilateral facet arthrosis greater on the left and ligamentum flavum hypertrophy. These findings contribute to moderate central canal narrowing. There is rounded scratch at there is lobular opacity on the left extending into the left neural foramen which measures approximately 4.8 x 3.2 cm in cross-section. There is some peripheral calcification laterally. This is of intermediate density, slightly hyperdense to adjacent muscle. There is some widening of the far lateral neural foramen. There is mild narrowing of the right neural foramen.   The L3-4 disc space level demonstrates moderate to severe bilateral facet arthrosis and ligamentum flavum hypertrophy. There is bulging disc and marginal osteophyte with mild to moderate central canal narrowing. There is disc and osteophyte encroachment with mild-to-moderate bilateral neural foraminal narrowing.   The L4-5 disc space level demonstrates severe bilateral facet arthrosis and ligamentum flavum hypertrophy. There is mild bulging disc with flattening of the anterior thecal sac and mild to moderate central canal narrowing. There is disc and osteophyte encroachment with mild to moderate narrowing of the left neural foramen and right neural foramen.   The L5-S1 disc space level demonstrates mild bilateral facet arthrosis. There is no central canal narrowing. There is mild to moderate narrowing of the right neural  foramen.   The urinary bladder is distended. There are multiple calcified gallstones along the dependent wall the gallbladder. There is a small hiatal hernia. There is increased stool in the visualized colon.       Compression fracture of the L3 vertebral body with burst component. There is diffuse sclerosis suggesting both subacute and acute components. There is retropulsion of the posterior vertebral cortex at the superior endplate with moderate central canal narrowing at this level.   Mild-to-moderate compression deformities of T12 and L1 are stable from a previous CT examination from 2019.   Findings of ankylosing spondylitis at the sacroiliac joints and lower thoracic spine.   Osteopenia.   Multilevel discogenic degenerative changes of the lumbar spine. There is multilevel bulging disc with facet and uncovertebral arthrosis. There is mild to moderate multilevel central canal narrowing as detailed above.   4.8 x 3.2 cm intermediate density mass at the L2-3 neural foramen on the left extending laterally. Soft tissue mass such as neurogenic tumor this is of primary concern. Hematoma is less likely. This can be further evaluated with follow-up MRI with gadolinium.   Incidental findings include diffuse bladder distention, cholelithiasis, small hiatal hernia.   Signed by: Dorian Garcia 2/8/2024 9:12 AM Dictation workstation:   [id number]    CT cervical spine wo IV contrast    Result Date: 2/8/2024  Interpreted By:  Dorian Garcia, STUDY: CT CERVICAL SPINE WO IV CONTRAST;  2/8/2024 7:18 am   INDICATION: Signs/Symptoms:pain post fall with head injury.   COMPARISON: 11/04/2023   ACCESSION NUMBER(S): [id number]   ORDERING CLINICIAN: SONIA DOSS   TECHNIQUE: Contiguous axial CT sections are performed from the skullbase to the upper thoracic spine and supplemented with coronal and sagittal reformatted images.   The study is mildly limited by motion degradation and dental artifact.   FINDINGS:  Postoperative changes are again noted with anterior and interbody fusion at C6-7. The hardware is intact and unchanged. There is no lucency at the metal/bone interface.   There is levo convexity of the cervical spine. There is mild straightening of the cervical lordosis. There is anterior subluxation of C5 relative to C4 measuring 4 mm. There is anterior subluxation of C3 and C4 measuring 2 mm. This appearance is unchanged from 11/04/2023. The facet joints align normally.   The osseous structures are diffusely demineralized. The cervical vertebral body heights are maintained. The ring of C1 is intact. There is no sign of acute cervical spine fracture. There is no bone destruction or aggressive periosteal reaction. No lytic or blastic lesion is identified. There is chronic appearing fracture/deformity of the medial right clavicle which is unchanged. There is relative widening of the sternoclavicular joint on the right which is also stable.   There is multilevel cervical spondylosis which is severe at C5-6. There is severe joint space narrowing and vacuum phenomenon at this level. There is endplate sclerosis and irregularity and small marginal osteophytes.   The C1-2 relationship is within normal limits. There is severe arthrosis anteriorly. The C2-3 disc space level reveals mild bulging disc with mass effect upon the ventral subarachnoid space. There is no significant central canal stenosis. There is mild narrowing of the right neural foramen.   The C3-4 disc space level demonstrates severe facet arthrosis on the right and moderate to severe facet arthrosis on the left. There is mild bilateral neural foraminal narrowing. There is bulging disc with mass effect upon the ventral subarachnoid space.   The C4-5 disc space level demonstrates severe facet arthrosis on the right with mild bulging disc. There is mass effect upon the ventral subarachnoid space. There is no significant central canal or neural foraminal  stenosis.   The C5-6 disc space level demonstrates severe facet arthrosis on the right and mild facet arthrosis on the left. There is bulging disc and marginal osteophyte with uncovertebral arthrosis bilaterally. There is moderate narrowing of the left neural foramen and narrowing of the right neural foramen. There is mass effect upon the ventral subarachnoid space and anterior surface of the spinal cord.   The C6-7 disc space level demonstrates some persistent anterior subluxation of C5. There is moderate facet arthrosis on the right and mild facet arthrosis on the left. There is uncovertebral arthrosis with at least mild bilateral neural foraminal narrowing. There is ridging osteophyte asymmetric to the left with mass effect upon the ventral thecal sac and anterior surface of the spinal cord.   The C7-T1 disc space level demonstrates mild facet arthrosis bilaterally. There is no significant central canal or neural foraminal stenosis.   There is faint ground-glass density in the left lung apex and some thickening of the interlobular septa bilaterally. The surrounding soft tissues are unremarkable. There is no prevertebral soft tissue swelling or retropharyngeal air.       Status post anterior and interbody fusion at C6-7. There is no hardware failure.   Anterior subluxation of C5 and to a lesser extent C4 and C3 which is likely degenerative. There is levo convexity of the cervical spine and some straightening of the cervical lordosis. This appearance is unchanged from 11/04/2023.   Persistent cervical spondylosis which is severe at C5-6. There is multilevel hypertrophic facet arthrosis with bulging disc and marginal osteophyte complex. There is mild to moderate central canal narrowing at C5-6 and C6-7 which is unchanged. Effacement of the anterior surface of spinal cord at both levels.   No sign of acute fracture or subluxation.   Signed by: Dorian Garcia 2/8/2024 8:46 AM Dictation workstation:    [id number]    CT maxillofacial bones wo IV contrast    Result Date: 2/8/2024  Interpreted By:  Dorian Garcia, STUDY: CT FACIAL BONES WO IV CONTRAST;  2/8/2024 7:18 am   INDICATION: Signs/Symptoms:pain post fall with head injury.   COMPARISON: 07/14/2012   ACCESSION NUMBER(S): [id number]   ORDERING CLINICIAN: SONIA DOSS   TECHNIQUE: Contiguous axial CT sections are performed through the facial bones and orbits and supplemented with coronal and sagittal reformatted images.   Multiplanar 3D reformations of the facial bones and orbits are performed and reviewed on independent workstation.   FINDINGS: There is scalp hematoma overlying the frontal calvarium to left of midline. This extends between the orbits over the nasal bones.   There is a displaced and mildly comminuted fracture of the mid to distal right nasal bone. There is lobular opacity within the left nasal cavity extending posteriorly into the nasopharynx. This appearance could reflect hemorrhage or inflammatory density. Increased density surrounds the left inferior turbinate and partially surrounds the middle turbinate. There is questionable avulsion fracture at the maxillary process anteriorly at the midline.   The nasal septum is bowed to the right the ostiomeatal units remain patent bilaterally. The remaining visualized osseous structures are intact. The medial and inferior orbital walls are intact. The orbital contents are unremarkable. There is no intraorbital fluid collection or air density.   There is mild mucoperiosteal thickening of the ethmoid air cells. The paranasal sinuses and mastoid air cells are otherwise clear.   There are osteoarthritic changes of both temporomandibular joints with flattening and irregularity of the articular surfaces, greater on the right.       Comminuted and mildly displaced fracture of the right nasal bone.   Possible tiny avulsion fracture at the tip of the anterior maxillary process.   Lobular opacity  in the left nasal cavity primarily surrounding the inferior turbinate. This extends into the nasopharynx. This could be related to hematoma or could be on an inflammatory or post inflammatory basis. Further workup with direct visualization is recommended.   Scalp hematoma overlies the frontal calvarium and extends between the orbits and over the nasal bones. There is no underlying calvarial fracture.   The nasal septum is bowed to the right. The ostiomeatal units remain patent.   Signed by: Dorian Garcia 2/8/2024 8:37 AM Dictation workstation:   [id number]    CT 3D reconstruction    Result Date: 2/8/2024  Interpreted By:  Dorian Garcia, STUDY: CT FACIAL BONES WO IV CONTRAST;  2/8/2024 7:18 am   INDICATION: Signs/Symptoms:pain post fall with head injury.   COMPARISON: 07/14/2012   ACCESSION NUMBER(S): [id number]   ORDERING CLINICIAN: SONIA DOSS   TECHNIQUE: Contiguous axial CT sections are performed through the facial bones and orbits and supplemented with coronal and sagittal reformatted images.   Multiplanar 3D reformations of the facial bones and orbits are performed and reviewed on independent workstation.   FINDINGS: There is scalp hematoma overlying the frontal calvarium to left of midline. This extends between the orbits over the nasal bones.   There is a displaced and mildly comminuted fracture of the mid to distal right nasal bone. There is lobular opacity within the left nasal cavity extending posteriorly into the nasopharynx. This appearance could reflect hemorrhage or inflammatory density. Increased density surrounds the left inferior turbinate and partially surrounds the middle turbinate. There is questionable avulsion fracture at the maxillary process anteriorly at the midline.   The nasal septum is bowed to the right the ostiomeatal units remain patent bilaterally. The remaining visualized osseous structures are intact. The medial and inferior orbital walls are intact. The  orbital contents are unremarkable. There is no intraorbital fluid collection or air density.   There is mild mucoperiosteal thickening of the ethmoid air cells. The paranasal sinuses and mastoid air cells are otherwise clear.   There are osteoarthritic changes of both temporomandibular joints with flattening and irregularity of the articular surfaces, greater on the right.       Comminuted and mildly displaced fracture of the right nasal bone.   Possible tiny avulsion fracture at the tip of the anterior maxillary process.   Lobular opacity in the left nasal cavity primarily surrounding the inferior turbinate. This extends into the nasopharynx. This could be related to hematoma or could be on an inflammatory or post inflammatory basis. Further workup with direct visualization is recommended.   Scalp hematoma overlies the frontal calvarium and extends between the orbits and over the nasal bones. There is no underlying calvarial fracture.   The nasal septum is bowed to the right. The ostiomeatal units remain patent.   Signed by: Dorian Garcia 2/8/2024 8:37 AM Dictation workstation:   [id number]    CT head wo IV contrast    Result Date: 2/8/2024  Interpreted By:  Dorian Garcia, STUDY: CT HEAD WO IV CONTRAST;  2/8/2024 7:18 am   INDICATION: Signs/Symptoms:pain post fall with head injury.   COMPARISON: 11/04/2023   ACCESSION NUMBER(S): [id number]   ORDERING CLINICIAN: SONIA DOSS   TECHNIQUE: Contiguous unenhanced axial CT sections are performed from the skull base to the vertex.   FINDINGS: There is soft tissue hematoma overlying the frontal calvarium to the left of midline and extending between the orbits over the nasal bones. There is a mildly displaced fracture of the right nasal bone with overlying soft tissue swelling. The remaining osseous structures are intact. There is mild mucoperiosteal thickening of the ethmoid air cells. There is increased density in the left nasal cavity with lobular  opacity extending into the nasopharynx. The remaining visualized paranasal sinuses and mastoid air cells are clear.   There is moderate generalized parenchymal volume loss with symmetric enlargement of the cortical sulci and CSF spaces. There is diffuse hypoattenuation in the cerebral white matter bilaterally compatible with small-vessel ischemia. Hypodensity extends into the internal and external capsules anteriorly.   There is no sign of parenchymal hematoma or dense extra-axial fluid collection. There is no mass effect or midline shift. The gray matter/white-matter differentiation is preserved.       Scalp hematoma overlies the frontal calvarium to the left of midline and extends between the orbits over the nasal bones.   Displaced fracture of the mid right nasal bone.   Age-related parenchymal atrophy and diffuse small vessel ischemic changes of the cerebral white matter and basal ganglia.   No CT evidence of acute intracranial hemorrhage or mass effect.   Signed by: Dorian Garcia 2/8/2024 8:30 AM Dictation workstation:   [id number]    DXA-AXIAL SKELETON WITH VFA    Result Date: 1/17/2024  * * *Final Report* * * DATE OF EXAM: Jan 17 2024  9:29AM   SOB   0802  -  BD VFA WITH DXA - AXIAL SKELETON  / ACCESSION #  [id number] PROCEDURE REASON: multiple diagnoses      * * * * Physician Interpretation * * * *  EXAMINATION: DXA BONE DENSITOMETRY BD VFA WITH DXA - AXIAL SKELETON PATIENT DEMOGRAPHICS:  Age: 83 years, Gender: Female SCANNER INFORMATION: DXA Model: [facility] [employer] (S/N:  [id number]) Date Scanned:  1/17/2024 9:29 AM CLINICAL HISTORY:  DIAGNOSTIC   Dermatomyositis (HCC) Weakness Malaise and fatigue Malaise and fatigue. RISK FACTORS FOR OSTEOPOROSIS AND ASSOCIATED FRACTURES REPORTED BY THIS PATIENT: Please refer to Bone Health Questionnaire in the EMR CURRENT THERAPY: Please refer to Bone Health Questionnaire in the EMR TECHNICAL LIMITATIONS: spine scan not completed due to extensive  degenerative changes left hip fracture / surgery RESULTS: Right Femoral Neck: 0.517 g/cm2, T-score -3.7, Z-score -1.6 Right Total Hip: 0.551 g/cm2, T-score -3.6, Z-score -1.7 Left Forearm, Distal 1/3 of Radius: 0.575 g/cm2, T-score -3.4, Z-score -0.6 No comparison data - the patient has not had a previous bone density in the [facility] or the previous bone density was performed on a different DXA machine (new, updated model or different location) within the [facility]. VERTEBRAL FRACTURE ASSESSMENT Indication for VFA:   Physician ordered Levels visualized:  T4-L5 Results:  T12, L1 and L4 fractures consistent with x-rays 10/30/2023 Presence of a single vertebral fracture increases subsequent global fracture risk, multiple fractures significantly increase fracture risk. TRABECULAR BONE ASSESSMENT TBS not performed: not ordered    IMPRESSION: THE LOWEST T-SCORE IS -3.7  IN THE RIGHT HIP 1) DIAGNOSIS (based on BMD alone):  OSTEOPOROSIS - Caution: Medical conditions other than osteoporosis may cause low bone density, such as osteomalacia or renal osteodystrophy.  Clinical correlation is necessary. 2) FRACTURE RISK (based on BMD alone)  INCREASED - Caution: Fracture risk may be increased independent of BMD in patients with corticosteroid use, age greater than 65 years, or a history of prior fragility fracture.               - FRAX was not calculated: pt has had a prior hip fracture RECOMMENDATIONS: Follow-up in 2 years or as clinically indicated.  Patients that are taking corticosteroids, are transplant recipients or have hyperparathyroidism should have annual follow-up.  Follow-up scans should always be done on the same machine for accurate comparison. FOR MORE INFORMATION ABOUT DIAGNOSIS AND TREATMENT: [facility] for Osteoporosis and Metabolic Bone Disease:? www.ccf.org/arthritis/osteo National Osteoporosis Foundation:? www.nof.org International Society of  Clinical Densitometry www.iscd.org : findin Transcribe Date/Time: Jan 17 2024  9:51A Dictated by : JUDY HUBER MD This examination was interpreted and the report reviewed and electronically signed by: JUDY HUBER MD on Jan 17 2024  8:26PM  EST      DATA:  EKG: QTC  Anti-psychotics in 48 hours:  Opioids/Benzodiazepines in 48 hours:  Anticholinergics on board:  Restraints:  Indwelling catheters:  Last BM:  UO in 24 hours:  Activity in the past 24 hours:  Need for ambulatory devices:      Assessment/Plan     Jennifer Mancini is a 83 y.o. female on day 5 of admission. 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     Mechanical Fall  Patient reportedly fell out of bed while reaching for piece of candy on the floor  Denied CP, SOB, lightheadedness, dizziness, palpitations prior to event  Patient's baseline cognitive impairment iso schizoaffective disorder on mobility impairment requiring wheel chair and walker contributory  CTH without acute intracranial hemorrhage or mass effect  EKG with NSR  TTE 2/9 with LVEF 60-65%, mild aortic stenosis  mgmt of traumatic injuries per trauma and ortho as below    L3 burst fracture and nasal bone fracture s/p OR on 2/9 for ORIF L humerus  Hx osteoporosis  DEXA scan 1/17/24 with lowest T score -3.7 in the right hip and T12, L1 and L4 fractures consistent with x-rays 10/30/2023   Vit D of 52.8 4/2023  ionized Ca 1.11  start home calcium Vit D supplementation  recommend outpatient follow up with rheum or endo to discuss starting denosumab / bisphosphonates  post op pain mgmt with:  c/w tylenol 975 tid  small doses of opiods as needed for pain- oxycodone  2.5mg prn  q6h with holding parameters for lethargy or respiratory depression  lidocaine patches     Schizoaffective disorder  moderate severe dementia iso Hx sundowning  High Risk for delirium  Most recent Qtc 402  TSH elevated 4.34 (2/24), t4 0.87 (2/24), b12 991 (2/24),  folate 11.6 (2/24)  B12, folate on patient home med list  c/w home olanzapine 20mg nightly  c/w home haldol 0.5mg daily  c/w home venlafaxine 37.5mg daily  Please consider the following general measures for minimizing delirium in a hospitalized patient: -  Bright lights during the day, keeps blinds up, switch all lights on   avoid disturbances at night. Encourage at least 6 hours uninterrupted sleep. Consider d/c 4am vitals check  avoid benzodiazepines, sedatives. Minimize opioids   avoid anti-cholinergics    avoid restraints. D/c [place] if possible.   if requiring no to little insulin coverage, d/c sliding scale  use low dose haldol 0.5mg PO (IM if PO not possible) only PRN severe agitation where pt exhibits volatile behavior and is a threat to self or others. EKGs to monitor QTc   daily orientation to time and place by the staff   out of bed to chair few hours everyday  encourage stimulating activities during the day if possible  watch for acute urinary retention or constipation which could contribute to delirium    7. New Hyponatremia  :: 132>126 from 2/11 to 2/12  :: I&O net negative 2L   - recommend evening RFP, if no normalization recommend serum osm, urine osm, UA, urine lytes        8. HTN  hold home losartan 50mg BID  permissive HTN in geriatric patients with baseline cognitive impairment, especially post-op, can use prn metoprolol IV 5mg for SBP >170-- patient has not required anti-htn or prns this admission    9. HLD  10. CAD  Hx MI  c/w home asa  c/w home atorvastatin 80 at bedtime    11. Dermatomyositis   c/w home prednisone 7.5mg daily  c/w home famotidine 40mg daily  c/w home methotrexate 15mg on Fridays    12. DM  most recent A1c  8.2 (2/24)  mild SSI inpatient  Endo consulted for home going DM recommendations: januvia 100mg daily, continue metformin 1000mg BID, start 6 units NPH daily and timed with doses of prednisone. SSI #2 with meals.  13. WEN  nightly CPAP  14. Constipation iso opiod use  Risk of constipation iso opiod use  Miralax daily scheduled  C/w miriam-colace BID prn     Medication Evaluation:   High risk medications: methotrexate, patient on anticholinergics including haldol and olanzapine at home and continued inpatient.  Please limit use of additional anti-cholinergics,   Recommend serial EKGs to assess QTC if prn haldol or other Qtc prolonging medication required for agitation  Other concerning issues regarding medications:   please start home calcium vit D, patient has a history of osteoporosis, is presenting with multiple fractures, and this is a home med.  Please arrange outpatient rheum or endo follow up for initiation of bisphosphonates/denosumab for further osteoporosis mgmt       Care Transitions:  -Recommended level for discharge: Pending PT/OT eval  -Home going considerations: discharge to facility  -Primary care physician: Ivy Ayala MD        Goals of Care:  -Primary goals: quality of life  -Health care power of : none, although daughter Ivette is Guardian   -Living will: none  Code status: Full        4M AGE-FRIENDLY INITIATIVE:   What matters most to patient: Family  Medications: Haldol, Zyprexa, Oxycodone  Mentation: Alert, calm, cooperative. Oriented x3.  Mobility: [facility] 11. Agree with PT/OT recommendation patient will benefit from skilled rehab         Geriatric medicine will continue to follow the patient. Thank you for allowing geriatric medicine to be involved in the care of your patient. Geriatric medicine consultation team is available during work hours Monday through Friday. For any emergency issues requiring immediate assistance over the weekend, please contact Epic chat.        lEvia SIDHU  MD Darren

## 2024-02-13 NOTE — PROGRESS NOTES
Jennifer Mancini is a 83 y.o. female on day 5 of admission presenting with Left supracondylar humerus fracture, closed, initial encounter.    Subjective   Patient was seen, examined and notified we discontinued her NPH dose.     She says she was concerned her glucose was higher during lunch and expressed concern for her diet. We discussed her diet is currently carb-controlled and she may continue to eat what she would like within her 60 carb limit per meal. Patient denies any symptoms of nausea, vomiting, or diarrhea.     I have reviewed histories, allergies and medications have been reviewed and there are no changes       Objective   Review of Systems   Constitutional:  Negative for appetite change, chills and fever.   HENT:  Positive for facial swelling. Negative for congestion, ear pain, rhinorrhea, sneezing, sore throat and trouble swallowing.         Noted ecchymosis    Eyes:  Negative for pain and itching.   Respiratory:  Negative for cough and wheezing.    Cardiovascular:  Negative for chest pain and leg swelling.   Gastrointestinal:  Negative for abdominal pain, diarrhea, nausea and vomiting.   Endocrine: Negative for polydipsia and polyuria.   Genitourinary:  Negative for difficulty urinating and enuresis.   Skin:  Negative for color change and rash.   Neurological:  Negative for speech difficulty, light-headedness and headaches.   Psychiatric/Behavioral:  Negative for agitation and behavioral problems. The patient is not hyperactive.      Physical Exam  Constitutional:       Appearance: Normal appearance.   HENT:      Head: Normocephalic.      Comments: Ecchymosis located mostly on left side of face and right cheek.     Nose: Signs of injury present.      Mouth/Throat:      Mouth: Mucous membranes are moist.   Eyes:      Extraocular Movements: Extraocular movements intact.      Conjunctiva/sclera:      Left eye: Hemorrhage present.      Comments: Ecchymosis around left eye   Cardiovascular:      Rate and  "Rhythm: Normal rate and regular rhythm.      Pulses: Normal pulses.      Heart sounds: Normal heart sounds.   Pulmonary:      Effort: Pulmonary effort is normal.      Breath sounds: Normal breath sounds.   Abdominal:      General: Bowel sounds are normal.      Palpations: Abdomen is soft.   Skin:     General: Skin is warm and dry.      Findings: Bruising present.   Neurological:      General: No focal deficit present.      Mental Status: She is alert.   Psychiatric:         Mood and Affect: Mood normal.         Behavior: Behavior normal.         Thought Content: Thought content normal.         Judgment: Judgment normal.         Last Recorded Vitals  Blood pressure 124/73, pulse 86, temperature 36.9 °C (98.4 °F), temperature source Temporal, resp. rate 17, height 1.549 m (5' 1\"), weight 68 kg (149 lb 14.6 oz), SpO2 95 %.  Intake/Output last 3 Shifts:  I/O last 3 completed shifts:  In: 960 (14.1 mL/kg) [P.O.:960]  Out: 5700 (83.8 mL/kg) [Urine:5700 (2.3 mL/kg/hr)]  Weight: 68 kg     Relevant Results  Results from last 7 days   Lab Units 02/13/24  1608 02/13/24  1137 02/13/24  0758 02/12/24  2122 02/12/24  1643 02/12/24  1546 02/11/24  1109 02/11/24  0827 02/10/24  0758 02/10/24  0728 02/10/24  0647 02/09/24  0754 02/09/24  0634 02/08/24  1257   POCT GLUCOSE mg/dL 197* 258* 124* 182* 280*  --    < >  --    < >  --    < >  --    < >  --    GLUCOSE mg/dL  --   --   --   --   --  236*  --  193*  --  226*  --  160*  --  156*    < > = values in this interval not displayed.     Scheduled medications  acetaminophen, 975 mg, oral, q8h  calcium carbonate-vitamin D3, 1 tablet, oral, Daily  clobetasol, 1 Application, Topical, BID  enoxaparin, 30 mg, subcutaneous, BID  famotidine, 40 mg, oral, Nightly  fluticasone, 1 spray, Each Nostril, Daily  guaiFENesin, 600 mg, oral, BID  haloperidol, 0.5 mg, oral, Daily  hydrocortisone, 1 Application, Topical, BID  insulin lispro, 0-10 Units, subcutaneous, TID AC  latanoprost, 1 drop, Both " Eyes, Nightly  magnesium oxide, 400 mg, oral, Daily  melatonin, 5 mg, oral, Nightly  metFORMIN, 1,000 mg, oral, BID with meals  methotrexate, 15 mg, oral, Weekly  OLANZapine, 20 mg, oral, Nightly  oxygen, , inhalation, Continuous - 02/gases  polyethylene glycol, 17 g, oral, BID  predniSONE, 7.5 mg, oral, Daily  sennosides-docusate sodium, 2 tablet, oral, BID  SITagliptin phosphate, 100 mg, oral, Daily  sodium chloride, 1 spray, Each Nostril, Daily  sulfur hexafluoride microsphr, 2 mL, intravenous, Once in imaging  venlafaxine XR, 37.5 mg, oral, Daily      Continuous medications  sodium chloride 0.9%, 125 mL/hr, Last Rate: 125 mL/hr (02/13/24 1213)      PRN medications  PRN medications: dextrose 10 % in water (D10W), dextrose, glucagon, guaiFENesin, naloxone, oxyCODONE, oxyCODONE, oxygen, sodium chloride     Assessment/Plan   Principal Problem:    Left supracondylar humerus fracture, closed, initial encounter  Active Problems:    Anemia    HTN (hypertension)    CAD (coronary artery disease)    MI (myocardial infarction) (CMS/HCC)    Murmur    Aortic stenosis    T2DM with steroid-induced hyperglycemia     History of Present Illness  Jennifer Mancini is a 83 y.o. 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     - Goal BG <180  - continue Januvia 100mg daily  - continue taking metformin 1000mg BID  - STOP 6u of NPH daily and timed with doses of prednisone  - continue lispro corrective scale #2 with meals   = 0u  151-200 = 2u  201-250 = 4u  251-300 = 6u  301-350 = 8u  351-400 = 10u     -Accuchecks (not BMP) TIDAC and QHS- kindly ensure QHS Accucheck is drawn; it is often missed   -Hypoglycemia protocol  -Diabetes Diet- low carb 60 CHO  -Will continue to follow and titrate insulin accordingly      Dispo: Patient can expect to  discharge on januvia and metformin regardless of her destination. She may require some modest insulin if going to a medically assisted living facility, but may not use insulin if leaving to independent housing.        Ramsey Mendoza

## 2024-02-13 NOTE — CARE PLAN
The patient's goals for the shift include      The clinical goals for the shift include pt will rate pain below 7/10      Problem: Pain  Goal: Takes deep breaths with improved pain control throughout the shift  Outcome: Progressing  Goal: Turns in bed with improved pain control throughout the shift  Outcome: Progressing  Goal: Walks with improved pain control throughout the shift  Outcome: Progressing  Goal: Performs ADL's with improved pain control throughout shift  Outcome: Progressing  Goal: Participates in PT with improved pain control throughout the shift  Outcome: Progressing  Goal: Free from opioid side effects throughout the shift  Outcome: Progressing  Goal: Free from acute confusion related to pain meds throughout the shift  Outcome: Progressing     Problem: Fall/Injury  Goal: Not fall by end of shift  Outcome: Progressing  Goal: Be free from injury by end of the shift  Outcome: Progressing  Goal: Verbalize understanding of personal risk factors for fall in the hospital  Outcome: Progressing  Goal: Verbalize understanding of risk factor reduction measures to prevent injury from fall in the home  Outcome: Progressing  Goal: Use assistive devices by end of the shift  Outcome: Progressing  Goal: Pace activities to prevent fatigue by end of the shift  Outcome: Progressing     Problem: Skin  Goal: Decreased wound size/increased tissue granulation at next dressing change  Outcome: Progressing  Goal: Participates in plan/prevention/treatment measures  Outcome: Progressing  Goal: Prevent/manage excess moisture  Outcome: Progressing  Goal: Prevent/minimize sheer/friction injuries  Outcome: Progressing  Goal: Promote/optimize nutrition  Outcome: Progressing  Goal: Promote skin healing  Outcome: Progressing

## 2024-02-13 NOTE — PROGRESS NOTES
[facility]  TRAUMA SERVICE - PROGRESS NOTE    Patient Name: Jennifer Mancini  MRN: [id number]  Admit Date: 208  : 1940  AGE: 83 y.o.   GENDER: female  ==============================================================================  MECHANISM OF INJURY:   Mechanical fall out of bed onto L side at assisted living facility    LOC (yes/no?): No  Anticoagulant / Anti-platelet Rx? (for what dx?): No  Referring Facility Name (N/A for scene EMR run):  [place]    INJURIES:   - L3 burst fracture, comminuted. 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   - Follow-up outpatient with Dr. Quick.   - No bracing required    ## Left humerus fx  -  underwent ORIF.   - NWB LUE in splint, will remain until follow-up appointment    ## Nasal bone fracture  - No acute intervention at this time  - follow up in OMFS clinic in 2 weeks     ## comorbids  - Geriatrics team consulted, appreciate recommendations  - pharmacy med rec team involved, restart home steroid, methotrexate, H2 blocker, evening haldol/zyprexa for now  - PT/OT     ## DM  -  Restarted home Metformin. Consulted Diabetes team for post-discharge management. A1c >8.   - Started Januvia 100 mg  daily,  - Continue taking metformin 1000mg BID  - Continue 6u of NPH with today's prednisone, then stop NPH after today's dose (dexamethasone 10mg given on 2/9 is likely to clear in the next 24-48hr)  - continue lispro corrective scale #2 with meals    # Other  - Geriatrics following  - TSH, T4, B12, Folate  - Started Calcium/vit d supplementation  - Miralax BID scheduled    Dispo: Patient lives in an assisted living facility that does not have a SNF affiliated with it. In process of finding an accepting facility.    Patient discussed with attending, Dr. Melinda Elam DO  Department of Surgery / IR Integrated PGY1  Trauma [id number]  ==============================================================================  CHIEF COMPLAINT / OVERNIGHT EVENTS:   No acute events overnight. Resting comfortably on exam this AM.     MEDICAL HISTORY / ROS:  Admission history and ROS reviewed. Pertinent changes as follows:  N/A    PHYSICAL EXAM:  Heart Rate:  []   Temp:  [36 °C (96.8 °F)-37.1 °C (98.8 °F)]   Resp:  [17-18]   BP: (119-153)/(74-85)   SpO2:  [91 %-100 %]       GENERAL APPEARANCE:  AxOx4, generally well-appearing no acute distress.  HEENT: Scattered facial bruising, small cephalohematoma between eyebrows. Oral mucosa and tongue without lacerations, teeth in place but multiple chronically missing. Bilateral, erythematous heliotrope rash surrounding eyes  HEART:  Normal rate and regular rhythm  LUNGS:  Equal chest rise and fall, non-labored breathing  ABDOMEN:  Soft, nontender, nondistended  EXTREMITIES:  LAS in place. Tender to palpation  NEUROLOGICAL:  Grossly nonfocal. Alert and oriented, moving all 4 extremities.   Skin:  Warm and dry       LABS:  Results for orders placed or performed during the hospital encounter of 02/08/24 (from the past 24 hour(s))   POCT GLUCOSE   Result Value Ref Range    POCT Glucose 159 (H) 74 - 99 mg/dL   POCT GLUCOSE   Result Value Ref Range    POCT Glucose 306 (H) 74 - 99 mg/dL    ECG 12 lead   Result Value Ref Range    Ventricular Rate 106 BPM    Atrial Rate 106 BPM    ID Interval 130 ms    QRS Duration 70 ms    QT Interval 316 ms    QTC Calculation(Bazett) 419 ms    P Axis 63 degrees    R Axis 18 degrees    T Axis 58 degrees    QRS Count 18 beats    Q Onset 220 ms    P Onset 155 ms    P Offset 205 ms    T Offset 378 ms    QTC Fredericia 381 ms   CBC   Result Value Ref Range    WBC 9.7 4.4 - 11.3 x10*3/uL    nRBC 0.0 0.0 - 0.0 /100 WBCs    RBC 3.43 (L) 4.00 - 5.20 x10*6/uL    Hemoglobin 10.7 (L) 12.0 - 16.0 g/dL    Hematocrit 32.6 (L) 36.0 - 46.0 %    MCV 95 80 - 100 fL    MCH 31.2 26.0 - 34.0 pg    MCHC 32.8 32.0 - 36.0 g/dL    RDW 16.5 (H) 11.5 - 14.5 %    Platelets 312 150 - 450 x10*3/uL   Comprehensive Metabolic Panel   Result Value Ref Range    Glucose 236 (H) 74 - 99 mg/dL    Sodium 126 (L) 136 - 145 mmol/L    Potassium 4.9 3.5 - 5.3 mmol/L    Chloride 90 (L) 98 - 107 mmol/L    Bicarbonate 29 21 - 32 mmol/L    Anion Gap 12 10 - 20 mmol/L    Urea Nitrogen 13 6 - 23 mg/dL    Creatinine 0.41 (L) 0.50 - 1.05 mg/dL    eGFR >90 >60 mL/min/1.73m*2    Calcium 9.3 8.6 - 10.6 mg/dL    Albumin 2.9 (L) 3.4 - 5.0 g/dL    Alkaline Phosphatase 76 33 - 136 U/L    Total Protein 5.3 (L) 6.4 - 8.2 g/dL    AST 43 (H) 9 - 39 U/L    Bilirubin, Total 0.5 0.0 - 1.2 mg/dL    ALT 43 7 - 45 U/L   Magnesium   Result Value Ref Range    Magnesium 1.57 (L) 1.60 - 2.40 mg/dL   POCT GLUCOSE   Result Value Ref Range    POCT Glucose 280 (H) 74 - 99 mg/dL   POCT GLUCOSE   Result Value Ref Range    POCT Glucose 182 (H) 74 - 99 mg/dL     MEDICATIONS:  Current Facility-Administered Medications   Medication Dose Route Frequency Provider Last Rate Last Admin    acetaminophen (Tylenol) tablet 975 mg  975 mg oral q8h Warren Mayberry PA-C   975 mg at 02/12/24 2036    clobetasol (Temovate) 0.05 % cream 1 Application  1 Application Topical BID Xavi Elam DO   1 Application at 02/12/24 2100    dextrose 10 % in water (D10W)  infusion  0.3 g/kg/hr intravenous Once PRN Warren Mayberry PA-C        dextrose 50 % injection 25 g  25 g intravenous q15 min PRN Warren Mayberry PA-C        enoxaparin (Lovenox) syringe 30 mg  30 mg subcutaneous BID Xavi Elam DO   30 mg at 02/12/24 2036    famotidine (Pepcid) tablet 40 mg  40 mg oral Nightly John Magallon PA-C   40 mg at 02/12/24 2035    fluticasone (Flonase) nasal spray 1 spray  1 spray Each Nostril Daily Xavi Elam DO   1 spray at 02/12/24 0855    glucagon (Glucagen) injection 1 mg  1 mg intramuscular q15 min PRN Warren Mayberry PA-C        guaiFENesin (Mucinex) 12 hr tablet 600 mg  600 mg oral BID Xavi Elam DO   600 mg at 02/12/24 2035    guaiFENesin (Robitussin) 100 mg/5 mL syrup 200 mg  200 mg oral q4h PRN Xavi Elam DO        haloperidol (Haldol) tablet 0.5 mg  0.5 mg oral Daily John Magallon PA-C   0.5 mg at 02/12/24 1051    hydrocortisone 2.5 % cream 1 Application  1 Application Topical BID Xavi Elam DO   1 Application at 02/12/24 2100    insulin lispro (HumaLOG) injection 0-10 Units  0-10 Units subcutaneous TID AC Annette Mcgrath PA-C   4 Units at 02/12/24 1613    insulin NPH (Isophane) (HumuLIN N,NovoLIN N) injection 6 Units  6 Units subcutaneous q AM Annette Mcgarth PA-C        latanoprost (Xalatan) 0.005 % ophthalmic solution 1 drop  1 drop Both Eyes Nightly John Magallon PA-C   1 drop at 02/11/24 2231    melatonin tablet 5 mg  5 mg oral Nightly Warren Mayberry PA-C   5 mg at 02/12/24 2035    metFORMIN (Glucophage) tablet 1,000 mg  1,000 mg oral BID with meals Xavi Elam DO   1,000 mg at 02/12/24 1657    methotrexate (Trexall) tablet 15 mg  15 mg oral Weekly John Magallon PA-C   15 mg at 02/09/24 1904    naloxone (Narcan) injection 0.2 mg  0.2 mg intravenous q5 min PRN Warren Mayberry PA-C        OLANZapine (ZyPREXA) tablet 20 mg  20 mg oral Nightly John Magallon PA-C   20 mg at 02/12/24 2035    oxyCODONE (Roxicodone) immediate release tablet 2.5  mg  2.5 mg oral q6h PRN Warren Mayberry PA-C        oxyCODONE (Roxicodone) immediate release tablet 5 mg  5 mg oral q6h PRN Warren Mayberry PA-C   5 mg at 02/12/24 1752    oxygen (O2) therapy   inhalation Continuous - 02/gases Warren Mayberry PA-C   Rate Verify at 02/11/24 0830    oxygen (O2) therapy   inhalation Continuous PRN - O2/gases Warren Mayberry PA-C        polyethylene glycol (Glycolax, Miralax) packet 17 g  17 g oral Daily Warren Mayberry PA-C   17 g at 02/12/24 0850    predniSONE (Deltasone) tablet 7.5 mg  7.5 mg oral Daily John Magallon PA-C   7.5 mg at 02/12/24 0849    sennosides-docusate sodium (Mary-Colace) 8.6-50 mg per tablet 2 tablet  2 tablet oral BID Warren Mayberry PA-C   2 tablet at 02/12/24 0849    SITagliptin phosphate (Januvia) tablet 100 mg  100 mg oral Daily Xavi Elam DO   100 mg at 02/12/24 1657    sodium chloride (Ocean) 0.65 % nasal spray 1 spray  1 spray Each Nostril 4x daily PRN Xavi Elam DO   1 spray at 02/11/24 2231    sulfur hexafluoride microsphr (Lumason) injection 24.28 mg  2 mL intravenous Once in imaging Xavi Elam DO        venlafaxine XR (Effexor-XR) 24 hr capsule 37.5 mg  37.5 mg oral Daily John Magallon PA-C   37.5 mg at 02/12/24 0849       IMAGING SUMMARY:  (summary of new imaging findings, not a copy of dictation)  CT Head/Face: nasal bone fracture on right, negative CTH acute injury  CT C-Spine: chronic degen changes, no acute injury  CT Chest/Abd/Pelvis: large hiatal hernia in left hemithorax, no acute injury  CT TL spine: L3 burst fracture, indeterminate age T12/L1 fractures  MR T/L 40% height loss L3 burst    I have reviewed all laboratory and imaging results ordered/pertinent for today's encounter.

## 2024-02-14 LAB
ANION GAP SERPL CALC-SCNC: 11 MMOL/L (ref 10–20)
BUN SERPL-MCNC: 13 MG/DL (ref 6–23)
CALCIUM SERPL-MCNC: 9.3 MG/DL (ref 8.6–10.6)
CHLORIDE SERPL-SCNC: 95 MMOL/L (ref 98–107)
CHLORIDE UR-SCNC: 57 MMOL/L
CHLORIDE/CREATININE (MMOL/G) IN URINE: 375 MMOL/G CREAT (ref 38–318)
CO2 SERPL-SCNC: 28 MMOL/L (ref 21–32)
CREAT SERPL-MCNC: 0.38 MG/DL (ref 0.5–1.05)
CREAT UR-MCNC: 15.2 MG/DL (ref 20–320)
EGFRCR SERPLBLD CKD-EPI 2021: >90 ML/MIN/1.73M*2
GLUCOSE BLD MANUAL STRIP-MCNC: 146 MG/DL (ref 74–99)
GLUCOSE BLD MANUAL STRIP-MCNC: 191 MG/DL (ref 74–99)
GLUCOSE BLD MANUAL STRIP-MCNC: 248 MG/DL (ref 74–99)
GLUCOSE SERPL-MCNC: 174 MG/DL (ref 74–99)
MAGNESIUM SERPL-MCNC: 1.35 MG/DL (ref 1.6–2.4)
OSMOLALITY UR: 274 MOSM/KG (ref 200–1200)
POTASSIUM SERPL-SCNC: 4.4 MMOL/L (ref 3.5–5.3)
POTASSIUM UR-SCNC: 32 MMOL/L
POTASSIUM/CREAT UR-RTO: 211 MMOL/G CREAT
SODIUM SERPL-SCNC: 130 MMOL/L (ref 136–145)
SODIUM UR-SCNC: 54 MMOL/L
SODIUM/CREAT UR-RTO: 355 MMOL/G CREAT

## 2024-02-14 PROCEDURE — 2500000004 HC RX 250 GENERAL PHARMACY W/ HCPCS (ALT 636 FOR OP/ED)

## 2024-02-14 PROCEDURE — 99233 SBSQ HOSP IP/OBS HIGH 50: CPT | Performed by: INTERNAL MEDICINE

## 2024-02-14 PROCEDURE — 82947 ASSAY GLUCOSE BLOOD QUANT: CPT

## 2024-02-14 PROCEDURE — 82436 ASSAY OF URINE CHLORIDE: CPT

## 2024-02-14 PROCEDURE — 36415 COLL VENOUS BLD VENIPUNCTURE: CPT

## 2024-02-14 PROCEDURE — 2500000004 HC RX 250 GENERAL PHARMACY W/ HCPCS (ALT 636 FOR OP/ED): Performed by: PHYSICIAN ASSISTANT

## 2024-02-14 PROCEDURE — 1200000002 HC GENERAL ROOM WITH TELEMETRY DAILY

## 2024-02-14 PROCEDURE — 2500000001 HC RX 250 WO HCPCS SELF ADMINISTERED DRUGS (ALT 637 FOR MEDICARE OP)

## 2024-02-14 PROCEDURE — 2500000002 HC RX 250 W HCPCS SELF ADMINISTERED DRUGS (ALT 637 FOR MEDICARE OP, ALT 636 FOR OP/ED)

## 2024-02-14 PROCEDURE — 99231 SBSQ HOSP IP/OBS SF/LOW 25: CPT | Performed by: SURGERY

## 2024-02-14 PROCEDURE — 2500000001 HC RX 250 WO HCPCS SELF ADMINISTERED DRUGS (ALT 637 FOR MEDICARE OP): Performed by: PHYSICIAN ASSISTANT

## 2024-02-14 PROCEDURE — 99232 SBSQ HOSP IP/OBS MODERATE 35: CPT

## 2024-02-14 PROCEDURE — 2500000002 HC RX 250 W HCPCS SELF ADMINISTERED DRUGS (ALT 637 FOR MEDICARE OP, ALT 636 FOR OP/ED): Performed by: PHYSICIAN ASSISTANT

## 2024-02-14 PROCEDURE — 83935 ASSAY OF URINE OSMOLALITY: CPT

## 2024-02-14 PROCEDURE — 80048 BASIC METABOLIC PNL TOTAL CA: CPT

## 2024-02-14 RX ORDER — DORZOLAMIDE HCL 20 MG/ML
1 SOLUTION/ DROPS OPHTHALMIC 3 TIMES DAILY
Status: DISCONTINUED | OUTPATIENT
Start: 2024-02-14 | End: 2024-02-16 | Stop reason: HOSPADM

## 2024-02-14 RX ADMIN — HYDROCORTISONE 1 APPLICATION: 25 CREAM TOPICAL at 08:57

## 2024-02-14 RX ADMIN — FLUTICASONE PROPIONATE 1 SPRAY: 50 SPRAY, METERED NASAL at 08:56

## 2024-02-14 RX ADMIN — INSULIN HUMAN 8 UNITS: 100 INJECTION, SUSPENSION SUBCUTANEOUS at 09:27

## 2024-02-14 RX ADMIN — PREDNISONE 7.5 MG: 5 TABLET ORAL at 09:00

## 2024-02-14 RX ADMIN — SODIUM CHLORIDE 125 ML/HR: 9 INJECTION, SOLUTION INTRAVENOUS at 03:25

## 2024-02-14 RX ADMIN — DORZOLAMIDE HYDROCHLORIDE 1 DROP: 20 SOLUTION/ DROPS OPHTHALMIC at 21:00

## 2024-02-14 RX ADMIN — SALINE NASAL SPRAY 1 SPRAY: 1.5 SOLUTION NASAL at 08:58

## 2024-02-14 RX ADMIN — Medication 400 MG: at 08:55

## 2024-02-14 RX ADMIN — METFORMIN HYDROCHLORIDE 1000 MG: 500 TABLET, FILM COATED ORAL at 08:55

## 2024-02-14 RX ADMIN — METFORMIN HYDROCHLORIDE 1000 MG: 500 TABLET, FILM COATED ORAL at 17:11

## 2024-02-14 RX ADMIN — GUAIFENESIN 600 MG: 600 TABLET ORAL at 08:55

## 2024-02-14 RX ADMIN — CLOBETASOL PROPIONATE 1 APPLICATION: 0.5 CREAM TOPICAL at 08:56

## 2024-02-14 RX ADMIN — SITAGLIPTIN 100 MG: 100 TABLET, FILM COATED ORAL at 08:58

## 2024-02-14 RX ADMIN — CLOBETASOL PROPIONATE 1 APPLICATION: 0.5 CREAM TOPICAL at 21:00

## 2024-02-14 RX ADMIN — FAMOTIDINE 40 MG: 20 TABLET, FILM COATED ORAL at 21:13

## 2024-02-14 RX ADMIN — ENOXAPARIN SODIUM 30 MG: 100 INJECTION SUBCUTANEOUS at 21:12

## 2024-02-14 RX ADMIN — HYDROCORTISONE 1 APPLICATION: 25 CREAM TOPICAL at 21:00

## 2024-02-14 RX ADMIN — OLANZAPINE 20 MG: 5 TABLET, FILM COATED ORAL at 21:13

## 2024-02-14 RX ADMIN — SENNOSIDES AND DOCUSATE SODIUM 2 TABLET: 8.6; 5 TABLET ORAL at 08:54

## 2024-02-14 RX ADMIN — ACETAMINOPHEN 975 MG: 325 TABLET ORAL at 10:22

## 2024-02-14 RX ADMIN — Medication 1 TABLET: at 08:55

## 2024-02-14 RX ADMIN — DORZOLAMIDE HYDROCHLORIDE 1 DROP: 20 SOLUTION/ DROPS OPHTHALMIC at 15:33

## 2024-02-14 RX ADMIN — INSULIN LISPRO 4 UNITS: 100 INJECTION, SOLUTION INTRAVENOUS; SUBCUTANEOUS at 12:22

## 2024-02-14 RX ADMIN — ENOXAPARIN SODIUM 30 MG: 100 INJECTION SUBCUTANEOUS at 08:55

## 2024-02-14 RX ADMIN — INSULIN LISPRO 2 UNITS: 100 INJECTION, SOLUTION INTRAVENOUS; SUBCUTANEOUS at 17:08

## 2024-02-14 RX ADMIN — VENLAFAXINE HYDROCHLORIDE 37.5 MG: 37.5 CAPSULE, EXTENDED RELEASE ORAL at 08:55

## 2024-02-14 RX ADMIN — Medication 5 MG: at 21:13

## 2024-02-14 RX ADMIN — HALOPERIDOL 0.5 MG: 0.5 TABLET ORAL at 09:53

## 2024-02-14 RX ADMIN — ACETAMINOPHEN 975 MG: 325 TABLET ORAL at 21:13

## 2024-02-14 ASSESSMENT — ENCOUNTER SYMPTOMS
EYE ITCHING: 0
DIFFICULTY URINATING: 0
EYE PAIN: 0
WHEEZING: 0
TROUBLE SWALLOWING: 0
FEVER: 0
LIGHT-HEADEDNESS: 0
ABDOMINAL PAIN: 0
FACIAL SWELLING: 1
SPEECH DIFFICULTY: 0
DIARRHEA: 0
VOMITING: 0
POLYDIPSIA: 0
CHILLS: 0
COUGH: 0
HYPERACTIVE: 0
AGITATION: 0
NAUSEA: 0
COLOR CHANGE: 0
RHINORRHEA: 0
HEADACHES: 0
SORE THROAT: 0
APPETITE CHANGE: 0

## 2024-02-14 ASSESSMENT — PAIN SCALES - GENERAL: PAINLEVEL_OUTOF10: 0 - NO PAIN

## 2024-02-14 ASSESSMENT — PAIN SCALES - WONG BAKER: WONGBAKER_NUMERICALRESPONSE: NO HURT

## 2024-02-14 NOTE — PROGRESS NOTES
Subjective   Follow-up for acute fall/L3 burst fracture/nasal bone fracture/Left humeral fracture.    Patient was seen and examined.  No new complaints overnight.  Patient stated her pain is fairly controlled.       Objective     Current Facility-Administered Medications   Medication Dose Route Frequency Provider Last Rate Last Admin    acetaminophen (Tylenol) tablet 975 mg  975 mg oral q8h Warren Mayberry PA-C   975 mg at 02/13/24 2040    calcium carbonate-vitamin D3 500 mg-5 mcg (200 unit) per tablet 1 tablet  1 tablet oral Daily Xavi Elam DO   1 tablet at 02/14/24 0855    clobetasol (Temovate) 0.05 % cream 1 Application  1 Application Topical BID Xavi Elam DO   1 Application at 02/14/24 0856    dextrose 10 % in water (D10W) infusion  0.3 g/kg/hr intravenous Once PRN Warren Mayberry PA-C        dextrose 50 % injection 25 g  25 g intravenous q15 min PRN Warren Mayberry PA-C        enoxaparin (Lovenox) syringe 30 mg  30 mg subcutaneous BID Xavi Elam DO   30 mg at 02/14/24 0855    famotidine (Pepcid) tablet 40 mg  40 mg oral Nightly John Magallon PA-C   40 mg at 02/13/24 2040    fluticasone (Flonase) nasal spray 1 spray  1 spray Each Nostril Daily Xavi Elam DO   1 spray at 02/14/24 0856    glucagon (Glucagen) injection 1 mg  1 mg intramuscular q15 min PRN Warren Mayberry PA-C        guaiFENesin (Mucinex) 12 hr tablet 600 mg  600 mg oral BID Xavi Elam DO   600 mg at 02/14/24 0855    guaiFENesin (Robitussin) 100 mg/5 mL syrup 200 mg  200 mg oral q4h PRN Xavi Elam DO        haloperidol (Haldol) tablet 0.5 mg  0.5 mg oral Daily John Magallon PA-C   0.5 mg at 02/14/24 0953    hydrocortisone 2.5 % cream 1 Application  1 Application Topical BID Xavi Elam DO   1 Application at 02/14/24 0857    insulin lispro (HumaLOG) injection 0-10 Units  0-10 Units subcutaneous TID GLENROY Mcgrath PA-C   6 Units at 02/13/24 1206    insulin NPH (Isophane) (HumuLIN N,NovoLIN N) injection 8 Units  8  Units subcutaneous q24h Xavi Elam DO   8 Units at 02/14/24 0927    latanoprost (Xalatan) 0.005 % ophthalmic solution 1 drop  1 drop Both Eyes Nightly John Magallon PA-C   1 drop at 02/13/24 2054    magnesium oxide (Mag-Ox) tablet 400 mg  400 mg oral Daily Xavi Elam DO   400 mg at 02/14/24 0855    melatonin tablet 5 mg  5 mg oral Nightly Warren Mayberry PA-C   5 mg at 02/13/24 2040    metFORMIN (Glucophage) tablet 1,000 mg  1,000 mg oral BID with meals Xavi Elam DO   1,000 mg at 02/14/24 0855    methotrexate (Trexall) tablet 15 mg  15 mg oral Weekly John Magallon PA-C   15 mg at 02/09/24 1904    naloxone (Narcan) injection 0.2 mg  0.2 mg intravenous q5 min PRN Warren Mayberry PA-C        OLANZapine (ZyPREXA) tablet 20 mg  20 mg oral Nightly John Magallon PA-C   20 mg at 02/13/24 2040    oxyCODONE (Roxicodone) immediate release tablet 2.5 mg  2.5 mg oral q6h PRN Warren Mayberry PA-C        oxyCODONE (Roxicodone) immediate release tablet 5 mg  5 mg oral q6h PRN Warren Mayberry PA-C   5 mg at 02/13/24 1133    oxygen (O2) therapy   inhalation Continuous - 02/gases Warren Mayberry PA-C   Start at 02/13/24 0800    oxygen (O2) therapy   inhalation Continuous PRN - O2/gases Warren Mayberry PA-C        polyethylene glycol (Glycolax, Miralax) packet 17 g  17 g oral BID Xavi Elam DO        predniSONE (Deltasone) tablet 7.5 mg  7.5 mg oral Daily John Magallon PA-C   7.5 mg at 02/14/24 0900    sennosides-docusate sodium (Mary-Colace) 8.6-50 mg per tablet 2 tablet  2 tablet oral BID Warren Mayberry PA-C   2 tablet at 02/14/24 0854    SITagliptin phosphate (Januvia) tablet 100 mg  100 mg oral Daily Xavi Elam DO   100 mg at 02/14/24 0858    sodium chloride (Ocean) 0.65 % nasal spray 1 spray  1 spray Each Nostril 4x daily VERONICA Elam DO   1 spray at 02/13/24 1352    sodium chloride (Ocean) 0.65 % nasal spray 1 spray  1 spray Each Nostril Daily Lincoln Lawrence MD   1 spray at 02/14/24 0858     sodium chloride 0.9% infusion  125 mL/hr intravenous Continuous Xavi Elam  mL/hr at 02/14/24 0325 125 mL/hr at 02/14/24 0325    sulfur hexafluoride microsphr (Lumason) injection 24.28 mg  2 mL intravenous Once in imaging Xavi Elam DO        venlafaxine XR (Effexor-XR) 24 hr capsule 37.5 mg  37.5 mg oral Daily John Magallon PA-C   37.5 mg at 02/14/24 0855       Physical Exam  GEN: Alert, oriented to self, and place but not to time, not pale, not jaundiced, well hydrated, multiple bruises with on the forehead, around the eyes, healing  CVS: HS I +II, regular, no murmurs  RESP: Diminished air entry  ABD: Full, soft, BS present, nontender, no palpable organs,   EXT: No bilateral leg edema, left upper extremity in dressing, able to wiggle fingers    Confusion Assessment Method(CAM) for diagnosis of delirium:    1.  Acute onset or fluctuating course: absent/present: Absent  2.  Inattention: absent/present: Absent  3.  Disorganized thinking: absent/present: Absent  4.  Altered level of consciousness: absent/present: Absent  CAM: negative    AT Score For Assessment of Delirium and Cognitive Impairment:    Alertness: 0  Normal(fully alert,but not agitated, throughout assessment)=0  Mild sleepiness for <10 seconds after walking, then normal=0  Clearly abnormal=4  2.  AMT4: 1  No mistakes=0  One mistake=1  Two or more mistakes/untestable=2  3.  Attention: 0  Achieves seven months or more correctly=0  Starts but scores <7 months/ refuses to start=1  Untestable(cannot start because unwell, drowsy, inattentive)=2  4.  Acute: 0  No=0  Yes=4    Total Score: 1  4 or above: Possible delirium +/- cognitive impairment  1-3: Possible cognitive impairment  0: Delirium or severe cognitive impairment unlikely(but delirium still possible if (4) information incomplete)      Last Recorded Vitals      2/13/2024     9:27 AM 2/13/2024    12:09 PM 2/13/2024     4:00 PM 2/13/2024     7:55 PM 2/13/2024    11:43 PM 2/14/2024     3:54  AM 2/14/2024     8:53 AM   Vitals   Systolic  124 151 129 116 140 124   Diastolic  73 84 79 75 76 84   Heart Rate 115 86 95 104 89 94 97   Temp  36.9 °C (98.4 °F) 37 °C (98.6 °F) 36.8 °C (98.2 °F) 37 °C (98.6 °F) 36.9 °C (98.4 °F) 36.2 °C (97.2 °F)   Resp  17 16 17 17 17 17      Vitals:    02/09/24 1020   Weight: 68 kg (149 lb 14.6 oz)        Relevant Results  Lab Results   Component Value Date    TSH 4.34 (H) 02/10/2024    [id number] 991 (H) 02/10/2024    HGBA1C 8.2 (H) 02/08/2024     Results from last 7 days   Lab Units 02/13/24  1741 02/12/24  1546 02/11/24  0827 02/10/24  0728 02/09/24  0754 02/08/24  1257 02/08/24  0635   WBC AUTO x10*3/uL  --  9.7  --  8.7 9.4 10.0 8.5   HEMOGLOBIN g/dL  --  10.7*  --  10.0* 9.8* 10.4* 12.3   HEMATOCRIT %  --  32.6*  --  30.2* 30.6* 30.9* 37.4   ALT U/L  --  43  --  19  --  25  --    AST U/L  --  43*  --  25  --  25  --    SODIUM mmol/L 129* 126* 132* 131* 137 138 133*   POTASSIUM mmol/L 4.4 4.9 4.0 4.0 3.9 3.1* 3.5   CHLORIDE mmol/L 92* 90* 96* 96* 101 103 98   CREATININE mg/dL 0.44* 0.41* 0.35* 0.41* 0.39* 0.37* 0.47*   BUN mg/dL 16 13 7 8 8 6 8   CO2 mmol/L 29 29 30 28 29 29 27   INR   --   --   --   --   --  1.1 1.1   HEMOGLOBIN A1C %  --   --   --   --   --  8.2*  --           ECG 12 lead  Sinus tachycardia  Otherwise normal ECG  When compared with ECG of 08-FEB-2024 13:10,  No significant change was found  Confirmed by Betito Beltran ([id number]) on 2/12/2024 4:35:37 PM    DATA:  EKG: QTC  Encounter Date: 02/08/24   ECG 12 lead   Result Value    Ventricular Rate 106    Atrial Rate 106    IN Interval 130    QRS Duration 70    QT Interval 316    QTC Calculation(Bazett) 419    P Axis 63    R Axis 18    T Axis 58    QRS Count 18    Q Onset 220    P Onset 155    P Offset 205    T Offset 378    QTC Fredericia 381    Narrative    Sinus tachycardia  Otherwise normal ECG  When compared with ECG of 08-FEB-2024 13:10,  No significant change was found  Confirmed by Betito Beltran ([id number]) on  2/12/2024 4:35:37 PM      Anti-psychotics in 48 hours:Haldol, Zyprexa  Opioids/Benzodiazepines in 48 hours:Oxycodone 5mg  Anticholinergics on board:No  Restraints:No  Indwelling catheters:No  Last BM:2/13/24  UO in 24 hours:2150 mls  Activity in the past 24 hours: Out of bed, moved with therapy  Need for ambulatory devices: Wheeled walker          Assessment/Plan   Jennifer Mancini is a 83 y.o. female on day 6 of admission presenting with Left supracondylar humerus fracture, closed, initial encounter.    Principal Problem:    Left supracondylar humerus fracture, closed, initial encounter  Active Problems:    Anemia    HTN (hypertension)    CAD (coronary artery disease)    MI (myocardial infarction) (CMS/HCC)    Murmur    Aortic stenosis    Acute fall, patient fell out of bed was reaching for a piece of candy  Acute L3 burst fracture  Acute nasal bone fracture  Acute left humeral fracture status post ORIF on 2/9  Moderate to severe dementia  Osteoporosis  Schizoaffective disorder with  Acute hyponatremia, likely related to elevated blood glucose, serum osmolarity is 281, corrected sodium is 132  Hypertension  10.  Type 2 DM  11. Dermatomyositis  12. H/o hypomagenesemia    Plan:  Continue with current pain regimen-scheduled Tylenol and as needed oxycodone, Lidoderm patches  Patient is not needing much oxycodone  Encourage use of incentive spirometer  Get out of bed to chair couple of hours a day  Continue current home regimen for schizoaffective disorder-Zyprexa and Haldol  Decrease IV fluids from 125 mL an hour to 75 mls/hr, continue with strict I's and O's  Continue with blood glucose control per endocrinology - NPH,. Metformin, Jardiance  Repeat magnesium level  Outpatient follow-up for osteoporosis management  Continue on Calcium/Vitamin D    Medication Evaluation:  High risk medications: Haldol. Zyprexa, Venlafaxine, NPH    4M AGE-FRIENDLY INITIATIVE:  What matters most to patient: Family  Medications: Haldol,  Zyprexa, venlafaxine  Mentation: History of moderate to severe dementia, CAM negative,   Mobility: Moderate assist x 2,uses walker, work with PT and OT, Impact score of 12    Geriatric medicine will continue to follow the patient. Thank you for allowing geriatric medicine to be involved in the care of your patient. Geriatric medicine consultation team is available during work hours Monday through Friday. For any emergency issues requiring immediate assistance over the weekend, please page Geriatrics pager [phone]      Fannie Strickland MD

## 2024-02-14 NOTE — PROGRESS NOTES
Jennifer Mancini is a 83 y.o. female on day 6 of admission presenting with Left supracondylar humerus fracture, closed, initial encounter.    Transitional Care Coordination Progress Note:   Patient discussed during interdisciplinary rounds.   Team members present: TCC  Plan per Medical/Surgical team: Continuing to monitor labs   Discharge disposition: [facility]  Status: Inpatient   Payer: Medicare Advantage  Potential Barriers: NMR  ADOD: 2/16/24

## 2024-02-14 NOTE — CARE PLAN
The patient's goals for the shift include adequate rest    The clinical goals for the shift include pt will be free of injury

## 2024-02-14 NOTE — CARE PLAN
The patient's goals for the shift include      The clinical goals for the shift include pt will be free of injury

## 2024-02-14 NOTE — PROGRESS NOTES
Jennifer Mancini is a 83 y.o. female on day 6 of admission presenting with Left supracondylar humerus fracture, closed, initial encounter.    Subjective   Patient was seen, examined and notified of NPH dose increase with steroids. Explained steroid induced hyperglycemia to pt, she states appetite is poor.      I have reviewed histories, allergies and medications have been reviewed and there are no changes       Objective   Review of Systems   Constitutional:  Negative for appetite change, chills and fever.   HENT:  Positive for facial swelling. Negative for congestion, ear pain, rhinorrhea, sneezing, sore throat and trouble swallowing.         Noted ecchymosis    Eyes:  Negative for pain and itching.   Respiratory:  Negative for cough and wheezing.    Cardiovascular:  Negative for chest pain and leg swelling.   Gastrointestinal:  Negative for abdominal pain, diarrhea, nausea and vomiting.   Endocrine: Negative for polydipsia and polyuria.   Genitourinary:  Negative for difficulty urinating and enuresis.   Skin:  Negative for color change and rash.   Neurological:  Negative for speech difficulty, light-headedness and headaches.   Psychiatric/Behavioral:  Negative for agitation and behavioral problems. The patient is not hyperactive.      Physical Exam  Constitutional:       Appearance: Normal appearance.   HENT:      Head: Normocephalic.      Comments: Ecchymosis located mostly on left side of face and right cheek.     Nose: Signs of injury present.      Mouth/Throat:      Mouth: Mucous membranes are moist.   Eyes:      Extraocular Movements: Extraocular movements intact.      Conjunctiva/sclera:      Left eye: Hemorrhage present.      Comments: Ecchymosis around left eye   Cardiovascular:      Rate and Rhythm: Normal rate and regular rhythm.      Pulses: Normal pulses.      Heart sounds: Normal heart sounds.   Pulmonary:      Effort: Pulmonary effort is normal.      Breath sounds: Normal breath sounds.   Abdominal:  "     General: Bowel sounds are normal.      Palpations: Abdomen is soft.   Skin:     General: Skin is warm and dry.      Findings: Bruising present.   Neurological:      General: No focal deficit present.      Mental Status: She is alert.   Psychiatric:         Mood and Affect: Mood normal.         Behavior: Behavior normal.         Thought Content: Thought content normal.         Judgment: Judgment normal.         Last Recorded Vitals  Blood pressure 140/75, pulse (!) 114, temperature 37.7 °C (99.9 °F), temperature source Temporal, resp. rate 17, height 1.549 m (5' 1\"), weight 68 kg (149 lb 14.6 oz), SpO2 94 %.  Intake/Output last 3 Shifts:  I/O last 3 completed shifts:  In: 480 (7.1 mL/kg) [P.O.:480]  Out: 3350 (49.3 mL/kg) [Urine:3350 (1.4 mL/kg/hr)]  Weight: 68 kg     Relevant Results  Results from last 7 days   Lab Units 02/14/24  1217 02/14/24  0913 02/13/24  2055 02/13/24  1741 02/13/24  1608 02/13/24  1137 02/12/24  1643 02/12/24  1546 02/11/24  1109 02/11/24  0827 02/10/24  0758 02/10/24  0728 02/10/24  0647 02/09/24  0754   POCT GLUCOSE mg/dL 248* 146* 181*  --  197* 258*   < >  --    < >  --    < >  --    < >  --    GLUCOSE mg/dL  --   --   --  226*  --   --   --  236*  --  193*  --  226*  --  160*    < > = values in this interval not displayed.       Scheduled medications  acetaminophen, 975 mg, oral, q8h  calcium carbonate-vitamin D3, 1 tablet, oral, Daily  clobetasol, 1 Application, Topical, BID  dorzolamide, 1 drop, Both Eyes, TID  enoxaparin, 30 mg, subcutaneous, BID  famotidine, 40 mg, oral, Nightly  fluticasone, 1 spray, Each Nostril, Daily  haloperidol, 0.5 mg, oral, Daily  hydrocortisone, 1 Application, Topical, BID  insulin lispro, 0-10 Units, subcutaneous, TID AC  insulin NPH (Isophane), 8 Units, subcutaneous, q24h  latanoprost, 1 drop, Both Eyes, Nightly  magnesium oxide, 400 mg, oral, Daily  melatonin, 5 mg, oral, Nightly  metFORMIN, 1,000 mg, oral, BID with meals  methotrexate, 15 mg, oral, " Weekly  OLANZapine, 20 mg, oral, Nightly  oxygen, , inhalation, Continuous - 02/gases  polyethylene glycol, 17 g, oral, BID  predniSONE, 7.5 mg, oral, Daily  sennosides-docusate sodium, 2 tablet, oral, BID  SITagliptin phosphate, 100 mg, oral, Daily  sodium chloride, 1 spray, Each Nostril, Daily  venlafaxine XR, 37.5 mg, oral, Daily      Continuous medications  sodium chloride 0.9%, 75 mL/hr, Last Rate: 75 mL/hr (02/14/24 1128)      PRN medications  PRN medications: dextrose 10 % in water (D10W), dextrose, glucagon, guaiFENesin, naloxone, oxyCODONE, oxyCODONE, oxygen, sodium chloride     Assessment/Plan   Principal Problem:    Left supracondylar humerus fracture, closed, initial encounter  Active Problems:    Anemia    HTN (hypertension)    CAD (coronary artery disease)    MI (myocardial infarction) (CMS/HCC)    Murmur    Aortic stenosis    T2DM with steroid-induced hyperglycemia     History of Present Illness  Jennifer Mancini is a 83 y.o. 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     - Goal BG <180  - continue Januvia 100mg daily  - continue taking metformin 1000mg BID  - increase 8u of NPH daily and timed with doses of prednisone  - continue lispro corrective scale #2 with meals   = 0u  151-200 = 2u  201-250 = 4u  251-300 = 6u  301-350 = 8u  351-400 = 10u     -Accuchecks (not BMP) TIDAC and QHS- kindly ensure QHS Accucheck is drawn; it is often missed   -Hypoglycemia protocol  -Diabetes Diet- low carb 60 CHO  -Will continue to follow and titrate insulin accordingly      Dispo: Patient can expect to discharge on januvia and metformin regardless of her destination. She may require some modest insulin if going to a medically assisted living facility, but may not use insulin if leaving to independent housing.        Stephanie PEREYRA  SONI Mendoza

## 2024-02-14 NOTE — PROGRESS NOTES
[facility]  TRAUMA SERVICE - PROGRESS NOTE    Patient Name: Jennifer Mancini  MRN: [id number]  Admit Date: 208  : 1940  AGE: 83 y.o.   GENDER: female  ==============================================================================  MECHANISM OF INJURY:   Mechanical fall out of bed onto L side at assisted living facility    LOC (yes/no?): No  Anticoagulant / Anti-platelet Rx? (for what dx?): No  Referring Facility Name (N/A for scene EMR run):  [place]    INJURIES:   - L3 burst fracture, comminuted. 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   - Follow-up outpatient with Dr. Quick.   - No bracing required    ## Left humerus fx  -  underwent ORIF.   - NWB LUE in splint, will remain until follow-up appointment    ## Nasal bone fracture  - No acute intervention at this time  - follow up in OMFS clinic in 2 weeks     ## comorbids  - Geriatrics team consulted, appreciate recommendations  - pharmacy med rec team involved, restart home steroid, methotrexate, H2 blocker, evening haldol/zyprexa for now  - PT/OT     ## DM  -  Restarted home Metformin. Consulted Diabetes team for post-discharge management. A1c >8.   - Started Januvia 100 mg  daily,  - Continue taking metformin 1000mg BID  - 8u NPH ordered for today.  - continue lispro corrective scale #2 with meals    # Other  - Geriatrics following  - TSH, T4, B12, Folate  - Eye drops ordered, IS at bedside  - Started Calcium/vit d supplementation  - Miralax BID scheduled    Dispo: Patient lives in an assisted living facility that does not have a SNF affiliated with it. In process of finding an accepting facility.    Patient discussed with attending, Dr. Melinda Elam DO  Department of Surgery / IR Integrated PGY1  Trauma [id number]  ==============================================================================  CHIEF COMPLAINT / OVERNIGHT EVENTS:   No acute events overnight. Resting comfortably on exam this AM. Was able to sit in a chair yesterday for several hours.     MEDICAL HISTORY / ROS:  Admission history and ROS reviewed. Pertinent changes as follows:  N/A    PHYSICAL EXAM:  Heart Rate:  []   Temp:  [36.2 °C (97.2 °F)-37 °C (98.6 °F)]   Resp:  [16-17]   BP: (116-151)/(73-84)   SpO2:  [92 %-95 %]       GENERAL APPEARANCE:  AxOx4, generally well-appearing no acute distress.  HEENT: Scattered facial bruising, small cephalohematoma between eyebrows. Oral mucosa and tongue without lacerations, teeth in place but multiple chronically missing. Bilateral, erythematous heliotrope rash surrounding eyes  HEART:  Normal rate and regular rhythm  LUNGS:  Equal chest rise and fall, non-labored breathing  ABDOMEN:  Soft, nontender, nondistended  EXTREMITIES:  LAS in place. Tender to palpation  NEUROLOGICAL:  Grossly nonfocal. Alert and oriented, moving all 4 extremities.   Skin:  Warm and dry       LABS:  Results for orders placed or performed during the hospital encounter of 02/08/24 (from the past 24 hour(s))   POCT GLUCOSE   Result Value Ref Range    POCT Glucose 258 (H) 74 - 99 mg/dL   POCT GLUCOSE   Result Value Ref Range    POCT Glucose 197 (H) 74 - 99 mg/dL   Osmolality   Result Value Ref  Range    Osmolality, Serum 281 280 - 300 mOsm/kg   Basic metabolic panel   Result Value Ref Range    Glucose 226 (H) 74 - 99 mg/dL    Sodium 129 (L) 136 - 145 mmol/L    Potassium 4.4 3.5 - 5.3 mmol/L    Chloride 92 (L) 98 - 107 mmol/L    Bicarbonate 29 21 - 32 mmol/L    Anion Gap 12 10 - 20 mmol/L    Urea Nitrogen 16 6 - 23 mg/dL    Creatinine 0.44 (L) 0.50 - 1.05 mg/dL    eGFR >90 >60 mL/min/1.73m*2    Calcium 9.4 8.6 - 10.6 mg/dL   POCT GLUCOSE   Result Value Ref Range    POCT Glucose 181 (H) 74 - 99 mg/dL   POCT GLUCOSE   Result Value Ref Range    POCT Glucose 146 (H) 74 - 99 mg/dL     MEDICATIONS:  Current Facility-Administered Medications   Medication Dose Route Frequency Provider Last Rate Last Admin    acetaminophen (Tylenol) tablet 975 mg  975 mg oral q8h Warren Mayberry PA-C   975 mg at 02/14/24 1022    calcium carbonate-vitamin D3 500 mg-5 mcg (200 unit) per tablet 1 tablet  1 tablet oral Daily Xavi Elam DO   1 tablet at 02/14/24 0855    clobetasol (Temovate) 0.05 % cream 1 Application  1 Application Topical BID Xavi Elam DO   1 Application at 02/14/24 0856    dextrose 10 % in water (D10W) infusion  0.3 g/kg/hr intravenous Once PRN Warren Mayberry PA-C        dextrose 50 % injection 25 g  25 g intravenous q15 min PRN Warren Mayberry PA-C        enoxaparin (Lovenox) syringe 30 mg  30 mg subcutaneous BID Xavi Elam DO   30 mg at 02/14/24 0855    famotidine (Pepcid) tablet 40 mg  40 mg oral Nightly John Magallon PA-C   40 mg at 02/13/24 2040    fluticasone (Flonase) nasal spray 1 spray  1 spray Each Nostril Daily Xavi Elam DO   1 spray at 02/14/24 0856    glucagon (Glucagen) injection 1 mg  1 mg intramuscular q15 min PRN Warren Mayberry PA-C        guaiFENesin (Mucinex) 12 hr tablet 600 mg  600 mg oral BID Xavi Elam DO   600 mg at 02/14/24 0855    guaiFENesin (Robitussin) 100 mg/5 mL syrup 200 mg  200 mg oral q4h PRN Xavi Elam DO        haloperidol (Haldol) tablet 0.5 mg  0.5 mg oral  Daily John Magallon PA-C   0.5 mg at 02/14/24 0953    hydrocortisone 2.5 % cream 1 Application  1 Application Topical BID Xavi Elam DO   1 Application at 02/14/24 0857    insulin lispro (HumaLOG) injection 0-10 Units  0-10 Units subcutaneous TID GLENROY Mcgrath PA-C   6 Units at 02/13/24 1206    insulin NPH (Isophane) (HumuLIN N,NovoLIN N) injection 8 Units  8 Units subcutaneous q24h Xavi Elam DO   8 Units at 02/14/24 0927    latanoprost (Xalatan) 0.005 % ophthalmic solution 1 drop  1 drop Both Eyes Nightly John Magallon PA-C   1 drop at 02/13/24 2054    magnesium oxide (Mag-Ox) tablet 400 mg  400 mg oral Daily Xavi Elam DO   400 mg at 02/14/24 0855    melatonin tablet 5 mg  5 mg oral Nightly Warren Mayberry PA-C   5 mg at 02/13/24 2040    metFORMIN (Glucophage) tablet 1,000 mg  1,000 mg oral BID with meals Xavi Elam DO   1,000 mg at 02/14/24 0855    methotrexate (Trexall) tablet 15 mg  15 mg oral Weekly John Magallon PA-C   15 mg at 02/09/24 1904    naloxone (Narcan) injection 0.2 mg  0.2 mg intravenous q5 min PRN Warren Mayberry PA-C        OLANZapine (ZyPREXA) tablet 20 mg  20 mg oral Nightly John Magallon PA-C   20 mg at 02/13/24 2040    oxyCODONE (Roxicodone) immediate release tablet 2.5 mg  2.5 mg oral q6h PRN Warren Mayberry PA-C        oxyCODONE (Roxicodone) immediate release tablet 5 mg  5 mg oral q6h PRN Warren Mayberry PA-C   5 mg at 02/13/24 1133    oxygen (O2) therapy   inhalation Continuous - 02/gases Warren Mayberry PA-C   Start at 02/13/24 0800    oxygen (O2) therapy   inhalation Continuous PRN - O2/gases Warren Mayberry PA-C        polyethylene glycol (Glycolax, Miralax) packet 17 g  17 g oral BID Xavi Elam DO        predniSONE (Deltasone) tablet 7.5 mg  7.5 mg oral Daily John Magallon PA-C   7.5 mg at 02/14/24 0900    sennosides-docusate sodium (Mary-Colace) 8.6-50 mg per tablet 2 tablet  2 tablet oral BID Warren Mayberry PA-C   2 tablet at 02/14/24 0854     SITagliptin phosphate (Januvia) tablet 100 mg  100 mg oral Daily Xavi Elam DO   100 mg at 02/14/24 0858    sodium chloride (Ocean) 0.65 % nasal spray 1 spray  1 spray Each Nostril 4x daily PRN Xavi Elam DO   1 spray at 02/13/24 1352    sodium chloride (Ocean) 0.65 % nasal spray 1 spray  1 spray Each Nostril Daily Lincoln Lawrence MD   1 spray at 02/14/24 0858    sodium chloride 0.9% infusion  125 mL/hr intravenous Continuous Xavi Elam  mL/hr at 02/14/24 0325 125 mL/hr at 02/14/24 0325    sulfur hexafluoride microsphr (Lumason) injection 24.28 mg  2 mL intravenous Once in imaging Xavi Elam DO        venlafaxine XR (Effexor-XR) 24 hr capsule 37.5 mg  37.5 mg oral Daily John Magallon PA-C   37.5 mg at 02/14/24 0855       IMAGING SUMMARY:  (summary of new imaging findings, not a copy of dictation)  CT Head/Face: nasal bone fracture on right, negative CTH acute injury  CT C-Spine: chronic degen changes, no acute injury  CT Chest/Abd/Pelvis: large hiatal hernia in left hemithorax, no acute injury  CT TL spine: L3 burst fracture, indeterminate age T12/L1 fractures  MR T/L 40% height loss L3 burst    I have reviewed all laboratory and imaging results ordered/pertinent for today's encounter.

## 2024-02-15 LAB
ANION GAP SERPL CALC-SCNC: 11 MMOL/L (ref 10–20)
BUN SERPL-MCNC: 11 MG/DL (ref 6–23)
CALCIUM SERPL-MCNC: 9 MG/DL (ref 8.6–10.6)
CHLORIDE SERPL-SCNC: 98 MMOL/L (ref 98–107)
CO2 SERPL-SCNC: 27 MMOL/L (ref 21–32)
CREAT SERPL-MCNC: 0.37 MG/DL (ref 0.5–1.05)
EGFRCR SERPLBLD CKD-EPI 2021: >90 ML/MIN/1.73M*2
GLUCOSE BLD MANUAL STRIP-MCNC: 127 MG/DL (ref 74–99)
GLUCOSE BLD MANUAL STRIP-MCNC: 132 MG/DL (ref 74–99)
GLUCOSE BLD MANUAL STRIP-MCNC: 172 MG/DL (ref 74–99)
GLUCOSE BLD MANUAL STRIP-MCNC: 196 MG/DL (ref 74–99)
GLUCOSE SERPL-MCNC: 194 MG/DL (ref 74–99)
HOLD SPECIMEN: NORMAL
MAGNESIUM SERPL-MCNC: 2.06 MG/DL (ref 1.6–2.4)
POTASSIUM SERPL-SCNC: 4.5 MMOL/L (ref 3.5–5.3)
SODIUM SERPL-SCNC: 131 MMOL/L (ref 136–145)

## 2024-02-15 PROCEDURE — 36415 COLL VENOUS BLD VENIPUNCTURE: CPT

## 2024-02-15 PROCEDURE — 2500000002 HC RX 250 W HCPCS SELF ADMINISTERED DRUGS (ALT 637 FOR MEDICARE OP, ALT 636 FOR OP/ED)

## 2024-02-15 PROCEDURE — 83735 ASSAY OF MAGNESIUM: CPT

## 2024-02-15 PROCEDURE — 97535 SELF CARE MNGMENT TRAINING: CPT | Mod: GO

## 2024-02-15 PROCEDURE — 2500000001 HC RX 250 WO HCPCS SELF ADMINISTERED DRUGS (ALT 637 FOR MEDICARE OP): Performed by: PHYSICIAN ASSISTANT

## 2024-02-15 PROCEDURE — 97116 GAIT TRAINING THERAPY: CPT | Mod: GP

## 2024-02-15 PROCEDURE — 2500000004 HC RX 250 GENERAL PHARMACY W/ HCPCS (ALT 636 FOR OP/ED)

## 2024-02-15 PROCEDURE — 2500000001 HC RX 250 WO HCPCS SELF ADMINISTERED DRUGS (ALT 637 FOR MEDICARE OP)

## 2024-02-15 PROCEDURE — 2500000004 HC RX 250 GENERAL PHARMACY W/ HCPCS (ALT 636 FOR OP/ED): Performed by: PHYSICIAN ASSISTANT

## 2024-02-15 PROCEDURE — 2500000002 HC RX 250 W HCPCS SELF ADMINISTERED DRUGS (ALT 637 FOR MEDICARE OP, ALT 636 FOR OP/ED): Performed by: PHYSICIAN ASSISTANT

## 2024-02-15 PROCEDURE — 80048 BASIC METABOLIC PNL TOTAL CA: CPT

## 2024-02-15 PROCEDURE — 1200000002 HC GENERAL ROOM WITH TELEMETRY DAILY

## 2024-02-15 PROCEDURE — 97530 THERAPEUTIC ACTIVITIES: CPT | Mod: GP

## 2024-02-15 PROCEDURE — 2500000005 HC RX 250 GENERAL PHARMACY W/O HCPCS

## 2024-02-15 PROCEDURE — 82947 ASSAY GLUCOSE BLOOD QUANT: CPT

## 2024-02-15 PROCEDURE — 99231 SBSQ HOSP IP/OBS SF/LOW 25: CPT | Performed by: SURGERY

## 2024-02-15 RX ORDER — MAGNESIUM SULFATE HEPTAHYDRATE 40 MG/ML
4 INJECTION, SOLUTION INTRAVENOUS ONCE
Status: COMPLETED | OUTPATIENT
Start: 2024-02-15 | End: 2024-02-15

## 2024-02-15 RX ADMIN — FAMOTIDINE 40 MG: 20 TABLET, FILM COATED ORAL at 20:56

## 2024-02-15 RX ADMIN — LATANOPROST 1 DROP: 50 SOLUTION/ DROPS OPHTHALMIC at 21:05

## 2024-02-15 RX ADMIN — ENOXAPARIN SODIUM 30 MG: 100 INJECTION SUBCUTANEOUS at 08:24

## 2024-02-15 RX ADMIN — Medication 5 MG: at 20:57

## 2024-02-15 RX ADMIN — HYDROCORTISONE 1 APPLICATION: 25 CREAM TOPICAL at 08:24

## 2024-02-15 RX ADMIN — DORZOLAMIDE HYDROCHLORIDE 1 DROP: 20 SOLUTION/ DROPS OPHTHALMIC at 15:00

## 2024-02-15 RX ADMIN — SALINE NASAL SPRAY 1 SPRAY: 1.5 SOLUTION NASAL at 08:27

## 2024-02-15 RX ADMIN — INSULIN HUMAN 8 UNITS: 100 INJECTION, SUSPENSION SUBCUTANEOUS at 11:47

## 2024-02-15 RX ADMIN — CLOBETASOL PROPIONATE 1 APPLICATION: 0.5 CREAM TOPICAL at 08:25

## 2024-02-15 RX ADMIN — METFORMIN HYDROCHLORIDE 1000 MG: 500 TABLET, FILM COATED ORAL at 08:29

## 2024-02-15 RX ADMIN — HYDROCORTISONE 1 APPLICATION: 25 CREAM TOPICAL at 20:57

## 2024-02-15 RX ADMIN — Medication 1 TABLET: at 08:23

## 2024-02-15 RX ADMIN — HALOPERIDOL 0.5 MG: 0.5 TABLET ORAL at 11:48

## 2024-02-15 RX ADMIN — OLANZAPINE 20 MG: 5 TABLET, FILM COATED ORAL at 20:57

## 2024-02-15 RX ADMIN — INSULIN LISPRO 2 UNITS: 100 INJECTION, SOLUTION INTRAVENOUS; SUBCUTANEOUS at 11:56

## 2024-02-15 RX ADMIN — SALINE NASAL SPRAY 1 SPRAY: 1.5 SOLUTION NASAL at 18:54

## 2024-02-15 RX ADMIN — ACETAMINOPHEN 975 MG: 325 TABLET ORAL at 11:48

## 2024-02-15 RX ADMIN — METFORMIN HYDROCHLORIDE 1000 MG: 500 TABLET, FILM COATED ORAL at 18:00

## 2024-02-15 RX ADMIN — ENOXAPARIN SODIUM 30 MG: 100 INJECTION SUBCUTANEOUS at 20:59

## 2024-02-15 RX ADMIN — CLOBETASOL PROPIONATE 1 APPLICATION: 0.5 CREAM TOPICAL at 20:57

## 2024-02-15 RX ADMIN — MAGNESIUM SULFATE HEPTAHYDRATE 4 G: 40 INJECTION, SOLUTION INTRAVENOUS at 10:03

## 2024-02-15 RX ADMIN — SITAGLIPTIN 100 MG: 100 TABLET, FILM COATED ORAL at 08:23

## 2024-02-15 RX ADMIN — FLUTICASONE PROPIONATE 1 SPRAY: 50 SPRAY, METERED NASAL at 08:24

## 2024-02-15 RX ADMIN — DORZOLAMIDE HYDROCHLORIDE 1 DROP: 20 SOLUTION/ DROPS OPHTHALMIC at 08:24

## 2024-02-15 RX ADMIN — Medication 400 MG: at 08:23

## 2024-02-15 RX ADMIN — DORZOLAMIDE HYDROCHLORIDE 1 DROP: 20 SOLUTION/ DROPS OPHTHALMIC at 20:58

## 2024-02-15 RX ADMIN — VENLAFAXINE HYDROCHLORIDE 37.5 MG: 37.5 CAPSULE, EXTENDED RELEASE ORAL at 08:23

## 2024-02-15 RX ADMIN — PREDNISONE 7.5 MG: 5 TABLET ORAL at 08:23

## 2024-02-15 RX ADMIN — ACETAMINOPHEN 975 MG: 325 TABLET ORAL at 18:58

## 2024-02-15 RX ADMIN — Medication: at 08:00

## 2024-02-15 ASSESSMENT — COGNITIVE AND FUNCTIONAL STATUS - GENERAL
EATING MEALS: A LITTLE
DAILY ACTIVITIY SCORE: 12
TURNING FROM BACK TO SIDE WHILE IN FLAT BAD: A LITTLE
DRESSING REGULAR UPPER BODY CLOTHING: A LITTLE
DRESSING REGULAR LOWER BODY CLOTHING: TOTAL
HELP NEEDED FOR BATHING: A LOT
PERSONAL GROOMING: A LITTLE
DAILY ACTIVITIY SCORE: 12
DRESSING REGULAR LOWER BODY CLOTHING: A LITTLE
HELP NEEDED FOR BATHING: A LITTLE
EATING MEALS: A LITTLE
TOILETING: A LITTLE
DRESSING REGULAR LOWER BODY CLOTHING: TOTAL
EATING MEALS: A LITTLE
DAILY ACTIVITIY SCORE: 18
TOILETING: TOTAL
MOVING FROM LYING ON BACK TO SITTING ON SIDE OF FLAT BED WITH BEDRAILS: A LITTLE
DRESSING REGULAR UPPER BODY CLOTHING: A LOT
DRESSING REGULAR UPPER BODY CLOTHING: A LOT
TOILETING: TOTAL
MOVING FROM LYING ON BACK TO SITTING ON SIDE OF FLAT BED WITH BEDRAILS: A LITTLE
STANDING UP FROM CHAIR USING ARMS: A LOT
PERSONAL GROOMING: A LITTLE
CLIMB 3 TO 5 STEPS WITH RAILING: TOTAL
MOBILITY SCORE: 18
MOVING TO AND FROM BED TO CHAIR: A LOT
TURNING FROM BACK TO SIDE WHILE IN FLAT BAD: A LITTLE
WALKING IN HOSPITAL ROOM: A LOT
PERSONAL GROOMING: A LITTLE
TURNING FROM BACK TO SIDE WHILE IN FLAT BAD: A LITTLE
MOVING FROM LYING ON BACK TO SITTING ON SIDE OF FLAT BED WITH BEDRAILS: A LITTLE
CLIMB 3 TO 5 STEPS WITH RAILING: TOTAL
HELP NEEDED FOR BATHING: A LOT
CLIMB 3 TO 5 STEPS WITH RAILING: A LITTLE
STANDING UP FROM CHAIR USING ARMS: A LITTLE
WALKING IN HOSPITAL ROOM: A LOT
MOBILITY SCORE: 13
STANDING UP FROM CHAIR USING ARMS: A LOT
WALKING IN HOSPITAL ROOM: A LITTLE
MOBILITY SCORE: 13
MOVING TO AND FROM BED TO CHAIR: A LOT
MOVING TO AND FROM BED TO CHAIR: A LITTLE

## 2024-02-15 ASSESSMENT — PAIN - FUNCTIONAL ASSESSMENT
PAIN_FUNCTIONAL_ASSESSMENT: 0-10

## 2024-02-15 ASSESSMENT — PAIN SCALES - GENERAL
PAINLEVEL_OUTOF10: 0 - NO PAIN

## 2024-02-15 ASSESSMENT — PAIN SCALES - WONG BAKER: WONGBAKER_NUMERICALRESPONSE: NO HURT

## 2024-02-15 ASSESSMENT — ACTIVITIES OF DAILY LIVING (ADL): HOME_MANAGEMENT_TIME_ENTRY: 23

## 2024-02-15 NOTE — PROGRESS NOTES
Transitional Care Coordinator Note: Per medical team patient is not medically ready, pending labs. Plan for patient to discharge to [facility], auth received per facility auth expires 2/17. Trauma team updated on auth expiration.       Ruby Squires RN BSN   Transitional Care Coordinator       UPDATE: Per medical team patient is medically ready. SW updated and plan to set up transport. TCC requested for trauma team to complete and sign godfrey geena.       Ruby Squires RN BSN   Transitional Care Coordinator

## 2024-02-15 NOTE — CARE PLAN
The patient's goals for the shift include      The clinical goals for the shift include pt pain remy be controlled throughout the shift    Over the shift, the patient will continue to progress towards care plan goal

## 2024-02-15 NOTE — PROGRESS NOTES
Occupational Therapy    Occupational Therapy Treatment    Name: Jennifer Mancini  MRN: [id number]  : 1940  Date: 02/15/24  Time Calculation  Start Time: 1447  Stop Time: 1510  Time Calculation (min): 23 min    Assessment:  OT Assessment: Pt presents with decreased strength, balance, and endurance impairing occupational performance of ADLs and funcitional mobility  End of Session Communication: Bedside nurse  End of Session Patient Position: Bed, 3 rail up, Alarm on  Plan:  Treatment Interventions: ADL retraining, Functional transfer training  OT Frequency: 3 times per week  OT Discharge Recommendations: Moderate intensity level of continued care  OT - OK to Discharge: Yes (indicates completed eval and discharge recommendation)    Subjective   General:  OT Last Visit  OT Received On: 02/15/24  Reason for Referral: 83 y.o. female sustained fall and now s/p L distal humerus ORIF. Patient also with L3 burst fracture. Per ortho, no surgical intervention. Strict spinal precautions and no need for brace  Past Medical History Relevant to Rehab: Schizoaffective DO, frequent falls  Prior to Session Communication: Bedside nurse  Patient Position Received: Bed, 3 rail up, Alarm on  General Comment: pleasantly and agreeable to OT   Precautions:  UE Weight Bearing Status: Left Non-Weight Bearing  LE Weight Bearing Status: Weight Bearing as Tolerated  Medical Precautions: Fall precautions, Spinal precautions  Post-Surgical Precautions:  (NWB R UE)    Lines/Tubes/Drains:  External Urinary Catheter Female (Active)   Number of days: 4       Cognition:  Orientation Level: Disoriented to situation  Insight: Mild  Impulsive: Mildly  Planning: Reduced planning skills    Pain Assessment:  Pain Assessment  Pain Assessment: 0-10  Pain Score: 0 - No pain     Objective   Activities of Daily Living:    UE Dressing  UE Dressing Comments: Pt doffed/donned gown mod A supine in bed HOB elevated  Toileting  Toileting Comments: Pt with BM  beginning of session; Pt performed rolling L and R for dependent pericare    Bed Mobility/Transfers:   Bed Mobility  Bed Mobility: Yes  Bed Mobility 1  Bed Mobility 1: Rolling right, Rolling left  Level of Assistance 1: Moderate assistance, Minimal verbal cues     Therapy/Activity:      Therapeutic Activity  Therapeutic Activity Performed: Yes  Therapeutic Activity 1: Pt performed rolling L and R mod A for pericare after toileting     Outcome Measures:       02/15/24 1447   [facility] Daily Activity   Putting on and taking off regular lower body clothing 1   Bathing (including washing, rinsing, drying) 2   Putting on and taking off regular upper body clothing 2   Toileting, which includes using toilet, bedpan or urinal 1   Taking care of personal grooming such as brushing teeth 3   Eating Meals 3   Daily Activity - Total Score 12         Education Documentation  Precautions, taught by Laurel Goldman OT at 2/15/2024  3:26 PM.  Learner: Patient  Readiness: Acceptance  Method: Explanation  Response: Verbalizes Understanding    ADL Training, taught by Laurel Goldman OT at 2/15/2024  3:26 PM.  Learner: Patient  Readiness: Acceptance  Method: Explanation  Response: Verbalizes Understanding    Goals:  Encounter Problems       Encounter Problems (Active)       ADLs       Patient with complete lower body dressing with stand by assist level of assistance donning and doffing all LE clothes  with PRN adaptive equipment  (Progressing)       Start:  02/12/24    Expected End:  02/26/24            Patient will complete toileting including hygiene clothing management/hygiene with stand by assist level of assistance and DME prn. (Progressing)       Start:  02/12/24    Expected End:  02/26/24               BALANCE       Pt will maintain dynamic standing balance during ADL task with stand by assist level of assistance in order to demonstrate decreased risk of falling and improved postural control. (Progressing)       Start:  02/12/24     Expected End:  02/26/24               COGNITION/SAFETY       Patient will recall and adhere to spine precautions during all functional mobility/ADL tasks in order to demonstrate improved understanding and promote healing post op (Progressing)       Start:  02/12/24    Expected End:  02/26/24               MOBILITY       Patient will perform Functional mobility min Household distances/Community Distances with modified independent level of assistance and least restrictive device in order to improve safety and functional mobility. (Progressing)       Start:  02/12/24    Expected End:  02/26/24               TRANSFERS       Patient will perform bed mobility stand by assist level of assistance in order to improve safety and independence with mobility (Progressing)       Start:  02/12/24    Expected End:  02/26/24            Patient will complete functional transfers with least restrictive device with contact guard assist level of assistance. (Progressing)       Start:  02/12/24    Expected End:  02/26/24                     02/15/24 at 3:26 PM   Laurel Goldman, OT   [phone]

## 2024-02-15 NOTE — PROGRESS NOTES
[facility] has auth to admit patient. Auth expires midnight 2/17. Patient is pending medically readiness. ADOD 1-2 days. [facility] will continue to follow for dc planning needs.    Per team, patient medically ready for dc. Ngozi contacted daughter Ivette to update on dc plan to [facility]. [facility] requested transport via [facility] 2/16 @11am.     Transport confirmed for 11am 2/16 via [facility].

## 2024-02-15 NOTE — SIGNIFICANT EVENT
Rapid Response RN Note    Rapid response RN at bedside for RADAR score 6 due to the following VS: T 36 °Celsius; HR 93 ; RR 17; /76; SPO2 93%.     Reviewed above VS with bedside RN.  VS within patient's current trends.  Patient denied pain, shortness of breath, dizziness or lightheadedness.  No interventions by rapid response team indicated at this time.      Staff to page rapid response for any concerns or acute change in condition/VS.

## 2024-02-15 NOTE — PROGRESS NOTES
Physical Therapy    Physical Therapy    Physical Therapy Treatment    Patient Name: Jennifer Mancini  MRN: [id number]  Today's Date: 2/15/2024  Time Calculation  Start Time: 1050  Stop Time: 1123  Time Calculation (min): 33 min       Assessment/Plan   PT Assessment  Evaluation/Treatment Tolerance: Patient limited by fatigue  End of Session Communication: Bedside nurse  Assessment Comment: Pt with increased tolerance for activity able to perfom side steps for 3 trials. Pt with increased difficulty stepping to the right with soft knees in the right leg. 1 LOB requiring pt to sit back down on the bed after STS. Patient continues to benefit from skilled physical therapy to maximize functional mobility and safety. Pt remains appropriate for mod intensity therapy when medically appropriate for DC.    End of Session Patient Position: Bed, 3 rail up, Alarm on (call light within reach)  PT Plan  Inpatient/Swing Bed or Outpatient: Inpatient  PT Plan  Treatment/Interventions: Transfer training, Bed mobility, Gait training, Balance training, Strengthening, Therapeutic activity, Therapeutic exercise  PT Plan: PT Eval only  PT Frequency: 4 times per week  PT Discharge Recommendations: Moderate intensity level of continued care  PT Recommended Transfer Status: Assist x2  PT - OK to Discharge: Yes    General Visit Information:   PT  Visit  PT Received On: 02/15/24  Response to Previous Treatment: Patient with no complaints from previous session.  Prior to Session Communication: Bedside nurse  Patient Position Received: Bed, 3 rail up, Alarm off, not on at start of session  General Comment: Pt pleasant and agreeable to PT.     Subjective   Precautions:  Precautions  UE Weight Bearing Status: Left Non-Weight Bearing  LE Weight Bearing Status: Weight Bearing as Tolerated (bilateral)  Medical Precautions: Fall precautions, Spinal precautions  Precautions Comment: Strict spinal precautions    Objective   Pain:  Pain Assessment  Pain  Assessment: 0-10  Pain Score: 0 - No pain  Pain Type: Acute pain  Cognition:  Cognition  Overall Cognitive Status: Within Functional Limits    PT Treatments:  Therapeutic Exercise  Therapeutic Exercise Performed: Yes  Therapeutic Exercise Activity 1: seated lincoln LE: LAQ x 10, hip flex x 10  Therapeutic Activity  Therapeutic Activity Performed: Yes  Therapeutic Activity 1: sitting EOB x 5 min and  2 min x3 between ambulation with CGA  Therapeutic Activity 2: static standing with mod A for 2 min x 2     Bed Mobility  Bed Mobility: Yes  Bed Mobility 1  Bed Mobility 1: Supine to sitting, Sitting to supine  Level of Assistance 1: Minimal verbal cues, Minimum assistance  Bed Mobility Comments 1: HOB elevated 25 degrees and pt needed min cues for log rolling and LUE NWB  Ambulation/Gait Training  Ambulation/Gait Training Performed: Yes  Ambulation/Gait Training 1  Surface 1: Level tile  Device 1: No device (arm in arm on left)  Assistance 1: Moderate verbal cues, Moderate assistance  Quality of Gait 1: Inconsistent stride length, Decreased step length, Shuffling gait, Soft knee(s)  Comments/Distance (ft) 1: side steps w/ seated break between bouts: 5 steps R, 8 steps left, 8 steps right; pt with increased difficulty stepping to the right  Transfers  Transfer: Yes  Transfer 1  Transfer From 1: Sit to, Stand to  Transfer to 1: Stand, Sit  Technique 1: Sit to stand, Stand to sit  Transfer Device 1:  (arm in arm on left)  Transfer Level of Assistance 1: Moderate assistance, Minimal verbal cues  Trials/Comments 1: x4 trials with L arm supported min vc for LUE NWB  Stairs  Stairs: No        Outcome Measures:  [facility] Basic Mobility  Turning from your back to your side while in a flat bed without using bedrails: A little  Moving from lying on your back to sitting on the side of a flat bed without using bedrails: A little  Moving to and from bed to chair (including a wheelchair): A lot  Standing up from a chair using your arms (e.g.  wheelchair or bedside chair): A lot  To walk in hospital room: A lot  Climbing 3-5 steps with railing: Total  Basic Mobility - Total Score: 13           Education Documentation  Precautions, taught by Karen Chandler, PT at 2/15/2024 12:35 PM.  Learner: Patient  Readiness: Acceptance  Method: Explanation, Demonstration  Response: Verbalizes Understanding, Needs Reinforcement    Education Comments  No comments found.      Encounter Problems       Encounter Problems (Active)       Mobility       STG - Patient will ambulate >/= 15 ft with WBQC mod A  (Progressing)       Start:  02/10/24    Expected End:  02/24/24               Transfers       STG - Transfer from bed to chair Min A with WBQC (Progressing)       Start:  02/10/24    Expected End:  02/24/24            STG - Patient will perform bed mobility with min A (Progressing)       Start:  02/10/24    Expected End:  02/24/24            STG - Patient will transfer sit to and from stand Min A with WBQC (Progressing)       Start:  02/10/24    Expected End:  02/24/24                 02/15/24 at 12:38 PM   KAREN CHANDLER, JOSE ARMANDO   Rehab Office: [phone]

## 2024-02-15 NOTE — CARE PLAN
The patient's goals for the shift include      The clinical goals for the shift include pt pain remy be controlled throughout the shift      Problem: Pain  Goal: Takes deep breaths with improved pain control throughout the shift  Outcome: Progressing  Goal: Turns in bed with improved pain control throughout the shift  Outcome: Progressing  Goal: Walks with improved pain control throughout the shift  Outcome: Progressing  Goal: Performs ADL's with improved pain control throughout shift  Outcome: Progressing  Goal: Participates in PT with improved pain control throughout the shift  Outcome: Progressing  Goal: Free from opioid side effects throughout the shift  Outcome: Progressing  Goal: Free from acute confusion related to pain meds throughout the shift  Outcome: Progressing     Problem: Fall/Injury  Goal: Not fall by end of shift  Outcome: Progressing  Goal: Be free from injury by end of the shift  Outcome: Progressing  Goal: Verbalize understanding of personal risk factors for fall in the hospital  Outcome: Progressing  Goal: Verbalize understanding of risk factor reduction measures to prevent injury from fall in the home  Outcome: Progressing  Goal: Use assistive devices by end of the shift  Outcome: Progressing  Goal: Pace activities to prevent fatigue by end of the shift  Outcome: Progressing     Problem: Skin  Goal: Decreased wound size/increased tissue granulation at next dressing change  Outcome: Progressing  Flowsheets (Taken 2/9/2024 0443)  Decreased wound size/increased tissue granulation at next dressing change:   Promote sleep for wound healing   Protective dressings over bony prominences  Goal: Participates in plan/prevention/treatment measures  Outcome: Progressing  Flowsheets (Taken 2/9/2024 0443)  Participates in plan/prevention/treatment measures:   Elevate heels   Increase activity/out of bed for meals  Goal: Prevent/manage excess moisture  Outcome: Progressing  Flowsheets (Taken 2/13/2024 0959 by  Malathi Gutierres LPN)  Prevent/manage excess moisture:   Cleanse incontinence/protect with barrier cream   Moisturize dry skin   Follow provider orders for dressing changes   Monitor for/manage infection if present  Goal: Prevent/minimize sheer/friction injuries  Outcome: Progressing  Flowsheets (Taken 2/9/2024 0443)  Prevent/minimize sheer/friction injuries:   Use pull sheet   HOB 30 degrees or less   Turn/reposition every 2 hours/use positioning/transfer devices  Goal: Promote/optimize nutrition  Outcome: Progressing  Flowsheets (Taken 2/14/2024 1123 by Kasey Alvarez RN)  Promote/optimize nutrition: Assist with feeding  Goal: Promote skin healing  Outcome: Progressing  Flowsheets (Taken 2/9/2024 0443)  Promote skin healing:   Assess skin/pad under line(s)/device(s)   Turn/reposition every 2 hours/use positioning/transfer devices   Protective dressings over bony prominences     Problem: Pain  Goal: My pain/discomfort is manageable  Outcome: Progressing     Problem: Safety  Goal: Patient will be injury free during hospitalization  Outcome: Progressing  Goal: I will remain free of falls  Outcome: Progressing     Problem: Daily Care  Goal: Daily care needs are met  Outcome: Progressing     Problem: Psychosocial Needs  Goal: Demonstrates ability to cope with hospitalization/illness  Outcome: Progressing  Goal: Collaborate with me, my family, and caregiver to identify my specific goals  Outcome: Progressing     Problem: Discharge Barriers  Goal: My discharge needs are met  Outcome: Progressing

## 2024-02-15 NOTE — PROGRESS NOTES
[facility]  TRAUMA SERVICE - PROGRESS NOTE    Patient Name: Jennifer Mancini  MRN: [id number]  Admit Date: 208  : 1940  AGE: 83 y.o.   GENDER: female  ==============================================================================  MECHANISM OF INJURY:   Mechanical fall out of bed onto L side at assisted living facility    LOC (yes/no?): No  Anticoagulant / Anti-platelet Rx? (for what dx?): No  Referring Facility Name (N/A for scene EMR run):  [facility]    INJURIES:   - L3 burst fracture, comminuted. 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   - Follow-up outpatient with Dr. Quick.   - No bracing required    ## Left humerus fx  -  underwent ORIF.   - NWB LUE in splint, will remain until follow-up appointment    ## Nasal bone fracture  - No acute intervention at this time  - follow up in OMFS clinic in 2 weeks     ## comorbids  - Geriatrics team consulted, appreciate recommendations  - pharmacy med rec team involved, restart home steroid, methotrexate, H2 blocker, evening haldol/zyprexa for now  - Continue to trend hyponatremia. 126->129-> 130. Repeat BMP pending this afternoon.  - Continue to trend hypomagnesemia. 1.35 yesterday, 4mg mag  IV   - PT/OT     ## DM  - Consulted Diabetes team for post-discharge management. A1c >8.   - Continue Januvia 100 mg daily,  - Continue taking metformin 1000mg BID  - 8u NPH   - continue lispro corrective scale #2 with meals    # Other  - Geriatrics following  - TSH, T4, B12, Folate  - Eye drops ordered, IS at bedside  - Started Calcium/vit d supplementation  - Miralax BID scheduled    Dispo: Patient lives in an assisted living facility that does not have a SNF affiliated with it. Accepted to facility. Will be ready once electrolyte disturbances are corrected.    Patient discussed with attending, Dr. Melinda Elam,   Department of Surgery / IR Integrated PGY1  Trauma [id number]  ==============================================================================  CHIEF COMPLAINT / OVERNIGHT EVENTS:   No acute events overnight. Pain is well controlled. Endorses using her incentive spirometer    MEDICAL HISTORY / ROS:  Admission history and ROS reviewed. Pertinent changes as follows:  N/A    PHYSICAL EXAM:  Heart Rate:  []   Temp:  [36 °C (96.8 °F)-37.7 °C (99.9 °F)]   Resp:  [17-18]   BP: (132-148)/(75-89)   SpO2:  [93 %-100 %]       GENERAL APPEARANCE:  AxOx4, generally well-appearing no acute distress.  HEENT: Scattered facial bruising, small cephalohematoma between eyebrows. Oral mucosa and tongue without lacerations, teeth in place but multiple chronically missing. Bilateral, erythematous heliotrope rash surrounding eyes  HEART:  Normal rate and regular rhythm  LUNGS:  Equal chest rise and fall, non-labored breathing  ABDOMEN:  Soft, nontender, nondistended  EXTREMITIES:  LAS in place. Tender to palpation  NEUROLOGICAL:  Grossly nonfocal. Alert and oriented, moving all 4 extremities.   Skin:  Warm and dry       LABS:  Results for orders placed or performed during the hospital encounter of 02/08/24 (from the past 24 hour(s))   POCT GLUCOSE   Result Value Ref Range    POCT Glucose 248 (H) 74 - 99 mg/dL    Osmolality, urine   Result Value Ref Range    Osmolality, Urine Random 274 200 - 1,200 mOsm/kg   Urine electrolytes   Result Value Ref Range    Sodium, Urine Random 54 mmol/L    Sodium/Creatinine Ratio 355 Not established. mmol/g Creat    Potassium, Urine Random 32 mmol/L    Potassium/Creatinine Ratio 211 Not established mmol/g Creat    Chloride, Urine Random 57 mmol/L    Chloride/Creatinine Ratio 375 (H) 38 - 318 mmol/g creat    Creatinine, Urine Random 15.2 (L) 20.0 - 320.0 mg/dL   POCT GLUCOSE   Result Value Ref Range    POCT Glucose 191 (H) 74 - 99 mg/dL   Basic metabolic panel   Result Value Ref Range    Glucose 174 (H) 74 - 99 mg/dL    Sodium 130 (L) 136 - 145 mmol/L    Potassium 4.4 3.5 - 5.3 mmol/L    Chloride 95 (L) 98 - 107 mmol/L    Bicarbonate 28 21 - 32 mmol/L    Anion Gap 11 10 - 20 mmol/L    Urea Nitrogen 13 6 - 23 mg/dL    Creatinine 0.38 (L) 0.50 - 1.05 mg/dL    eGFR >90 >60 mL/min/1.73m*2    Calcium 9.3 8.6 - 10.6 mg/dL   Urine Gray Tube   Result Value Ref Range    Extra Tube Hold for add-ons.    POCT GLUCOSE   Result Value Ref Range    POCT Glucose 127 (H) 74 - 99 mg/dL     MEDICATIONS:  Current Facility-Administered Medications   Medication Dose Route Frequency Provider Last Rate Last Admin    acetaminophen (Tylenol) tablet 975 mg  975 mg oral q8h Warren Mayberry PA-C   975 mg at 02/14/24 2113    calcium carbonate-vitamin D3 500 mg-5 mcg (200 unit) per tablet 1 tablet  1 tablet oral Daily Xavi Elam DO   1 tablet at 02/15/24 0823    clobetasol (Temovate) 0.05 % cream 1 Application  1 Application Topical BID Xavi Elam DO   1 Application at 02/15/24 0825    dextrose 10 % in water (D10W) infusion  0.3 g/kg/hr intravenous Once PRN Warren Mayberry PA-C        dextrose 50 % injection 25 g  25 g intravenous q15 min PRN Warren Mayberry PA-C        dorzolamide (Trusopt) 2 % ophthalmic solution 1 drop  1 drop Both Eyes TID Xavi Elam DO   1 drop at 02/15/24 0824    enoxaparin (Lovenox) syringe  30 mg  30 mg subcutaneous BID Xavi Elam DO   30 mg at 02/15/24 0824    famotidine (Pepcid) tablet 40 mg  40 mg oral Nightly John Magallon PA-C   40 mg at 02/14/24 2113    fluticasone (Flonase) nasal spray 1 spray  1 spray Each Nostril Daily Xavi Elam DO   1 spray at 02/15/24 0824    glucagon (Glucagen) injection 1 mg  1 mg intramuscular q15 min PRN Warren Mayberry PA-C        guaiFENesin (Robitussin) 100 mg/5 mL syrup 200 mg  200 mg oral q4h PRN Xavi Elam DO        haloperidol (Haldol) tablet 0.5 mg  0.5 mg oral Daily John Magallon PA-C   0.5 mg at 02/14/24 0953    hydrocortisone 2.5 % cream 1 Application  1 Application Topical BID Xavi Elam DO   1 Application at 02/15/24 0824    insulin lispro (HumaLOG) injection 0-10 Units  0-10 Units subcutaneous TID GLENROY Mcgrath PA-C   2 Units at 02/14/24 1708    insulin NPH (Isophane) (HumuLIN N,NovoLIN N) injection 8 Units  8 Units subcutaneous q24h Xavi Elam DO   8 Units at 02/14/24 0927    latanoprost (Xalatan) 0.005 % ophthalmic solution 1 drop  1 drop Both Eyes Nightly John Magallon PA-C   1 drop at 02/13/24 2054    magnesium oxide (Mag-Ox) tablet 400 mg  400 mg oral Daily Xavi Elam DO   400 mg at 02/15/24 0823    magnesium sulfate IV 4 g  4 g intravenous Once Xavi Elam DO 25 mL/hr at 02/15/24 1003 4 g at 02/15/24 1003    melatonin tablet 5 mg  5 mg oral Nightly Warren Mayberry PA-C   5 mg at 02/14/24 2113    metFORMIN (Glucophage) tablet 1,000 mg  1,000 mg oral BID with meals Xavi Elam DO   1,000 mg at 02/15/24 0829    methotrexate (Trexall) tablet 15 mg  15 mg oral Weekly John Magallon PA-C   15 mg at 02/09/24 1904    naloxone (Narcan) injection 0.2 mg  0.2 mg intravenous q5 min PRN Warren Mayberry PA-C        OLANZapine (ZyPREXA) tablet 20 mg  20 mg oral Nightly John Magallon PA-C   20 mg at 02/14/24 2113    oxyCODONE (Roxicodone) immediate release tablet 2.5 mg  2.5 mg oral q6h PRN Warren Mayberry PA-C         oxyCODONE (Roxicodone) immediate release tablet 5 mg  5 mg oral q6h PRN Warren Mayberry PA-C   5 mg at 02/13/24 1133    oxygen (O2) therapy   inhalation Continuous - 02/gases Warren Mayberry PA-C   Start at 02/15/24 0800    oxygen (O2) therapy   inhalation Continuous PRN - O2/gases Warren Mayberry PA-C        polyethylene glycol (Glycolax, Miralax) packet 17 g  17 g oral BID Xaiv Elam DO        predniSONE (Deltasone) tablet 7.5 mg  7.5 mg oral Daily John Magallon PA-C   7.5 mg at 02/15/24 0823    sennosides-docusate sodium (Mary-Colace) 8.6-50 mg per tablet 2 tablet  2 tablet oral BID Warren Mayberry PA-C   2 tablet at 02/14/24 0854    SITagliptin phosphate (Januvia) tablet 100 mg  100 mg oral Daily Xavi Elam DO   100 mg at 02/15/24 0823    sodium chloride (Ocean) 0.65 % nasal spray 1 spray  1 spray Each Nostril 4x daily PRN Xavi Elam DO   1 spray at 02/13/24 1352    sodium chloride (Ocean) 0.65 % nasal spray 1 spray  1 spray Each Nostril Daily Lincoln Lawrence MD   1 spray at 02/15/24 0827    sodium chloride 0.9% infusion  75 mL/hr intravenous Continuous Lincoln Lawrence MD 75 mL/hr at 02/14/24 1128 75 mL/hr at 02/14/24 1128    venlafaxine XR (Effexor-XR) 24 hr capsule 37.5 mg  37.5 mg oral Daily John Magallon PA-C   37.5 mg at 02/15/24 0823       IMAGING SUMMARY:  (summary of new imaging findings, not a copy of dictation)  CT Head/Face: nasal bone fracture on right, negative CTH acute injury  CT C-Spine: chronic degen changes, no acute injury  CT Chest/Abd/Pelvis: large hiatal hernia in left hemithorax, no acute injury  CT TL spine: L3 burst fracture, indeterminate age T12/L1 fractures  MR T/L 40% height loss L3 burst    I have reviewed all laboratory and imaging results ordered/pertinent for today's encounter.

## 2024-02-16 VITALS
SYSTOLIC BLOOD PRESSURE: 159 MMHG | HEIGHT: 61 IN | WEIGHT: 149.91 LBS | OXYGEN SATURATION: 96 % | HEART RATE: 99 BPM | BODY MASS INDEX: 28.3 KG/M2 | DIASTOLIC BLOOD PRESSURE: 77 MMHG | TEMPERATURE: 97.3 F | RESPIRATION RATE: 17 BRPM

## 2024-02-16 LAB — GLUCOSE BLD MANUAL STRIP-MCNC: 104 MG/DL (ref 74–99)

## 2024-02-16 PROCEDURE — 2500000001 HC RX 250 WO HCPCS SELF ADMINISTERED DRUGS (ALT 637 FOR MEDICARE OP)

## 2024-02-16 PROCEDURE — 99222 1ST HOSP IP/OBS MODERATE 55: CPT | Performed by: NURSE PRACTITIONER

## 2024-02-16 PROCEDURE — 82947 ASSAY GLUCOSE BLOOD QUANT: CPT

## 2024-02-16 PROCEDURE — 2500000004 HC RX 250 GENERAL PHARMACY W/ HCPCS (ALT 636 FOR OP/ED): Performed by: PHYSICIAN ASSISTANT

## 2024-02-16 PROCEDURE — 2500000001 HC RX 250 WO HCPCS SELF ADMINISTERED DRUGS (ALT 637 FOR MEDICARE OP): Performed by: PHYSICIAN ASSISTANT

## 2024-02-16 PROCEDURE — 2500000002 HC RX 250 W HCPCS SELF ADMINISTERED DRUGS (ALT 637 FOR MEDICARE OP, ALT 636 FOR OP/ED)

## 2024-02-16 PROCEDURE — 99239 HOSP IP/OBS DSCHRG MGMT >30: CPT | Performed by: SURGERY

## 2024-02-16 PROCEDURE — 2500000004 HC RX 250 GENERAL PHARMACY W/ HCPCS (ALT 636 FOR OP/ED)

## 2024-02-16 RX ADMIN — HALOPERIDOL 0.5 MG: 0.5 TABLET ORAL at 10:30

## 2024-02-16 RX ADMIN — ENOXAPARIN SODIUM 30 MG: 100 INJECTION SUBCUTANEOUS at 08:30

## 2024-02-16 RX ADMIN — CLOBETASOL PROPIONATE 1 APPLICATION: 0.5 CREAM TOPICAL at 08:29

## 2024-02-16 RX ADMIN — Medication 1 TABLET: at 08:28

## 2024-02-16 RX ADMIN — FLUTICASONE PROPIONATE 1 SPRAY: 50 SPRAY, METERED NASAL at 08:30

## 2024-02-16 RX ADMIN — DORZOLAMIDE HYDROCHLORIDE 1 DROP: 20 SOLUTION/ DROPS OPHTHALMIC at 08:29

## 2024-02-16 RX ADMIN — Medication 400 MG: at 08:30

## 2024-02-16 RX ADMIN — INSULIN HUMAN 8 UNITS: 100 INJECTION, SUSPENSION SUBCUTANEOUS at 08:35

## 2024-02-16 RX ADMIN — PREDNISONE 7.5 MG: 5 TABLET ORAL at 08:31

## 2024-02-16 RX ADMIN — SITAGLIPTIN 100 MG: 100 TABLET, FILM COATED ORAL at 08:33

## 2024-02-16 RX ADMIN — HYDROCORTISONE 1 APPLICATION: 25 CREAM TOPICAL at 08:30

## 2024-02-16 RX ADMIN — VENLAFAXINE HYDROCHLORIDE 37.5 MG: 37.5 CAPSULE, EXTENDED RELEASE ORAL at 08:33

## 2024-02-16 RX ADMIN — METHOTREXATE 15 MG: 2.5 TABLET ORAL at 10:30

## 2024-02-16 RX ADMIN — METFORMIN HYDROCHLORIDE 1000 MG: 500 TABLET, FILM COATED ORAL at 08:28

## 2024-02-16 ASSESSMENT — PAIN SCALES - GENERAL: PAINLEVEL_OUTOF10: 0 - NO PAIN

## 2024-02-16 NOTE — PROGRESS NOTES
Transitional Care Coordinator Note: Per medical team patient is medically ready. Plan for patient to discharge to SNF [facility], transport requested by SW confirmed for 11am. TCC attached AVS and sent to SNF listed via careport.       Ruby Squires RN BSN   Transitional Care Coordinator

## 2024-02-16 NOTE — CARE PLAN
The patient's goals for the shift include      The clinical goals for the shift include Patient will remain HDS    Problem: Pain  Goal: Takes deep breaths with improved pain control throughout the shift  Outcome: Met  Goal: Turns in bed with improved pain control throughout the shift  Outcome: Met  Goal: Walks with improved pain control throughout the shift  Outcome: Met  Goal: Performs ADL's with improved pain control throughout shift  Outcome: Met  Goal: Participates in PT with improved pain control throughout the shift  Outcome: Met  Goal: Free from opioid side effects throughout the shift  Outcome: Met  Goal: Free from acute confusion related to pain meds throughout the shift  Outcome: Met     Problem: Fall/Injury  Goal: Not fall by end of shift  Outcome: Met  Goal: Be free from injury by end of the shift  Outcome: Met  Goal: Verbalize understanding of personal risk factors for fall in the hospital  Outcome: Met  Goal: Verbalize understanding of risk factor reduction measures to prevent injury from fall in the home  Outcome: Met  Goal: Use assistive devices by end of the shift  Outcome: Met  Goal: Pace activities to prevent fatigue by end of the shift  Outcome: Met     Problem: Skin  Goal: Decreased wound size/increased tissue granulation at next dressing change  Outcome: Met  Goal: Participates in plan/prevention/treatment measures  Outcome: Met  Goal: Prevent/manage excess moisture  Outcome: Met  Goal: Prevent/minimize sheer/friction injuries  Outcome: Met  Goal: Promote/optimize nutrition  Outcome: Met  Goal: Promote skin healing  Outcome: Met     Problem: Mobility  Goal: STG - Patient will ambulate >/= 15 ft with WBQC mod A   Outcome: Adequate for Discharge     Problem: Transfers  Goal: STG - Transfer from bed to chair Min A with WBQC  Outcome: Adequate for Discharge  Goal: STG - Patient will perform bed mobility with min A  Outcome: Adequate for Discharge  Goal: STG - Patient will transfer sit to and from  stand Min A with WBQC  Outcome: Adequate for Discharge     Problem: COGNITION/SAFETY  Goal: Patient will recall and adhere to spine precautions during all functional mobility/ADL tasks in order to demonstrate improved understanding and promote healing post op  Outcome: Adequate for Discharge     Problem: ADLs  Goal: Patient with complete lower body dressing with stand by assist level of assistance donning and doffing all LE clothes  with PRN adaptive equipment   Outcome: Adequate for Discharge  Goal: Patient will complete toileting including hygiene clothing management/hygiene with stand by assist level of assistance and DME prn.  Outcome: Adequate for Discharge     Problem: TRANSFERS  Goal: Patient will perform bed mobility stand by assist level of assistance in order to improve safety and independence with mobility  Outcome: Adequate for Discharge  Goal: Patient will complete functional transfers with least restrictive device with contact guard assist level of assistance.  Outcome: Adequate for Discharge     Problem: MOBILITY  Goal: Patient will perform Functional mobility min Household distances/Community Distances with modified independent level of assistance and least restrictive device in order to improve safety and functional mobility.  Outcome: Adequate for Discharge     Problem: BALANCE  Goal: Pt will maintain dynamic standing balance during ADL task with stand by assist level of assistance in order to demonstrate decreased risk of falling and improved postural control.  Outcome: Adequate for Discharge     Problem: Pain  Goal: My pain/discomfort is manageable  Outcome: Met     Problem: Safety  Goal: Patient will be injury free during hospitalization  Outcome: Met  Goal: I will remain free of falls  Outcome: Met     Problem: Daily Care  Goal: Daily care needs are met  Outcome: Met     Problem: Psychosocial Needs  Goal: Demonstrates ability to cope with hospitalization/illness  Outcome: Met  Goal: Collaborate  with me, my family, and caregiver to identify my specific goals  Outcome: Met     Problem: Discharge Barriers  Goal: My discharge needs are met  Outcome: Met

## 2024-02-16 NOTE — NURSING NOTE
1042 Report called to nurse Pickett at [facility]. Patient awaiting transport to facility.     1141 Patient transported to facility by [facility]

## 2024-02-16 NOTE — DISCHARGE SUMMARY
Discharge Diagnosis  Left supracondylar humerus fracture, closed, initial encounter    Issues Requiring Follow-Up  Follow-up in Trauma Clinic  Follow-up in Orthopedic Clinic  Follow-up with PCP    Test Results Pending At Discharge  Pending Labs       No current pending labs.            Hospital Course  83F with presents as trauma consult from [facility] s/p mechanical fall out of bed onto left side at assisted living facility earlier today. On ground for less than 1 hour before staff at facility helped her get up. +face strike without LOC, no blood thinners reported. Workup at OSH notable for left humerus fracture, L3 burst fracture, and nasal bone fracture. Ortho trauma/spine consulted, recommending additional imaging of humerus and spine. Plan for operative fixation of humerus. Admitted to [facility], and subsequently taken to the OR on 2/9 for ORIF of humerus. Patient nonweightbearing left upper extremity in splint until follow-up. Pt returned to [facility] in stable condition and evaluated by PT and OT who recommended moderate intensity level of continued care. Geriatrics team consulted for recommendations on restarting home medications during hospital stay. Home steroid, methotrexate, H2 blocker, evening haldol/zyprexa started.  Patient is to follow up outpatient with Dr. Quick for spinal fractures and with [facility] for [place] bone fracture.     Diabetes team was consulted for post discharge management given A1c greater than 8.    Patient seen and evaluated by PT/OT, recommended moderate intensity level of continue care at discharge.    At the time of discharge, patient's pain was controlled with oral analgesia, patient was urinating, having BMs, sleeping, and eating well. Based on PT/OT's recommendation, patient was discharged to SNF with scripts and follow up appointments. Discharge plan was discussed with the patient and all of the patient's questions were answered. Patient and family agreeable to  plan.        Pertinent Physical Exam At Time of Discharge  GENERAL APPEARANCE:  AxOx4, generally well-appearing no acute distress.  HEENT: Scattered facial bruising, small cephalohematoma between eyebrows. Oral mucosa and tongue without lacerations, teeth in place but multiple chronically missing. Bilateral, erythematous heliotrope rash surrounding eyes  HEART:  Normal rate and regular rhythm  LUNGS:  Equal chest rise and fall, non-labored breathing  ABDOMEN:  Soft, nontender, nondistended  EXTREMITIES:  LAS in place. Tender to palpation  NEUROLOGICAL:  Grossly nonfocal. Alert and oriented, moving all 4 extremities.   Skin:  Warm and dry          Home Medications     Medication List      START taking these medications     SITagliptin phosphate 100 mg tablet; Commonly known as: Januvia; Take 1   tablet (100 mg) by mouth once daily. Do not start before February 17, 2024.; Start taking on: February 17, 2024     CHANGE how you take these medications     * calcium carbonate-vitamin D3 600 mg-20 mcg (800 unit) tablet; What   changed: Another medication with the same name was added. Make sure you   understand how and when to take each.   * calcium carbonate-vitamin D3 500 mg-5 mcg (200 unit) tablet; Take 1   tablet by mouth 2 times a day.; What changed: You were already taking a   medication with the same name, and this prescription was added. Make sure   you understand how and when to take each.  * This list has 2 medication(s) that are the same as other medications   prescribed for you. Read the directions carefully, and ask your doctor or   other care provider to review them with you.     CONTINUE taking these medications     acetaminophen 650 mg ER tablet; Commonly known as: Tylenol 8 HOUR   ascorbic acid 500 mg tablet; Commonly known as: Vitamin C   aspirin 81 mg EC tablet   atorvastatin 80 mg tablet; Commonly known as: Lipitor   b complex 0.4 mg tablet   benzonatate 100 mg capsule; Commonly known as: Tessalon    Cepacol Sore Throat (nargis-men) 15-3.6 mg lozenge; Generic drug:   benzocaine-menthol   cetirizine 10 mg tablet; Commonly known as: ZyrTEC   * clobetasol 0.05 % cream; Commonly known as: Temovate   * clobetasoL 0.05 % shampoo   cyanocobalamin 1,000 mcg tablet; Commonly known as: Vitamin B-12   docusate sodium 100 mg capsule; Commonly known as: Colace   famotidine 40 mg tablet; Commonly known as: Pepcid   ferrous sulfate 325 (65 Fe) MG EC tablet   Fish OiL 60- mg capsule; Generic drug: fish oil   fluticasone 50 mcg/actuation nasal spray; Commonly known as: Flonase   folic acid 1 mg tablet; Commonly known as: Folvite   furosemide 40 mg tablet; Commonly known as: Lasix   * guaiFENesin 600 mg 12 hr tablet; Commonly known as: Mucinex   * guaiFENesin 100 mg/5 mL syrup; Commonly known as: Robitussin   haloperidol 0.5 mg tablet; Commonly known as: Haldol   hydrocortisone 2.5 % cream   * ketoconazole 2 % cream; Commonly known as: NIZOral   * ketoconazole 2 % shampoo; Commonly known as: NIZOral   lactobacillus acidophilus & bulgar 1 million cell chewable tablet;   Commonly known as: Lactinex   latanoprost 0.005 % ophthalmic solution; Commonly known as: Xalatan   lidocaine 2 % solution; Commonly known as: Xylocaine   losartan 50 mg tablet; Commonly known as: Cozaar   metFORMIN 1,000 mg tablet; Commonly known as: Glucophage   methotrexate 2.5 mg tablet; Commonly known as: Trexall   multivitamin with minerals tablet   neomycin-bacitracnZn-polymyxnB ointment; Commonly known as: Neosporin   Original   OLANZapine 20 mg tablet; Commonly known as: ZyPREXA   OSTEO BI-FLEX TRIPLE STRENGTH ORAL   polyethylene glycol 17 gram packet; Commonly known as: Glycolax, Miralax   predniSONE 2.5 mg tablet; Commonly known as: Deltasone   venlafaxine XR 37.5 mg 24 hr capsule; Commonly known as: Effexor-XR   zinc sulfate 220 (50 Zn) MG capsule; Commonly known as: Zincate  * This list has 6 medication(s) that are the same as other medications    prescribed for you. Read the directions carefully, and ask your doctor or   other care provider to review them with you.       Outpatient Follow-Up  Future Appointments   Date Time Provider Department Center   3/6/2024  2:45 PM Slim Dover MD [id number]       Xavi Elam,

## 2024-02-19 ENCOUNTER — NURSING HOME VISIT (OUTPATIENT)
Dept: POST ACUTE CARE | Facility: EXTERNAL LOCATION | Age: 84
End: 2024-02-19
Payer: COMMERCIAL

## 2024-02-19 DIAGNOSIS — S42.412A LEFT SUPRACONDYLAR HUMERUS FRACTURE, CLOSED, INITIAL ENCOUNTER: Primary | ICD-10-CM

## 2024-02-19 DIAGNOSIS — F32.A DEPRESSION, UNSPECIFIED DEPRESSION TYPE: ICD-10-CM

## 2024-02-19 DIAGNOSIS — F25.9 SCHIZOAFFECTIVE DISORDER, UNSPECIFIED TYPE (MULTI): ICD-10-CM

## 2024-02-19 DIAGNOSIS — S32.031D CLOSED STABLE BURST FRACTURE OF THIRD LUMBAR VERTEBRA WITH ROUTINE HEALING: ICD-10-CM

## 2024-02-19 DIAGNOSIS — E11.9 TYPE 2 DIABETES MELLITUS WITHOUT COMPLICATION, WITH LONG-TERM CURRENT USE OF INSULIN (MULTI): ICD-10-CM

## 2024-02-19 DIAGNOSIS — Z79.4 TYPE 2 DIABETES MELLITUS WITHOUT COMPLICATION, WITH LONG-TERM CURRENT USE OF INSULIN (MULTI): ICD-10-CM

## 2024-02-19 DIAGNOSIS — I10 HYPERTENSION, UNSPECIFIED TYPE: ICD-10-CM

## 2024-02-19 DIAGNOSIS — K21.9 GASTROESOPHAGEAL REFLUX DISEASE, UNSPECIFIED WHETHER ESOPHAGITIS PRESENT: ICD-10-CM

## 2024-02-19 PROBLEM — R53.1 WEAKNESS: Status: ACTIVE | Noted: 2024-02-19

## 2024-02-19 PROCEDURE — 99305 1ST NF CARE MODERATE MDM 35: CPT | Performed by: INTERNAL MEDICINE

## 2024-02-19 NOTE — LETTER
Patient: Jennifer Mancini  : 1940    Encounter Date: 2024    HISTORY & PHYSICAL    Subjective  Chief complaint: Jennifer Mancini is a 83 y.o. female who is being seen and evaluated for multiple medical problems.  Patient admitted to SNF for therapy due to weakness after recent hospitalization.    HPI:  HPI  Patient presented to ED following a fall out of bed with left-sided pain from assisted living facility.  Patient did strike her face without loss of consciousness.  Imaging in ED did reveal left humerus fracture, L3 burst fracture and nasal bone fracture.  Ortho trauma\spine consulted on patient, recommending additional imaging.  Patient was taken to OR for operative fixation of humerus.  Patient remains nonweightbearing to left upper extremity and will remain in splint until follow-up.  PT OT evaluated patient recommending moderate intensity level care.  Patient is also to follow-up outpatient with orthospine and OMFS clinic for nasal bone fracture.  Patient was hemodynamically stable to discharge to SNF for continued medical management and therapy.  Patient was seen and examined at bedside, appears to be in no apparent distress.  Afebrile.  Past Medical History:   Diagnosis Date   • HTN (hypertension) 2024   • Hypertensive heart disease without heart failure 2019    Hypertensive heart disease without CHF   • Other nondisplaced fracture of seventh cervical vertebra, subsequent encounter for fracture with routine healing 2019    Other closed nondisplaced fracture of seventh cervical vertebra with routine healing, subsequent encounter   • Other nondisplaced fracture of sixth cervical vertebra, subsequent encounter for fracture with routine healing 2019    Other closed nondisplaced fracture of sixth cervical vertebra with routine healing, subsequent encounter   • Personal history of other diseases of the female genital tract 2019    History of uterine prolapse   • Personal  history of other endocrine, nutritional and metabolic disease 12/17/2019    History of hyperlipidemia   • Personal history of other mental and behavioral disorders 06/14/2021    History of schizoaffective disorder   • Personal history of other specified conditions 06/14/2021    History of chronic pain   • Schizoaffective disorder, bipolar type (CMS/[facility]) 12/17/2019    Schizoaffective disorder, bipolar type   • Unspecified fracture of left femur, initial encounter for closed fracture (CMS/[facility]) 06/14/2021    Fracture of left femur       Past Surgical History:   Procedure Laterality Date   • CT ANGIO NECK  12/13/2019    CT NECK ANGIO W AND WO IV CONTRAST 12/13/2019 [facility]   • OTHER SURGICAL HISTORY  12/17/2019    Tonsillectomy   • OTHER SURGICAL HISTORY  12/17/2019    Rotator cuff repair   • OTHER SURGICAL HISTORY  12/17/2019    Wrist surgery   • OTHER SURGICAL HISTORY  12/17/2019    Uterine surgery       Family History   Problem Relation Name Age of Onset   • No Known Problems Mother     • No Known Problems Father         Social History     Socioeconomic History   • Marital status:      Spouse name: Not on file   • Number of children: Not on file   • Years of education: Not on file   • Highest education level: Not on file   Occupational History   • Not on file   Tobacco Use   • Smoking status: Former     Years: 1     Types: Cigarettes   • Smokeless tobacco: Not on file   Substance and Sexual Activity   • Alcohol use: Not on file   • Drug use: Not on file   • Sexual activity: Not on file   Other Topics Concern   • Not on file   Social History Narrative   • Not on file     Social Determinants of Health     Financial Resource Strain: Patient Declined (2/9/2024)    Overall Financial Resource Strain (CARDIA)    • Difficulty of Paying Living Expenses: Patient declined   Food Insecurity: Not on file   Transportation Needs: No Transportation Needs (2/9/2024)    PRAPARE - Transportation    • Lack of  Transportation (Medical): No    • Lack of Transportation (Non-Medical): No   Physical Activity: Not on file   Stress: Not on file   Social Connections: Not on file   Intimate Partner Violence: Not on file   Housing Stability: Low Risk  (2/9/2024)    Housing Stability Vital Sign    • Unable to Pay for Housing in the Last Year: No    • Number of Places Lived in the Last Year: 1    • Unstable Housing in the Last Year: No       Vital signs: 163/87, 97.8, 60, 17, 96%, blood sugar 171    Objective  Physical Exam  Constitutional:       General: She is not in acute distress.  Eyes:      Extraocular Movements: Extraocular movements intact.   Cardiovascular:      Rate and Rhythm: Normal rate and regular rhythm.   Pulmonary:      Effort: Pulmonary effort is normal.      Breath sounds: Normal breath sounds.   Abdominal:      General: Bowel sounds are normal.      Palpations: Abdomen is soft.   Musculoskeletal:      Cervical back: Neck supple.      Right lower leg: No edema.      Left lower leg: No edema.      Comments: Left upper extremity splint   Neurological:      Mental Status: She is alert.   Psychiatric:         Mood and Affect: Mood normal.         Behavior: Behavior is cooperative.         Assessment/Plan  Problem List Items Addressed This Visit       Left supracondylar humerus fracture, closed, initial encounter - Primary     Status post ORIF  Pain control  Therapy  F/u outpatient Ortho  Nonweightbearing to left upper extremity  DVT prophylaxis         HTN (hypertension)     Monitor blood pressure         Closed stable burst fracture of third lumbar vertebra with routine healing     F/u outpatient with Ortho surgery  T/L spine precautions.  Therapy  Pain control         GERD (gastroesophageal reflux disease)     Monitor GI symptoms  PPI         Type 2 diabetes mellitus (CMS/[facility])     Glucoscans  Insulin lispro sliding scale   NPH insulin  Sitagliptin  Metformin  LCS diet         Depression     Monitor mood and  behaviors.  Venlafaxine         Schizoaffective disorder (CMS/[facility])     Monitor  Olanzapine  Haloperidol          Hospital records reviewed  Medications, treatments, and labs reviewed  Continue medications and treatments as listed in EMR  Discussed with nursing and therapy      Scribe Attestation  IJulianne Scribe   attest that this documentation has been prepared under the direction and in the presence of Clari Santillan MD    Provider Attestation - Scribe documentation  All medical record entries made by the Scribe were at my direction and personally dictated by me. I have reviewed the chart and agree that the record accurately reflects my personal performance of the history, physical exam, discussion and plan.   Clari Santillan MD      Electronically Signed By: Clari Santillan MD   2/19/24  6:26 PM

## 2024-02-19 NOTE — PROGRESS NOTES
HISTORY & PHYSICAL    Subjective   Chief complaint: Jennifer Mancini is a 83 y.o. female who is being seen and evaluated for multiple medical problems.  Patient admitted to SNF for therapy due to weakness after recent hospitalization.    HPI:  HPI  Patient presented to ED following a fall out of bed with left-sided pain from assisted living facility.  Patient did strike her face without loss of consciousness.  Imaging in ED did reveal left humerus fracture, L3 burst fracture and nasal bone fracture.  Ortho trauma\spine consulted on patient, recommending additional imaging.  Patient was taken to OR for operative fixation of humerus.  Patient remains nonweightbearing to left upper extremity and will remain in splint until follow-up.  PT OT evaluated patient recommending moderate intensity level care.  Patient is also to follow-up outpatient with orthospine and OMFS clinic for nasal bone fracture.  Patient was hemodynamically stable to discharge to SNF for continued medical management and therapy.  Patient was seen and examined at bedside, appears to be in no apparent distress.  Afebrile.  Past Medical History:   Diagnosis Date    HTN (hypertension) 2/9/2024    Hypertensive heart disease without heart failure 12/17/2019    Hypertensive heart disease without CHF    Other nondisplaced fracture of seventh cervical vertebra, subsequent encounter for fracture with routine healing 12/17/2019    Other closed nondisplaced fracture of seventh cervical vertebra with routine healing, subsequent encounter    Other nondisplaced fracture of sixth cervical vertebra, subsequent encounter for fracture with routine healing 12/17/2019    Other closed nondisplaced fracture of sixth cervical vertebra with routine healing, subsequent encounter    Personal history of other diseases of the female genital tract 12/17/2019    History of uterine prolapse    Personal history of other endocrine, nutritional and metabolic disease 12/17/2019     History of hyperlipidemia    Personal history of other mental and behavioral disorders 06/14/2021    History of schizoaffective disorder    Personal history of other specified conditions 06/14/2021    History of chronic pain    Schizoaffective disorder, bipolar type (CMS/[facility]) 12/17/2019    Schizoaffective disorder, bipolar type    Unspecified fracture of left femur, initial encounter for closed fracture (CMS/[facility]) 06/14/2021    Fracture of left femur       Past Surgical History:   Procedure Laterality Date    CT ANGIO NECK  12/13/2019    CT NECK ANGIO W AND WO IV CONTRAST 12/13/2019 [facility]    OTHER SURGICAL HISTORY  12/17/2019    Tonsillectomy    OTHER SURGICAL HISTORY  12/17/2019    Rotator cuff repair    OTHER SURGICAL HISTORY  12/17/2019    Wrist surgery    OTHER SURGICAL HISTORY  12/17/2019    Uterine surgery       Family History   Problem Relation Name Age of Onset    No Known Problems Mother      No Known Problems Father         Social History     Socioeconomic History    Marital status:      Spouse name: Not on file    Number of children: Not on file    Years of education: Not on file    Highest education level: Not on file   Occupational History    Not on file   Tobacco Use    Smoking status: Former     Years: 1     Types: Cigarettes    Smokeless tobacco: Not on file   Substance and Sexual Activity    Alcohol use: Not on file    Drug use: Not on file    Sexual activity: Not on file   Other Topics Concern    Not on file   Social History Narrative    Not on file     Social Determinants of Health     Financial Resource Strain: Patient Declined (2/9/2024)    Overall Financial Resource Strain (CARDIA)     Difficulty of Paying Living Expenses: Patient declined   Food Insecurity: Not on file   Transportation Needs: No Transportation Needs (2/9/2024)    PRAPARE - Transportation     Lack of Transportation (Medical): No     Lack of Transportation (Non-Medical): No   Physical Activity: Not on file    Stress: Not on file   Social Connections: Not on file   Intimate Partner Violence: Not on file   Housing Stability: Low Risk  (2/9/2024)    Housing Stability Vital Sign     Unable to Pay for Housing in the Last Year: No     Number of Places Lived in the Last Year: 1     Unstable Housing in the Last Year: No       Vital signs: 163/87, 97.8, 60, 17, 96%, blood sugar 171    Objective   Physical Exam  Constitutional:       General: She is not in acute distress.  Eyes:      Extraocular Movements: Extraocular movements intact.   Cardiovascular:      Rate and Rhythm: Normal rate and regular rhythm.   Pulmonary:      Effort: Pulmonary effort is normal.      Breath sounds: Normal breath sounds.   Abdominal:      General: Bowel sounds are normal.      Palpations: Abdomen is soft.   Musculoskeletal:      Cervical back: Neck supple.      Right lower leg: No edema.      Left lower leg: No edema.      Comments: Left upper extremity splint   Neurological:      Mental Status: She is alert.   Psychiatric:         Mood and Affect: Mood normal.         Behavior: Behavior is cooperative.         Assessment/Plan   Problem List Items Addressed This Visit       Left supracondylar humerus fracture, closed, initial encounter - Primary     Status post ORIF  Pain control  Therapy  F/u outpatient Ortho  Nonweightbearing to left upper extremity  DVT prophylaxis         HTN (hypertension)     Monitor blood pressure         Closed stable burst fracture of third lumbar vertebra with routine healing     F/u outpatient with Ortho surgery  T/L spine precautions.  Therapy  Pain control         GERD (gastroesophageal reflux disease)     Monitor GI symptoms  PPI         Type 2 diabetes mellitus (CMS/[facility])     Glucoscans  Insulin lispro sliding scale   NPH insulin  Sitagliptin  Metformin  LCS diet         Depression     Monitor mood and behaviors.  Venlafaxine         Schizoaffective disorder (CMS/[facility])     Monitor  Olanzapine  Haloperidol           Hospital records reviewed  Medications, treatments, and labs reviewed  Continue medications and treatments as listed in EMR  Discussed with nursing and therapy      Scribe Attestation  I, Michael Burnham   attest that this documentation has been prepared under the direction and in the presence of Clari Santillan MD    Provider Attestation - Scribe documentation  All medical record entries made by the Scribe were at my direction and personally dictated by me. I have reviewed the chart and agree that the record accurately reflects my personal performance of the history, physical exam, discussion and plan.   Clari Santillan MD

## 2024-02-19 NOTE — ASSESSMENT & PLAN NOTE
Status post ORIF  Pain control  Therapy  F/u outpatient Ortho  Nonweightbearing to left upper extremity  DVT prophylaxis

## 2024-02-20 ENCOUNTER — NURSING HOME VISIT (OUTPATIENT)
Dept: POST ACUTE CARE | Facility: EXTERNAL LOCATION | Age: 84
End: 2024-02-20
Payer: COMMERCIAL

## 2024-02-20 DIAGNOSIS — Z79.4 TYPE 2 DIABETES MELLITUS WITHOUT COMPLICATION, WITH LONG-TERM CURRENT USE OF INSULIN (MULTI): ICD-10-CM

## 2024-02-20 DIAGNOSIS — F32.A DEPRESSION, UNSPECIFIED DEPRESSION TYPE: ICD-10-CM

## 2024-02-20 DIAGNOSIS — R35.0 URINARY FREQUENCY: ICD-10-CM

## 2024-02-20 DIAGNOSIS — E11.9 TYPE 2 DIABETES MELLITUS WITHOUT COMPLICATION, WITH LONG-TERM CURRENT USE OF INSULIN (MULTI): ICD-10-CM

## 2024-02-20 DIAGNOSIS — F25.9 SCHIZOAFFECTIVE DISORDER, UNSPECIFIED TYPE (MULTI): ICD-10-CM

## 2024-02-20 DIAGNOSIS — I10 HYPERTENSION, UNSPECIFIED TYPE: ICD-10-CM

## 2024-02-20 DIAGNOSIS — R53.1 WEAKNESS: ICD-10-CM

## 2024-02-20 DIAGNOSIS — K21.9 GASTROESOPHAGEAL REFLUX DISEASE, UNSPECIFIED WHETHER ESOPHAGITIS PRESENT: ICD-10-CM

## 2024-02-20 PROCEDURE — 99308 SBSQ NF CARE LOW MDM 20: CPT | Performed by: INTERNAL MEDICINE

## 2024-02-20 NOTE — PROGRESS NOTES
PROGRESS NOTE    Subjective   Chief complaint: Jennifer Mancini is a 83 y.o. female who is an acute skilled patient being seen and evaluated for weakness    HPI:  Patient admitted to SNF for therapy d/t weakness after recent hospitalization. Patient requires assist with ADLs and transfers. She has c\o frequency. Denies burning, foul odor, remains afebrile. No other concerns.       Objective   Vital signs: 157/85,66,97%,     Physical Exam  Constitutional:       General: She is not in acute distress.  Eyes:      Extraocular Movements: Extraocular movements intact.   Cardiovascular:      Rate and Rhythm: Normal rate and regular rhythm.   Pulmonary:      Effort: Pulmonary effort is normal.      Breath sounds: Normal breath sounds.   Abdominal:      General: Bowel sounds are normal.      Palpations: Abdomen is soft.   Musculoskeletal:      Cervical back: Neck supple.      Right lower leg: No edema.      Left lower leg: No edema.      Comments: Left upper extremity splint   Neurological:      Mental Status: She is alert.   Psychiatric:         Mood and Affect: Mood normal.         Behavior: Behavior is cooperative.         Assessment/Plan   Problem List Items Addressed This Visit       HTN (hypertension)     Monitor blood pressure         GERD (gastroesophageal reflux disease)     Monitor GI symptoms  PPI         Type 2 diabetes mellitus (CMS/[facility])     Glucoscans  Insulin lispro sliding scale   NPH insulin  Sitagliptin  Metformin  LCS diet         Weakness     Work with therapy to reach goals         Depression     Monitor mood and behaviors.  Venlafaxine         Schizoaffective disorder (CMS/[facility])     Monitor  Olanzapine  Haloperidol         Urinary frequency     UA\CS  Monitor           Medications, treatments, and labs reviewed  Continue medications and treatments as listed in EMR    Scribe Attestation  IVAN, Michael Tucker   attest that this documentation has been prepared under the direction and in the  presence of Clari Santillan MD.     Provider Attestation - Scribe documentation  All medical record entries made by the Scribe were at my direction and personally dictated by me. I have reviewed the chart and agree that the record accurately reflects my personal performance of the history, physical exam, discussion and plan.   Clari Santillan MD

## 2024-02-20 NOTE — LETTER
Patient: Jennifer Mancini  : 1940    Encounter Date: 2024    PROGRESS NOTE    Subjective  Chief complaint: Jennifer Mancini is a 83 y.o. female who is an acute skilled patient being seen and evaluated for weakness    HPI:  Patient admitted to SNF for therapy d/t weakness after recent hospitalization. Patient requires assist with ADLs and transfers. She has c\o frequency. Denies burning, foul odor, remains afebrile. No other concerns.       Objective  Vital signs: 157/85,66,97%,     Physical Exam  Constitutional:       General: She is not in acute distress.  Eyes:      Extraocular Movements: Extraocular movements intact.   Cardiovascular:      Rate and Rhythm: Normal rate and regular rhythm.   Pulmonary:      Effort: Pulmonary effort is normal.      Breath sounds: Normal breath sounds.   Abdominal:      General: Bowel sounds are normal.      Palpations: Abdomen is soft.   Musculoskeletal:      Cervical back: Neck supple.      Right lower leg: No edema.      Left lower leg: No edema.      Comments: Left upper extremity splint   Neurological:      Mental Status: She is alert.   Psychiatric:         Mood and Affect: Mood normal.         Behavior: Behavior is cooperative.         Assessment/Plan  Problem List Items Addressed This Visit       HTN (hypertension)     Monitor blood pressure         GERD (gastroesophageal reflux disease)     Monitor GI symptoms  PPI         Type 2 diabetes mellitus (CMS/[facility])     Glucoscans  Insulin lispro sliding scale   NPH insulin  Sitagliptin  Metformin  LCS diet         Weakness     Work with therapy to reach goals         Depression     Monitor mood and behaviors.  Venlafaxine         Schizoaffective disorder (CMS/[facility])     Monitor  Olanzapine  Haloperidol         Urinary frequency     UA\CS  Monitor           Medications, treatments, and labs reviewed  Continue medications and treatments as listed in EMR    Scribe Attestation  I, Michael Tucker   attest that  this documentation has been prepared under the direction and in the presence of Clari Santillan MD.     Provider Attestation - Scribe documentation  All medical record entries made by the Scribe were at my direction and personally dictated by me. I have reviewed the chart and agree that the record accurately reflects my personal performance of the history, physical exam, discussion and plan.   Clari Santillan MD      Electronically Signed By: Clari Santillan MD   2/20/24  5:12 PM

## 2024-02-21 ENCOUNTER — NURSING HOME VISIT (OUTPATIENT)
Dept: POST ACUTE CARE | Facility: EXTERNAL LOCATION | Age: 84
End: 2024-02-21
Payer: COMMERCIAL

## 2024-02-21 DIAGNOSIS — E11.9 TYPE 2 DIABETES MELLITUS WITHOUT COMPLICATION, WITH LONG-TERM CURRENT USE OF INSULIN (MULTI): ICD-10-CM

## 2024-02-21 DIAGNOSIS — I10 HYPERTENSION, UNSPECIFIED TYPE: ICD-10-CM

## 2024-02-21 DIAGNOSIS — Z79.4 TYPE 2 DIABETES MELLITUS WITHOUT COMPLICATION, WITH LONG-TERM CURRENT USE OF INSULIN (MULTI): ICD-10-CM

## 2024-02-21 DIAGNOSIS — S42.412A LEFT SUPRACONDYLAR HUMERUS FRACTURE, CLOSED, INITIAL ENCOUNTER: ICD-10-CM

## 2024-02-21 DIAGNOSIS — R53.1 WEAKNESS: Primary | ICD-10-CM

## 2024-02-21 PROCEDURE — 99309 SBSQ NF CARE MODERATE MDM 30: CPT | Performed by: NURSE PRACTITIONER

## 2024-02-21 NOTE — LETTER
Patient: Jennifer Mancini  : 1940    Encounter Date: 2024    PROGRESS NOTE    Subjective  Chief complaint: Jennifer Mancini is a 83 y.o. female who is an acute skilled patient being seen and evaluated for weakness    HPI:  HPI  Patient is working in therapy following a fall with resultant left humerus fracture.  Patient is ambulating with min assist and walker for 10 feet.  Patient is requiring max assistance for ADL tasks.  Patient transferring requiring min assist.  Patient was seen and examined at bedside, appears to be in no acute distress.  Denies chest pain or shortness of breath.  Denies nausea or vomiting.  Patient stable, no new concerns at this time.    Objective  Vital signs: 103/71, 97.1, 86, 18, blood sugar 115, 97%    Physical Exam  Constitutional:       General: She is not in acute distress.  Eyes:      Extraocular Movements: Extraocular movements intact.   Cardiovascular:      Rate and Rhythm: Normal rate and regular rhythm.   Pulmonary:      Effort: Pulmonary effort is normal.      Breath sounds: Normal breath sounds.   Abdominal:      General: Bowel sounds are normal.      Palpations: Abdomen is soft.   Musculoskeletal:      Cervical back: Neck supple.      Right lower leg: No edema.      Left lower leg: No edema.      Comments: Left upper extremity splint   Skin:     Comments: Hematoma to forehead   Neurological:      Mental Status: She is alert.   Psychiatric:         Mood and Affect: Mood normal.         Behavior: Behavior is cooperative.         Assessment/Plan  Problem List Items Addressed This Visit       HTN (hypertension)     Monitor blood pressure  BP at goal         Type 2 diabetes mellitus (CMS/[facility])     FBG at goal  Continue to monitor glucoscan         Weakness - Primary     Continue progressing towards goals         Left supracondylar humerus fracture, closed, initial encounter     Status post ORIF  Pain control  Therapy  F/u outpatient Ortho  Nonweightbearing to left upper  extremity  DVT prophylaxis          Medications, treatments, and labs reviewed  Continue medications and treatments as listed in EMR      Scribe Attestation  IJulianne Scribe   attest that this documentation has been prepared under the direction and in the presence of JESSE Serrano    Provider Attestation - Scribe documentation  All medical record entries made by the Scribe were at my direction and personally dictated by me. I have reviewed the chart and agree that the record accurately reflects my personal performance of the history, physical exam, discussion and plan.   JESSE Serrano        Electronically Signed By: JESSE Serrano   2/27/24  7:40 PM

## 2024-02-22 ENCOUNTER — NURSING HOME VISIT (OUTPATIENT)
Dept: POST ACUTE CARE | Facility: EXTERNAL LOCATION | Age: 84
End: 2024-02-22
Payer: COMMERCIAL

## 2024-02-22 DIAGNOSIS — S32.031D CLOSED STABLE BURST FRACTURE OF THIRD LUMBAR VERTEBRA WITH ROUTINE HEALING: ICD-10-CM

## 2024-02-22 DIAGNOSIS — Z79.4 TYPE 2 DIABETES MELLITUS WITHOUT COMPLICATION, WITH LONG-TERM CURRENT USE OF INSULIN (MULTI): ICD-10-CM

## 2024-02-22 DIAGNOSIS — E11.9 TYPE 2 DIABETES MELLITUS WITHOUT COMPLICATION, WITH LONG-TERM CURRENT USE OF INSULIN (MULTI): ICD-10-CM

## 2024-02-22 DIAGNOSIS — R53.1 WEAKNESS: Primary | ICD-10-CM

## 2024-02-22 DIAGNOSIS — S42.412A LEFT SUPRACONDYLAR HUMERUS FRACTURE, CLOSED, INITIAL ENCOUNTER: ICD-10-CM

## 2024-02-22 DIAGNOSIS — I10 HYPERTENSION, UNSPECIFIED TYPE: ICD-10-CM

## 2024-02-22 DIAGNOSIS — R35.0 URINARY FREQUENCY: ICD-10-CM

## 2024-02-22 PROCEDURE — 99308 SBSQ NF CARE LOW MDM 20: CPT | Performed by: INTERNAL MEDICINE

## 2024-02-22 NOTE — PROGRESS NOTES
PROGRESS NOTE    Subjective   Chief complaint: Jennifer Mancini is a 83 y.o. female who is an acute skilled patient being seen and evaluated for weakness    HPI:  HPI  Patient is continue to work in therapy following a left humerus fracture resulted from a fall.  Patient is nonweightbearing to left upper extremity.  Therapy focusing on gait training to correct sequencing and hand and foot placement during gait with assistive device.  Patient is also working on transfer training to increase functional task performance, requiring min assist to CGA to complete transfers.  Patient was seen and examined at the bedside.  Patient had complaints of urinary frequency, UA C&S is pending at this time.  Denies chest pain or shortness of breath.  Afebrile at this time.    Objective   Vital signs: 103/71, 97.1, 86, 18, blood sugar 127, 97%    Physical Exam  Constitutional:       General: She is not in acute distress.  Eyes:      Extraocular Movements: Extraocular movements intact.   Cardiovascular:      Rate and Rhythm: Normal rate and regular rhythm.   Pulmonary:      Effort: Pulmonary effort is normal.      Breath sounds: Normal breath sounds.   Abdominal:      General: Bowel sounds are normal.      Palpations: Abdomen is soft.   Musculoskeletal:      Cervical back: Neck supple.      Right lower leg: No edema.      Left lower leg: No edema.      Comments: Left upper extremity splint   Neurological:      Mental Status: She is alert.      Motor: Weakness present.   Psychiatric:         Mood and Affect: Mood normal.         Behavior: Behavior is cooperative.         Assessment/Plan   Problem List Items Addressed This Visit       Left supracondylar humerus fracture, closed, initial encounter     Status post ORIF  Pain control  Therapy  F/u outpatient Ortho  Nonweightbearing to left upper extremity  DVT prophylaxis         HTN (hypertension)     Monitor blood pressure  BP at goal         Closed stable burst fracture of third lumbar  vertebra with routine healing     F/u outpatient with Ortho surgery  T/L spine precautions.  Therapy  Pain control         Type 2 diabetes mellitus (CMS/[facility])     Glucoscans  Insulin lispro sliding scale   NPH insulin  Sitagliptin  Metformin  LCS diet         Weakness - Primary     Continue working towards goals in therapy following fractures         Urinary frequency     UA C&S is pending          Medications, treatments, and labs reviewed  Continue medications and treatments as listed in EMR      Scribe Attestation  I, Chanelle Burnhamibe   attest that this documentation has been prepared under the direction and in the presence of Clari Santillan MD    Provider Attestation - Scribe documentation  All medical record entries made by the Scribe were at my direction and personally dictated by me. I have reviewed the chart and agree that the record accurately reflects my personal performance of the history, physical exam, discussion and plan.   Clari Santillan MD

## 2024-02-22 NOTE — LETTER
Patient: Jennifer Mancini  : 1940    Encounter Date: 2024    PROGRESS NOTE    Subjective  Chief complaint: Jennifer Mancini is a 83 y.o. female who is an acute skilled patient being seen and evaluated for weakness    HPI:  HPI  Patient is continue to work in therapy following a left humerus fracture resulted from a fall.  Patient is nonweightbearing to left upper extremity.  Therapy focusing on gait training to correct sequencing and hand and foot placement during gait with assistive device.  Patient is also working on transfer training to increase functional task performance, requiring min assist to CGA to complete transfers.  Patient was seen and examined at the bedside.  Patient had complaints of urinary frequency, UA C&S is pending at this time.  Denies chest pain or shortness of breath.  Afebrile at this time.    Objective  Vital signs: 103/71, 97.1, 86, 18, blood sugar 127, 97%    Physical Exam  Constitutional:       General: She is not in acute distress.  Eyes:      Extraocular Movements: Extraocular movements intact.   Cardiovascular:      Rate and Rhythm: Normal rate and regular rhythm.   Pulmonary:      Effort: Pulmonary effort is normal.      Breath sounds: Normal breath sounds.   Abdominal:      General: Bowel sounds are normal.      Palpations: Abdomen is soft.   Musculoskeletal:      Cervical back: Neck supple.      Right lower leg: No edema.      Left lower leg: No edema.      Comments: Left upper extremity splint   Neurological:      Mental Status: She is alert.      Motor: Weakness present.   Psychiatric:         Mood and Affect: Mood normal.         Behavior: Behavior is cooperative.         Assessment/Plan  Problem List Items Addressed This Visit       Left supracondylar humerus fracture, closed, initial encounter     Status post ORIF  Pain control  Therapy  F/u outpatient Ortho  Nonweightbearing to left upper extremity  DVT prophylaxis         HTN (hypertension)     Monitor blood  pressure  BP at goal         Closed stable burst fracture of third lumbar vertebra with routine healing     F/u outpatient with Ortho surgery  T/L spine precautions.  Therapy  Pain control         Type 2 diabetes mellitus (CMS/[facility])     Glucoscans  Insulin lispro sliding scale   NPH insulin  Sitagliptin  Metformin  LCS diet         Weakness - Primary     Continue working towards goals in therapy following fractures         Urinary frequency     UA C&S is pending          Medications, treatments, and labs reviewed  Continue medications and treatments as listed in EMR      Scribe Attestation  IJulianne Scribe   attest that this documentation has been prepared under the direction and in the presence of Clari Santillan MD    Provider Attestation - Scribe documentation  All medical record entries made by the Scribe were at my direction and personally dictated by me. I have reviewed the chart and agree that the record accurately reflects my personal performance of the history, physical exam, discussion and plan.   Clari Santillan MD        Electronically Signed By: Clari Santillan MD   2/22/24  4:11 PM

## 2024-02-23 ENCOUNTER — NURSING HOME VISIT (OUTPATIENT)
Dept: POST ACUTE CARE | Facility: EXTERNAL LOCATION | Age: 84
End: 2024-02-23
Payer: COMMERCIAL

## 2024-02-23 DIAGNOSIS — R53.1 WEAKNESS: Primary | ICD-10-CM

## 2024-02-23 DIAGNOSIS — K21.9 GASTROESOPHAGEAL REFLUX DISEASE, UNSPECIFIED WHETHER ESOPHAGITIS PRESENT: ICD-10-CM

## 2024-02-23 DIAGNOSIS — I10 HYPERTENSION, UNSPECIFIED TYPE: ICD-10-CM

## 2024-02-23 DIAGNOSIS — S42.412A LEFT SUPRACONDYLAR HUMERUS FRACTURE, CLOSED, INITIAL ENCOUNTER: ICD-10-CM

## 2024-02-23 PROCEDURE — 99308 SBSQ NF CARE LOW MDM 20: CPT | Performed by: NURSE PRACTITIONER

## 2024-02-23 NOTE — LETTER
Patient: Jennifer Mancini  : 1940    Encounter Date: 2024    PROGRESS NOTE    Subjective  Chief complaint: Jennifer Mancini is a 83 y.o. female who is an acute skilled patient being seen and evaluated for weakness    HPI:  HPI  Patient is working in therapy following fall with left humerus fracture.  Patient is ambulating 10 feet with walker and min assist.  Patient does require max assist for ADL tasks.  Patient was seen and examined at bedside.  Patient is reported to be feeling better.  Denies chest pain or shortness of breath.  Afebrile.  Pain controlled on current regime.  Patient stable no new complaints at this time.    Objective  Vital signs: 132/70, 97.2, 74, 18, blood sugar 118, 94%    Physical Exam  Constitutional:       General: She is not in acute distress.  Eyes:      Extraocular Movements: Extraocular movements intact.   Cardiovascular:      Rate and Rhythm: Normal rate and regular rhythm.   Pulmonary:      Effort: Pulmonary effort is normal.      Breath sounds: Normal breath sounds.   Abdominal:      General: Bowel sounds are normal.      Palpations: Abdomen is soft.   Musculoskeletal:      Cervical back: Neck supple.      Right lower leg: No edema.      Left lower leg: No edema.      Comments: Left upper extremity splint   Skin:     Comments: Hematoma to forehead   Neurological:      Mental Status: She is alert.      Motor: Weakness present.   Psychiatric:         Mood and Affect: Mood normal.         Behavior: Behavior is cooperative.         Assessment/Plan  Problem List Items Addressed This Visit       Left supracondylar humerus fracture, closed, initial encounter     Status post ORIF  Pain control  Therapy  F/u outpatient Ortho  Nonweightbearing to left upper extremity  DVT prophylaxis         HTN (hypertension)     Monitor blood pressure  BP at goal         GERD (gastroesophageal reflux disease)     Monitor GI symptoms  PPI  GERD symptoms controlled         Weakness - Primary      Continue to work towards goals established in therapy          Medications, treatments, and labs reviewed  Continue medications and treatments as listed in EMR      Scribe Attestation  I, Michael Burnham   attest that this documentation has been prepared under the direction and in the presence of JESSE Serrano    Provider Attestation - Scribe documentation  All medical record entries made by the Scribe were at my direction and personally dictated by me. I have reviewed the chart and agree that the record accurately reflects my personal performance of the history, physical exam, discussion and plan.   JESSE Serrano        Electronically Signed By: JESSE Serrano   2/29/24 12:20 AM

## 2024-02-26 ENCOUNTER — NURSING HOME VISIT (OUTPATIENT)
Dept: POST ACUTE CARE | Facility: EXTERNAL LOCATION | Age: 84
End: 2024-02-26
Payer: COMMERCIAL

## 2024-02-26 DIAGNOSIS — F25.9 SCHIZOAFFECTIVE DISORDER, UNSPECIFIED TYPE (MULTI): ICD-10-CM

## 2024-02-26 DIAGNOSIS — R53.1 WEAKNESS: ICD-10-CM

## 2024-02-26 DIAGNOSIS — S42.412A LEFT SUPRACONDYLAR HUMERUS FRACTURE, CLOSED, INITIAL ENCOUNTER: ICD-10-CM

## 2024-02-26 DIAGNOSIS — K21.9 GASTROESOPHAGEAL REFLUX DISEASE, UNSPECIFIED WHETHER ESOPHAGITIS PRESENT: ICD-10-CM

## 2024-02-26 DIAGNOSIS — S32.031D CLOSED STABLE BURST FRACTURE OF THIRD LUMBAR VERTEBRA WITH ROUTINE HEALING: ICD-10-CM

## 2024-02-26 DIAGNOSIS — F32.A DEPRESSION, UNSPECIFIED DEPRESSION TYPE: ICD-10-CM

## 2024-02-26 DIAGNOSIS — Z79.4 TYPE 2 DIABETES MELLITUS WITHOUT COMPLICATION, WITH LONG-TERM CURRENT USE OF INSULIN (MULTI): ICD-10-CM

## 2024-02-26 DIAGNOSIS — I10 HYPERTENSION, UNSPECIFIED TYPE: ICD-10-CM

## 2024-02-26 DIAGNOSIS — E11.9 TYPE 2 DIABETES MELLITUS WITHOUT COMPLICATION, WITH LONG-TERM CURRENT USE OF INSULIN (MULTI): ICD-10-CM

## 2024-02-26 PROCEDURE — 99308 SBSQ NF CARE LOW MDM 20: CPT | Performed by: INTERNAL MEDICINE

## 2024-02-26 NOTE — PROGRESS NOTES
PROGRESS NOTE    Subjective   Chief complaint: Jennifer Mancini is a 83 y.o. female who is an acute skilled patient being seen and evaluated for weakness    HPI:  Therapy has been working with the patient to improve strength and endurance with ADLs, transfers, and mobility.  Patient continues to work toward goals. She ambulates 10' with WW at min assist. She requires min assist with transfers and max assist with ADLs. Patient is stable and has no new complaints.  Nursing staff voices no new concerns today.      Objective   Vital signs: 136/76,76,94%,     Physical Exam  Constitutional:       General: She is not in acute distress.  Eyes:      Extraocular Movements: Extraocular movements intact.   Cardiovascular:      Rate and Rhythm: Normal rate and regular rhythm.   Pulmonary:      Effort: Pulmonary effort is normal.      Breath sounds: Normal breath sounds.   Abdominal:      General: Bowel sounds are normal.      Palpations: Abdomen is soft.   Musculoskeletal:      Cervical back: Neck supple.      Right lower leg: No edema.      Left lower leg: No edema.      Comments: Left upper extremity splint   Neurological:      Mental Status: She is alert.      Motor: Weakness present.   Psychiatric:         Mood and Affect: Mood normal.         Behavior: Behavior is cooperative.         Assessment/Plan   Problem List Items Addressed This Visit       Left supracondylar humerus fracture, closed, initial encounter     Status post ORIF  Pain control  Therapy  F/u outpatient Ortho  Nonweightbearing to left upper extremity  DVT prophylaxis         HTN (hypertension)     Monitor blood pressure  BP at goal         Closed stable burst fracture of third lumbar vertebra with routine healing     F/u outpatient with Ortho surgery  T/L spine precautions.  Therapy  Pain control         GERD (gastroesophageal reflux disease)     Monitor GI symptoms  PPI         Type 2 diabetes mellitus (CMS/[facility])     Glucoscans  Insulin lispro sliding  scale   NPH insulin  Sitagliptin  Metformin  LCS diet         Weakness     Continue working towards goals in therapy          Depression     Monitor mood and behaviors.  Venlafaxine         Schizoaffective disorder (CMS/[facility])     Monitor  Olanzapine  Haloperidol          Medications, treatments, and labs reviewed  Continue medications and treatments as listed in EMR    Scribe Attestation  Kimberley LOVE Scribe   attest that this documentation has been prepared under the direction and in the presence of Clari Santillan MD.     Provider Attestation - Scribe documentation  All medical record entries made by the Scribe were at my direction and personally dictated by me. I have reviewed the chart and agree that the record accurately reflects my personal performance of the history, physical exam, discussion and plan.   Clari Santillan MD

## 2024-02-26 NOTE — LETTER
Patient: Jennifer Mancini  : 1940    Encounter Date: 2024    PROGRESS NOTE    Subjective  Chief complaint: Jennifer Mancini is a 83 y.o. female who is an acute skilled patient being seen and evaluated for weakness    HPI:  Therapy has been working with the patient to improve strength and endurance with ADLs, transfers, and mobility.  Patient continues to work toward goals. She ambulates 10' with WW at min assist. She requires min assist with transfers and max assist with ADLs. Patient is stable and has no new complaints.  Nursing staff voices no new concerns today.      Objective  Vital signs: 136/76,76,94%,     Physical Exam  Constitutional:       General: She is not in acute distress.  Eyes:      Extraocular Movements: Extraocular movements intact.   Cardiovascular:      Rate and Rhythm: Normal rate and regular rhythm.   Pulmonary:      Effort: Pulmonary effort is normal.      Breath sounds: Normal breath sounds.   Abdominal:      General: Bowel sounds are normal.      Palpations: Abdomen is soft.   Musculoskeletal:      Cervical back: Neck supple.      Right lower leg: No edema.      Left lower leg: No edema.      Comments: Left upper extremity splint   Neurological:      Mental Status: She is alert.      Motor: Weakness present.   Psychiatric:         Mood and Affect: Mood normal.         Behavior: Behavior is cooperative.         Assessment/Plan  Problem List Items Addressed This Visit       Left supracondylar humerus fracture, closed, initial encounter     Status post ORIF  Pain control  Therapy  F/u outpatient Ortho  Nonweightbearing to left upper extremity  DVT prophylaxis         HTN (hypertension)     Monitor blood pressure  BP at goal         Closed stable burst fracture of third lumbar vertebra with routine healing     F/u outpatient with Ortho surgery  T/L spine precautions.  Therapy  Pain control         GERD (gastroesophageal reflux disease)     Monitor GI symptoms  PPI         Type  2 diabetes mellitus (CMS/[facility])     Glucoscans  Insulin lispro sliding scale   NPH insulin  Sitagliptin  Metformin  LCS diet         Weakness     Continue working towards goals in therapy          Depression     Monitor mood and behaviors.  Venlafaxine         Schizoaffective disorder (CMS/[facility])     Monitor  Olanzapine  Haloperidol          Medications, treatments, and labs reviewed  Continue medications and treatments as listed in EMR    Scribe Attestation  Kimberley LOVE Scribe   attest that this documentation has been prepared under the direction and in the presence of Clari Santillan MD.     Provider Attestation - Scribe documentation  All medical record entries made by the Scribe were at my direction and personally dictated by me. I have reviewed the chart and agree that the record accurately reflects my personal performance of the history, physical exam, discussion and plan.   Clari Santillan MD      Electronically Signed By: Clari Santillan MD   2/26/24  7:01 PM

## 2024-02-26 NOTE — PROGRESS NOTES
PROGRESS NOTE    Subjective   Chief complaint: Jennifer Mancini is a 83 y.o. female who is an acute skilled patient being seen and evaluated for weakness    HPI:  HPI  Patient is working in therapy following a fall with resultant left humerus fracture.  Patient is ambulating with min assist and walker for 10 feet.  Patient is requiring max assistance for ADL tasks.  Patient transferring requiring min assist.  Patient was seen and examined at bedside, appears to be in no acute distress.  Denies chest pain or shortness of breath.  Denies nausea or vomiting.  Patient stable, no new concerns at this time.    Objective   Vital signs: 103/71, 97.1, 86, 18, blood sugar 115, 97%    Physical Exam  Constitutional:       General: She is not in acute distress.  Eyes:      Extraocular Movements: Extraocular movements intact.   Cardiovascular:      Rate and Rhythm: Normal rate and regular rhythm.   Pulmonary:      Effort: Pulmonary effort is normal.      Breath sounds: Normal breath sounds.   Abdominal:      General: Bowel sounds are normal.      Palpations: Abdomen is soft.   Musculoskeletal:      Cervical back: Neck supple.      Right lower leg: No edema.      Left lower leg: No edema.      Comments: Left upper extremity splint   Skin:     Comments: Hematoma to forehead   Neurological:      Mental Status: She is alert.   Psychiatric:         Mood and Affect: Mood normal.         Behavior: Behavior is cooperative.         Assessment/Plan   Problem List Items Addressed This Visit       HTN (hypertension)     Monitor blood pressure  BP at goal         Type 2 diabetes mellitus (CMS/[facility])     FBG at goal  Continue to monitor glucoscan         Weakness - Primary     Continue progressing towards goals         Left supracondylar humerus fracture, closed, initial encounter     Status post ORIF  Pain control  Therapy  F/u outpatient Ortho  Nonweightbearing to left upper extremity  DVT prophylaxis          Medications, treatments, and labs  reviewed  Continue medications and treatments as listed in EMR      Scribe Attestation  IJulianne Scribe   attest that this documentation has been prepared under the direction and in the presence of JESSE Serrano    Provider Attestation - Scribe documentation  All medical record entries made by the Scribe were at my direction and personally dictated by me. I have reviewed the chart and agree that the record accurately reflects my personal performance of the history, physical exam, discussion and plan.   JESSE Serrano

## 2024-02-27 ENCOUNTER — NURSING HOME VISIT (OUTPATIENT)
Dept: POST ACUTE CARE | Facility: EXTERNAL LOCATION | Age: 84
End: 2024-02-27
Payer: COMMERCIAL

## 2024-02-27 DIAGNOSIS — K21.9 GASTROESOPHAGEAL REFLUX DISEASE, UNSPECIFIED WHETHER ESOPHAGITIS PRESENT: ICD-10-CM

## 2024-02-27 DIAGNOSIS — Z79.4 TYPE 2 DIABETES MELLITUS WITHOUT COMPLICATION, WITH LONG-TERM CURRENT USE OF INSULIN (MULTI): ICD-10-CM

## 2024-02-27 DIAGNOSIS — F25.9 SCHIZOAFFECTIVE DISORDER, UNSPECIFIED TYPE (MULTI): ICD-10-CM

## 2024-02-27 DIAGNOSIS — F32.A DEPRESSION, UNSPECIFIED DEPRESSION TYPE: ICD-10-CM

## 2024-02-27 DIAGNOSIS — E11.9 TYPE 2 DIABETES MELLITUS WITHOUT COMPLICATION, WITH LONG-TERM CURRENT USE OF INSULIN (MULTI): ICD-10-CM

## 2024-02-27 DIAGNOSIS — R53.1 WEAKNESS: ICD-10-CM

## 2024-02-27 DIAGNOSIS — I10 HYPERTENSION, UNSPECIFIED TYPE: ICD-10-CM

## 2024-02-27 PROCEDURE — 99308 SBSQ NF CARE LOW MDM 20: CPT | Performed by: INTERNAL MEDICINE

## 2024-02-27 NOTE — LETTER
Patient: Jennifer Mancini  : 1940    Encounter Date: 2024    PROGRESS NOTE    Subjective  Chief complaint: Jennifer Mancini is a 83 y.o. female who is an acute skilled patient being seen and evaluated for weakness    HPI:  Patient has been working in therapy to improve strength, endurance, and ADLs.  Patient continues to work toward goals. Ambulating 10' with WW at min assist. Transfers require min assist while ADLs require max assist.  No new concerns today.  Denies n/v/f/c pain.        Objective  Vital signs: 116/69,102,96%,     Physical Exam  Constitutional:       General: She is not in acute distress.  Eyes:      Extraocular Movements: Extraocular movements intact.   Cardiovascular:      Rate and Rhythm: Normal rate and regular rhythm.   Pulmonary:      Effort: Pulmonary effort is normal.      Breath sounds: Normal breath sounds.   Abdominal:      General: Bowel sounds are normal.      Palpations: Abdomen is soft.   Musculoskeletal:      Cervical back: Neck supple.      Right lower leg: No edema.      Left lower leg: No edema.      Comments: Left upper extremity splint   Neurological:      Mental Status: She is alert.      Motor: Weakness present.   Psychiatric:         Mood and Affect: Mood normal.         Behavior: Behavior is cooperative.         Assessment/Plan  Problem List Items Addressed This Visit       HTN (hypertension)     Monitor blood pressure  BP at goal         GERD (gastroesophageal reflux disease)     Monitor GI symptoms  PPI  GERD symptoms controlled         Type 2 diabetes mellitus (CMS/[facility])     Glucoscans  Insulin lispro sliding scale   NPH insulin  Sitagliptin  Metformin  LCS diet         Weakness     Continue to work towards goals established in therapy         Depression     Monitor mood and behaviors.  Venlafaxine         Schizoaffective disorder (CMS/[facility])     Monitor  Olanzapine  Haloperidol          Medications, treatments, and labs reviewed  Continue medications and  treatments as listed in EMR    Scribe Attestation  IKimberley Scribe   attest that this documentation has been prepared under the direction and in the presence of Clari Santillan MD.     Provider Attestation - Scribe documentation  All medical record entries made by the Scribe were at my direction and personally dictated by me. I have reviewed the chart and agree that the record accurately reflects my personal performance of the history, physical exam, discussion and plan.   Clari Santillan MD      Electronically Signed By: Clari Santillan MD   2/27/24  7:13 PM

## 2024-02-27 NOTE — PROGRESS NOTES
PROGRESS NOTE    Subjective   Chief complaint: Jennifer Mancini is a 83 y.o. female who is an acute skilled patient being seen and evaluated for weakness    HPI:  HPI  Patient is working in therapy following fall with left humerus fracture.  Patient is ambulating 10 feet with walker and min assist.  Patient does require max assist for ADL tasks.  Patient was seen and examined at bedside.  Patient is reported to be feeling better.  Denies chest pain or shortness of breath.  Afebrile.  Pain controlled on current regime.  Patient stable no new complaints at this time.    Objective   Vital signs: 132/70, 97.2, 74, 18, blood sugar 118, 94%    Physical Exam  Constitutional:       General: She is not in acute distress.  Eyes:      Extraocular Movements: Extraocular movements intact.   Cardiovascular:      Rate and Rhythm: Normal rate and regular rhythm.   Pulmonary:      Effort: Pulmonary effort is normal.      Breath sounds: Normal breath sounds.   Abdominal:      General: Bowel sounds are normal.      Palpations: Abdomen is soft.   Musculoskeletal:      Cervical back: Neck supple.      Right lower leg: No edema.      Left lower leg: No edema.      Comments: Left upper extremity splint   Skin:     Comments: Hematoma to forehead   Neurological:      Mental Status: She is alert.      Motor: Weakness present.   Psychiatric:         Mood and Affect: Mood normal.         Behavior: Behavior is cooperative.         Assessment/Plan   Problem List Items Addressed This Visit       Left supracondylar humerus fracture, closed, initial encounter     Status post ORIF  Pain control  Therapy  F/u outpatient Ortho  Nonweightbearing to left upper extremity  DVT prophylaxis         HTN (hypertension)     Monitor blood pressure  BP at goal         GERD (gastroesophageal reflux disease)     Monitor GI symptoms  PPI  GERD symptoms controlled         Weakness - Primary     Continue to work towards goals established in therapy           Medications, treatments, and labs reviewed  Continue medications and treatments as listed in EMR      Scribe Attestation  I, Michael Burnham   attest that this documentation has been prepared under the direction and in the presence of JESSE Serrano    Provider Attestation - Scribe documentation  All medical record entries made by the Scribe were at my direction and personally dictated by me. I have reviewed the chart and agree that the record accurately reflects my personal performance of the history, physical exam, discussion and plan.   JESSE Serrano

## 2024-02-27 NOTE — PROGRESS NOTES
PROGRESS NOTE    Subjective   Chief complaint: Jennifer Mancini is a 83 y.o. female who is an acute skilled patient being seen and evaluated for weakness    HPI:  Patient has been working in therapy to improve strength, endurance, and ADLs.  Patient continues to work toward goals. Ambulating 10' with WW at min assist. Transfers require min assist while ADLs require max assist.  No new concerns today.  Denies n/v/f/c pain.        Objective   Vital signs: 116/69,102,96%,     Physical Exam  Constitutional:       General: She is not in acute distress.  Eyes:      Extraocular Movements: Extraocular movements intact.   Cardiovascular:      Rate and Rhythm: Normal rate and regular rhythm.   Pulmonary:      Effort: Pulmonary effort is normal.      Breath sounds: Normal breath sounds.   Abdominal:      General: Bowel sounds are normal.      Palpations: Abdomen is soft.   Musculoskeletal:      Cervical back: Neck supple.      Right lower leg: No edema.      Left lower leg: No edema.      Comments: Left upper extremity splint   Neurological:      Mental Status: She is alert.      Motor: Weakness present.   Psychiatric:         Mood and Affect: Mood normal.         Behavior: Behavior is cooperative.         Assessment/Plan   Problem List Items Addressed This Visit       HTN (hypertension)     Monitor blood pressure  BP at goal         GERD (gastroesophageal reflux disease)     Monitor GI symptoms  PPI  GERD symptoms controlled         Type 2 diabetes mellitus (CMS/[facility])     Glucoscans  Insulin lispro sliding scale   NPH insulin  Sitagliptin  Metformin  LCS diet         Weakness     Continue to work towards goals established in therapy         Depression     Monitor mood and behaviors.  Venlafaxine         Schizoaffective disorder (CMS/[facility])     Monitor  Olanzapine  Haloperidol          Medications, treatments, and labs reviewed  Continue medications and treatments as listed in EMR    Scribe Attestation  I, Kimberley Bagley,  Scribe   attest that this documentation has been prepared under the direction and in the presence of Clari Santillan MD.     Provider Attestation - Scribe documentation  All medical record entries made by the Scribe were at my direction and personally dictated by me. I have reviewed the chart and agree that the record accurately reflects my personal performance of the history, physical exam, discussion and plan.   Clari Santillan MD

## 2024-02-28 ENCOUNTER — NURSING HOME VISIT (OUTPATIENT)
Dept: POST ACUTE CARE | Facility: EXTERNAL LOCATION | Age: 84
End: 2024-02-28
Payer: COMMERCIAL

## 2024-02-28 DIAGNOSIS — I10 HYPERTENSION, UNSPECIFIED TYPE: ICD-10-CM

## 2024-02-28 DIAGNOSIS — F32.A DEPRESSION, UNSPECIFIED DEPRESSION TYPE: ICD-10-CM

## 2024-02-28 DIAGNOSIS — S42.412A LEFT SUPRACONDYLAR HUMERUS FRACTURE, CLOSED, INITIAL ENCOUNTER: ICD-10-CM

## 2024-02-28 DIAGNOSIS — F25.9 SCHIZOAFFECTIVE DISORDER, UNSPECIFIED TYPE (MULTI): ICD-10-CM

## 2024-02-28 DIAGNOSIS — R53.1 WEAKNESS: Primary | ICD-10-CM

## 2024-02-28 PROCEDURE — 99308 SBSQ NF CARE LOW MDM 20: CPT | Performed by: NURSE PRACTITIONER

## 2024-02-28 NOTE — LETTER
Patient: Jennifer Mancini  : 1940    Encounter Date: 2024    PROGRESS NOTE    Subjective  Chief complaint: Jennifer Mancini is a 83 y.o. female who is an acute skilled patient being seen and evaluated for weakness    HPI:  HPI  Patient does continue working in therapy following a left humerus fracture.  Patient is nonweightbearing to left upper extremity.  Patient is working on gait training, transfers and ADLs.  Patient continues to require max assist for ADLs, min assist to complete transfers.  Patient is ambulating with wheeled walker with min assist.  Patient was seen and examined at bedside, appears to be in no acute distress.  Nursing staff voicing no new concerns at this time.    Objective  Vital signs: 116/69, 97.5, 71, 18, blood sugar 115, 96%    Physical Exam  Constitutional:       General: She is not in acute distress.  Eyes:      Extraocular Movements: Extraocular movements intact.   Cardiovascular:      Rate and Rhythm: Normal rate and regular rhythm.   Pulmonary:      Effort: Pulmonary effort is normal.      Breath sounds: Normal breath sounds.   Abdominal:      General: Bowel sounds are normal.      Palpations: Abdomen is soft.   Musculoskeletal:      Cervical back: Neck supple.      Right lower leg: No edema.      Left lower leg: No edema.      Comments: Left upper extremity splint   Skin:     Comments: Hematoma, forehead   Neurological:      Mental Status: She is alert.      Motor: Weakness present.   Psychiatric:         Mood and Affect: Mood normal.         Behavior: Behavior is cooperative.         Assessment/Plan  Problem List Items Addressed This Visit       Left supracondylar humerus fracture, closed, initial encounter     Status post ORIF  Pain control  Therapy  F/u outpatient Ortho  Nonweightbearing to left upper extremity  DVT prophylaxis         HTN (hypertension)     Monitor blood pressure  BP at goal         Weakness - Primary     Continue to work toward established goals,  goal to return to assisted living         Depression     Monitor mood and behaviors.  Venlafaxine         Schizoaffective disorder (CMS/[facility])     Monitor mood and behaviors  Mood is stable  Olanzapine  Haloperidol          Medications, treatments, and labs reviewed  Continue medications and treatments as listed in EMR      Scribe Attestation  Julianne LOVE Scribe   attest that this documentation has been prepared under the direction and in the presence of JESSE Serrano    Provider Attestation - Scribe documentation  All medical record entries made by the Scribe were at my direction and personally dictated by me. I have reviewed the chart and agree that the record accurately reflects my personal performance of the history, physical exam, discussion and plan.   JESSE Serrano        Electronically Signed By: JESSE Serrano   3/13/24 12:08 PM

## 2024-02-29 ENCOUNTER — NURSING HOME VISIT (OUTPATIENT)
Dept: POST ACUTE CARE | Facility: EXTERNAL LOCATION | Age: 84
End: 2024-02-29
Payer: COMMERCIAL

## 2024-02-29 DIAGNOSIS — S42.412A LEFT SUPRACONDYLAR HUMERUS FRACTURE, CLOSED, INITIAL ENCOUNTER: ICD-10-CM

## 2024-02-29 DIAGNOSIS — R53.1 WEAKNESS: Primary | ICD-10-CM

## 2024-02-29 DIAGNOSIS — I10 HYPERTENSION, UNSPECIFIED TYPE: ICD-10-CM

## 2024-02-29 DIAGNOSIS — K21.9 GASTROESOPHAGEAL REFLUX DISEASE, UNSPECIFIED WHETHER ESOPHAGITIS PRESENT: ICD-10-CM

## 2024-02-29 PROCEDURE — 99308 SBSQ NF CARE LOW MDM 20: CPT | Performed by: INTERNAL MEDICINE

## 2024-02-29 NOTE — PROGRESS NOTES
PROGRESS NOTE    Subjective   Chief complaint: Jennifer Mancini is a 83 y.o. female who is an acute skilled patient being seen and evaluated for weakness    HPI:  HPI  Patient is continue to work in therapy following a left humerus fracture.  Patient is working on gait training, transfers and ADLs.  Patient is requiring max assist for ADL tasks to be completed.  Patient is transferring with minimal assistance.  Patient was seen and examined at bedside, appears to be in no acute distress.  Denies chest pain or shortness of breath.  Afebrile.  Nursing staff voicing no new concerns at this time.    Objective   Vital signs: 116/69, 97.5, 102, 18, blood sugar 146, 96%    Physical Exam  Constitutional:       General: She is not in acute distress.  Eyes:      Extraocular Movements: Extraocular movements intact.   Cardiovascular:      Rate and Rhythm: Normal rate and regular rhythm.   Pulmonary:      Effort: Pulmonary effort is normal.      Breath sounds: Normal breath sounds.   Abdominal:      General: Bowel sounds are normal.      Palpations: Abdomen is soft.   Musculoskeletal:      Cervical back: Neck supple.      Right lower leg: No edema.      Left lower leg: No edema.      Comments: Left upper extremity splint   Neurological:      Mental Status: She is alert.      Motor: Weakness present.   Psychiatric:         Mood and Affect: Mood normal.         Behavior: Behavior is cooperative.         Assessment/Plan   Problem List Items Addressed This Visit       Left supracondylar humerus fracture, closed, initial encounter     Status post ORIF  Pain control  Therapy  F/u outpatient Ortho  Nonweightbearing to left upper extremity  DVT prophylaxis         HTN (hypertension)     Monitor blood pressure  BP at goal         GERD (gastroesophageal reflux disease)     Monitor GI symptoms  PPI  GERD symptoms controlled         Weakness - Primary     Continue working towards goals          Medications, treatments, and labs  reviewed  Continue medications and treatments as listed in EMR      Scribe Attestation  I, Michael Burnham   attest that this documentation has been prepared under the direction and in the presence of Clari Santillan MD    Provider Attestation - Scribe documentation  All medical record entries made by the Scribe were at my direction and personally dictated by me. I have reviewed the chart and agree that the record accurately reflects my personal performance of the history, physical exam, discussion and plan.   Clari Santillan MD

## 2024-02-29 NOTE — LETTER
Patient: Jennifer Mancini  : 1940    Encounter Date: 2024    PROGRESS NOTE    Subjective  Chief complaint: Jennifer Mancini is a 83 y.o. female who is an acute skilled patient being seen and evaluated for weakness    HPI:  HPI  Patient is continue to work in therapy following a left humerus fracture.  Patient is working on gait training, transfers and ADLs.  Patient is requiring max assist for ADL tasks to be completed.  Patient is transferring with minimal assistance.  Patient was seen and examined at bedside, appears to be in no acute distress.  Denies chest pain or shortness of breath.  Afebrile.  Nursing staff voicing no new concerns at this time.    Objective  Vital signs: 116/69, 97.5, 102, 18, blood sugar 146, 96%    Physical Exam  Constitutional:       General: She is not in acute distress.  Eyes:      Extraocular Movements: Extraocular movements intact.   Cardiovascular:      Rate and Rhythm: Normal rate and regular rhythm.   Pulmonary:      Effort: Pulmonary effort is normal.      Breath sounds: Normal breath sounds.   Abdominal:      General: Bowel sounds are normal.      Palpations: Abdomen is soft.   Musculoskeletal:      Cervical back: Neck supple.      Right lower leg: No edema.      Left lower leg: No edema.      Comments: Left upper extremity splint   Neurological:      Mental Status: She is alert.      Motor: Weakness present.   Psychiatric:         Mood and Affect: Mood normal.         Behavior: Behavior is cooperative.         Assessment/Plan  Problem List Items Addressed This Visit       Left supracondylar humerus fracture, closed, initial encounter     Status post ORIF  Pain control  Therapy  F/u outpatient Ortho  Nonweightbearing to left upper extremity  DVT prophylaxis         HTN (hypertension)     Monitor blood pressure  BP at goal         GERD (gastroesophageal reflux disease)     Monitor GI symptoms  PPI  GERD symptoms controlled         Weakness - Primary     Continue working  towards goals          Medications, treatments, and labs reviewed  Continue medications and treatments as listed in EMR      Scribe Attestation  I, Michael Burnham   attest that this documentation has been prepared under the direction and in the presence of Clari Santillan MD    Provider Attestation - Scribe documentation  All medical record entries made by the Scribe were at my direction and personally dictated by me. I have reviewed the chart and agree that the record accurately reflects my personal performance of the history, physical exam, discussion and plan.   Clari Santillan MD        Electronically Signed By: Clari Santillan MD   2/29/24  8:51 PM

## 2024-03-02 ENCOUNTER — NURSING HOME VISIT (OUTPATIENT)
Dept: POST ACUTE CARE | Facility: EXTERNAL LOCATION | Age: 84
End: 2024-03-02
Payer: COMMERCIAL

## 2024-03-02 DIAGNOSIS — Z79.4 TYPE 2 DIABETES MELLITUS WITHOUT COMPLICATION, WITH LONG-TERM CURRENT USE OF INSULIN (MULTI): ICD-10-CM

## 2024-03-02 DIAGNOSIS — F32.A DEPRESSION, UNSPECIFIED DEPRESSION TYPE: ICD-10-CM

## 2024-03-02 DIAGNOSIS — S42.412A LEFT SUPRACONDYLAR HUMERUS FRACTURE, CLOSED, INITIAL ENCOUNTER: ICD-10-CM

## 2024-03-02 DIAGNOSIS — R53.1 WEAKNESS: Primary | ICD-10-CM

## 2024-03-02 DIAGNOSIS — I10 HYPERTENSION, UNSPECIFIED TYPE: ICD-10-CM

## 2024-03-02 DIAGNOSIS — E11.9 TYPE 2 DIABETES MELLITUS WITHOUT COMPLICATION, WITH LONG-TERM CURRENT USE OF INSULIN (MULTI): ICD-10-CM

## 2024-03-02 PROCEDURE — 99309 SBSQ NF CARE MODERATE MDM 30: CPT | Performed by: NURSE PRACTITIONER

## 2024-03-02 NOTE — LETTER
Patient: Jennifer Mancini  : 1940    Encounter Date: 2024    PROGRESS NOTE    Subjective  Chief complaint: Jennifer Mancini is a 83 y.o. female who is an acute skilled patient being seen and evaluated for weakness    HPI:  HPI  An interactive audio and/or video telecommunication system which permits real time communications between the patient (at the originating site) and provider (at a distant site) was utilized to provide this telehealth service after obtaining verbal consent.  Patient does continue to work in therapy following a left humerus fracture.  Patient is working on gait training, transfers and ADL task.  Patient is ambulating with wheeled walker and min assist.  Patient appears to be in no acute distress.    Objective  Vital signs: 156/83, 97.5, 18, 104, 97%, blood sugar 106    Physical Exam  Constitutional:       General: She is not in acute distress.  Eyes:      Extraocular Movements: Extraocular movements intact.   Pulmonary:      Effort: Pulmonary effort is normal.   Musculoskeletal:      Cervical back: Neck supple.      Comments: Splint LUE   Skin:     Comments: Hematoma to forehead   Neurological:      Mental Status: She is alert.      Motor: Weakness present.   Psychiatric:         Mood and Affect: Mood normal.         Behavior: Behavior is cooperative.         Assessment/Plan  Problem List Items Addressed This Visit       Depression     Monitor mood and behaviors.  Venlafaxine         HTN (hypertension)     Monitor blood pressure         Type 2 diabetes mellitus (CMS/[facility])     FBG at goal  Continue to monitor glucoscan         Weakness - Primary     Continue working towards goals in therapy         Left supracondylar humerus fracture, closed, initial encounter     Status post ORIF  Pain control  Therapy  F/u outpatient Ortho  Nonweightbearing to left upper extremity  DVT prophylaxis          Medications, treatments, and labs reviewed  Continue medications and treatments as listed in  EMR      Scribe Attestation  I, Michael Burnham   attest that this documentation has been prepared under the direction and in the presence of JESSE Serrano    Provider Attestation - Scribe documentation  All medical record entries made by the Scribe were at my direction and personally dictated by me. I have reviewed the chart and agree that the record accurately reflects my personal performance of the history, physical exam, discussion and plan.   JESSE Serrano        Electronically Signed By: JESSE Serrano   3/11/24  8:35 PM

## 2024-03-04 ENCOUNTER — NURSING HOME VISIT (OUTPATIENT)
Dept: POST ACUTE CARE | Facility: EXTERNAL LOCATION | Age: 84
End: 2024-03-04
Payer: COMMERCIAL

## 2024-03-04 DIAGNOSIS — Z79.4 TYPE 2 DIABETES MELLITUS WITHOUT COMPLICATION, WITH LONG-TERM CURRENT USE OF INSULIN (MULTI): ICD-10-CM

## 2024-03-04 DIAGNOSIS — S42.412A LEFT SUPRACONDYLAR HUMERUS FRACTURE, CLOSED, INITIAL ENCOUNTER: ICD-10-CM

## 2024-03-04 DIAGNOSIS — K21.9 GASTROESOPHAGEAL REFLUX DISEASE, UNSPECIFIED WHETHER ESOPHAGITIS PRESENT: ICD-10-CM

## 2024-03-04 DIAGNOSIS — F32.A DEPRESSION, UNSPECIFIED DEPRESSION TYPE: ICD-10-CM

## 2024-03-04 DIAGNOSIS — F25.9 SCHIZOAFFECTIVE DISORDER, UNSPECIFIED TYPE (MULTI): ICD-10-CM

## 2024-03-04 DIAGNOSIS — I10 HYPERTENSION, UNSPECIFIED TYPE: ICD-10-CM

## 2024-03-04 DIAGNOSIS — E11.9 TYPE 2 DIABETES MELLITUS WITHOUT COMPLICATION, WITH LONG-TERM CURRENT USE OF INSULIN (MULTI): ICD-10-CM

## 2024-03-04 PROCEDURE — 99309 SBSQ NF CARE MODERATE MDM 30: CPT | Performed by: INTERNAL MEDICINE

## 2024-03-04 NOTE — PROGRESS NOTES
PROGRESS NOTE    Subjective   Chief complaint: Jennifer Mancini is a 83 y.o. female who is an acute skilled patient being seen and evaluated for weakness    HPI:  Patient presents for general medical care and f/u.  Patient seen and examined at bedside. HTN, denies fatigue, sob, and palpitations. T2DM, Patient denies vision changes, excessive thirst, sweating, urinary frequency.  Patient has diagnosis of GERD, denies heartburn, regurgitation, epigastric discomfort, sour taste, and cough.  Patient is schizophrenic and he denies feeling down or thoughts of harming self or others. Patient with diagnosis of depression, denies feeling down and thoughts of harming self or others. She is discharging to [facility] today after [facility] received. No issues per nursing.  Patient has no acute complaints.      Objective   Vital signs: 127\80,101,96%,     Physical Exam  Constitutional:       General: She is not in acute distress.  Eyes:      Extraocular Movements: Extraocular movements intact.   Cardiovascular:      Rate and Rhythm: Normal rate and regular rhythm.   Pulmonary:      Effort: Pulmonary effort is normal.      Breath sounds: Normal breath sounds.   Abdominal:      General: Bowel sounds are normal.      Palpations: Abdomen is soft.   Musculoskeletal:      Cervical back: Neck supple.      Right lower leg: No edema.      Left lower leg: No edema.      Comments: Left upper extremity splint   Neurological:      Mental Status: She is alert.      Motor: Weakness present.   Psychiatric:         Mood and Affect: Mood normal.         Behavior: Behavior is cooperative.         Assessment/Plan   Problem List Items Addressed This Visit       Left supracondylar humerus fracture, closed, initial encounter     Status post ORIF  Pain control  Therapy  F/u outpatient Ortho  Nonweightbearing to left upper extremity  DVT prophylaxis         HTN (hypertension)     Monitor blood pressure  BP at goal         GERD (gastroesophageal reflux  disease)     PPI  GERD symptoms controlled         Type 2 diabetes mellitus (CMS/[facility])     Continue to monitor glucoscan         Depression     Monitor mood and behaviors.  Venlafaxine         Schizoaffective disorder (CMS/[facility])     Mood stable  Olanzapine  Haloperidol          Medications, treatments, and labs reviewed  Continue medications and treatments as listed in EMR  Prognosis - fair  Course - therapy  Plan - DC to [facility]  Follow up with PCP within 2 weeks  Medications called into pharmacy by office  Condition at discharge is stable    Scribe Attestation  I, Michael Tucker   attest that this documentation has been prepared under the direction and in the presence of Clari Santillan MD.     Provider Attestation - Scribe documentation  All medical record entries made by the Scribe were at my direction and personally dictated by me. I have reviewed the chart and agree that the record accurately reflects my personal performance of the history, physical exam, discussion and plan.   Clari Santillan MD

## 2024-03-04 NOTE — LETTER
Patient: Jennifer Mancini  : 1940    Encounter Date: 2024    PROGRESS NOTE    Subjective  Chief complaint: Jennifer Mancini is a 83 y.o. female who is an acute skilled patient being seen and evaluated for weakness    HPI:  Patient presents for general medical care and f/u.  Patient seen and examined at bedside. HTN, denies fatigue, sob, and palpitations. T2DM, Patient denies vision changes, excessive thirst, sweating, urinary frequency.  Patient has diagnosis of GERD, denies heartburn, regurgitation, epigastric discomfort, sour taste, and cough.  Patient is schizophrenic and he denies feeling down or thoughts of harming self or others. Patient with diagnosis of depression, denies feeling down and thoughts of harming self or others. She is discharging to [facility] today after [facility] received. No issues per nursing.  Patient has no acute complaints.      Objective  Vital signs: 127\80,101,96%,     Physical Exam  Constitutional:       General: She is not in acute distress.  Eyes:      Extraocular Movements: Extraocular movements intact.   Cardiovascular:      Rate and Rhythm: Normal rate and regular rhythm.   Pulmonary:      Effort: Pulmonary effort is normal.      Breath sounds: Normal breath sounds.   Abdominal:      General: Bowel sounds are normal.      Palpations: Abdomen is soft.   Musculoskeletal:      Cervical back: Neck supple.      Right lower leg: No edema.      Left lower leg: No edema.      Comments: Left upper extremity splint   Neurological:      Mental Status: She is alert.      Motor: Weakness present.   Psychiatric:         Mood and Affect: Mood normal.         Behavior: Behavior is cooperative.         Assessment/Plan  Problem List Items Addressed This Visit       Left supracondylar humerus fracture, closed, initial encounter     Status post ORIF  Pain control  Therapy  F/u outpatient Ortho  Nonweightbearing to left upper extremity  DVT prophylaxis         HTN (hypertension)     Monitor  blood pressure  BP at goal         GERD (gastroesophageal reflux disease)     PPI  GERD symptoms controlled         Type 2 diabetes mellitus (CMS/[facility])     Continue to monitor glucoscan         Depression     Monitor mood and behaviors.  Venlafaxine         Schizoaffective disorder (CMS/[facility])     Mood stable  Olanzapine  Haloperidol          Medications, treatments, and labs reviewed  Continue medications and treatments as listed in EMR  Prognosis - fair  Course - therapy  Plan - DC to [facility]  Follow up with PCP within 2 weeks  Medications called into pharmacy by office  Condition at discharge is stable    Scribe Attestation  Kimberley LOVE Scribe   attest that this documentation has been prepared under the direction and in the presence of Clari Santillan MD.     Provider Attestation - Scribe documentation  All medical record entries made by the Scribe were at my direction and personally dictated by me. I have reviewed the chart and agree that the record accurately reflects my personal performance of the history, physical exam, discussion and plan.   Clari Santillan MD      Electronically Signed By: Clari Santillan MD   3/4/24  5:00 PM

## 2024-03-06 ENCOUNTER — HOSPITAL ENCOUNTER (OUTPATIENT)
Dept: RADIOLOGY | Facility: HOSPITAL | Age: 84
Discharge: HOME | End: 2024-03-06
Payer: COMMERCIAL

## 2024-03-06 ENCOUNTER — OFFICE VISIT (OUTPATIENT)
Dept: ORTHOPEDIC SURGERY | Facility: HOSPITAL | Age: 84
End: 2024-03-06
Payer: COMMERCIAL

## 2024-03-06 DIAGNOSIS — S42.412A LEFT SUPRACONDYLAR HUMERUS FRACTURE, CLOSED, INITIAL ENCOUNTER: ICD-10-CM

## 2024-03-06 PROCEDURE — 73080 X-RAY EXAM OF ELBOW: CPT | Mod: LT

## 2024-03-06 PROCEDURE — 1126F AMNT PAIN NOTED NONE PRSNT: CPT | Performed by: ORTHOPAEDIC SURGERY

## 2024-03-06 PROCEDURE — 1160F RVW MEDS BY RX/DR IN RCRD: CPT | Performed by: ORTHOPAEDIC SURGERY

## 2024-03-06 PROCEDURE — 73080 X-RAY EXAM OF ELBOW: CPT | Mod: LEFT SIDE | Performed by: RADIOLOGY

## 2024-03-06 PROCEDURE — 99024 POSTOP FOLLOW-UP VISIT: CPT | Performed by: ORTHOPAEDIC SURGERY

## 2024-03-06 PROCEDURE — 1111F DSCHRG MED/CURRENT MED MERGE: CPT | Performed by: ORTHOPAEDIC SURGERY

## 2024-03-06 PROCEDURE — 1159F MED LIST DOCD IN RCRD: CPT | Performed by: ORTHOPAEDIC SURGERY

## 2024-03-06 NOTE — PROGRESS NOTES
Jennifer Mancini is  post-op from ORIF left distal humerus on 2/9/2024.  she is doing well at this point.  Pain is well controlled  Denies fevers or chills.  Denies drainage from the wound.  she reports no additional symptoms or concerns. No shortness of breath or chest pain. No calf swelling or pain.    The patients full medical history, surgical history, medications, allergies, family, medical history, social history, and a complete 14 point review of systems is documented in the medical record on the signed, scanned medical intake sheet or reviewed in the history of present illness. Review of systems otherwise negative    Past Medical History:   Diagnosis Date    HTN (hypertension) 2/9/2024    Hypertensive heart disease without heart failure 12/17/2019    Hypertensive heart disease without CHF    Other nondisplaced fracture of seventh cervical vertebra, subsequent encounter for fracture with routine healing 12/17/2019    Other closed nondisplaced fracture of seventh cervical vertebra with routine healing, subsequent encounter    Other nondisplaced fracture of sixth cervical vertebra, subsequent encounter for fracture with routine healing 12/17/2019    Other closed nondisplaced fracture of sixth cervical vertebra with routine healing, subsequent encounter    Personal history of other diseases of the female genital tract 12/17/2019    History of uterine prolapse    Personal history of other endocrine, nutritional and metabolic disease 12/17/2019    History of hyperlipidemia    Personal history of other mental and behavioral disorders 06/14/2021    History of schizoaffective disorder    Personal history of other specified conditions 06/14/2021    History of chronic pain    Schizoaffective disorder, bipolar type (CMS/[facility]) 12/17/2019    Schizoaffective disorder, bipolar type    Unspecified fracture of left femur, initial encounter for closed fracture (CMS/[facility]) 06/14/2021    Fracture of left femur       Medication  Documentation Review Audit       Reviewed by EDWARD Olivia (Nurse Anesthetist) on 02/09/24 at 1004      Medication Order Taking? Sig Documenting Provider Last Dose Status   acetaminophen (Tylenol 8 HOUR) 650 mg ER tablet [id number]  Take 1 tablet (650 mg) by mouth 3 times a day. Do not crush, chew, or split. Rachell Antony MD  Active   ascorbic acid (Vitamin C) 500 mg tablet [id number]  Take 1 tablet (500 mg) by mouth 2 times a day. With ferrous sulfate for absorption Rachell Antony MD  Active   aspirin 81 mg EC tablet [id number]  Take 1 tablet (81 mg) by mouth once daily. Rachell Antony MD  Active   atorvastatin (Lipitor) 80 mg tablet [id number]  Take 1 tablet (80 mg) by mouth once daily at bedtime. Historical Provider, MD  Active   b complex 0.4 mg tablet [id number]  Take 1 tablet by mouth 2 times a day. Historical Provider, MD  Active   benzocaine-menthol (Cepacol Sore Throat, nargis-men,) 15-3.6 mg lozenge [id number]  Dissolve 1 lozenge in the mouth every 4 hours if needed for sore throat. Historical MD Arpan  Active   benzonatate (Tessalon) 100 mg capsule [id number]  Take 1 capsule (100 mg) by mouth 3 times a day as needed for cough. Do not crush or chew. Historical Provider, MD  Active   calcium carbonate-vitamin D3 600 mg-20 mcg (800 unit) tablet [id number]  Take 1 tablet by mouth once daily. Rachell Antony MD  Active   cetirizine (ZyrTEC) 10 mg tablet [id number]  Take 1 tablet (10 mg) by mouth once daily. Historical Provider, MD  Active   clobetasol (Temovate) 0.05 % cream [id number]  Apply 1 Application topically 2 times a day. Rachell Antony MD  Active   clobetasoL 0.05 % shampoo [id number]  Apply 1 Application topically 1 (one) time per week. Rachell Antony MD  Active   cyanocobalamin (Vitamin B-12) 1,000 mcg tablet [id number]  Take 1 tablet (1,000 mcg) by mouth once daily. Rachell Antony MD  Active   docusate sodium (Colace) 100 mg capsule [id number]  Take 1  capsule (100 mg) by mouth 2 times a day. Historical Provider, MD  Active   famotidine (Pepcid) 40 mg tablet [id number]  Take 1 tablet (40 mg) by mouth once daily at bedtime. Historical Provider, MD  Active   ferrous sulfate 325 (65 Fe) MG EC tablet [id number]  Take 65 mg by mouth 2 times a day. Do not crush, chew, or split. Historical Provider, MD  Active   Fish OiL 60- mg capsule [id number]  Take 1 capsule (500 mg) by mouth once daily. Historical Provider, MD  Active   fluticasone (Flonase) 50 mcg/actuation nasal spray [id number]  Administer 1 spray into each nostril once daily. Shake gently. Before first use, prime pump. After use, clean tip and replace cap. Historical Provider, MD  Active   folic acid (Folvite) 1 mg tablet [id number]  Take 1 tablet (1 mg) by mouth once daily. Historical Provider, MD  Active   furosemide (Lasix) 40 mg tablet [id number]  Take 1 tablet (40 mg) by mouth once daily. Historical Provider, MD  Active   glucosam/robert-msm1/C/bailey/bosw (OSTEO BI-FLEX TRIPLE STRENGTH ORAL) [id number]  Take 1 tablet by mouth 2 times a day. Historical Provider, MD  Active   guaiFENesin (Mucinex) 600 mg 12 hr tablet [id number]  Take 1 tablet (600 mg) by mouth 2 times a day. Do not crush, chew, or split. Historical MD Arpan  Active   guaiFENesin (Robitussin) 100 mg/5 mL syrup [id number]  Take 5 mL (100 mg) by mouth 4 times a day as needed for cough or congestion. Historical Provider, MD  Active   haloperidol (Haldol) 0.5 mg tablet [id number]  Take 1 tablet (0.5 mg) by mouth once daily. Historical Provider, MD  Active   hydrocortisone 2.5 % cream [id number]  Apply 1 Application topically 2 times a day. Historical Provider, MD  Active   ketoconazole (NIZOral) 2 % cream [id number]  Apply 1 Application topically 3 times a day. Rachell Antony MD  Active   ketoconazole (NIZOral) 2 % shampoo [id number]  Apply 1 Application topically 2 times a week. As needed Historical Provider, MD  Active   lactobacillus  acidophilus & bulgar (Lactinex) 1 million cell chewable tablet [id number]  Chew 1 tablet once daily. Historical MD Arpan  Active   latanoprost (Xalatan) 0.005 % ophthalmic solution [id number]  Administer 1 drop into both eyes once daily at bedtime. Rachell Antony MD  Active   lidocaine (Xylocaine) 2 % solution [id number]  Lidocaine 2% viscous   Swish and spit 10mg every 4 hours as needed Rachell Antony MD  Active   losartan (Cozaar) 50 mg tablet [id number]  Take 1 tablet (50 mg) by mouth 2 times a day. Rachell Antony MD  Active   metFORMIN (Glucophage) 1,000 mg tablet [id number]  Take 1 tablet (1,000 mg) by mouth 2 times a day with meals. Rachell Antony MD  Active   methotrexate (Trexall) 2.5 mg tablet [id number]  Take 6 tablets (15 mg total) by mouth 1 (one) time per week.  6 tablets (15mg) once a week on Fridays Rachell Antony MD  Active   multivitamin with minerals tablet [id number]  Take 1 tablet by mouth once daily. Historical MD Arpan  Active   neomycin-bacitracnZn-polymyxnB (Neosporin Original) ointment [id number]  Apply 1 Application topically 3 times a day. Historical MD Arpan  Active   OLANZapine (ZyPREXA) 20 mg tablet [id number]  Take 1 tablet (20 mg) by mouth once daily at bedtime. Historical MD Arpan  Active   polyethylene glycol (Glycolax, Miralax) 17 gram packet [id number]  Take 17 g by mouth once daily. Historical MD Arpan  Active   predniSONE (Deltasone) 2.5 mg tablet [id number]  Take 3 tablets (7.5 mg) by mouth once daily. Rachell Antony MD  Active   venlafaxine XR (Effexor-XR) 37.5 mg 24 hr capsule [id number]  Take 1 capsule (37.5 mg) by mouth once daily. Do not crush or chew. Rachell Antony MD  Active   zinc sulfate (Zincate) 220 (50 Zn) MG capsule [id number]  Take 1 capsule (50 mg of elemental zinc) by mouth once daily. Historical MD Arpan  Active                    No Known Allergies    Social History     Socioeconomic History    Marital  status:      Spouse name: Not on file    Number of children: Not on file    Years of education: Not on file    Highest education level: Not on file   Occupational History    Not on file   Tobacco Use    Smoking status: Former     Years: 1     Types: Cigarettes    Smokeless tobacco: Not on file   Substance and Sexual Activity    Alcohol use: Not on file    Drug use: Not on file    Sexual activity: Not on file   Other Topics Concern    Not on file   Social History Narrative    Not on file     Social Determinants of Health     Financial Resource Strain: Patient Declined (2/9/2024)    Overall Financial Resource Strain (CARDIA)     Difficulty of Paying Living Expenses: Patient declined   Food Insecurity: Not on file   Transportation Needs: No Transportation Needs (2/9/2024)    PRAPARE - Transportation     Lack of Transportation (Medical): No     Lack of Transportation (Non-Medical): No   Physical Activity: Not on file   Stress: Not on file   Social Connections: Not on file   Intimate Partner Violence: Not on file   Housing Stability: Low Risk  (2/9/2024)    Housing Stability Vital Sign     Unable to Pay for Housing in the Last Year: No     Number of Places Lived in the Last Year: 1     Unstable Housing in the Last Year: No       Past Surgical History:   Procedure Laterality Date    CT ANGIO NECK  12/13/2019    CT NECK ANGIO W AND WO IV CONTRAST 12/13/2019 [facility]    OTHER SURGICAL HISTORY  12/17/2019    Tonsillectomy    OTHER SURGICAL HISTORY  12/17/2019    Rotator cuff repair    OTHER SURGICAL HISTORY  12/17/2019    Wrist surgery    OTHER SURGICAL HISTORY  12/17/2019    Uterine surgery       Gen: The patient is alert and oriented ×3, is in no acute distress, and appear their stated age and weight.    Psychiatric: Mood and affect are appropriate.    Eyes: Sclera are white, and pupils are round and symmetric.    ENT: Mucous membranes are moist.     Neck: Supple. Thyroid is midline.    Respiratory:  Respirations are nonlabored, chest rise is symmetric.    Cardiac: Rate is regular by palpation of distal pulses.     Abdomen: Nondistended.    Integument: No obvious cutaneous lesions are noted. No signs of lymphangitis. No signs of systemic edema.  side: left lower extremity :  her  surgical incisions are healing well, without evidence of erythema, fluctuance, drainage, or infection.  The skin around the incision is intact.  Distally neurovascular exam is stable.  There is appropriate tenderness to palpation in the miriam-incisional area. No calf swelling or tenderness to palpation.      I personally reviewed multiple views of left elbow were obtained in the office today demonstrate maintenance of reduction, interval healing, and a stable position of the hardware.      Jennifer Mancini is a 83 y.o. female patient status post ORIF left distal humerus on 2/9/2024.   I went over her x-rays in detail today.  Edwin removed the office today.  she is NWB of the side: left upper extremity. ~He/she~ is range of motion as tolerated of the side: left upper extremity.  I stressed the importance of physical therapy on overall functional outcome. I answered all patient's questions he agrees with treatment plan.  I will see her back in Follow-up 4 week(s)with repeat 2 views of the left elbow.        Slim Dover    Department of Orthopaedic Trauma Surgery

## 2024-03-11 NOTE — PROGRESS NOTES
PROGRESS NOTE    Subjective   Chief complaint: Jennifer Mancini is a 83 y.o. female who is an acute skilled patient being seen and evaluated for weakness    HPI:  HPI  Patient does continue working in therapy following a left humerus fracture.  Patient is nonweightbearing to left upper extremity.  Patient is working on gait training, transfers and ADLs.  Patient continues to require max assist for ADLs, min assist to complete transfers.  Patient is ambulating with wheeled walker with min assist.  Patient was seen and examined at bedside, appears to be in no acute distress.  Nursing staff voicing no new concerns at this time.    Objective   Vital signs: 116/69, 97.5, 71, 18, blood sugar 115, 96%    Physical Exam  Constitutional:       General: She is not in acute distress.  Eyes:      Extraocular Movements: Extraocular movements intact.   Cardiovascular:      Rate and Rhythm: Normal rate and regular rhythm.   Pulmonary:      Effort: Pulmonary effort is normal.      Breath sounds: Normal breath sounds.   Abdominal:      General: Bowel sounds are normal.      Palpations: Abdomen is soft.   Musculoskeletal:      Cervical back: Neck supple.      Right lower leg: No edema.      Left lower leg: No edema.      Comments: Left upper extremity splint   Skin:     Comments: Hematoma, forehead   Neurological:      Mental Status: She is alert.      Motor: Weakness present.   Psychiatric:         Mood and Affect: Mood normal.         Behavior: Behavior is cooperative.         Assessment/Plan   Problem List Items Addressed This Visit       Left supracondylar humerus fracture, closed, initial encounter     Status post ORIF  Pain control  Therapy  F/u outpatient Ortho  Nonweightbearing to left upper extremity  DVT prophylaxis         HTN (hypertension)     Monitor blood pressure  BP at goal         Weakness - Primary     Continue to work toward established goals, goal to return to assisted living         Depression     Monitor mood  and behaviors.  Venlafaxine         Schizoaffective disorder (CMS/[facility])     Monitor mood and behaviors  Mood is stable  Olanzapine  Haloperidol          Medications, treatments, and labs reviewed  Continue medications and treatments as listed in EMR      Scribe Attestation  IJulianne Scribe   attest that this documentation has been prepared under the direction and in the presence of JESSE Serrano    Provider Attestation - Scribe documentation  All medical record entries made by the Scribe were at my direction and personally dictated by me. I have reviewed the chart and agree that the record accurately reflects my personal performance of the history, physical exam, discussion and plan.   JESSE Serrano

## 2024-03-11 NOTE — PROGRESS NOTES
PROGRESS NOTE    Subjective   Chief complaint: Jennifer Mancini is a 83 y.o. female who is an acute skilled patient being seen and evaluated for weakness    HPI:  HPI  An interactive audio and/or video telecommunication system which permits real time communications between the patient (at the originating site) and provider (at a distant site) was utilized to provide this telehealth service after obtaining verbal consent.  Patient does continue to work in therapy following a left humerus fracture.  Patient is working on gait training, transfers and ADL task.  Patient is ambulating with wheeled walker and min assist.  Patient appears to be in no acute distress.    Objective   Vital signs: 156/83, 97.5, 18, 104, 97%, blood sugar 106    Physical Exam  Constitutional:       General: She is not in acute distress.  Eyes:      Extraocular Movements: Extraocular movements intact.   Pulmonary:      Effort: Pulmonary effort is normal.   Musculoskeletal:      Cervical back: Neck supple.      Comments: Splint LUE   Skin:     Comments: Hematoma to forehead   Neurological:      Mental Status: She is alert.      Motor: Weakness present.   Psychiatric:         Mood and Affect: Mood normal.         Behavior: Behavior is cooperative.         Assessment/Plan   Problem List Items Addressed This Visit       Depression     Monitor mood and behaviors.  Venlafaxine         HTN (hypertension)     Monitor blood pressure         Type 2 diabetes mellitus (CMS/[facility])     FBG at goal  Continue to monitor glucoscan         Weakness - Primary     Continue working towards goals in therapy         Left supracondylar humerus fracture, closed, initial encounter     Status post ORIF  Pain control  Therapy  F/u outpatient Ortho  Nonweightbearing to left upper extremity  DVT prophylaxis          Medications, treatments, and labs reviewed  Continue medications and treatments as listed in EMR      Michael Attestation  IVAN, Michael Burnham   attest that  this documentation has been prepared under the direction and in the presence of JESSE Serrano    Provider Attestation - Scribe documentation  All medical record entries made by the Scribe were at my direction and personally dictated by me. I have reviewed the chart and agree that the record accurately reflects my personal performance of the history, physical exam, discussion and plan.   JESSE Serrano

## 2024-04-03 ENCOUNTER — HOSPITAL ENCOUNTER (OUTPATIENT)
Dept: RADIOLOGY | Facility: HOSPITAL | Age: 84
Discharge: HOME | End: 2024-04-03
Payer: COMMERCIAL

## 2024-04-03 ENCOUNTER — APPOINTMENT (OUTPATIENT)
Dept: ORTHOPEDIC SURGERY | Facility: HOSPITAL | Age: 84
End: 2024-04-03
Payer: COMMERCIAL

## 2024-04-03 ENCOUNTER — OFFICE VISIT (OUTPATIENT)
Dept: ORTHOPEDIC SURGERY | Facility: HOSPITAL | Age: 84
End: 2024-04-03
Payer: COMMERCIAL

## 2024-04-03 DIAGNOSIS — S42.412A LEFT SUPRACONDYLAR HUMERUS FRACTURE, CLOSED, INITIAL ENCOUNTER: ICD-10-CM

## 2024-04-03 PROCEDURE — 73070 X-RAY EXAM OF ELBOW: CPT | Mod: LT

## 2024-04-03 PROCEDURE — 99024 POSTOP FOLLOW-UP VISIT: CPT | Performed by: ORTHOPAEDIC SURGERY

## 2024-04-03 PROCEDURE — 73070 X-RAY EXAM OF ELBOW: CPT | Mod: LEFT SIDE | Performed by: RADIOLOGY

## 2024-04-03 PROCEDURE — 1159F MED LIST DOCD IN RCRD: CPT | Performed by: ORTHOPAEDIC SURGERY

## 2024-04-03 PROCEDURE — 1160F RVW MEDS BY RX/DR IN RCRD: CPT | Performed by: ORTHOPAEDIC SURGERY

## 2024-04-03 NOTE — PROGRESS NOTES
Jennifer Mancini is  post-op from ORIF left distal humerus on 2/9/2024.  she is doing well at this point.  Pain is well controlled  Denies fevers or chills.  Denies drainage from the wound.  she reports no additional symptoms or concerns. No shortness of breath or chest pain. No calf swelling or pain.    The patients full medical history, surgical history, medications, allergies, family, medical history, social history, and a complete 14 point review of systems is documented in the medical record on the signed, scanned medical intake sheet or reviewed in the history of present illness. Review of systems otherwise negative    Past Medical History:   Diagnosis Date    HTN (hypertension) 2/9/2024    Hypertensive heart disease without heart failure 12/17/2019    Hypertensive heart disease without CHF    Other nondisplaced fracture of seventh cervical vertebra, subsequent encounter for fracture with routine healing 12/17/2019    Other closed nondisplaced fracture of seventh cervical vertebra with routine healing, subsequent encounter    Other nondisplaced fracture of sixth cervical vertebra, subsequent encounter for fracture with routine healing 12/17/2019    Other closed nondisplaced fracture of sixth cervical vertebra with routine healing, subsequent encounter    Personal history of other diseases of the female genital tract 12/17/2019    History of uterine prolapse    Personal history of other endocrine, nutritional and metabolic disease 12/17/2019    History of hyperlipidemia    Personal history of other mental and behavioral disorders 06/14/2021    History of schizoaffective disorder    Personal history of other specified conditions 06/14/2021    History of chronic pain    Schizoaffective disorder, bipolar type (CMS/[facility]) 12/17/2019    Schizoaffective disorder, bipolar type    Unspecified fracture of left femur, initial encounter for closed fracture (CMS/[facility]) 06/14/2021    Fracture of left femur       Medication  Documentation Review Audit       Reviewed by Slim Dover MD (Physician) on 03/06/24 at 1523      Medication Order Taking? Sig Documenting Provider Last Dose Status   acetaminophen (Tylenol 8 HOUR) 650 mg ER tablet [id number]  Take 1 tablet (650 mg) by mouth 3 times a day. Do not crush, chew, or split. Rachell Antony MD  Active   ascorbic acid (Vitamin C) 500 mg tablet [id number]  Take 1 tablet (500 mg) by mouth 2 times a day. With ferrous sulfate for absorption Historical MD Arpan  Active   aspirin 81 mg EC tablet [id number]  Take 1 tablet (81 mg) by mouth once daily. Rachell Antony MD  Active   atorvastatin (Lipitor) 80 mg tablet [id number]  Take 1 tablet (80 mg) by mouth once daily at bedtime. Historical Provider, MD  Active   b complex 0.4 mg tablet [id number]  Take 1 tablet by mouth 2 times a day. Historical Provider, MD  Active   benzocaine-menthol (Cepacol Sore Throat, nargis-men,) 15-3.6 mg lozenge [id number]  Dissolve 1 lozenge in the mouth every 4 hours if needed for sore throat. Historical MD Arpan  Active   benzonatate (Tessalon) 100 mg capsule [id number]  Take 1 capsule (100 mg) by mouth 3 times a day as needed for cough. Do not crush or chew. Rachell Antony MD  Active   calcium carbonate-vitamin D3 500 mg-5 mcg (200 unit) tablet [id number]  Take 1 tablet by mouth 2 times a day. Desean Aguilar MD  Active   calcium carbonate-vitamin D3 600 mg-20 mcg (800 unit) tablet [id number]  Take 1 tablet by mouth once daily. Rachell Antony MD  Active   cetirizine (ZyrTEC) 10 mg tablet [id number]  Take 1 tablet (10 mg) by mouth once daily. Racehll Antony MD  Active   clobetasol (Temovate) 0.05 % cream [id number]  Apply 1 Application topically 2 times a day. Rachell Antony MD  Active   clobetasoL 0.05 % shampoo [id number]  Apply 1 Application topically 1 (one) time per week. Rachell Antony MD  Active   cyanocobalamin (Vitamin B-12) 1,000 mcg tablet [id number]  Take 1 tablet  (1,000 mcg) by mouth once daily. Historical MD Arpan  Active   docusate sodium (Colace) 100 mg capsule [id number]  Take 1 capsule (100 mg) by mouth 2 times a day. Rachell Antony MD  Active   famotidine (Pepcid) 40 mg tablet [id number]  Take 1 tablet (40 mg) by mouth once daily at bedtime. Rachell Antony MD  Active   ferrous sulfate 325 (65 Fe) MG EC tablet [id number]  Take 65 mg by mouth 2 times a day. Do not crush, chew, or split. Rachell Antony MD  Active   Fish OiL 60- mg capsule [id number]  Take 1 capsule (500 mg) by mouth once daily. Rachell Antony MD  Active   fluticasone (Flonase) 50 mcg/actuation nasal spray [id number]  Administer 1 spray into each nostril once daily. Shake gently. Before first use, prime pump. After use, clean tip and replace cap. Historical MD Arpan  Active   folic acid (Folvite) 1 mg tablet [id number]  Take 1 tablet (1 mg) by mouth once daily. Rachell Antony MD  Active   furosemide (Lasix) 40 mg tablet [id number]  Take 1 tablet (40 mg) by mouth once daily. Historical MD Arpan  Active   glucosam/robert-msm1/C/bailey/bosw (OSTEO BI-FLEX TRIPLE STRENGTH ORAL) [id number]  Take 1 tablet by mouth 2 times a day. Historical MD Arpan  Active   guaiFENesin (Mucinex) 600 mg 12 hr tablet [id number]  Take 1 tablet (600 mg) by mouth 2 times a day. Do not crush, chew, or split. Rachell Antony MD  Active   guaiFENesin (Robitussin) 100 mg/5 mL syrup [id number]  Take 5 mL (100 mg) by mouth 4 times a day as needed for cough or congestion. Rachell Antony MD  Active   haloperidol (Haldol) 0.5 mg tablet [id number]  Take 1 tablet (0.5 mg) by mouth once daily. Rachell Antony MD  Active   hydrocortisone 2.5 % cream [id number]  Apply 1 Application topically 2 times a day. Rachell Antony MD  Active   ketoconazole (NIZOral) 2 % cream [id number]  Apply 1 Application topically 3 times a day. Rachell Antony MD  Active   ketoconazole (NIZOral) 2 % shampoo  [id number]  Apply 1 Application topically 2 times a week. As needed Historical MD Arpan  Active   lactobacillus acidophilus & bulgar (Lactinex) 1 million cell chewable tablet [id number]  Chew 1 tablet once daily. Rachell Provider, MD  Active   latanoprost (Xalatan) 0.005 % ophthalmic solution [id number]  Administer 1 drop into both eyes once daily at bedtime. Rachell Antony MD  Active   lidocaine (Xylocaine) 2 % solution [id number]  Lidocaine 2% viscous   Swish and spit 10mg every 4 hours as needed Rachell Antony MD  Active   losartan (Cozaar) 50 mg tablet [id number]  Take 1 tablet (50 mg) by mouth 2 times a day. Rachell Antony MD  Active   metFORMIN (Glucophage) 1,000 mg tablet [id number]  Take 1 tablet (1,000 mg) by mouth 2 times a day with meals. Rachell Antony MD  Active   methotrexate (Trexall) 2.5 mg tablet [id number]  Take 6 tablets (15 mg total) by mouth 1 (one) time per week.  6 tablets (15mg) once a week on Fridays Rachell Antony MD  Active   multivitamin with minerals tablet [id number]  Take 1 tablet by mouth once daily. Historical Provider, MD  Active   neomycin-bacitracnZn-polymyxnB (Neosporin Original) ointment [id number]  Apply 1 Application topically 3 times a day. Historical MD Arpan  Active   OLANZapine (ZyPREXA) 20 mg tablet [id number]  Take 1 tablet (20 mg) by mouth once daily at bedtime. Historical MD Arpan  Active   polyethylene glycol (Glycolax, Miralax) 17 gram packet [id number]  Take 17 g by mouth once daily. Historical Provider, MD  Active   predniSONE (Deltasone) 2.5 mg tablet [id number]  Take 3 tablets (7.5 mg) by mouth once daily. Historical Provider, MD  Active   SITagliptin phosphate (Januvia) 100 mg tablet [id number]  Take 1 tablet (100 mg) by mouth once daily. Do not start before February 17, 2024. Xavi Elam,   Active   venlafaxine XR (Effexor-XR) 37.5 mg 24 hr capsule [id number]  Take 1 capsule (37.5 mg) by mouth once daily. Do not crush or chew.  Historical Provider, MD  Active   zinc sulfate (Zincate) 220 (50 Zn) MG capsule [id number]  Take 1 capsule (50 mg of elemental zinc) by mouth once daily. Historical Provider, MD  Active                    No Known Allergies    Social History     Socioeconomic History    Marital status:      Spouse name: Not on file    Number of children: Not on file    Years of education: Not on file    Highest education level: Not on file   Occupational History    Not on file   Tobacco Use    Smoking status: Unknown    Smokeless tobacco: Not on file   Substance and Sexual Activity    Alcohol use: Not on file    Drug use: Not on file    Sexual activity: Not on file   Other Topics Concern    Not on file   Social History Narrative    Not on file     Social Determinants of Health     Financial Resource Strain: Patient Declined (2/9/2024)    Overall Financial Resource Strain (CARDIA)     Difficulty of Paying Living Expenses: Patient declined   Food Insecurity: Not on file   Transportation Needs: No Transportation Needs (2/9/2024)    PRAPARE - Transportation     Lack of Transportation (Medical): No     Lack of Transportation (Non-Medical): No   Physical Activity: Not on file   Stress: Not on file   Social Connections: Not on file   Intimate Partner Violence: Not on file   Housing Stability: Low Risk  (2/9/2024)    Housing Stability Vital Sign     Unable to Pay for Housing in the Last Year: No     Number of Places Lived in the Last Year: 1     Unstable Housing in the Last Year: No       Past Surgical History:   Procedure Laterality Date    CT ANGIO NECK  12/13/2019    CT NECK ANGIO W AND WO IV CONTRAST 12/13/2019 [facility] CLINICAL LEGACY    OTHER SURGICAL HISTORY  12/17/2019    Tonsillectomy    OTHER SURGICAL HISTORY  12/17/2019    Rotator cuff repair    OTHER SURGICAL HISTORY  12/17/2019    Wrist surgery    OTHER SURGICAL HISTORY  12/17/2019    Uterine surgery       Gen: The patient is alert and oriented ×3, is in no acute distress,  and appear their stated age and weight.    Psychiatric: Mood and affect are appropriate.    Eyes: Sclera are white, and pupils are round and symmetric.    ENT: Mucous membranes are moist.     Neck: Supple. Thyroid is midline.    Respiratory: Respirations are nonlabored, chest rise is symmetric.    Cardiac: Rate is regular by palpation of distal pulses.     Abdomen: Nondistended.    Integument: No obvious cutaneous lesions are noted. No signs of lymphangitis. No signs of systemic edema.  side: left lower extremity :  her  surgical incisions are healing well, without evidence of erythema, fluctuance, drainage, or infection.  The skin around the incision is intact.  Distally neurovascular exam is stable.  There is appropriate tenderness to palpation in the miriam-incisional area. No calf swelling or tenderness to palpation.      I personally reviewed multiple views of left elbow were obtained in the office today demonstrate maintenance of reduction, interval healing, and a stable position of the hardware.      Jennifer Mancini is a 83 y.o. female patient status post ORIF left distal humerus on 2/9/2024.   I went over her x-rays in detail today.  Edwin removed the office today.  she is NWB of the side: left upper extremity. ~He/she~ is range of motion as tolerated of the side: left upper extremity.  I stressed the importance of physical therapy on overall functional outcome. I answered all patient's questions he agrees with treatment plan.  I will see her back in Follow-up 5 week(s)with repeat 2 views of the left elbow.        Slim Dover    Department of Orthopaedic Trauma Surgery

## 2024-05-09 ENCOUNTER — OFFICE VISIT (OUTPATIENT)
Dept: ORTHOPEDIC SURGERY | Facility: HOSPITAL | Age: 84
End: 2024-05-09
Payer: COMMERCIAL

## 2024-05-09 ENCOUNTER — HOSPITAL ENCOUNTER (OUTPATIENT)
Dept: RADIOLOGY | Facility: HOSPITAL | Age: 84
Discharge: HOME | End: 2024-05-09
Payer: COMMERCIAL

## 2024-05-09 DIAGNOSIS — S42.412A LEFT SUPRACONDYLAR HUMERUS FRACTURE, CLOSED, INITIAL ENCOUNTER: Primary | ICD-10-CM

## 2024-05-09 DIAGNOSIS — S42.412A LEFT SUPRACONDYLAR HUMERUS FRACTURE, CLOSED, INITIAL ENCOUNTER: ICD-10-CM

## 2024-05-09 PROCEDURE — 1159F MED LIST DOCD IN RCRD: CPT | Performed by: ORTHOPAEDIC SURGERY

## 2024-05-09 PROCEDURE — 73070 X-RAY EXAM OF ELBOW: CPT | Mod: LEFT SIDE | Performed by: STUDENT IN AN ORGANIZED HEALTH CARE EDUCATION/TRAINING PROGRAM

## 2024-05-09 PROCEDURE — 73070 X-RAY EXAM OF ELBOW: CPT | Mod: LT

## 2024-05-09 PROCEDURE — 99024 POSTOP FOLLOW-UP VISIT: CPT | Performed by: ORTHOPAEDIC SURGERY

## 2024-05-09 PROCEDURE — 1160F RVW MEDS BY RX/DR IN RCRD: CPT | Performed by: ORTHOPAEDIC SURGERY

## 2024-05-09 NOTE — PROGRESS NOTES
Jennifer Mancini is  post-op from ORIF left distal humerus on 2/9/2024.  she is doing well at this point.  Pain is well controlled  Denies fevers or chills.  Denies drainage from the wound.  she reports no additional symptoms or concerns. No shortness of breath or chest pain. No calf swelling or pain.    The patients full medical history, surgical history, medications, allergies, family, medical history, social history, and a complete 14 point review of systems is documented in the medical record on the signed, scanned medical intake sheet or reviewed in the history of present illness. Review of systems otherwise negative    Past Medical History:   Diagnosis Date    HTN (hypertension) 2/9/2024    Hypertensive heart disease without heart failure 12/17/2019    Hypertensive heart disease without CHF    Other nondisplaced fracture of seventh cervical vertebra, subsequent encounter for fracture with routine healing 12/17/2019    Other closed nondisplaced fracture of seventh cervical vertebra with routine healing, subsequent encounter    Other nondisplaced fracture of sixth cervical vertebra, subsequent encounter for fracture with routine healing 12/17/2019    Other closed nondisplaced fracture of sixth cervical vertebra with routine healing, subsequent encounter    Personal history of other diseases of the female genital tract 12/17/2019    History of uterine prolapse    Personal history of other endocrine, nutritional and metabolic disease 12/17/2019    History of hyperlipidemia    Personal history of other mental and behavioral disorders 06/14/2021    History of schizoaffective disorder    Personal history of other specified conditions 06/14/2021    History of chronic pain    Schizoaffective disorder, bipolar type (Multi) 12/17/2019    Schizoaffective disorder, bipolar type    Unspecified fracture of left femur, initial encounter for closed fracture (Multi) 06/14/2021    Fracture of left femur       Medication  Documentation Review Audit       Reviewed by Cathy Mayberry MA (Medical Assistant) on 05/09/24 at 1030      Medication Order Taking? Sig Documenting Provider Last Dose Status   acetaminophen (Tylenol 8 HOUR) 650 mg ER tablet [id number] Yes Take 1 tablet (650 mg) by mouth 3 times a day. Do not crush, chew, or split. Historical MD Arpan Taking Active   ascorbic acid (Vitamin C) 500 mg tablet [id number] Yes Take 1 tablet (500 mg) by mouth 2 times a day. With ferrous sulfate for absorption Historical MD Arpan Taking Active   aspirin 81 mg EC tablet [id number] Yes Take 1 tablet (81 mg) by mouth once daily. Historical MD Arpan Taking Active   atorvastatin (Lipitor) 80 mg tablet [id number] Yes Take 1 tablet (80 mg) by mouth once daily at bedtime. Historical MD Arpan Taking Active   b complex 0.4 mg tablet [id number] Yes Take 1 tablet by mouth 2 times a day. Historical MD Arpan Taking Active   benzocaine-menthol (Cepacol Sore Throat, nargis-men,) 15-3.6 mg lozenge [id number] Yes Dissolve 1 lozenge in the mouth every 4 hours if needed for sore throat. Historical MD Arpan Taking Active   benzonatate (Tessalon) 100 mg capsule [id number] Yes Take 1 capsule (100 mg) by mouth 3 times a day as needed for cough. Do not crush or chew. Historical MD Arpan Taking Active   calcium carbonate-vitamin D3 500 mg-5 mcg (200 unit) tablet [id number] Yes Take 1 tablet by mouth 2 times a day. Desean Aguilar MD Taking Active   calcium carbonate-vitamin D3 600 mg-20 mcg (800 unit) tablet [id number] Yes Take 1 tablet by mouth once daily. Historical Provider, MD Taking Active   cetirizine (ZyrTEC) 10 mg tablet [id number] Yes Take 1 tablet (10 mg) by mouth once daily. Historical MD Arpan Taking Active   clobetasol (Temovate) 0.05 % cream [id number] Yes Apply 1 Application topically 2 times a day. Rachell Antony MD Taking Active   clobetasoL 0.05 % shampoo [id number] Yes Apply 1 Application topically 1 (one) time per  week. Rachell Antony MD Taking Active   cyanocobalamin (Vitamin B-12) 1,000 mcg tablet [id number] Yes Take 1 tablet (1,000 mcg) by mouth once daily. Historical MD Arpan Taking Active   docusate sodium (Colace) 100 mg capsule [id number] Yes Take 1 capsule (100 mg) by mouth 2 times a day. Rachell Antony MD Taking Active   famotidine (Pepcid) 40 mg tablet [id number] Yes Take 1 tablet (40 mg) by mouth once daily at bedtime. Rachell Antony MD Taking Active   ferrous sulfate 325 (65 Fe) MG EC tablet [id number] Yes Take 65 mg by mouth 2 times a day. Do not crush, chew, or split. Rachell Antony MD Taking Active   Fish OiL 60- mg capsule [id number] Yes Take 1 capsule (500 mg) by mouth once daily. Historical MD Arpan Taking Active   fluticasone (Flonase) 50 mcg/actuation nasal spray [id number] Yes Administer 1 spray into each nostril once daily. Shake gently. Before first use, prime pump. After use, clean tip and replace cap. Historical MD Arpan Taking Active   folic acid (Folvite) 1 mg tablet [id number] Yes Take 1 tablet (1 mg) by mouth once daily. Historical MD Arpan Taking Active   furosemide (Lasix) 40 mg tablet [id number] Yes Take 1 tablet (40 mg) by mouth once daily. Historical MD Arpan Taking Active   glucosam/robert-msm1/C/bailey/bosw (OSTEO BI-FLEX TRIPLE STRENGTH ORAL) [id number] Yes Take 1 tablet by mouth 2 times a day. Rachell Antony MD Taking Active   guaiFENesin (Mucinex) 600 mg 12 hr tablet [id number] Yes Take 1 tablet (600 mg) by mouth 2 times a day. Do not crush, chew, or split. Historical MD Arpan Taking Active   guaiFENesin (Robitussin) 100 mg/5 mL syrup [id number] Yes Take 5 mL (100 mg) by mouth 4 times a day as needed for cough or congestion. Historical MD Arpan Taking Active   haloperidol (Haldol) 0.5 mg tablet [id number] Yes Take 1 tablet (0.5 mg) by mouth once daily. Historical MD Arpan Taking Active   hydrocortisone 2.5 % cream [id number] Yes Apply 1  Application topically 2 times a day. Historical MD Arpan Taking Active   ketoconazole (NIZOral) 2 % cream [id number] Yes Apply 1 Application topically 3 times a day. Historical MD Arpan Taking Active   ketoconazole (NIZOral) 2 % shampoo [id number] Yes Apply 1 Application topically 2 times a week. As needed Rachell Antony MD Taking Active   lactobacillus acidophilus & bulgar (Lactinex) 1 million cell chewable tablet [id number] Yes Chew 1 tablet once daily. Historical MD Arpan Taking Active   latanoprost (Xalatan) 0.005 % ophthalmic solution [id number] Yes Administer 1 drop into both eyes once daily at bedtime. Historical MD Arpan Taking Active   lidocaine (Xylocaine) 2 % solution [id number] Yes Lidocaine 2% viscous   Swish and spit 10mg every 4 hours as needed Historical MD Arpan Taking Active   losartan (Cozaar) 50 mg tablet [id number] Yes Take 1 tablet (50 mg) by mouth 2 times a day. Historical MD Arpan Taking Active   metFORMIN (Glucophage) 1,000 mg tablet [id number] Yes Take 1 tablet (1,000 mg) by mouth 2 times a day with meals. Historical MD Arpan Taking Active   methotrexate (Trexall) 2.5 mg tablet [id number] Yes Take 6 tablets (15 mg total) by mouth 1 (one) time per week.  6 tablets (15mg) once a week on Fridays Historical MD Arpan Taking Active   multivitamin with minerals tablet [id number] Yes Take 1 tablet by mouth once daily. Historical MD Arpan Taking Active   neomycin-bacitracnZn-polymyxnB (Neosporin Original) ointment [id number] Yes Apply 1 Application topically 3 times a day. Historical Provider, MD Taking Active   OLANZapine (ZyPREXA) 20 mg tablet [id number] Yes Take 1 tablet (20 mg) by mouth once daily at bedtime. Historical MD Arapn Taking Active   polyethylene glycol (Glycolax, Miralax) 17 gram packet [id number] Yes Take 17 g by mouth once daily. Historical MD Arpan Taking Active   predniSONE (Deltasone) 2.5 mg tablet [id number] Yes Take 3 tablets (7.5 mg) by  mouth once daily. Historical Provider, MD Taking Active   SITagliptin phosphate (Januvia) 100 mg tablet [id number]  Take 1 tablet (100 mg) by mouth once daily. Do not start before 2024. Xavi Elam,    [id number]   venlafaxine XR (Effexor-XR) 37.5 mg 24 hr capsule [id number] Yes Take 1 capsule (37.5 mg) by mouth once daily. Do not crush or chew. Historical Provider, MD Taking Active   zinc sulfate (Zincate) 220 (50 Zn) MG capsule [id number] Yes Take 1 capsule (50 mg of elemental zinc) by mouth once daily. Historical Provider, MD Taking Active                    No Known Allergies    Social History     Socioeconomic History    Marital status:      Spouse name: Not on file    Number of children: Not on file    Years of education: Not on file    Highest education level: Not on file   Occupational History    Not on file   Tobacco Use    Smoking status: Unknown    Smokeless tobacco: Not on file   Substance and Sexual Activity    Alcohol use: Never    Drug use: Never    Sexual activity: Not on file   Other Topics Concern    Not on file   Social History Narrative    Not on file     Social Determinants of Health     Financial Resource Strain: Patient Declined (2024)    Overall Financial Resource Strain (CARDIA)     Difficulty of Paying Living Expenses: Patient declined   Food Insecurity: Not on file   Transportation Needs: No Transportation Needs (2024)    PRAPARE - Transportation     Lack of Transportation (Medical): No     Lack of Transportation (Non-Medical): No   Physical Activity: Not on file   Stress: Not on file   Social Connections: Not on file   Intimate Partner Violence: Not on file   Housing Stability: Low Risk  (2024)    Housing Stability Vital Sign     Unable to Pay for Housing in the Last Year: No     Number of Places Lived in the Last Year: 1     Unstable Housing in the Last Year: No       Past Surgical History:   Procedure Laterality Date    CT ANGIO NECK   12/13/2019    CT NECK ANGIO W AND WO IV CONTRAST 12/13/2019 [facility]ICAL LEGAC[facility]    OTHER SURGICAL HISTORY  12/17/2019    Tonsillectomy    OTHER SURGICAL HISTORY  12/17/2019    Rotator cuff repair    OTHER SURGICAL HISTORY  12/17/2019    Wrist surgery    OTHER SURGICAL HISTORY  12/17/2019    Uterine surgery       Gen: The patient is alert and oriented ×3, is in no acute distress, and appear their stated age and weight.    Psychiatric: Mood and affect are appropriate.    Eyes: Sclera are white, and pupils are round and symmetric.    ENT: Mucous membranes are moist.     Neck: Supple. Thyroid is midline.    Respiratory: Respirations are nonlabored, chest rise is symmetric.    Cardiac: Rate is regular by palpation of distal pulses.     Abdomen: Nondistended.    Integument: No obvious cutaneous lesions are noted. No signs of lymphangitis. No signs of systemic edema.  side: left lower extremity :  her  surgical incisions are healing well, without evidence of erythema, fluctuance, drainage, or infection.  The skin around the incision is intact.  Distally neurovascular exam is stable.  There is appropriate tenderness to palpation in the miriam-incisional area. No calf swelling or tenderness to palpation.      I personally reviewed multiple views of left elbow were obtained in the office today demonstrate maintenance of reduction, interval healing, and a stable position of the hardware.      Jennifer Mancini is a 83 y.o. female patient status post ORIF left distal humerus on 2/9/2024.   I went over her x-rays in detail today.  she is WBAT of the side: left upper extremity. ~He/she~ is range of motion as tolerated of the side: left upper extremity.  Range of motion is excellent and she is doing very well.  I stressed the importance of physical therapy on overall functional outcome. I answered all patient's questions he agrees with treatment plan.  I will see her back in Follow-up 3 months with repeat 2 views of the left  elbow.        Slim Dover    Department of Orthopaedic Trauma Surgery

## 2024-08-17 ENCOUNTER — HOSPITAL ENCOUNTER (EMERGENCY)
Facility: HOSPITAL | Age: 84
Discharge: HOME | End: 2024-08-17
Attending: EMERGENCY MEDICINE
Payer: COMMERCIAL

## 2024-08-17 ENCOUNTER — APPOINTMENT (OUTPATIENT)
Dept: CARDIOLOGY | Facility: HOSPITAL | Age: 84
End: 2024-08-17
Payer: COMMERCIAL

## 2024-08-17 ENCOUNTER — APPOINTMENT (OUTPATIENT)
Dept: RADIOLOGY | Facility: HOSPITAL | Age: 84
End: 2024-08-17
Payer: COMMERCIAL

## 2024-08-17 VITALS
HEART RATE: 98 BPM | DIASTOLIC BLOOD PRESSURE: 108 MMHG | SYSTOLIC BLOOD PRESSURE: 152 MMHG | TEMPERATURE: 97.6 F | OXYGEN SATURATION: 98 % | WEIGHT: 134.48 LBS | BODY MASS INDEX: 26.4 KG/M2 | RESPIRATION RATE: 18 BRPM | HEIGHT: 60 IN
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
## 2024-08-17 DIAGNOSIS — S06.2X0A: ICD-10-CM

## 2024-08-17 LAB
ALBUMIN SERPL BCP-MCNC: 3.7 G/DL (ref 3.4–5)
ALP SERPL-CCNC: 93 U/L (ref 33–136)
ALT SERPL W P-5'-P-CCNC: 12 U/L (ref 7–45)
ANION GAP SERPL CALC-SCNC: 12 MMOL/L (ref 10–20)
APPEARANCE UR: CLEAR
AST SERPL W P-5'-P-CCNC: 12 U/L (ref 9–39)
BASOPHILS # BLD AUTO: 0.04 X10*3/UL (ref 0–0.1)
BASOPHILS NFR BLD AUTO: 0.4 %
BILIRUB SERPL-MCNC: 0.3 MG/DL (ref 0–1.2)
BILIRUB UR STRIP.AUTO-MCNC: NEGATIVE MG/DL
BUN SERPL-MCNC: 11 MG/DL (ref 6–23)
CALCIUM SERPL-MCNC: 9.4 MG/DL (ref 8.6–10.3)
CARDIAC TROPONIN I PNL SERPL HS: 12 NG/L (ref 0–13)
CHLORIDE SERPL-SCNC: 97 MMOL/L (ref 98–107)
CO2 SERPL-SCNC: 25 MMOL/L (ref 21–32)
COLOR UR: COLORLESS
CREAT SERPL-MCNC: 0.42 MG/DL (ref 0.5–1.05)
EGFRCR SERPLBLD CKD-EPI 2021: >90 ML/MIN/1.73M*2
EOSINOPHIL # BLD AUTO: 0.06 X10*3/UL (ref 0–0.4)
EOSINOPHIL NFR BLD AUTO: 0.7 %
ERYTHROCYTE [DISTWIDTH] IN BLOOD BY AUTOMATED COUNT: 14.1 % (ref 11.5–14.5)
GLUCOSE SERPL-MCNC: 130 MG/DL (ref 74–99)
GLUCOSE UR STRIP.AUTO-MCNC: NORMAL MG/DL
HCT VFR BLD AUTO: 35.7 % (ref 36–46)
HGB BLD-MCNC: 11.8 G/DL (ref 12–16)
HOLD SPECIMEN: NORMAL
IMM GRANULOCYTES # BLD AUTO: 0.1 X10*3/UL (ref 0–0.5)
IMM GRANULOCYTES NFR BLD AUTO: 1.1 % (ref 0–0.9)
KETONES UR STRIP.AUTO-MCNC: NEGATIVE MG/DL
LEUKOCYTE ESTERASE UR QL STRIP.AUTO: NEGATIVE
LYMPHOCYTES # BLD AUTO: 0.9 X10*3/UL (ref 0.8–3)
LYMPHOCYTES NFR BLD AUTO: 10 %
MAGNESIUM SERPL-MCNC: 1.33 MG/DL (ref 1.6–2.4)
MCH RBC QN AUTO: 30.6 PG (ref 26–34)
MCHC RBC AUTO-ENTMCNC: 33.1 G/DL (ref 32–36)
MCV RBC AUTO: 93 FL (ref 80–100)
MONOCYTES # BLD AUTO: 0.78 X10*3/UL (ref 0.05–0.8)
MONOCYTES NFR BLD AUTO: 8.6 %
NEUTROPHILS # BLD AUTO: 7.14 X10*3/UL (ref 1.6–5.5)
NEUTROPHILS NFR BLD AUTO: 79.2 %
NITRITE UR QL STRIP.AUTO: NEGATIVE
NRBC BLD-RTO: 0 /100 WBCS (ref 0–0)
PH UR STRIP.AUTO: 7.5 [PH]
PLATELET # BLD AUTO: 352 X10*3/UL (ref 150–450)
POTASSIUM SERPL-SCNC: 4 MMOL/L (ref 3.5–5.3)
PROT SERPL-MCNC: 6.5 G/DL (ref 6.4–8.2)
PROT UR STRIP.AUTO-MCNC: NEGATIVE MG/DL
RBC # BLD AUTO: 3.85 X10*6/UL (ref 4–5.2)
RBC # UR STRIP.AUTO: NEGATIVE /UL
SODIUM SERPL-SCNC: 130 MMOL/L (ref 136–145)
SP GR UR STRIP.AUTO: 1
UROBILINOGEN UR STRIP.AUTO-MCNC: NORMAL MG/DL
WBC # BLD AUTO: 9 X10*3/UL (ref 4.4–11.3)

## 2024-08-17 PROCEDURE — 12002 RPR S/N/AX/GEN/TRNK2.6-7.5CM: CPT | Mod: 59 | Performed by: PHYSICIAN ASSISTANT

## 2024-08-17 PROCEDURE — 36415 COLL VENOUS BLD VENIPUNCTURE: CPT | Performed by: EMERGENCY MEDICINE

## 2024-08-17 PROCEDURE — 85025 COMPLETE CBC W/AUTO DIFF WBC: CPT | Performed by: EMERGENCY MEDICINE

## 2024-08-17 PROCEDURE — 99285 EMERGENCY DEPT VISIT HI MDM: CPT | Mod: 25

## 2024-08-17 PROCEDURE — 81003 URINALYSIS AUTO W/O SCOPE: CPT | Performed by: EMERGENCY MEDICINE

## 2024-08-17 PROCEDURE — 93005 ELECTROCARDIOGRAM TRACING: CPT

## 2024-08-17 PROCEDURE — 96366 THER/PROPH/DIAG IV INF ADDON: CPT | Mod: 59

## 2024-08-17 PROCEDURE — 70450 CT HEAD/BRAIN W/O DYE: CPT | Performed by: RADIOLOGY

## 2024-08-17 PROCEDURE — G0390 TRAUMA RESPONS W/HOSP CRITI: HCPCS

## 2024-08-17 PROCEDURE — 83735 ASSAY OF MAGNESIUM: CPT | Performed by: EMERGENCY MEDICINE

## 2024-08-17 PROCEDURE — 80053 COMPREHEN METABOLIC PANEL: CPT | Performed by: EMERGENCY MEDICINE

## 2024-08-17 PROCEDURE — 84484 ASSAY OF TROPONIN QUANT: CPT | Performed by: EMERGENCY MEDICINE

## 2024-08-17 PROCEDURE — 96365 THER/PROPH/DIAG IV INF INIT: CPT | Mod: 59

## 2024-08-17 PROCEDURE — 2500000004 HC RX 250 GENERAL PHARMACY W/ HCPCS (ALT 636 FOR OP/ED): Performed by: EMERGENCY MEDICINE

## 2024-08-17 PROCEDURE — 70450 CT HEAD/BRAIN W/O DYE: CPT

## 2024-08-17 RX ORDER — MAGNESIUM SULFATE HEPTAHYDRATE 40 MG/ML
2 INJECTION, SOLUTION INTRAVENOUS ONCE
Status: COMPLETED | OUTPATIENT
Start: 2024-08-17 | End: 2024-08-17

## 2024-08-17 RX ADMIN — MAGNESIUM SULFATE HEPTAHYDRATE 2 G: 40 INJECTION, SOLUTION INTRAVENOUS at 07:22

## 2024-08-17 ASSESSMENT — PAIN SCALES - GENERAL
PAINLEVEL_OUTOF10: 3
PAINLEVEL_OUTOF10: 5 - MODERATE PAIN

## 2024-08-17 ASSESSMENT — COLUMBIA-SUICIDE SEVERITY RATING SCALE - C-SSRS
6. HAVE YOU EVER DONE ANYTHING, STARTED TO DO ANYTHING, OR PREPARED TO DO ANYTHING TO END YOUR LIFE?: NO
1. IN THE PAST MONTH, HAVE YOU WISHED YOU WERE DEAD OR WISHED YOU COULD GO TO SLEEP AND NOT WAKE UP?: NO
2. HAVE YOU ACTUALLY HAD ANY THOUGHTS OF KILLING YOURSELF?: NO

## 2024-08-17 ASSESSMENT — LIFESTYLE VARIABLES
TOTAL SCORE: 0
EVER FELT BAD OR GUILTY ABOUT YOUR DRINKING: NO
HAVE YOU EVER FELT YOU SHOULD CUT DOWN ON YOUR DRINKING: NO
EVER HAD A DRINK FIRST THING IN THE MORNING TO STEADY YOUR NERVES TO GET RID OF A HANGOVER: NO
HAVE PEOPLE ANNOYED YOU BY CRITICIZING YOUR DRINKING: NO

## 2024-08-17 ASSESSMENT — PAIN DESCRIPTION - PAIN TYPE: TYPE: ACUTE PAIN

## 2024-08-17 ASSESSMENT — PAIN - FUNCTIONAL ASSESSMENT: PAIN_FUNCTIONAL_ASSESSMENT: 0-10

## 2024-08-17 NOTE — ED TRIAGE NOTES
Pt reports feeling dizzy and falling, laceration noted above left brow. Pt reports blood thinner use, denies LOC

## 2024-08-17 NOTE — ED PROCEDURE NOTE
Procedure  Laceration Repair    Performed by: Jovany Barraza PA-C  Authorized by: Carol Franco DO    Consent:     Consent obtained:  Verbal    Consent given by:  Patient    Risks discussed:  Infection and pain  Universal protocol:     Procedure explained and questions answered to patient or proxy's satisfaction: yes      Relevant documents present and verified: yes      Immediately prior to procedure, a time out was called: yes      Patient identity confirmed:  Verbally with patient  Anesthesia:     Anesthesia method:  Local infiltration    Local anesthetic:  Bupivacaine 0.25% w/o epi  Laceration details:     Location:  Scalp    Scalp location:  Frontal    Length (cm):  3    Depth (mm):  5  Pre-procedure details:     Preparation:  Patient was prepped and draped in usual sterile fashion  Exploration:     Limited defect created (wound extended): no      Hemostasis achieved with:  Direct pressure    Imaging outcome: foreign body not noted      Contaminated: no    Treatment:     Area cleansed with:  Povidone-iodine    Amount of cleaning:  Standard    Irrigation solution:  Sterile water    Visualized foreign bodies/material removed: no      Debridement:  None  Skin repair:     Repair method:  Sutures    Suture size:  5-0    Suture material:  Nylon    Suture technique:  Running    Number of sutures:  7  Approximation:     Approximation:  Close  Repair type:     Repair type:  Simple  Post-procedure details:     Dressing:  Non-adherent dressing    Procedure completion:  Tolerated             Jovany Barraza PA-C  08/17/24 0709

## 2024-08-17 NOTE — PROGRESS NOTES
I have accept care of this patient in signout.    In summary:  This is a 84-year-old female who presented to the emergency department after a fall.  Patient was seen by prior provider.  Patient states that she was getting up from the bathroom and she felt dizzy and fell forward hitting her head.  She did have a laceration that was repaired.  Patient's images were negative for any intracranial abnormality.  Her lab workup is also reassuring she did get replacement for magnesium.  Otherwise her electrolytes appear to be around her baseline.  Patient states that she has had multiple falls.  I do not believe that she had any cardiac etiology to cause her fall suspect is more likely vasovagal given her brain her head down and looking up quickly.  I did offer patient potential admission for social work given her multiple falls but she would like to follow-up with her primary care provider and see if she can get into a rehab facility.  She did not like the facility she was at prior.  She understands she can return to emergency department if she continues to feel weak.  Otherwise she has no signs of infection at this time she is safe for discharge back to the assisted living facility.      Labs Reviewed   CBC WITH AUTO DIFFERENTIAL - Abnormal       Result Value    WBC 9.0      nRBC 0.0      RBC 3.85 (*)     Hemoglobin 11.8 (*)     Hematocrit 35.7 (*)     MCV 93      MCH 30.6      MCHC 33.1      RDW 14.1      Platelets 352      Neutrophils % 79.2      Immature Granulocytes %, Automated 1.1 (*)     Lymphocytes % 10.0      Monocytes % 8.6      Eosinophils % 0.7      Basophils % 0.4      Neutrophils Absolute 7.14 (*)     Immature Granulocytes Absolute, Automated 0.10      Lymphocytes Absolute 0.90      Monocytes Absolute 0.78      Eosinophils Absolute 0.06      Basophils Absolute 0.04     COMPREHENSIVE METABOLIC PANEL - Abnormal    Glucose 130 (*)     Sodium 130 (*)     Potassium 4.0      Chloride 97 (*)     Bicarbonate 25       Anion Gap 12      Urea Nitrogen 11      Creatinine 0.42 (*)     eGFR >90      Calcium 9.4      Albumin 3.7      Alkaline Phosphatase 93      Total Protein 6.5      AST 12      Bilirubin, Total 0.3      ALT 12     MAGNESIUM - Abnormal    Magnesium 1.33 (*)    URINALYSIS WITH REFLEX CULTURE AND MICROSCOPIC - Abnormal    Color, Urine Colorless (*)     Appearance, Urine Clear      Specific Gravity, Urine 1.005      pH, Urine 7.5      Protein, Urine NEGATIVE      Glucose, Urine Normal      Blood, Urine NEGATIVE      Ketones, Urine NEGATIVE      Bilirubin, Urine NEGATIVE      Urobilinogen, Urine Normal      Nitrite, Urine NEGATIVE      Leukocyte Esterase, Urine NEGATIVE     TROPONIN I, HIGH SENSITIVITY - Normal    Troponin I, High Sensitivity 12      Narrative:     Less than 99th percentile of normal range cutoff-  Female and children under 18 years old <14 ng/L; Male <21 ng/L: Negative  Repeat testing should be performed if clinically indicated.     Female and children under 18 years old 14-50 ng/L; Male 21-50 ng/L:  Consistent with possible cardiac damage and possible increased clinical   risk. Serial measurements may help to assess extent of myocardial damage.     >50 ng/L: Consistent with cardiac damage, increased clinical risk and  myocardial infarction. Serial measurements may help assess extent of   myocardial damage.      NOTE: Children less than 1 year old may have higher baseline troponin   levels and results should be interpreted in conjunction with the overall   clinical context.     NOTE: Troponin I testing is performed using a different   testing methodology at [facility] than at other   [facility] hospitals. Direct result comparisons should only   be made within the same method.   URINALYSIS WITH REFLEX CULTURE AND MICROSCOPIC    Narrative:     The following orders were created for panel order Urinalysis with Reflex Culture and Microscopic.  Procedure                               Abnormality          Status                     ---------                               -----------         ------                     Urinalysis with Reflex C...[[id number]]  Abnormal            Final result               Extra Urine Gray Tube[[id number]]                            In process                   Please view results for these tests on the individual orders.   EXTRA URINE GRAY TUBE     CT head wo IV contrast   Final Result   No acute intracranial hemorrhage, mass effect or midline shift.        Nonspecific scattered white matter hypodensities favored to represent   sequela of small vessel ischemia.             MACRO:   None.        Signed by: Evan Finkelstein 8/17/2024 4:45 AM   Dictation workstation:   [id number]

## 2024-08-17 NOTE — DISCHARGE INSTRUCTIONS
Please get your 7 stitches removed in 7 days this coming in the emergency department, primary care's office or urgent care.  If you change your mind decide that you do want rehab and you do not want to wait for your primary care's office because you feel you are declining please return emerged apartment.

## 2024-08-17 NOTE — ED PROVIDER NOTES
Jennifer Mancini is a 84 y.o. patient presenting to the ED for HIA.  Patient was ambulating to the bathroom and to get into feel dizzy and fell.  She did hit her head and has a laceration above her eyebrow.  She does report being on blood thinner however it is unclear exactly which 1.  Per record review she is only on baby aspirin.  Patient denies loss of consciousness.  She does have mild pain in her head but denies any other pain.  Additional abrasions to her arms, but states that these are not that bad.    Additional History Obtained from: None  Limitations to History: None  ------------------------------------------------------------------------------------------------------------------------------------------  Physical Exam:  Appearance: Alert, cooperative.  Skin: Warm, dry, appropriate color for ethnicity.  Multiple upper extremity abrasions.  There is a full-thickness laceration above the left eyebrow.  Eyes: Cornea clear. No scleral icterus or injection.   ENT: Mucous membranes moist.  Pulmonary: No accessory muscle use or stridor. Clear lung sounds bilaterally without rhonchi or wheezing.   Cardiac: Heart sounds regular without murmur. B/L radial pulses full and symmetric.   Abdomen: Soft, not tender.  No rebound or guarding.   Musculoskeletal: No gross deformities.   Neurological: Face symmetrical. Voice clear. Appropriately conversant.  Motor and sensation grossly intact x 4 extremities.    Psychiatric: Appropriate mood and affect.    Medical Decision Making:  Chronic Medical Conditions Significantly Affecting Care:  has a past medical history of HTN (hypertension) (2/9/2024), Hypertensive heart disease without heart failure (12/17/2019), Other nondisplaced fracture of seventh cervical vertebra, subsequent encounter for fracture with routine healing (12/17/2019), Other nondisplaced fracture of sixth cervical vertebra, subsequent encounter for fracture with routine healing (12/17/2019), Personal history of  other diseases of the female genital tract (12/17/2019), Personal history of other endocrine, nutritional and metabolic disease (12/17/2019), Personal history of other mental and behavioral disorders (06/14/2021), Personal history of other specified conditions (06/14/2021), Schizoaffective disorder, bipolar type (Multi) (12/17/2019), and Unspecified fracture of left femur, initial encounter for closed fracture (Multi) (06/14/2021).    Social Determinants of Health Significantly Affecting Care: None identified    Differential Diagnosis Considered but not limited to: The patient was feeling dizzy prior to the fall and therefore presyncope is a consideration.  Patient is also anticoagulated with head injury and therefore intracranial hemorrhage is consideration.  She does have lacerations on her forehead as well as skin abrasions on her arms.      External Records Reviewed:   I reviewed recent and relevant outside records including:     Independent Interpretation of Studies: The following studies were ordered as part of the emergency department work up and independently interpreted by me. See ED Course for details.    CBC with mild anemia and hemoglobin of 11.8.  Otherwise normal.  CMP shows hyponatremia with a sodium of 130 which is the patient's baseline.  Magnesium is low at 1.3 with remainder of electrolytes being normal.  Troponin is negative.  Urinalysis is without evidence of infection.    Magnesium was replaced and wounds cleansed.  Laceration was repaired by RASHAWN-see additional note for details.      ED Course as of 08/20/24 0646   Sat Aug 17, 2024   0414 EKG performed at 0410 and interpreted by me shows sinus tachycardia at a rate of 100.  Intervals are normal.  The axis is normal.  There are no ST or T wave changes.  No STEMI. [SP]      ED Course User Index  [SP] Carol Franco DO         Diagnoses as of 08/20/24 0646   Fall, initial encounter   Laceration and contusion of cerebral cortex without loss of  consciousness, unspecified laterality, initial encounter (Multi)          Carol Franco DO  08/20/24 0649

## 2024-08-19 VITALS
TEMPERATURE: 97.6 F | DIASTOLIC BLOOD PRESSURE: 108 MMHG | WEIGHT: 134.48 LBS | BODY MASS INDEX: 26.4 KG/M2 | SYSTOLIC BLOOD PRESSURE: 152 MMHG | HEIGHT: 60 IN | RESPIRATION RATE: 18 BRPM | HEART RATE: 98 BPM | OXYGEN SATURATION: 98 %

## 2024-08-21 LAB
ATRIAL RATE: 101 BPM
P AXIS: 76 DEGREES
PR INTERVAL: 131 MS
Q ONSET: 249 MS
QRS COUNT: 16 BEATS
QRS DURATION: 79 MS
QT INTERVAL: 349 MS
QTC CALCULATION(BAZETT): 451 MS
QTC FREDERICIA: 414 MS
R AXIS: 2 DEGREES
T AXIS: 63 DEGREES
T OFFSET: 424 MS
VENTRICULAR RATE: 100 BPM

## 2024-08-22 ENCOUNTER — OFFICE VISIT (OUTPATIENT)
Dept: ORTHOPEDIC SURGERY | Facility: HOSPITAL | Age: 84
End: 2024-08-22
Payer: COMMERCIAL

## 2024-08-22 ENCOUNTER — HOSPITAL ENCOUNTER (OUTPATIENT)
Dept: RADIOLOGY | Facility: HOSPITAL | Age: 84
Discharge: HOME | End: 2024-08-22
Payer: COMMERCIAL

## 2024-08-22 DIAGNOSIS — M84.319A STRESS FRACTURE OF CLAVICLE: Primary | ICD-10-CM

## 2024-08-22 DIAGNOSIS — S42.412A LEFT SUPRACONDYLAR HUMERUS FRACTURE, CLOSED, INITIAL ENCOUNTER: ICD-10-CM

## 2024-08-22 DIAGNOSIS — M84.319A STRESS FRACTURE OF CLAVICLE: ICD-10-CM

## 2024-08-22 PROCEDURE — 23500 CLTX CLAVICULAR FX W/O MNPJ: CPT | Performed by: ORTHOPAEDIC SURGERY

## 2024-08-22 PROCEDURE — 73000 X-RAY EXAM OF COLLAR BONE: CPT | Mod: RT

## 2024-08-22 PROCEDURE — 1159F MED LIST DOCD IN RCRD: CPT | Performed by: ORTHOPAEDIC SURGERY

## 2024-08-22 PROCEDURE — 73080 X-RAY EXAM OF ELBOW: CPT | Mod: LT

## 2024-08-22 PROCEDURE — 99214 OFFICE O/P EST MOD 30 MIN: CPT | Mod: 57 | Performed by: ORTHOPAEDIC SURGERY

## 2024-08-22 PROCEDURE — 99214 OFFICE O/P EST MOD 30 MIN: CPT | Performed by: ORTHOPAEDIC SURGERY

## 2024-08-22 NOTE — PROGRESS NOTES
Jennifer Mancini is  post-op from ORIF left distal humerus on 2/9/2024.  she is doing well at this point.  Pain is well controlled  Denies fevers or chills.  Denies drainage from the wound.  she reports no additional symptoms or concerns. No shortness of breath or chest pain. No calf swelling or pain.  She sustained a right distal clavicle fracture about a month and a half ago.  She was going to see someone within clinic [facility] I was happy to see her so she did not have to see to different positions.    The patients full medical history, surgical history, medications, allergies, family, medical history, social history, and a complete 14 point review of systems is documented in the medical record on the signed, scanned medical intake sheet or reviewed in the history of present illness. Review of systems otherwise negative    Past Medical History:   Diagnosis Date    HTN (hypertension) 2/9/2024    Hypertensive heart disease without heart failure 12/17/2019    Hypertensive heart disease without CHF    Other nondisplaced fracture of seventh cervical vertebra, subsequent encounter for fracture with routine healing 12/17/2019    Other closed nondisplaced fracture of seventh cervical vertebra with routine healing, subsequent encounter    Other nondisplaced fracture of sixth cervical vertebra, subsequent encounter for fracture with routine healing 12/17/2019    Other closed nondisplaced fracture of sixth cervical vertebra with routine healing, subsequent encounter    Personal history of other diseases of the female genital tract 12/17/2019    History of uterine prolapse    Personal history of other endocrine, nutritional and metabolic disease 12/17/2019    History of hyperlipidemia    Personal history of other mental and behavioral disorders 06/14/2021    History of schizoaffective disorder    Personal history of other specified conditions 06/14/2021    History of chronic pain    Schizoaffective disorder, bipolar type  (Multi) 12/17/2019    Schizoaffective disorder, bipolar type    Unspecified fracture of left femur, initial encounter for closed fracture (Multi) 06/14/2021    Fracture of left femur       Medication Documentation Review Audit       Reviewed by Lizzie Albert MA (Medical Assistant) on 08/22/24 at 0957      Medication Order Taking? Sig Documenting Provider Last Dose Status   acetaminophen (Tylenol 8 HOUR) 650 mg ER tablet [id number] Yes Take 1 tablet (650 mg) by mouth 3 times a day. Do not crush, chew, or split. Historical Provider, MD Taking Active   ascorbic acid (Vitamin C) 500 mg tablet [id number] Yes Take 1 tablet (500 mg) by mouth 2 times a day. With ferrous sulfate for absorption Historical Provider, MD Taking Active   aspirin 81 mg EC tablet [id number] Yes Take 1 tablet (81 mg) by mouth once daily. Historical MD Arpan Taking Active   atorvastatin (Lipitor) 80 mg tablet [id number] Yes Take 1 tablet (80 mg) by mouth once daily at bedtime. Historical Provider, MD Taking Active   b complex 0.4 mg tablet [id number] Yes Take 1 tablet by mouth 2 times a day. Historical Provider, MD Taking Active   benzocaine-menthol (Cepacol Sore Throat, nargis-men,) 15-3.6 mg lozenge [id number] Yes Dissolve 1 lozenge in the mouth every 4 hours if needed for sore throat. Historical Provider, MD Taking Active   benzonatate (Tessalon) 100 mg capsule [id number] Yes Take 1 capsule (100 mg) by mouth 3 times a day as needed for cough. Do not crush or chew. Historical Provider, MD Taking Active   calcium carbonate-vitamin D3 600 mg-20 mcg (800 unit) tablet [id number] Yes Take 1 tablet by mouth once daily. Historical Provider, MD Taking Active   cetirizine (ZyrTEC) 10 mg tablet [id number] Yes Take 1 tablet (10 mg) by mouth once daily. Historical Provider, MD Taking Active   clobetasol (Temovate) 0.05 % cream [id number] Yes Apply 1 Application topically 2 times a day. Historical MD Arpan Taking Active   clobetasoL 0.05 % shampoo [id number] Yes  Apply 1 Application topically 1 (one) time per week. Historical MD Arpan Taking Active   cyanocobalamin (Vitamin B-12) 1,000 mcg tablet [id number] Yes Take 1 tablet (1,000 mcg) by mouth once daily. Rachell Antony MD Taking Active   docusate sodium (Colace) 100 mg capsule [id number] Yes Take 1 capsule (100 mg) by mouth 2 times a day. Rachell Antony MD Taking Active   famotidine (Pepcid) 40 mg tablet [id number] Yes Take 1 tablet (40 mg) by mouth once daily at bedtime. Rachell Antony MD Taking Active   ferrous sulfate 325 (65 Fe) MG EC tablet [id number] Yes Take 65 mg by mouth 2 times a day. Do not crush, chew, or split. Rachell Antony MD Taking Active   Fish OiL 60- mg capsule [id number] Yes Take 1 capsule (500 mg) by mouth once daily. Rachell Antony MD Taking Active   fluticasone (Flonase) 50 mcg/actuation nasal spray [id number] Yes Administer 1 spray into each nostril once daily. Shake gently. Before first use, prime pump. After use, clean tip and replace cap. Rachell Antony MD Taking Active   folic acid (Folvite) 1 mg tablet [id number] Yes Take 1 tablet (1 mg) by mouth once daily. Rachell Antony MD Taking Active   furosemide (Lasix) 40 mg tablet [id number] Yes Take 1 tablet (40 mg) by mouth once daily. Historical MD Arpan Taking Active   glucosam/robert-msm1/C/bailey/bosw (OSTEO BI-FLEX TRIPLE STRENGTH ORAL) [id number] Yes Take 1 tablet by mouth 2 times a day. Historical MD Arpan Taking Active   guaiFENesin (Mucinex) 600 mg 12 hr tablet [id number] Yes Take 1 tablet (600 mg) by mouth 2 times a day. Do not crush, chew, or split. Rachell Antony MD Taking Active   guaiFENesin (Robitussin) 100 mg/5 mL syrup [id number] Yes Take 5 mL (100 mg) by mouth 4 times a day as needed for cough or congestion. Rachell Antony MD Taking Active   haloperidol (Haldol) 0.5 mg tablet [id number] Yes Take 1 tablet (0.5 mg) by mouth once daily. Historical MD Arpan Taking Active    hydrocortisone 2.5 % cream [id number] Yes Apply 1 Application topically 2 times a day. Historical Provider, MD Taking Active   ketoconazole (NIZOral) 2 % cream [id number] Yes Apply 1 Application topically 3 times a day. Historical Provider, MD Taking Active   ketoconazole (NIZOral) 2 % shampoo [id number] Yes Apply 1 Application topically 2 times a week. As needed Historical Provider, MD Taking Active   lactobacillus acidophilus & bulgar (Lactinex) 1 million cell chewable tablet [id number] Yes Chew 1 tablet once daily. Historical Provider, MD Taking Active   latanoprost (Xalatan) 0.005 % ophthalmic solution [id number] Yes Administer 1 drop into both eyes once daily at bedtime. Historical Provider, MD Taking Active   lidocaine (Xylocaine) 2 % solution [id number] Yes Lidocaine 2% viscous   Swish and spit 10mg every 4 hours as needed Historical Provider, MD Taking Active   losartan (Cozaar) 50 mg tablet [id number] Yes Take 1 tablet (50 mg) by mouth 2 times a day. Historical Provider, MD Taking Active   metFORMIN (Glucophage) 1,000 mg tablet [id number] Yes Take 1 tablet (1,000 mg) by mouth 2 times daily (morning and late afternoon). Historical Provider, MD Taking Active   methotrexate (Trexall) 2.5 mg tablet [id number] Yes Take 6 tablets (15 mg total) by mouth 1 (one) time per week.  6 tablets (15mg) once a week on Fridays Historical Provider, MD Taking Active   multivitamin with minerals tablet [id number] Yes Take 1 tablet by mouth once daily. Historical Provider, MD Taking Active   neomycin-bacitracnZn-polymyxnB (Neosporin Original) ointment [id number] Yes Apply 1 Application topically 3 times a day. Historical Provider, MD Taking Active   OLANZapine (ZyPREXA) 20 mg tablet [id number] Yes Take 1 tablet (20 mg) by mouth once daily at bedtime. Historical Provider, MD Taking Active   polyethylene glycol (Glycolax, Miralax) 17 gram packet [id number] Yes Take 17 g by mouth once daily. Historical Provider, MD Taking Active   predniSONE  (Deltasone) 2.5 mg tablet [id number] Yes Take 3 tablets (7.5 mg) by mouth once daily. Historical Provider, MD Taking Active   SITagliptin phosphate (Januvia) 100 mg tablet [id number]  Take 1 tablet (100 mg) by mouth once daily. Do not start before 2024. Xavi Elam,    24 2517   venlafaxine XR (Effexor-XR) 37.5 mg 24 hr capsule [id number] Yes Take 1 capsule (37.5 mg) by mouth once daily. Do not crush or chew. Historical Provider, MD Taking Active   zinc sulfate (Zincate) 220 (50 Zn) MG capsule [id number] Yes Take 1 capsule (50 mg of elemental zinc) by mouth once daily. Historical Provider, MD Taking Active                    No Known Allergies    Social History     Socioeconomic History    Marital status:      Spouse name: Not on file    Number of children: Not on file    Years of education: Not on file    Highest education level: Not on file   Occupational History    Not on file   Tobacco Use    Smoking status: Unknown    Smokeless tobacco: Not on file   Substance and Sexual Activity    Alcohol use: Never    Drug use: Never    Sexual activity: Not on file   Other Topics Concern    Not on file   Social History Narrative    Not on file     Social Determinants of Health     Financial Resource Strain: Patient Declined (2024)    Overall Financial Resource Strain (CARDIA)     Difficulty of Paying Living Expenses: Patient declined   Food Insecurity: Not on file   Transportation Needs: No Transportation Needs (2024)    PRAPARE - Transportation     Lack of Transportation (Medical): No     Lack of Transportation (Non-Medical): No   Physical Activity: Not on file   Stress: Not on file   Social Connections: Not on file   Intimate Partner Violence: Not on file   Housing Stability: Low Risk  (2024)    Housing Stability Vital Sign     Unable to Pay for Housing in the Last Year: No     Number of Places Lived in the Last Year: 1     Unstable Housing in the Last Year: No       Past  Surgical History:   Procedure Laterality Date    CT ANGIO NECK  12/13/2019    CT NECK ANGIO W AND WO IV CONTRAST 12/13/2019 [facility]    OTHER SURGICAL HISTORY  12/17/2019    Tonsillectomy    OTHER SURGICAL HISTORY  12/17/2019    Rotator cuff repair    OTHER SURGICAL HISTORY  12/17/2019    Wrist surgery    OTHER SURGICAL HISTORY  12/17/2019    Uterine surgery       Gen: The patient is alert and oriented ×3, is in no acute distress, and appear their stated age and weight.    Psychiatric: Mood and affect are appropriate.    Eyes: Sclera are white, and pupils are round and symmetric.    ENT: Mucous membranes are moist.     Neck: Supple. Thyroid is midline.    Respiratory: Respirations are nonlabored, chest rise is symmetric.    Cardiac: Rate is regular by palpation of distal pulses.     Abdomen: Nondistended.    Integument: No obvious cutaneous lesions are noted. No signs of lymphangitis. No signs of systemic edema.  side: left lower extremity :  her  surgical incisions are healing well, without evidence of erythema, fluctuance, drainage, or infection.  The skin around the incision is intact.  Distally neurovascular exam is stable.  There is appropriate tenderness to palpation in the miriam-incisional area. No calf swelling or tenderness to palpation.      I personally reviewed multiple views of left elbow were obtained in the office today demonstrate maintenance of reduction, interval healing, and a stable position of the hardware.  X-rays of the right clavicle show distal clavicle fracture with good alignment and interval healing.      Jennifer Mancini is a 84 y.o. female patient status post ORIF left distal humerus on 2/9/2024.   I went over her x-rays in detail today.  she is WBAT of the side: left upper extremity. ~He/she~ is range of motion as tolerated of the side: left upper extremity.  She is weightbearing as tolerated right upper extremity.  She can range of motion as tolerated right upper extremity.   She can discontinue her sling.  Range of motion is excellent and she is doing very well.  I stressed the importance of physical therapy on overall functional outcome. I answered all patient's questions he agrees with treatment plan.  I will see her back in Follow-up 6 months with repeat 2 views of the left elbow and right clavicle        Slim Dover    Department of Orthopaedic Trauma Surgery

## 2024-08-23 ENCOUNTER — APPOINTMENT (OUTPATIENT)
Dept: RADIOLOGY | Facility: HOSPITAL | Age: 84
End: 2024-08-23
Payer: COMMERCIAL

## 2024-08-23 ENCOUNTER — HOSPITAL ENCOUNTER (EMERGENCY)
Facility: HOSPITAL | Age: 84
Discharge: STILL ADMITTED OR ADMINISTRATIVE | End: 2024-08-24
Attending: EMERGENCY MEDICINE
Payer: COMMERCIAL

## 2024-08-23 ENCOUNTER — APPOINTMENT (OUTPATIENT)
Dept: CARDIOLOGY | Facility: HOSPITAL | Age: 84
End: 2024-08-23
Payer: COMMERCIAL
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
## 2024-08-23 DIAGNOSIS — E87.1 HYPONATREMIA: ICD-10-CM

## 2024-08-23 DIAGNOSIS — E83.42 HYPOMAGNESEMIA: ICD-10-CM

## 2024-08-23 DIAGNOSIS — S22.009A: Primary | ICD-10-CM

## 2024-08-23 LAB
ANION GAP SERPL CALC-SCNC: 12 MMOL/L (ref 10–20)
APPEARANCE UR: CLEAR
BASOPHILS # BLD AUTO: 0.03 X10*3/UL (ref 0–0.1)
BASOPHILS NFR BLD AUTO: 0.2 %
BILIRUB UR STRIP.AUTO-MCNC: NEGATIVE MG/DL
BUN SERPL-MCNC: 16 MG/DL (ref 6–23)
CALCIUM SERPL-MCNC: 9.7 MG/DL (ref 8.6–10.3)
CHLORIDE SERPL-SCNC: 93 MMOL/L (ref 98–107)
CO2 SERPL-SCNC: 27 MMOL/L (ref 21–32)
COLOR UR: COLORLESS
CREAT SERPL-MCNC: 0.53 MG/DL (ref 0.5–1.05)
EGFRCR SERPLBLD CKD-EPI 2021: >90 ML/MIN/1.73M*2
EOSINOPHIL # BLD AUTO: 0.07 X10*3/UL (ref 0–0.4)
EOSINOPHIL NFR BLD AUTO: 0.5 %
ERYTHROCYTE [DISTWIDTH] IN BLOOD BY AUTOMATED COUNT: 14 % (ref 11.5–14.5)
GLUCOSE SERPL-MCNC: 196 MG/DL (ref 74–99)
GLUCOSE UR STRIP.AUTO-MCNC: ABNORMAL MG/DL
HCT VFR BLD AUTO: 33.3 % (ref 36–46)
HGB BLD-MCNC: 11.1 G/DL (ref 12–16)
IMM GRANULOCYTES # BLD AUTO: 0.15 X10*3/UL (ref 0–0.5)
IMM GRANULOCYTES NFR BLD AUTO: 1 % (ref 0–0.9)
KETONES UR STRIP.AUTO-MCNC: NEGATIVE MG/DL
LACTATE BLDV-SCNC: 2.4 MMOL/L (ref 0.4–2)
LACTATE BLDV-SCNC: 2.6 MMOL/L (ref 0.4–2)
LEUKOCYTE ESTERASE UR QL STRIP.AUTO: NEGATIVE
LYMPHOCYTES # BLD AUTO: 0.86 X10*3/UL (ref 0.8–3)
LYMPHOCYTES NFR BLD AUTO: 5.8 %
MAGNESIUM SERPL-MCNC: 1.28 MG/DL (ref 1.6–2.4)
MCH RBC QN AUTO: 30.6 PG (ref 26–34)
MCHC RBC AUTO-ENTMCNC: 33.3 G/DL (ref 32–36)
MCV RBC AUTO: 92 FL (ref 80–100)
MONOCYTES # BLD AUTO: 0.83 X10*3/UL (ref 0.05–0.8)
MONOCYTES NFR BLD AUTO: 5.6 %
NEUTROPHILS # BLD AUTO: 12.82 X10*3/UL (ref 1.6–5.5)
NEUTROPHILS NFR BLD AUTO: 86.9 %
NITRITE UR QL STRIP.AUTO: NEGATIVE
NRBC BLD-RTO: 0 /100 WBCS (ref 0–0)
PH UR STRIP.AUTO: 7.5 [PH]
PLATELET # BLD AUTO: 336 X10*3/UL (ref 150–450)
POTASSIUM SERPL-SCNC: 3.9 MMOL/L (ref 3.5–5.3)
PROT UR STRIP.AUTO-MCNC: NEGATIVE MG/DL
RBC # BLD AUTO: 3.63 X10*6/UL (ref 4–5.2)
RBC # UR STRIP.AUTO: NEGATIVE /UL
SODIUM SERPL-SCNC: 128 MMOL/L (ref 136–145)
SP GR UR STRIP.AUTO: 1.01
UROBILINOGEN UR STRIP.AUTO-MCNC: NORMAL MG/DL
WBC # BLD AUTO: 14.8 X10*3/UL (ref 4.4–11.3)

## 2024-08-23 PROCEDURE — 83880 ASSAY OF NATRIURETIC PEPTIDE: CPT | Performed by: EMERGENCY MEDICINE

## 2024-08-23 PROCEDURE — 72131 CT LUMBAR SPINE W/O DYE: CPT

## 2024-08-23 PROCEDURE — 85025 COMPLETE CBC W/AUTO DIFF WBC: CPT | Performed by: EMERGENCY MEDICINE

## 2024-08-23 PROCEDURE — 81003 URINALYSIS AUTO W/O SCOPE: CPT | Performed by: EMERGENCY MEDICINE

## 2024-08-23 PROCEDURE — 71045 X-RAY EXAM CHEST 1 VIEW: CPT

## 2024-08-23 PROCEDURE — 96361 HYDRATE IV INFUSION ADD-ON: CPT

## 2024-08-23 PROCEDURE — 83735 ASSAY OF MAGNESIUM: CPT | Performed by: EMERGENCY MEDICINE

## 2024-08-23 PROCEDURE — 93005 ELECTROCARDIOGRAM TRACING: CPT

## 2024-08-23 PROCEDURE — 96375 TX/PRO/DX INJ NEW DRUG ADDON: CPT

## 2024-08-23 PROCEDURE — 72125 CT NECK SPINE W/O DYE: CPT

## 2024-08-23 PROCEDURE — 36415 COLL VENOUS BLD VENIPUNCTURE: CPT | Performed by: EMERGENCY MEDICINE

## 2024-08-23 PROCEDURE — 80048 BASIC METABOLIC PNL TOTAL CA: CPT | Performed by: EMERGENCY MEDICINE

## 2024-08-23 PROCEDURE — 2500000004 HC RX 250 GENERAL PHARMACY W/ HCPCS (ALT 636 FOR OP/ED): Performed by: EMERGENCY MEDICINE

## 2024-08-23 PROCEDURE — 70450 CT HEAD/BRAIN W/O DYE: CPT

## 2024-08-23 PROCEDURE — 72128 CT CHEST SPINE W/O DYE: CPT

## 2024-08-23 PROCEDURE — 99285 EMERGENCY DEPT VISIT HI MDM: CPT | Mod: 25

## 2024-08-23 PROCEDURE — 83605 ASSAY OF LACTIC ACID: CPT | Performed by: EMERGENCY MEDICINE

## 2024-08-23 RX ORDER — LOSARTAN POTASSIUM 50 MG/1
50 TABLET ORAL ONCE
Status: COMPLETED | OUTPATIENT
Start: 2024-08-23 | End: 2024-08-24

## 2024-08-23 RX ORDER — MAGNESIUM SULFATE HEPTAHYDRATE 40 MG/ML
2 INJECTION, SOLUTION INTRAVENOUS ONCE
Status: COMPLETED | OUTPATIENT
Start: 2024-08-23 | End: 2024-08-24

## 2024-08-23 RX ADMIN — SODIUM CHLORIDE 500 ML: 9 INJECTION, SOLUTION INTRAVENOUS at 22:08

## 2024-08-23 RX ADMIN — HYDROMORPHONE HYDROCHLORIDE 0.5 MG: 1 INJECTION, SOLUTION INTRAMUSCULAR; INTRAVENOUS; SUBCUTANEOUS at 23:15

## 2024-08-23 ASSESSMENT — LIFESTYLE VARIABLES
HAVE PEOPLE ANNOYED YOU BY CRITICIZING YOUR DRINKING: NO
EVER FELT BAD OR GUILTY ABOUT YOUR DRINKING: NO
EVER HAD A DRINK FIRST THING IN THE MORNING TO STEADY YOUR NERVES TO GET RID OF A HANGOVER: NO
TOTAL SCORE: 0
HAVE YOU EVER FELT YOU SHOULD CUT DOWN ON YOUR DRINKING: NO

## 2024-08-23 ASSESSMENT — COLUMBIA-SUICIDE SEVERITY RATING SCALE - C-SSRS
1. IN THE PAST MONTH, HAVE YOU WISHED YOU WERE DEAD OR WISHED YOU COULD GO TO SLEEP AND NOT WAKE UP?: NO
2. HAVE YOU ACTUALLY HAD ANY THOUGHTS OF KILLING YOURSELF?: NO
6. HAVE YOU EVER DONE ANYTHING, STARTED TO DO ANYTHING, OR PREPARED TO DO ANYTHING TO END YOUR LIFE?: NO

## 2024-08-23 ASSESSMENT — PAIN - FUNCTIONAL ASSESSMENT: PAIN_FUNCTIONAL_ASSESSMENT: 0-10

## 2024-08-23 ASSESSMENT — PAIN SCALES - GENERAL: PAINLEVEL_OUTOF10: 6

## 2024-08-23 ASSESSMENT — PAIN DESCRIPTION - PAIN TYPE: TYPE: ACUTE PAIN

## 2024-08-23 ASSESSMENT — PAIN DESCRIPTION - LOCATION: LOCATION: BACK

## 2024-08-24 ENCOUNTER — HOSPITAL ENCOUNTER (INPATIENT)
Facility: HOSPITAL | Age: 84
End: 2024-08-24
Attending: EMERGENCY MEDICINE | Admitting: SURGERY
Payer: COMMERCIAL

## 2024-08-24 ENCOUNTER — APPOINTMENT (OUTPATIENT)
Dept: RADIOLOGY | Facility: HOSPITAL | Age: 84
End: 2024-08-24
Payer: COMMERCIAL

## 2024-08-24 VITALS
OXYGEN SATURATION: 94 % | WEIGHT: 152 LBS | TEMPERATURE: 97.2 F | RESPIRATION RATE: 16 BRPM | DIASTOLIC BLOOD PRESSURE: 88 MMHG | HEART RATE: 74 BPM | HEIGHT: 60 IN | BODY MASS INDEX: 29.84 KG/M2 | SYSTOLIC BLOOD PRESSURE: 140 MMHG

## 2024-08-24 DIAGNOSIS — S22.069A CLOSED FRACTURE OF EIGHTH THORACIC VERTEBRA, UNSPECIFIED FRACTURE MORPHOLOGY, INITIAL ENCOUNTER (MULTI): Primary | ICD-10-CM

## 2024-08-24 DIAGNOSIS — R35.0 URINARY FREQUENCY: ICD-10-CM

## 2024-08-24 DIAGNOSIS — Z98.890 POST-OPERATIVE STATE: ICD-10-CM

## 2024-08-24 DIAGNOSIS — S42.412A LEFT SUPRACONDYLAR HUMERUS FRACTURE, CLOSED, INITIAL ENCOUNTER: ICD-10-CM

## 2024-08-24 LAB
ALBUMIN SERPL BCP-MCNC: 3.2 G/DL (ref 3.4–5)
ALP SERPL-CCNC: 82 U/L (ref 33–136)
ALT SERPL W P-5'-P-CCNC: 11 U/L (ref 7–45)
ANION GAP BLDV CALCULATED.4IONS-SCNC: ABNORMAL MMOL/L
ANION GAP SERPL CALC-SCNC: 12 MMOL/L (ref 10–20)
APPEARANCE UR: CLEAR
AST SERPL W P-5'-P-CCNC: 11 U/L (ref 9–39)
BASE EXCESS BLDV CALC-SCNC: 2.3 MMOL/L (ref -2–3)
BILIRUB SERPL-MCNC: 0.5 MG/DL (ref 0–1.2)
BILIRUB UR STRIP.AUTO-MCNC: NEGATIVE MG/DL
BNP SERPL-MCNC: 68 PG/ML (ref 0–99)
BODY TEMPERATURE: 37 DEGREES CELSIUS
BUN SERPL-MCNC: 7 MG/DL (ref 6–23)
CA-I BLDV-SCNC: 1.18 MMOL/L (ref 1.1–1.33)
CALCIUM SERPL-MCNC: 8.5 MG/DL (ref 8.6–10.6)
CARDIAC TROPONIN I PNL SERPL HS: 14 NG/L (ref 0–34)
CHLORIDE BLDV-SCNC: ABNORMAL MMOL/L
CHLORIDE SERPL-SCNC: 98 MMOL/L (ref 98–107)
CO2 SERPL-SCNC: 27 MMOL/L (ref 21–32)
COLOR UR: COLORLESS
CREAT SERPL-MCNC: 0.4 MG/DL (ref 0.5–1.05)
EGFRCR SERPLBLD CKD-EPI 2021: >90 ML/MIN/1.73M*2
ERYTHROCYTE [DISTWIDTH] IN BLOOD BY AUTOMATED COUNT: 14 % (ref 11.5–14.5)
GLUCOSE BLD MANUAL STRIP-MCNC: 199 MG/DL (ref 74–99)
GLUCOSE BLD MANUAL STRIP-MCNC: 229 MG/DL (ref 74–99)
GLUCOSE BLD MANUAL STRIP-MCNC: 272 MG/DL (ref 74–99)
GLUCOSE BLD MANUAL STRIP-MCNC: 382 MG/DL (ref 74–99)
GLUCOSE BLDV-MCNC: 215 MG/DL (ref 74–99)
GLUCOSE SERPL-MCNC: 196 MG/DL (ref 74–99)
GLUCOSE UR STRIP.AUTO-MCNC: ABNORMAL MG/DL
HCO3 BLDV-SCNC: 28.3 MMOL/L (ref 22–26)
HCT VFR BLD AUTO: 35.9 % (ref 36–46)
HCT VFR BLD EST: 35 % (ref 36–46)
HGB BLD-MCNC: 12.2 G/DL (ref 12–16)
HGB BLDV-MCNC: 11.8 G/DL (ref 12–16)
INHALED O2 CONCENTRATION: 28 %
KETONES UR STRIP.AUTO-MCNC: NEGATIVE MG/DL
LACTATE BLDV-SCNC: 1.5 MMOL/L (ref 0.4–2)
LEUKOCYTE ESTERASE UR QL STRIP.AUTO: NEGATIVE
MAGNESIUM SERPL-MCNC: 1.89 MG/DL (ref 1.6–2.4)
MCH RBC QN AUTO: 30.2 PG (ref 26–34)
MCHC RBC AUTO-ENTMCNC: 34 G/DL (ref 32–36)
MCV RBC AUTO: 89 FL (ref 80–100)
NITRITE UR QL STRIP.AUTO: NEGATIVE
NRBC BLD-RTO: 0 /100 WBCS (ref 0–0)
OXYHGB MFR BLDV: 62.8 % (ref 45–75)
PCO2 BLDV: 49 MM HG (ref 41–51)
PH BLDV: 7.37 PH (ref 7.33–7.43)
PH UR STRIP.AUTO: 7.5 [PH]
PLATELET # BLD AUTO: 340 X10*3/UL (ref 150–450)
PO2 BLDV: 42 MM HG (ref 35–45)
POTASSIUM BLDV-SCNC: 3.7 MMOL/L (ref 3.5–5.3)
POTASSIUM SERPL-SCNC: 3.4 MMOL/L (ref 3.5–5.3)
PROT SERPL-MCNC: 5.8 G/DL (ref 6.4–8.2)
PROT UR STRIP.AUTO-MCNC: NEGATIVE MG/DL
RBC # BLD AUTO: 4.04 X10*6/UL (ref 4–5.2)
RBC # UR STRIP.AUTO: NEGATIVE /UL
SAO2 % BLDV: 64 % (ref 45–75)
SODIUM BLDV-SCNC: 130 MMOL/L (ref 136–145)
SODIUM SERPL-SCNC: 134 MMOL/L (ref 136–145)
SP GR UR STRIP.AUTO: 1.01
UROBILINOGEN UR STRIP.AUTO-MCNC: NORMAL MG/DL
WBC # BLD AUTO: 10.4 X10*3/UL (ref 4.4–11.3)

## 2024-08-24 PROCEDURE — 84132 ASSAY OF SERUM POTASSIUM: CPT | Performed by: STUDENT IN AN ORGANIZED HEALTH CARE EDUCATION/TRAINING PROGRAM

## 2024-08-24 PROCEDURE — 71045 X-RAY EXAM CHEST 1 VIEW: CPT | Mod: FOREIGN READ | Performed by: RADIOLOGY

## 2024-08-24 PROCEDURE — 99285 EMERGENCY DEPT VISIT HI MDM: CPT | Performed by: EMERGENCY MEDICINE

## 2024-08-24 PROCEDURE — 72131 CT LUMBAR SPINE W/O DYE: CPT | Mod: FOREIGN READ | Performed by: RADIOLOGY

## 2024-08-24 PROCEDURE — 99285 EMERGENCY DEPT VISIT HI MDM: CPT | Mod: 25

## 2024-08-24 PROCEDURE — 72128 CT CHEST SPINE W/O DYE: CPT | Mod: FOREIGN READ | Performed by: RADIOLOGY

## 2024-08-24 PROCEDURE — 82947 ASSAY GLUCOSE BLOOD QUANT: CPT

## 2024-08-24 PROCEDURE — 36415 COLL VENOUS BLD VENIPUNCTURE: CPT

## 2024-08-24 PROCEDURE — 2500000004 HC RX 250 GENERAL PHARMACY W/ HCPCS (ALT 636 FOR OP/ED)

## 2024-08-24 PROCEDURE — G0390 TRAUMA RESPONS W/HOSP CRITI: HCPCS

## 2024-08-24 PROCEDURE — 36415 COLL VENOUS BLD VENIPUNCTURE: CPT | Performed by: STUDENT IN AN ORGANIZED HEALTH CARE EDUCATION/TRAINING PROGRAM

## 2024-08-24 PROCEDURE — 2500000001 HC RX 250 WO HCPCS SELF ADMINISTERED DRUGS (ALT 637 FOR MEDICARE OP)

## 2024-08-24 PROCEDURE — 2500000004 HC RX 250 GENERAL PHARMACY W/ HCPCS (ALT 636 FOR OP/ED): Performed by: EMERGENCY MEDICINE

## 2024-08-24 PROCEDURE — 85027 COMPLETE CBC AUTOMATED: CPT

## 2024-08-24 PROCEDURE — 96361 HYDRATE IV INFUSION ADD-ON: CPT | Mod: 59

## 2024-08-24 PROCEDURE — 81003 URINALYSIS AUTO W/O SCOPE: CPT

## 2024-08-24 PROCEDURE — 93010 ELECTROCARDIOGRAM REPORT: CPT | Performed by: EMERGENCY MEDICINE

## 2024-08-24 PROCEDURE — 83735 ASSAY OF MAGNESIUM: CPT | Performed by: STUDENT IN AN ORGANIZED HEALTH CARE EDUCATION/TRAINING PROGRAM

## 2024-08-24 PROCEDURE — 71260 CT THORAX DX C+: CPT | Performed by: RADIOLOGY

## 2024-08-24 PROCEDURE — 2500000005 HC RX 250 GENERAL PHARMACY W/O HCPCS

## 2024-08-24 PROCEDURE — 96376 TX/PRO/DX INJ SAME DRUG ADON: CPT

## 2024-08-24 PROCEDURE — 70450 CT HEAD/BRAIN W/O DYE: CPT | Performed by: RADIOLOGY

## 2024-08-24 PROCEDURE — 84484 ASSAY OF TROPONIN QUANT: CPT | Performed by: EMERGENCY MEDICINE

## 2024-08-24 PROCEDURE — 74177 CT ABD & PELVIS W/CONTRAST: CPT | Performed by: RADIOLOGY

## 2024-08-24 PROCEDURE — 96360 HYDRATION IV INFUSION INIT: CPT | Mod: 59

## 2024-08-24 PROCEDURE — 2500000004 HC RX 250 GENERAL PHARMACY W/ HCPCS (ALT 636 FOR OP/ED): Performed by: STUDENT IN AN ORGANIZED HEALTH CARE EDUCATION/TRAINING PROGRAM

## 2024-08-24 PROCEDURE — 72125 CT NECK SPINE W/O DYE: CPT | Performed by: RADIOLOGY

## 2024-08-24 PROCEDURE — 2500000001 HC RX 250 WO HCPCS SELF ADMINISTERED DRUGS (ALT 637 FOR MEDICARE OP): Performed by: EMERGENCY MEDICINE

## 2024-08-24 PROCEDURE — 96365 THER/PROPH/DIAG IV INF INIT: CPT

## 2024-08-24 PROCEDURE — 1200000002 HC GENERAL ROOM WITH TELEMETRY DAILY

## 2024-08-24 PROCEDURE — 74177 CT ABD & PELVIS W/CONTRAST: CPT

## 2024-08-24 PROCEDURE — 2550000001 HC RX 255 CONTRASTS: Performed by: EMERGENCY MEDICINE

## 2024-08-24 PROCEDURE — 96366 THER/PROPH/DIAG IV INF ADDON: CPT

## 2024-08-24 PROCEDURE — 51702 INSERT TEMP BLADDER CATH: CPT

## 2024-08-24 PROCEDURE — 84132 ASSAY OF SERUM POTASSIUM: CPT

## 2024-08-24 PROCEDURE — 2500000002 HC RX 250 W HCPCS SELF ADMINISTERED DRUGS (ALT 637 FOR MEDICARE OP, ALT 636 FOR OP/ED)

## 2024-08-24 RX ORDER — METOPROLOL SUCCINATE 25 MG/1
25 TABLET, EXTENDED RELEASE ORAL 2 TIMES DAILY
Status: DISCONTINUED | OUTPATIENT
Start: 2024-08-24 | End: 2024-09-01

## 2024-08-24 RX ORDER — OXYCODONE HYDROCHLORIDE 5 MG/1
5 TABLET ORAL EVERY 4 HOURS PRN
Status: DISCONTINUED | OUTPATIENT
Start: 2024-08-24 | End: 2024-09-05 | Stop reason: HOSPADM

## 2024-08-24 RX ORDER — LATANOPROST 50 UG/ML
1 SOLUTION/ DROPS OPHTHALMIC NIGHTLY
Status: DISCONTINUED | OUTPATIENT
Start: 2024-08-24 | End: 2024-09-05 | Stop reason: HOSPADM

## 2024-08-24 RX ORDER — IBUPROFEN 600 MG/1
600 TABLET ORAL EVERY 6 HOURS PRN
COMMUNITY
Start: 2024-07-25 | End: 2024-09-05 | Stop reason: HOSPADM

## 2024-08-24 RX ORDER — ACETAMINOPHEN 325 MG/1
975 TABLET ORAL EVERY 6 HOURS SCHEDULED
Status: DISCONTINUED | OUTPATIENT
Start: 2024-08-24 | End: 2024-09-05 | Stop reason: HOSPADM

## 2024-08-24 RX ORDER — DEXTROSE 50 % IN WATER (D50W) INTRAVENOUS SYRINGE
25
Status: DISCONTINUED | OUTPATIENT
Start: 2024-08-24 | End: 2024-09-05 | Stop reason: HOSPADM

## 2024-08-24 RX ORDER — ATORVASTATIN CALCIUM 40 MG/1
40 TABLET, FILM COATED ORAL NIGHTLY
Status: DISCONTINUED | OUTPATIENT
Start: 2024-08-24 | End: 2024-09-05 | Stop reason: HOSPADM

## 2024-08-24 RX ORDER — ENOXAPARIN SODIUM 100 MG/ML
30 INJECTION SUBCUTANEOUS EVERY 12 HOURS
Status: DISCONTINUED | OUTPATIENT
Start: 2024-08-24 | End: 2024-09-05 | Stop reason: HOSPADM

## 2024-08-24 RX ORDER — NALOXONE HYDROCHLORIDE 0.4 MG/ML
0.2 INJECTION, SOLUTION INTRAMUSCULAR; INTRAVENOUS; SUBCUTANEOUS EVERY 5 MIN PRN
Status: DISCONTINUED | OUTPATIENT
Start: 2024-08-24 | End: 2024-09-05 | Stop reason: HOSPADM

## 2024-08-24 RX ORDER — MORPHINE SULFATE 4 MG/ML
2 INJECTION INTRAVENOUS ONCE
Status: COMPLETED | OUTPATIENT
Start: 2024-08-24 | End: 2024-08-24

## 2024-08-24 RX ORDER — OXYCODONE HYDROCHLORIDE 5 MG/1
2.5 TABLET ORAL EVERY 6 HOURS PRN
Status: DISCONTINUED | OUTPATIENT
Start: 2024-08-24 | End: 2024-09-05 | Stop reason: HOSPADM

## 2024-08-24 RX ORDER — POLYETHYLENE GLYCOL 3350 17 G/17G
17 POWDER, FOR SOLUTION ORAL DAILY
Status: DISCONTINUED | OUTPATIENT
Start: 2024-08-24 | End: 2024-09-05 | Stop reason: HOSPADM

## 2024-08-24 RX ORDER — GLUCAGON 1 MG
1 VIAL (EA) INJECTION
COMMUNITY
Start: 2024-03-04 | End: 2024-09-05 | Stop reason: HOSPADM

## 2024-08-24 RX ORDER — ABALOPARATIDE 2000 UG/ML
80 INJECTION, SOLUTION SUBCUTANEOUS DAILY
Status: ON HOLD | COMMUNITY
End: 2024-09-04

## 2024-08-24 RX ORDER — DORZOLAMIDE HCL 20 MG/ML
1 SOLUTION/ DROPS OPHTHALMIC 3 TIMES DAILY
Status: ON HOLD | COMMUNITY
End: 2024-09-04

## 2024-08-24 RX ORDER — SODIUM CHLORIDE, SODIUM LACTATE, POTASSIUM CHLORIDE, CALCIUM CHLORIDE 600; 310; 30; 20 MG/100ML; MG/100ML; MG/100ML; MG/100ML
40 INJECTION, SOLUTION INTRAVENOUS CONTINUOUS
Status: DISCONTINUED | OUTPATIENT
Start: 2024-08-24 | End: 2024-08-29

## 2024-08-24 RX ORDER — CALCIUM CARBONATE 300MG(750)
400 TABLET,CHEWABLE ORAL DAILY
COMMUNITY
End: 2024-09-05 | Stop reason: HOSPADM

## 2024-08-24 RX ORDER — HALOPERIDOL 0.5 MG/1
0.5 TABLET ORAL NIGHTLY
Status: DISCONTINUED | OUTPATIENT
Start: 2024-08-24 | End: 2024-09-03

## 2024-08-24 RX ORDER — BISACODYL 10 MG/1
10 SUPPOSITORY RECTAL DAILY PRN
Status: ON HOLD | COMMUNITY
Start: 2024-03-04 | End: 2024-09-04

## 2024-08-24 RX ORDER — OLANZAPINE 5 MG/1
20 TABLET ORAL NIGHTLY
Status: DISCONTINUED | OUTPATIENT
Start: 2024-08-24 | End: 2024-09-03

## 2024-08-24 RX ORDER — ACETAMINOPHEN 500 MG
5 TABLET ORAL NIGHTLY
Status: ON HOLD | COMMUNITY
End: 2024-09-04

## 2024-08-24 RX ORDER — VENLAFAXINE HYDROCHLORIDE 37.5 MG/1
37.5 CAPSULE, EXTENDED RELEASE ORAL DAILY
Status: DISCONTINUED | OUTPATIENT
Start: 2024-08-25 | End: 2024-09-05 | Stop reason: HOSPADM

## 2024-08-24 RX ORDER — DEXTROSE 50 % IN WATER (D50W) INTRAVENOUS SYRINGE
12.5
Status: DISCONTINUED | OUTPATIENT
Start: 2024-08-24 | End: 2024-09-05 | Stop reason: HOSPADM

## 2024-08-24 RX ORDER — METOPROLOL SUCCINATE 25 MG/1
25 TABLET, EXTENDED RELEASE ORAL 2 TIMES DAILY
COMMUNITY
End: 2024-09-05 | Stop reason: HOSPADM

## 2024-08-24 RX ORDER — ADHESIVE BANDAGE
30 BANDAGE TOPICAL DAILY PRN
Status: ON HOLD | COMMUNITY
End: 2024-09-04

## 2024-08-24 RX ORDER — ACETAMINOPHEN 500 MG
5 TABLET ORAL NIGHTLY
Status: DISCONTINUED | OUTPATIENT
Start: 2024-08-24 | End: 2024-08-27

## 2024-08-24 RX ADMIN — SODIUM CHLORIDE, POTASSIUM CHLORIDE, SODIUM LACTATE AND CALCIUM CHLORIDE 500 ML: 600; 310; 30; 20 INJECTION, SOLUTION INTRAVENOUS at 11:39

## 2024-08-24 RX ADMIN — HYDROMORPHONE HYDROCHLORIDE 0.5 MG: 1 INJECTION, SOLUTION INTRAMUSCULAR; INTRAVENOUS; SUBCUTANEOUS at 09:19

## 2024-08-24 RX ADMIN — HALOPERIDOL 0.5 MG: 0.5 TABLET ORAL at 22:05

## 2024-08-24 RX ADMIN — ENOXAPARIN SODIUM 30 MG: 100 INJECTION SUBCUTANEOUS at 17:06

## 2024-08-24 RX ADMIN — INSULIN HUMAN 10 UNITS: 100 INJECTION, SOLUTION PARENTERAL at 21:01

## 2024-08-24 RX ADMIN — ATORVASTATIN CALCIUM 40 MG: 40 TABLET, FILM COATED ORAL at 22:04

## 2024-08-24 RX ADMIN — SODIUM CHLORIDE, POTASSIUM CHLORIDE, SODIUM LACTATE AND CALCIUM CHLORIDE 75 ML/HR: 600; 310; 30; 20 INJECTION, SOLUTION INTRAVENOUS at 16:21

## 2024-08-24 RX ADMIN — IOHEXOL 100 ML: 350 INJECTION, SOLUTION INTRAVENOUS at 11:54

## 2024-08-24 RX ADMIN — Medication 2 L/MIN: at 20:00

## 2024-08-24 RX ADMIN — SODIUM CHLORIDE 500 ML: 9 INJECTION, SOLUTION INTRAVENOUS at 00:18

## 2024-08-24 RX ADMIN — LOSARTAN POTASSIUM 50 MG: 50 TABLET, FILM COATED ORAL at 00:14

## 2024-08-24 RX ADMIN — Medication 2 L/MIN: at 15:10

## 2024-08-24 RX ADMIN — METOPROLOL SUCCINATE 25 MG: 25 TABLET, EXTENDED RELEASE ORAL at 22:04

## 2024-08-24 RX ADMIN — MAGNESIUM SULFATE HEPTAHYDRATE 2 G: 40 INJECTION, SOLUTION INTRAVENOUS at 00:18

## 2024-08-24 RX ADMIN — OXYCODONE HYDROCHLORIDE 5 MG: 5 TABLET ORAL at 22:05

## 2024-08-24 RX ADMIN — MORPHINE SULFATE 2 MG: 4 INJECTION INTRAVENOUS at 17:06

## 2024-08-24 ASSESSMENT — PAIN DESCRIPTION - LOCATION: LOCATION: BACK

## 2024-08-24 ASSESSMENT — LIFESTYLE VARIABLES
TOTAL SCORE: 0
HAVE YOU EVER FELT YOU SHOULD CUT DOWN ON YOUR DRINKING: NO
EVER HAD A DRINK FIRST THING IN THE MORNING TO STEADY YOUR NERVES TO GET RID OF A HANGOVER: NO
EVER FELT BAD OR GUILTY ABOUT YOUR DRINKING: NO
HAVE PEOPLE ANNOYED YOU BY CRITICIZING YOUR DRINKING: NO

## 2024-08-24 ASSESSMENT — COGNITIVE AND FUNCTIONAL STATUS - GENERAL
TOILETING: TOTAL
DRESSING REGULAR UPPER BODY CLOTHING: TOTAL
DAILY ACTIVITIY SCORE: 7
MOBILITY SCORE: 6
TURNING FROM BACK TO SIDE WHILE IN FLAT BAD: TOTAL
MOVING TO AND FROM BED TO CHAIR: TOTAL
HELP NEEDED FOR BATHING: TOTAL
WALKING IN HOSPITAL ROOM: TOTAL
PERSONAL GROOMING: TOTAL
MOVING FROM LYING ON BACK TO SITTING ON SIDE OF FLAT BED WITH BEDRAILS: TOTAL
EATING MEALS: A LOT
DRESSING REGULAR LOWER BODY CLOTHING: TOTAL
CLIMB 3 TO 5 STEPS WITH RAILING: TOTAL
STANDING UP FROM CHAIR USING ARMS: TOTAL

## 2024-08-24 ASSESSMENT — PAIN DESCRIPTION - DESCRIPTORS
DESCRIPTORS: SHARP
DESCRIPTORS: SHARP

## 2024-08-24 ASSESSMENT — PAIN - FUNCTIONAL ASSESSMENT: PAIN_FUNCTIONAL_ASSESSMENT: WONG-BAKER FACES

## 2024-08-24 ASSESSMENT — PAIN DESCRIPTION - FREQUENCY: FREQUENCY: CONSTANT/CONTINUOUS

## 2024-08-24 ASSESSMENT — COLUMBIA-SUICIDE SEVERITY RATING SCALE - C-SSRS
6. HAVE YOU EVER DONE ANYTHING, STARTED TO DO ANYTHING, OR PREPARED TO DO ANYTHING TO END YOUR LIFE?: NO
2. HAVE YOU ACTUALLY HAD ANY THOUGHTS OF KILLING YOURSELF?: NO
1. IN THE PAST MONTH, HAVE YOU WISHED YOU WERE DEAD OR WISHED YOU COULD GO TO SLEEP AND NOT WAKE UP?: NO

## 2024-08-24 ASSESSMENT — PAIN SCALES - GENERAL
PAINLEVEL_OUTOF10: 6
PAINLEVEL_OUTOF10: 5 - MODERATE PAIN
PAINLEVEL_OUTOF10: 8
PAINLEVEL_OUTOF10: 0 - NO PAIN

## 2024-08-24 ASSESSMENT — PAIN DESCRIPTION - PAIN TYPE: TYPE: ACUTE PAIN

## 2024-08-24 ASSESSMENT — PAIN DESCRIPTION - ONSET: ONSET: GRADUAL

## 2024-08-24 ASSESSMENT — PAIN DESCRIPTION - ORIENTATION: ORIENTATION: MID;RIGHT

## 2024-08-24 ASSESSMENT — PAIN DESCRIPTION - PROGRESSION: CLINICAL_PROGRESSION: GRADUALLY IMPROVING

## 2024-08-24 ASSESSMENT — PAIN SCALES - WONG BAKER: WONGBAKER_NUMERICALRESPONSE: HURTS LITTLE BIT

## 2024-08-24 NOTE — H&P
[facility]  TRAUMA SERVICE - HISTORY AND PHYSICAL / CONSULT    Patient Name: Jennifer Mancini  MRN: [id number]  Admit Date: 824  : 1940  AGE: 84 y.o.   GENDER: female  ==============================================================================  MECHANISM OF INJURY / CHIEF COMPLAINT:   Patient is a 84 F s/p glf from standing at her assisted living facility. Patient taken to OSH where they found a T8 vertebral body fracture, transferred here for further evaluation. EMS told nursing patient had a fall last week as well.   LOC (yes/no?): Unknown  Anticoagulant / Anti-platelet Rx? (for what dx?): Denies  Referring Facility Name (N/A for scene EMR run):  [place]     INJURIES:   T8 vertebral body fracture  Subacute right-sided rib fractures     OTHER MEDICAL PROBLEMS:  CHF with reserved EF  HTN  Schizoaffective disorder  HLD  DM2     INCIDENTAL FINDINGS:  Large hiatal hernia     ==============================================================================  ADMISSION PLAN OF CARE:  Keep NPO  Consult spine, rec T spine MRI and T/L spine precautions, will fu additional recs   Pain control   Goel insert for urinary retention    Dispo: admit to floor for syncopal workup     Pt staffed with Dr Lisa Epps S Norbertr PASOLO  Trauma Surgery    ==============================================================================  PAST MEDICAL HISTORY:   PMH:   Past Medical History:   Diagnosis Date    HTN (hypertension) 2024    Hypertensive heart disease without heart failure 2019    Hypertensive heart disease without CHF    Other nondisplaced fracture of seventh cervical vertebra, subsequent encounter for fracture with routine healing 2019    Other closed nondisplaced fracture of seventh cervical vertebra with routine healing, subsequent encounter    Other nondisplaced fracture of sixth cervical vertebra, subsequent encounter for fracture with routine healing  12/17/2019    Other closed nondisplaced fracture of sixth cervical vertebra with routine healing, subsequent encounter    Personal history of other diseases of the female genital tract 12/17/2019    History of uterine prolapse    Personal history of other endocrine, nutritional and metabolic disease 12/17/2019    History of hyperlipidemia    Personal history of other mental and behavioral disorders 06/14/2021    History of schizoaffective disorder    Personal history of other specified conditions 06/14/2021    History of chronic pain    Schizoaffective disorder, bipolar type (Multi) 12/17/2019    Schizoaffective disorder, bipolar type    Unspecified fracture of left femur, initial encounter for closed fracture (Multi) 06/14/2021    Fracture of left femur     Last menstrual period: Unknown    PSH:   Past Surgical History:   Procedure Laterality Date    CT ANGIO NECK  12/13/2019    CT NECK ANGIO W AND WO IV CONTRAST 12/13/2019 [facility] CLINICAL LEGACY    OTHER SURGICAL HISTORY  12/17/2019    Tonsillectomy    OTHER SURGICAL HISTORY  12/17/2019    Rotator cuff repair    OTHER SURGICAL HISTORY  12/17/2019    Wrist surgery    OTHER SURGICAL HISTORY  12/17/2019    Uterine surgery     FH:   Family History   Problem Relation Name Age of Onset    No Known Problems Mother      No Known Problems Father       SOCIAL HISTORY:    Smoking: Denies  Social History     Tobacco Use   Smoking Status Unknown   Smokeless Tobacco Not on file       Alcohol: Denies  Social History     Substance and Sexual Activity   Alcohol Use Never       Drug use: Denies    MEDICATIONS:   Prior to Admission medications    Medication Sig Start Date End Date Taking? Authorizing Provider   acetaminophen (Tylenol 8 HOUR) 650 mg ER tablet Take 1 tablet (650 mg) by mouth 3 times a day. Do not crush, chew, or split.    Historical Provider, MD   ascorbic acid (Vitamin C) 500 mg tablet Take 1 tablet (500 mg) by mouth 2 times a day. With ferrous sulfate for absorption     Historical Provider, MD   aspirin 81 mg EC tablet Take 1 tablet (81 mg) by mouth once daily.    Historical Provider, MD   atorvastatin (Lipitor) 80 mg tablet Take 1 tablet (80 mg) by mouth once daily at bedtime.    Historical Provider, MD   b complex 0.4 mg tablet Take 1 tablet by mouth 2 times a day.    Historical Provider, MD   benzocaine-menthol (Cepacol Sore Throat, nargis-men,) 15-3.6 mg lozenge Dissolve 1 lozenge in the mouth every 4 hours if needed for sore throat.    Rachell Antony MD   benzonatate (Tessalon) 100 mg capsule Take 1 capsule (100 mg) by mouth 3 times a day as needed for cough. Do not crush or chew.    Historical Provider, MD   calcium carbonate-vitamin D3 600 mg-20 mcg (800 unit) tablet Take 1 tablet by mouth once daily.    Historical Provider, MD   cetirizine (ZyrTEC) 10 mg tablet Take 1 tablet (10 mg) by mouth once daily.    Historical Provider, MD   clobetasol (Temovate) 0.05 % cream Apply 1 Application topically 2 times a day.    Historical MD Arpan   clobetasoL 0.05 % shampoo Apply 1 Application topically 1 (one) time per week.    Historical Provider, MD   cyanocobalamin (Vitamin B-12) 1,000 mcg tablet Take 1 tablet (1,000 mcg) by mouth once daily.    Historical Provider, MD   docusate sodium (Colace) 100 mg capsule Take 1 capsule (100 mg) by mouth 2 times a day.    Historical Provider, MD   famotidine (Pepcid) 40 mg tablet Take 1 tablet (40 mg) by mouth once daily at bedtime.    Historical Provider, MD   ferrous sulfate 325 (65 Fe) MG EC tablet Take 65 mg by mouth 2 times a day. Do not crush, chew, or split.    Historical Provider, MD   Fish OiL 60- mg capsule Take 1 capsule (500 mg) by mouth once daily.    Historical Provider, MD   fluticasone (Flonase) 50 mcg/actuation nasal spray Administer 1 spray into each nostril once daily. Shake gently. Before first use, prime pump. After use, clean tip and replace cap.    Historical Provider, MD   folic acid (Folvite) 1 mg tablet  Take 1 tablet (1 mg) by mouth once daily.    Historical Provider, MD   furosemide (Lasix) 40 mg tablet Take 1 tablet (40 mg) by mouth once daily.    Historical Provider, MD   glucosam/robert-msm1/C/bailey/bosw (OSTEO BI-FLEX TRIPLE STRENGTH ORAL) Take 1 tablet by mouth 2 times a day.    Historical Provider, MD   guaiFENesin (Mucinex) 600 mg 12 hr tablet Take 1 tablet (600 mg) by mouth 2 times a day. Do not crush, chew, or split.    Historical Provider, MD   guaiFENesin (Robitussin) 100 mg/5 mL syrup Take 5 mL (100 mg) by mouth 4 times a day as needed for cough or congestion.    Historical Provider, MD   haloperidol (Haldol) 0.5 mg tablet Take 1 tablet (0.5 mg) by mouth once daily.    Historical Provider, MD   hydrocortisone 2.5 % cream Apply 1 Application topically 2 times a day.    Historical Provider, MD   ketoconazole (NIZOral) 2 % cream Apply 1 Application topically 3 times a day.    Historical Provider, MD   ketoconazole (NIZOral) 2 % shampoo Apply 1 Application topically 2 times a week. As needed    Historical Provider, MD   lactobacillus acidophilus & bulgar (Lactinex) 1 million cell chewable tablet Chew 1 tablet once daily.    Historical Provider, MD   latanoprost (Xalatan) 0.005 % ophthalmic solution Administer 1 drop into both eyes once daily at bedtime.    Historical Provider, MD   lidocaine (Xylocaine) 2 % solution Lidocaine 2% viscous   Swish and spit 10mg every 4 hours as needed    Historical Provider, MD   losartan (Cozaar) 50 mg tablet Take 1 tablet (50 mg) by mouth 2 times a day.    Historical Provider, MD   metFORMIN (Glucophage) 1,000 mg tablet Take 1 tablet (1,000 mg) by mouth 2 times daily (morning and late afternoon).    Historical Provider, MD   methotrexate (Trexall) 2.5 mg tablet Take 6 tablets (15 mg total) by mouth 1 (one) time per week.  6 tablets (15mg) once a week on Fridays    Historical Provider, MD   multivitamin with minerals tablet Take 1 tablet by mouth once daily.    Historical  Provider, MD   neomycin-bacitracnZn-polymyxnB (Neosporin Original) ointment Apply 1 Application topically 3 times a day.    Historical Provider, MD   OLANZapine (ZyPREXA) 20 mg tablet Take 1 tablet (20 mg) by mouth once daily at bedtime.    Historical Provider, MD   polyethylene glycol (Glycolax, Miralax) 17 gram packet Take 17 g by mouth once daily.    Historical Provider, MD   predniSONE (Deltasone) 2.5 mg tablet Take 3 tablets (7.5 mg) by mouth once daily.    Historical Provider, MD   SITagliptin phosphate (Januvia) 100 mg tablet Take 1 tablet (100 mg) by mouth once daily. Do not start before February 17, 2024. 2/17/24 3/18/24  Xavi Elam DO   venlafaxine XR (Effexor-XR) 37.5 mg 24 hr capsule Take 1 capsule (37.5 mg) by mouth once daily. Do not crush or chew.    Historical Provider, MD   zinc sulfate (Zincate) 220 (50 Zn) MG capsule Take 1 capsule (50 mg of elemental zinc) by mouth once daily.    Historical Provider, MD     ALLERGIES:  Allergies   Allergen Reactions    House Dust Unknown    House Dust Mite Unknown    Mold Unknown    Pollen Extracts Cough       REVIEW OF SYSTEMS:  Review of Systems   Reason unable to perform ROS: Poor historian.     PHYSICAL EXAM:  PRIMARY SURVEY:  Primary Survey  SECONDARY SURVEY/PHYSICAL EXAM:  Physical Exam  IMAGING SUMMARY:  (summary of findings, not a copy of dictation)  CT Head/Face: No acute injury  CT C-Spine: No acute injury  CT T-Spine: Acute extension fracture through the T8 vertebral body as described extending into the posterior vertebral body cortex and bilateral  inferior facets with minimal distraction of the fracture of approximately 5 to 6 mm.  CT L-Spine: no acute injury  CT Chest/Abd/Pelvis:   CXR: No acute cardiopulmonary disease. Large hiatal hernia.      LABS:  Results for orders placed or performed during the hospital encounter of 08/24/24 (from the past 24 hour(s))   B-type natriuretic peptide   Result Value Ref Range    BNP 68 0 - 99 pg/mL   POCT  GLUCOSE   Result Value Ref Range    POCT Glucose 229 (H) 74 - 99 mg/dL   Blood Gas Venous Full Panel   Result Value Ref Range    POCT pH, Venous 7.37 7.33 - 7.43 pH    POCT pCO2, Venous 49 41 - 51 mm Hg    POCT pO2, Venous 42 35 - 45 mm Hg    POCT SO2, Venous 64 45 - 75 %    POCT Oxy Hemoglobin, Venous 62.8 45.0 - 75.0 %    POCT Hematocrit Calculated, Venous 35.0 (L) 36.0 - 46.0 %    POCT Sodium, Venous 130 (L) 136 - 145 mmol/L    POCT Potassium, Venous 3.7 3.5 - 5.3 mmol/L    POCT Chloride, Venous      POCT Ionized Calicum, Venous 1.18 1.10 - 1.33 mmol/L    POCT Glucose, Venous 215 (H) 74 - 99 mg/dL    POCT Lactate, Venous 1.5 0.4 - 2.0 mmol/L    POCT Base Excess, Venous 2.3 -2.0 - 3.0 mmol/L    POCT HCO3 Calculated, Venous 28.3 (H) 22.0 - 26.0 mmol/L    POCT Hemoglobin, Venous 11.8 (L) 12.0 - 16.0 g/dL    POCT Anion Gap, Venous      Patient Temperature 37.0 degrees Celsius    FiO2 28 %       I have reviewed all laboratory and imaging results ordered/pertinent for this encounter.

## 2024-08-24 NOTE — PROGRESS NOTES
Pharmacy Medication History Review    Jennifer Mancini is a 84 y.o. female admitted for fall. Pharmacy reviewed the patient's [id number]-[id number] medications and allergies for accuracy.    The list below reflectives the updated PTA list. Please review each medication in order reconciliation for additional clarification and justification.  Prior to Admission Medications   Prescriptions Last Dose Informant Patient Reported?    Glucagon Emergency Kit, human, 1 mg injection Unknown  Yes    Sig: Inject 1 mg under the skin every 15 minutes if needed for low blood sugar - see comments (BG <70).   OLANZapine (ZyPREXA) 20 mg tablet Unknown  Yes    Sig: Take 1 tablet (20 mg) by mouth once daily at bedtime.   abaloparatide (Tymlos) 80 mcg (3,120 mcg/1.56 mL) pen injector Unknown  Yes    Sig: Inject 80 mcg under the skin once daily.   acetaminophen (Tylenol) 325 mg tablet Unknown  Yes    Sig: Take 3 tablets (975 mg) by mouth 3 times a day. Do not crush, chew, or split.   atorvastatin (Lipitor) 40 mg tablet Unknown  Yes    Sig: Take 1 tablet (40 mg) by mouth once daily at bedtime.   benzocaine-menthol (Cepacol Sore Throat, nargis-men,) 15-3.6 mg lozenge Unknown  Yes    Sig: Dissolve 1 lozenge in the mouth every 4 hours if needed for sore throat.   bisacodyl (Dulcolax) 10 mg suppository Unknown  Yes    Sig: Insert 1 suppository (10 mg) into the rectum once daily as needed for constipation (if no BM 8 hours after milk of magnesium).   calcium carbonate-vitamin D3 600 mg-20 mcg (800 unit) tablet Unknown  Yes    Sig: Take 1 tablet by mouth 2 times a day.   clobetasoL 0.05 % shampoo Unknown  Yes    Sig: Apply 1 Application topically 1 (one) time per week.   clobetasol (Temovate) 0.05 % cream Unknown  Yes    Sig: Apply 1 Application topically 2 times a day.   docusate sodium (Colace) 100 mg capsule Unknown  Yes    Sig: Take 1 capsule (100 mg) by mouth once daily in the morning. Take before meals.   dorzolamide (Trusopt) 2 % ophthalmic  solution Unknown  Yes    Sig: Administer 1 drop into both eyes 3 times a day.   famotidine (Pepcid) 40 mg tablet Unknown  Yes    Sig: Take 1 tablet (40 mg) by mouth once daily at bedtime.   fluticasone (Flonase) 50 mcg/actuation nasal spray Unknown  Yes    Sig: Administer 1 spray into each nostril once daily. Shake gently. Before first use, prime pump. After use, clean tip and replace cap.   folic acid (Folvite) 1 mg tablet Unknown  Yes    Sig: Take 1 tablet (1 mg) by mouth once daily.   guaiFENesin (Robitussin) 100 mg/5 mL syrup Unknown  Yes    Sig: Take 10 mL (200 mg) by mouth every 4 hours if needed for cough or congestion.   haloperidol (Haldol) 0.5 mg tablet Unknown  Yes    Sig: Take 1 tablet (0.5 mg) by mouth once daily at bedtime.   hydrocortisone 2.5 % cream Unknown  Yes    Sig: Apply 1 Application topically 3 times a day. To forehead   ibuprofen 600 mg tablet Unknown  Yes    Sig: Take 1 tablet (600 mg) by mouth every 6 hours if needed for mild pain (1 - 3).   latanoprost (Xalatan) 0.005 % ophthalmic solution Unknown  Yes    Sig: Administer 1 drop into both eyes once daily at bedtime.   linaGLIPtin (Tradjenta) 5 mg tablet Unknown  Yes    Sig: Take 1 tablet (5 mg) by mouth once daily.   magnesium hydroxide (Milk of Magnesia) 400 mg/5 mL suspension Unknown  Yes    Sig: Take 30 mL by mouth once daily as needed for constipation (if no BM in 3 consecutive days).   magnesium oxide (Mag-Ox) 400 mg tablet Unknown  Yes    Sig: Take 1 tablet (400 mg) by mouth once daily.   melatonin 5 mg tablet Unknown  Yes    Sig: Take 1 tablet (5 mg) by mouth once daily at bedtime.   metFORMIN (Glucophage) 500 mg tablet Unknown  Yes    Sig: Take 1 tablet (500 mg) by mouth 2 times daily (morning and late afternoon).   methotrexate (Trexall) 2.5 mg tablet Unknown  Yes    Sig: Take 6 tablets (15 mg total) by mouth 1 (one) time per week.  6 tablets (15mg) once a week on Fridays   metoprolol succinate XL (Toprol-XL) 25 mg 24 hr tablet  Unknown  Yes    Sig: Take 1 tablet (25 mg) by mouth 2 times a day. Do not crush or chew.   polyethylene glycol (Glycolax, Miralax) 17 gram packet Unknown  Yes    Sig: Take 17 g by mouth 2 times a day.   predniSONE (Deltasone) 2.5 mg tablet Unknown  Yes    Sig: Take 1.5 tablets (3.75 mg) by mouth 2 times a day.   sodium phosphates (Fleet, Pediatric,) 9.5-3.5 gram/59 mL enema Unknown  Yes    Sig: Insert 1 enema into the rectum once daily as needed for constipation (if no BM 8 hours after receiving bisacodyl suppository).   venlafaxine XR (Effexor-XR) 37.5 mg 24 hr capsule Unknown  Yes    Sig: Take 1 capsule (37.5 mg) by mouth once daily. Do not crush or chew.      Facility-Administered Medications: None         The list below reflectives the updated allergy list. Please review each documented allergy for additional clarification and justification.  Allergies  Reviewed by Lauro Hutchins RN on 8/24/2024        Severity Reactions Comments    House Dust Not Specified Unknown     House Dust Mite Not Specified Unknown     Mold Not Specified Unknown     Pollen Extracts Not Specified Cough             Below are additional concerns with the patient's PTA list.  Updated medication list using information provided by [facility].    Jennifer Mercedes, PharmD, BCCCP

## 2024-08-24 NOTE — ED TRIAGE NOTES
Patient presents as a transfer from [facility] due to an unwitnessed fall from standing at a nursing home. Per CT report fusion changes at C6-C7, Acute extension fracture through T8, and chronic compression deformities of L1, L2, and L4. Remote right sided rib fractures seen as well. Uncertain of LOC, complaint of mid back pain. Per report from EMS patient had a fall last week which caused a laceration to the left eyebrow, stitches are in tact. Patient is A&O2 at baseline. Received 0.5 mg of Dilaudid IV before transport to [facility] ED, desaturated in ambulance and placed on 2 liters NC.

## 2024-08-24 NOTE — ED PROVIDER NOTES
Emergency Department Provider Note        History of Present Illness     History provided by: Patient and EMS  Limitations to History: Dementia  External Records Reviewed with Brief Summary:  Emergency department note from [facility] prior to transfer.  Patient was found to have a T8 vertebral body fracture after imaging obtained there, they reached out to the transfer center and discussed with neurosurgery spine and trauma who recommended transfer to [facility].    HPI:  Jennifer Mancini is a 82 y.o. female 84-year-old female presenting as transfer from [facility] for concerns of ground-level fall backwards onto her back.  Denies loss of consciousness or hitting her head, it was noted patient had a T8 vertebral body fracture.  As a result they recommended transfer here to be evaluated by trauma and spine.  And route patient received nasal cannula to support oxygen as it had dropped down to 88%.  Patient did receive 0.5 mg of Dilaudid prior to help with her pain so this could be a factor in regards to that.  Patient is ANO x 2 which appears to be her baseline, she does not have acute concerns at this time but states that her back hurts.    Physical Exam   Triage vitals:  T    HR    BP    RR    O2        Physical Exam  Cardiovascular:      Rate and Rhythm: Tachycardia present.   Pulmonary:      Effort: No respiratory distress.   Musculoskeletal:         General: No deformity or signs of injury.      Cervical back: Normal range of motion.   Skin:     General: Skin is warm and dry.   Neurological:      Mental Status: She is alert. Mental status is at baseline.          Medical Decision Making & ED Course   Medical Decision Makin y.o. female presenting as transfer as above.  Patient has a T8 vertebral body fracture.  Of note upon arrival, patient had new oxygen requirement.  This could be secondary to Dilaudid given prior to transfer, however CT chest abdomen and pelvis were performed to assess  for additional signs of trauma as well as pulmonary pathology such as lung contusion or pneumonia .  Results came back unremarkable, neurosurgery spine was consulted who recommended MRI thoracic spine without contrast.  Trauma surgery evaluated and ultimately admitted the patient to their service on the floor.  While in the ED, patient was between 2 and 4 L nasal cannula and given morphine for symptomatic pain relief.  ----    Differential diagnoses considered include but are not limited to: as per [facility]    Chronic conditions affecting the patient's care: As documented above in [facility]    The patient was discussed with the following consultants/services:  trauma/ NSGY spine    Care Considerations: As documented above in [facility]    ED Course:  ED Course as of 08/24/24 1207   Sat Aug 24, 2024   1050 EKG sinus tachycardia  qtc 427 no GAETANO or STD, TWI avL [AM]      ED Course User Index  [AM] Isidro Cabrales MD         Diagnoses as of 08/24/24 1207   Closed fracture of eighth thoracic vertebra, unspecified fracture morphology, initial encounter (Multi)     Disposition   As a result of their workup, the patient will require admission to the hospital.  The patient was informed of her diagnosis.  The patient was given the opportunity to ask questions and I answered them. The patient agreed to be admitted to the hospital.    Procedures   Procedures    Chaim Braden DO  Emergency Medicine      Chaim Braden DO  Resident  08/24/24 2972

## 2024-08-24 NOTE — ED TRIAGE NOTES
Pt reports she lost her balance and fell backwards landing on her butt. Denies hitting head, denies LOC, + thinner. Reports back pain

## 2024-08-24 NOTE — CONSULTS
Reason For Consult  Thoracic spine fracture    History Of Present Illness  Jennifer Mancini is a 84 y.o. female presenting with mechanical fall on nursing home.     Daughters at bedside, state patient has been wheelchair bound for years but is able to transfer to and from herself as well as short walks.  She falls often and has had recent peripheral fractures.     Patient states she is tired and was reluctant to participate in neuroexam but denied any back pain.  Denied and new bowel/bladder dysfunction or any new weakness/numbness/parasthesias.     Past Medical History  She has a past medical history of HTN (hypertension) (2/9/2024), Hypertensive heart disease without heart failure (12/17/2019), Other nondisplaced fracture of seventh cervical vertebra, subsequent encounter for fracture with routine healing (12/17/2019), Other nondisplaced fracture of sixth cervical vertebra, subsequent encounter for fracture with routine healing (12/17/2019), Personal history of other diseases of the female genital tract (12/17/2019), Personal history of other endocrine, nutritional and metabolic disease (12/17/2019), Personal history of other mental and behavioral disorders (06/14/2021), Personal history of other specified conditions (06/14/2021), Schizoaffective disorder, bipolar type (Multi) (12/17/2019), and Unspecified fracture of left femur, initial encounter for closed fracture (Multi) (06/14/2021).    Surgical History  She has a past surgical history that includes Other surgical history (12/17/2019); Other surgical history (12/17/2019); Other surgical history (12/17/2019); Other surgical history (12/17/2019); and CT angio neck (12/13/2019).     Social History  She reports that she does not drink alcohol and does not use drugs. No history on file for tobacco use.    Family History  Family History   Problem Relation Name Age of Onset    No Known Problems Mother      No Known Problems Father          Allergies  House dust,  Mold, and Pollen extracts    Review of Systems  10 point ROS is obtained and negative except the ones mentioned in the HPI      Physical Exam  General: asleep but easily awakened, on 02   HEENT: L laceration above eyebrow   Neuro:  NAD, A&Ox3  Cranial Nerves II-XII: PERRL, EOMI, Face symmetric, Facial SILT,   Motor: BUE D 2( chronic, 2/2 pain), B/T/HG 5/5  BLE 5/5 w persistence   Sensation: SILT throughout all extremities  DTRS: 2+ Throughout, No Hoffmans or Clonus    Last Recorded Vitals  Blood pressure 152/78, pulse (!) 118, temperature 36.7 °C (98.1 °F), temperature source Oral, resp. rate 16, SpO2 96%.    Relevant Results  Imaging personally reviewed and is significant for: CT CTL Spine with chronic stable L4 compression fracture with mild retropulsion.  T8 extension fracture.      Assessment/Plan     Jennifer Mancini is an 84 yr old F with h/o HTN, HLD, DM, HFpEF, schizoaffective, p/w mechanical fall at SNF, (wheelchair at baseline), prior C5-6 ACDF, CT CTL spine DISH spine, T8 ext fx w ext into b/l facets, chronic L4 compression fx    Recommendations:  Please obtain MRI T spine without contrast  Please keep in strict T/L spine precautions until MRI   Rest per ED and trauma teams     Final recommendations pending MRI    Susie Sow MD

## 2024-08-24 NOTE — ED PROVIDER NOTES
"HPI   Chief Complaint   Patient presents with    Fall     Pt reports she lost her balance and fell backwards landing on her butt. Denies hitting head, denies LOC, + thinner. Reports back pain       HPI:  84-year-old female with history of hypertension CHF and schizoaffective presents the emergency department after fall.  She lives at a assisted living facility mostly gets around with her wheelchair and it supposed to use a walker however today she wheeled herself over to the end of the table where boxes were sitting and she stood up to investigate what was in the box When she lost her balance and fell backwards.  She remembers the entire incident denies striking her head but is complaining of mid back pain.  She attempted to scoot her self over to her wheelchair to help get her self off of the ground however she was unable to and she came here for an evaluation by EMS.  She denies any chest discomfort denies any shortness of breath no recent illness states while at rest in bed she denies any lightheadedness or dizziness.  She she denies any numbness or tingling and no abdominal complaints.  Patient states that she has recurrent episodes of falls but has tried nursing homes before and states that its \"no better than where she currently resides\" she is hopeful that if everything checks out okay that she would like to be discharged back to her assisted living facility    Limitations to history: none  Independent Historians: [none  External Records Reviewed: EMR records  ------------------------------------------------------------------------------------------------------------------------------------------  ROS: a ten point review of systems was performed and negative except as per HPI.  ------------------------------------------------------------------------------------------------------------------------------------------  PMH / PSH: as per HPI, reviewed in EMR and discussed with the patient []  MEDS:  reviewed in " EMR and discussed with the patient []  ALLERGIES: reviewed in EMR[]  SocH:  as per HPI, otherwise reviewed in EMR []  FH:  as per HPI, otherwise reviewed in EMR []  ------------------------------------------------------------------------------------------------------------------------------------------  Physical Exam:  VS: As documented in the triage note and EMR flowsheet from this visit was reviewed  General: Well appearing. No acute distress.   Eyes:  Extraocular movements grossly intact. No scleral icterus.   HEENT: Atraumatic. Normocephalic.  Left periorbital bruising that is old healing with a laceration with sutures currently intact wound is clean dry  Neck: Supple. No gross masses  Back; lower thoracic reproducible paraspinal tenderness to palpation  CV: RRR, audible S1/S2, 2+ symmetric peripheral pulses  Resp: Clear to auscultation bilaterally. No respiratory distress.  Non-labored respirations  GI: Soft, non-tender, non-distended, no rebound or gaurding  MSK: Old bruising to bilateral forearms along with superficial skin tear appears oldSymmetric muscle bulk. No gross step offs or deformities.  Skin: Warm, dry, no obvious rash.  Neuro: Speech fluent. Awake. Alert. Appropriate conversation.  Psych: Appropriate mood and affect for situation  ------------------------------------------------------------------------------------------------------------------------------------------  Hospital Course / Medical Decision Making:  Independent Interpretations:  EKG -   Twelve-lead EKG performed and independently interpreted by me as showing sinus rhythm at a ventricular rate of 98 with signs of LVH ST segments are flat without any elevation T waves are upright no signs of acute ischemia QTc of 433 QRS of 77 axis is upright and normal no signs of acute ischemia    84-year-old female presents to the ER after a ground-level fall after losing her balance she says she has recurrent falls and has balance issues at baseline.   She was offered pain medications however states she is currently comfortable and declined anything for pain at the moment.  She has noted to be slightly tachycardic while at rest and therefore workup for any underlying cause of may be contributing to her dizziness was pursued.  She was maintained on the cardiac monitor peripheral IV access obtained labs were drawn.  Twelve-lead EKG performed    Patient was sent to CAT scan however could not lie flat due to back pain and had to be transferred back to her ED room.  She was given IV Dilaudid for back pain and was able to complete the CAT scan study CT of the head CT of her CT and L-spine was obtained and she does have a vertebral body fracture of T8.  I attempted to lay the patient flat in spine precautions however she refused stating that it hurt too much to lay flat and that she could not do it despite understanding the reason I wanted her to lay in spine precautions and to lay flat and the risk of potential spinal cord injury and paralysis however despite understanding these risk the patient still declined to lay flat.    Patient's magnesium level was found to be low this was repleted through the IV and her tachycardia improved with IV fluids Case was discussed with the transfer center and I spoke with neurosurgery spine as well as trauma surgery and the ED attending who are in agreement for the patient to be transferred down to [facility] for trauma consultation and spine evaluation.  The patient is hemodynamically stable and neurovascularly intact she is awaiting transportation to [facility]    Patient care discussed with: [Spine consultant, trauma consultant, outside ED attending  Social Determinants affecting care: [Problems affording transportation, inability to afford prescriptions, lack of housing, lack of insurance]    Final diagnosis and disposition: [] Fall, T8 vertebral body fracture            Kelli Youngblood MD                          Patient History   Past  Medical History:   Diagnosis Date    HTN (hypertension) 2/9/2024    Hypertensive heart disease without heart failure 12/17/2019    Hypertensive heart disease without CHF    Other nondisplaced fracture of seventh cervical vertebra, subsequent encounter for fracture with routine healing 12/17/2019    Other closed nondisplaced fracture of seventh cervical vertebra with routine healing, subsequent encounter    Other nondisplaced fracture of sixth cervical vertebra, subsequent encounter for fracture with routine healing 12/17/2019    Other closed nondisplaced fracture of sixth cervical vertebra with routine healing, subsequent encounter    Personal history of other diseases of the female genital tract 12/17/2019    History of uterine prolapse    Personal history of other endocrine, nutritional and metabolic disease 12/17/2019    History of hyperlipidemia    Personal history of other mental and behavioral disorders 06/14/2021    History of schizoaffective disorder    Personal history of other specified conditions 06/14/2021    History of chronic pain    Schizoaffective disorder, bipolar type (Multi) 12/17/2019    Schizoaffective disorder, bipolar type    Unspecified fracture of left femur, initial encounter for closed fracture (Multi) 06/14/2021    Fracture of left femur     Past Surgical History:   Procedure Laterality Date    CT ANGIO NECK  12/13/2019    CT NECK ANGIO W AND WO IV CONTRAST 12/13/2019 [facility] CLINICAL LEGACY    OTHER SURGICAL HISTORY  12/17/2019    Tonsillectomy    OTHER SURGICAL HISTORY  12/17/2019    Rotator cuff repair    OTHER SURGICAL HISTORY  12/17/2019    Wrist surgery    OTHER SURGICAL HISTORY  12/17/2019    Uterine surgery     Family History   Problem Relation Name Age of Onset    No Known Problems Mother      No Known Problems Father       Social History     Tobacco Use    Smoking status: Unknown    Smokeless tobacco: Not on file   Substance Use Topics    Alcohol use: Never    Drug use: Never        Physical Exam   ED Triage Vitals [08/23/24 2109]   Temperature Heart Rate Respirations BP   36.2 °C (97.2 °F) (!) 101 16 (!) 148/94      Pulse Ox Temp Source Heart Rate Source Patient Position   94 % Skin Monitor --      BP Location FiO2 (%)     -- --       Physical Exam      ED Course & MDM   Diagnoses as of 08/24/24 0312   Closed fracture of posterior thoracic vertebral body, initial encounter (Multi)   Fall, initial encounter   Hypomagnesemia   Hyponatremia                 No data recorded     Kamini Coma Scale Score: 15 (08/23/24 2110 : Ernestina Fisher, KASSIE)                           Medical Decision Making      Procedure  Procedures     Kelli Youngblood MD  08/24/24 0310

## 2024-08-25 ENCOUNTER — APPOINTMENT (OUTPATIENT)
Dept: RADIOLOGY | Facility: HOSPITAL | Age: 84
End: 2024-08-25
Payer: COMMERCIAL

## 2024-08-25 LAB
ALBUMIN SERPL BCP-MCNC: 3.1 G/DL (ref 3.4–5)
ANION GAP SERPL CALC-SCNC: 11 MMOL/L (ref 10–20)
BUN SERPL-MCNC: 8 MG/DL (ref 6–23)
CALCIUM SERPL-MCNC: 8.5 MG/DL (ref 8.6–10.6)
CHLORIDE SERPL-SCNC: 99 MMOL/L (ref 98–107)
CO2 SERPL-SCNC: 27 MMOL/L (ref 21–32)
CREAT SERPL-MCNC: 0.37 MG/DL (ref 0.5–1.05)
EGFRCR SERPLBLD CKD-EPI 2021: >90 ML/MIN/1.73M*2
ERYTHROCYTE [DISTWIDTH] IN BLOOD BY AUTOMATED COUNT: 14.4 % (ref 11.5–14.5)
ERYTHROCYTE [DISTWIDTH] IN BLOOD BY AUTOMATED COUNT: 14.5 % (ref 11.5–14.5)
GLUCOSE BLD MANUAL STRIP-MCNC: 156 MG/DL (ref 74–99)
GLUCOSE BLD MANUAL STRIP-MCNC: 159 MG/DL (ref 74–99)
GLUCOSE BLD MANUAL STRIP-MCNC: 204 MG/DL (ref 74–99)
GLUCOSE SERPL-MCNC: 196 MG/DL (ref 74–99)
HCT VFR BLD AUTO: 33.5 % (ref 36–46)
HCT VFR BLD AUTO: 34.6 % (ref 36–46)
HGB BLD-MCNC: 10.6 G/DL (ref 12–16)
HGB BLD-MCNC: 10.7 G/DL (ref 12–16)
HOLD SPECIMEN: NORMAL
MAGNESIUM SERPL-MCNC: 1.42 MG/DL (ref 1.6–2.4)
MCH RBC QN AUTO: 29.6 PG (ref 26–34)
MCH RBC QN AUTO: 29.7 PG (ref 26–34)
MCHC RBC AUTO-ENTMCNC: 30.9 G/DL (ref 32–36)
MCHC RBC AUTO-ENTMCNC: 31.6 G/DL (ref 32–36)
MCV RBC AUTO: 94 FL (ref 80–100)
MCV RBC AUTO: 96 FL (ref 80–100)
NRBC BLD-RTO: 0 /100 WBCS (ref 0–0)
NRBC BLD-RTO: 0 /100 WBCS (ref 0–0)
PHOSPHATE SERPL-MCNC: 2.2 MG/DL (ref 2.5–4.9)
PLATELET # BLD AUTO: 262 X10*3/UL (ref 150–450)
PLATELET # BLD AUTO: 271 X10*3/UL (ref 150–450)
POTASSIUM SERPL-SCNC: 3.2 MMOL/L (ref 3.5–5.3)
RBC # BLD AUTO: 3.58 X10*6/UL (ref 4–5.2)
RBC # BLD AUTO: 3.6 X10*6/UL (ref 4–5.2)
SODIUM SERPL-SCNC: 134 MMOL/L (ref 136–145)
WBC # BLD AUTO: 7.2 X10*3/UL (ref 4.4–11.3)
WBC # BLD AUTO: 8.5 X10*3/UL (ref 4.4–11.3)

## 2024-08-25 PROCEDURE — 85027 COMPLETE CBC AUTOMATED: CPT

## 2024-08-25 PROCEDURE — 72146 MRI CHEST SPINE W/O DYE: CPT

## 2024-08-25 PROCEDURE — 2500000005 HC RX 250 GENERAL PHARMACY W/O HCPCS

## 2024-08-25 PROCEDURE — 36415 COLL VENOUS BLD VENIPUNCTURE: CPT

## 2024-08-25 PROCEDURE — 1200000002 HC GENERAL ROOM WITH TELEMETRY DAILY

## 2024-08-25 PROCEDURE — 72146 MRI CHEST SPINE W/O DYE: CPT | Performed by: RADIOLOGY

## 2024-08-25 PROCEDURE — 82947 ASSAY GLUCOSE BLOOD QUANT: CPT

## 2024-08-25 PROCEDURE — 80069 RENAL FUNCTION PANEL: CPT

## 2024-08-25 PROCEDURE — 2500000001 HC RX 250 WO HCPCS SELF ADMINISTERED DRUGS (ALT 637 FOR MEDICARE OP)

## 2024-08-25 PROCEDURE — 83735 ASSAY OF MAGNESIUM: CPT

## 2024-08-25 PROCEDURE — 2500000002 HC RX 250 W HCPCS SELF ADMINISTERED DRUGS (ALT 637 FOR MEDICARE OP, ALT 636 FOR OP/ED)

## 2024-08-25 PROCEDURE — 2500000004 HC RX 250 GENERAL PHARMACY W/ HCPCS (ALT 636 FOR OP/ED)

## 2024-08-25 RX ORDER — KETOROLAC TROMETHAMINE 15 MG/ML
15 INJECTION, SOLUTION INTRAMUSCULAR; INTRAVENOUS EVERY 6 HOURS SCHEDULED
Status: COMPLETED | OUTPATIENT
Start: 2024-08-25 | End: 2024-08-26

## 2024-08-25 RX ORDER — POTASSIUM CHLORIDE 14.9 MG/ML
20 INJECTION INTRAVENOUS ONCE
Status: COMPLETED | OUTPATIENT
Start: 2024-08-25 | End: 2024-08-25

## 2024-08-25 RX ORDER — POTASSIUM CHLORIDE 20 MEQ/1
20 TABLET, EXTENDED RELEASE ORAL ONCE
Status: DISCONTINUED | OUTPATIENT
Start: 2024-08-25 | End: 2024-08-26

## 2024-08-25 RX ADMIN — ACETAMINOPHEN 975 MG: 325 TABLET ORAL at 13:18

## 2024-08-25 RX ADMIN — METOPROLOL SUCCINATE 25 MG: 25 TABLET, EXTENDED RELEASE ORAL at 09:56

## 2024-08-25 RX ADMIN — HALOPERIDOL 0.5 MG: 0.5 TABLET ORAL at 22:22

## 2024-08-25 RX ADMIN — Medication 2 L/MIN: at 08:00

## 2024-08-25 RX ADMIN — ENOXAPARIN SODIUM 30 MG: 100 INJECTION SUBCUTANEOUS at 09:56

## 2024-08-25 RX ADMIN — POTASSIUM CHLORIDE 20 MEQ: 14.9 INJECTION, SOLUTION INTRAVENOUS at 18:56

## 2024-08-25 RX ADMIN — SODIUM CHLORIDE, POTASSIUM CHLORIDE, SODIUM LACTATE AND CALCIUM CHLORIDE 75 ML/HR: 600; 310; 30; 20 INJECTION, SOLUTION INTRAVENOUS at 09:56

## 2024-08-25 RX ADMIN — INSULIN HUMAN 2 UNITS: 100 INJECTION, SOLUTION PARENTERAL at 16:09

## 2024-08-25 RX ADMIN — KETOROLAC TROMETHAMINE 15 MG: 15 INJECTION, SOLUTION INTRAMUSCULAR; INTRAVENOUS at 18:00

## 2024-08-25 RX ADMIN — ENOXAPARIN SODIUM 30 MG: 100 INJECTION SUBCUTANEOUS at 22:22

## 2024-08-25 RX ADMIN — METOPROLOL SUCCINATE 25 MG: 25 TABLET, EXTENDED RELEASE ORAL at 22:22

## 2024-08-25 RX ADMIN — ATORVASTATIN CALCIUM 40 MG: 40 TABLET, FILM COATED ORAL at 22:22

## 2024-08-25 RX ADMIN — INSULIN HUMAN 4 UNITS: 100 INJECTION, SOLUTION PARENTERAL at 10:09

## 2024-08-25 RX ADMIN — INSULIN HUMAN 2 UNITS: 100 INJECTION, SOLUTION PARENTERAL at 19:06

## 2024-08-25 RX ADMIN — VENLAFAXINE HYDROCHLORIDE 37.5 MG: 37.5 CAPSULE, EXTENDED RELEASE ORAL at 09:56

## 2024-08-25 ASSESSMENT — COGNITIVE AND FUNCTIONAL STATUS - GENERAL
EATING MEALS: A LOT
HELP NEEDED FOR BATHING: TOTAL
WALKING IN HOSPITAL ROOM: TOTAL
MOBILITY SCORE: 6
MOVING TO AND FROM BED TO CHAIR: TOTAL
MOVING FROM LYING ON BACK TO SITTING ON SIDE OF FLAT BED WITH BEDRAILS: TOTAL
TOILETING: TOTAL
PERSONAL GROOMING: TOTAL
STANDING UP FROM CHAIR USING ARMS: TOTAL
DRESSING REGULAR LOWER BODY CLOTHING: TOTAL
DRESSING REGULAR UPPER BODY CLOTHING: TOTAL
CLIMB 3 TO 5 STEPS WITH RAILING: TOTAL
DAILY ACTIVITIY SCORE: 7
TURNING FROM BACK TO SIDE WHILE IN FLAT BAD: TOTAL

## 2024-08-25 ASSESSMENT — PAIN SCALES - WONG BAKER: WONGBAKER_NUMERICALRESPONSE: HURTS WHOLE LOT

## 2024-08-25 ASSESSMENT — PAIN SCALES - GENERAL
PAINLEVEL_OUTOF10: 0 - NO PAIN
PAINLEVEL_OUTOF10: 8
PAINLEVEL_OUTOF10: 5 - MODERATE PAIN

## 2024-08-25 ASSESSMENT — PAIN SCALES - PAIN ASSESSMENT IN ADVANCED DEMENTIA (PAINAD): TOTALSCORE: MEDICATION (SEE MAR)

## 2024-08-25 ASSESSMENT — PAIN - FUNCTIONAL ASSESSMENT: PAIN_FUNCTIONAL_ASSESSMENT: 0-10

## 2024-08-25 NOTE — PROGRESS NOTES
Physical Therapy                 Therapy Communication Note    Patient Name: Jennifer Mancini  MRN: [id number]  Today's Date: 8/25/2024     Discipline: Physical Therapy    Missed Visit Reason: Missed Visit Reason: Patient placed on medical hold (Pt currently on bed rest waiting to  obtain MRI T spine without contrast. Will follow up imaging is complete and final recs are made.)    Missed Time: Attempt

## 2024-08-25 NOTE — PROGRESS NOTES
[facility]  TRAUMA SERVICE - PROGRESS NOTE    Patient Name: Jennifer Mancini  MRN: [id number]  Admit Date: 824  : 1940  AGE: 84 y.o.   GENDER: female  ==============================================================================  MECHANISM OF INJURY / CHIEF COMPLAINT:   Patient is a 84 F s/p glf from standing at her assisted living facility. Patient taken to OSH where they found a T8 vertebral body fracture, transferred here for further evaluation. EMS told nursing patient had a fall last week as well.   LOC (yes/no?): Unknown  Anticoagulant / Anti-platelet Rx? (for what dx?): Denies  Referring Facility Name (N/A for scene EMR run): [facility]      INJURIES:   T8 vertebral body fracture  Subacute right-sided rib fractures      OTHER MEDICAL PROBLEMS:  CHF with reserved EF  HTN  Schizoaffective disorder  HLD  DM2      INCIDENTAL FINDINGS:  Large hiatal hernia     PROCEDURES:  None    ==============================================================================  TODAY'S ASSESSMENT AND PLAN OF CARE:    #T8 vertebral body fx  -MRI obtained, reached out nsgy they are ok with TLSO brace (script in chart, orthotics needs called )   -cannot get out of bed without brace   -pt/ot    #fen/gi/gu  -reg diet  -montana cath in  -replete k today    #dvt ppx  -lvx  -scds    Dispo: continue RNF    Pt discussed with Dr. Harry Brady PABobbyC  Trauma Surgery  ==============================================================================  CHIEF COMPLAINT / OVERNIGHT EVENTS:   NAEO.     MEDICAL HISTORY / ROS:  Admission history and ROS reviewed. Pertinent changes as follows:      PHYSICAL EXAM:  Heart Rate:  [108-132]   Temp:  [35.9 °C (96.6 °F)-36.7 °C (98.1 °F)]   Resp:  [16-18]   BP: (129-144)/(65-85)   SpO2:  [91 %-98 %]   Physical Exam  HENT:      Head: Normocephalic.      Right Ear: External ear normal.      Left Ear: External ear normal.      Nose: Nose normal.       Mouth/Throat:      Mouth: Mucous membranes are dry.      Pharynx: Oropharynx is clear.   Eyes:      Pupils: Pupils are equal, round, and reactive to light.   Cardiovascular:      Rate and Rhythm: Normal rate.      Pulses: Normal pulses.   Pulmonary:      Effort: Pulmonary effort is normal.   Abdominal:      General: Abdomen is flat.      Palpations: Abdomen is soft.   Musculoskeletal:         General: Normal range of motion.      Cervical back: Normal range of motion.   Skin:     General: Skin is warm.   Neurological:      General: No focal deficit present.      Mental Status: She is alert and oriented to person, place, and time.         IMAGING SUMMARY:  (summary of new imaging findings, not a copy of dictation)  MRI T spine: Redemonstration of a fracture through the superior endplate of T8 with resulting disruption of the anterior longitudinal ligament at  the level of T7-T8. There is no striking retropulsion into the spinal  canal and there is no spinal canal stenosis. Evaluation for fractures  involving the posterior elements is somewhat degraded, noting this,  the fracture line extends into the junction between the left T8  vertebral body and pedicle, also seen on the prior CT. Chronic mild compression fractures of the T12 and L1 vertebral bodies.        LABS:  Results from last 7 days   Lab Units 08/25/24  1058 08/25/24  0718 08/24/24  1223 08/23/24  2207   WBC AUTO x10*3/uL 8.5 7.2 10.4 14.8*   HEMOGLOBIN g/dL 10.7* 10.6* 12.2 11.1*   HEMATOCRIT % 34.6* 33.5* 35.9* 33.3*   PLATELETS AUTO x10*3/uL 271 262 340 336   NEUTROS PCT AUTO %  --   --   --  86.9   LYMPHS PCT AUTO %  --   --   --  5.8   MONOS PCT AUTO %  --   --   --  5.6   EOS PCT AUTO %  --   --   --  0.5         Results from last 7 days   Lab Units 08/25/24  1057 08/24/24  1814 08/23/24  2207   SODIUM mmol/L 134* 134* 128*   POTASSIUM mmol/L 3.2* 3.4* 3.9   CHLORIDE mmol/L 99 98 93*   CO2 mmol/L 27 27 27   BUN mg/dL 8 7 16   CREATININE mg/dL 0.37*  0.40* 0.53   CALCIUM mg/dL 8.5* 8.5* 9.7   PROTEIN TOTAL g/dL  --  5.8*  --    BILIRUBIN TOTAL mg/dL  --  0.5  --    ALK PHOS U/L  --  82  --    ALT U/L  --  11  --    AST U/L  --  11  --    GLUCOSE mg/dL 196* 196* 196*     Results from last 7 days   Lab Units 08/24/24  1814   BILIRUBIN TOTAL mg/dL 0.5           I have reviewed all medications, laboratory results, and imaging pertinent for today's encounter.

## 2024-08-25 NOTE — PROGRESS NOTES
Occupational Therapy                 Therapy Communication Note    Patient Name: Jennifer Mancini  MRN: [id number]  Today's Date: 8/25/2024     Discipline: Occupational Therapy    Missed Visit Reason: Missed Visit Reason: Patient placed on medical hold (Pt on bedrest pending MRI and further neurosurgery recommendations following mechanical fall.)    Missed Time: Attempt    Comment:

## 2024-08-25 NOTE — PROGRESS NOTES
Patient presented to [facility] ED via transfer from [place] after sustaining fall at assisted living.  Patient found to have T8 vertebral body fracture and subacture right sided rib fractures.   Patient alternate contact is daughter Ivette [phone], insurance [employer]. Patient is pending MRI and neurosurgery rec's prior to PT/OT eval and recs.  SW will continue to follow to assist with a safe discharge plan.

## 2024-08-26 LAB
ALBUMIN SERPL BCP-MCNC: 2.9 G/DL (ref 3.4–5)
ANION GAP SERPL CALC-SCNC: 14 MMOL/L (ref 10–20)
ATRIAL RATE: 114 BPM
BUN SERPL-MCNC: 11 MG/DL (ref 6–23)
CALCIUM SERPL-MCNC: 8.3 MG/DL (ref 8.6–10.6)
CHLORIDE SERPL-SCNC: 100 MMOL/L (ref 98–107)
CO2 SERPL-SCNC: 21 MMOL/L (ref 21–32)
CREAT SERPL-MCNC: 0.35 MG/DL (ref 0.5–1.05)
EGFRCR SERPLBLD CKD-EPI 2021: >90 ML/MIN/1.73M*2
ERYTHROCYTE [DISTWIDTH] IN BLOOD BY AUTOMATED COUNT: 14.5 % (ref 11.5–14.5)
GLUCOSE BLD MANUAL STRIP-MCNC: 147 MG/DL (ref 74–99)
GLUCOSE BLD MANUAL STRIP-MCNC: 171 MG/DL (ref 74–99)
GLUCOSE BLD MANUAL STRIP-MCNC: 171 MG/DL (ref 74–99)
GLUCOSE BLD MANUAL STRIP-MCNC: 211 MG/DL (ref 74–99)
GLUCOSE SERPL-MCNC: 204 MG/DL (ref 74–99)
HCT VFR BLD AUTO: 31.6 % (ref 36–46)
HGB BLD-MCNC: 9.9 G/DL (ref 12–16)
MCH RBC QN AUTO: 30 PG (ref 26–34)
MCHC RBC AUTO-ENTMCNC: 31.3 G/DL (ref 32–36)
MCV RBC AUTO: 96 FL (ref 80–100)
NRBC BLD-RTO: 0 /100 WBCS (ref 0–0)
P AXIS: 62 DEGREES
P OFFSET: 200 MS
P ONSET: 156 MS
PHOSPHATE SERPL-MCNC: 2.6 MG/DL (ref 2.5–4.9)
PLATELET # BLD AUTO: 262 X10*3/UL (ref 150–450)
POTASSIUM SERPL-SCNC: 3.6 MMOL/L (ref 3.5–5.3)
PR INTERVAL: 126 MS
Q ONSET: 219 MS
QRS COUNT: 18 BEATS
QRS DURATION: 68 MS
QT INTERVAL: 310 MS
QTC CALCULATION(BAZETT): 427 MS
QTC FREDERICIA: 383 MS
R AXIS: -8 DEGREES
RBC # BLD AUTO: 3.3 X10*6/UL (ref 4–5.2)
SODIUM SERPL-SCNC: 131 MMOL/L (ref 136–145)
T AXIS: 115 DEGREES
T OFFSET: 374 MS
VENTRICULAR RATE: 114 BPM
WBC # BLD AUTO: 9.3 X10*3/UL (ref 4.4–11.3)

## 2024-08-26 PROCEDURE — 97165 OT EVAL LOW COMPLEX 30 MIN: CPT | Mod: GO | Performed by: OCCUPATIONAL THERAPIST

## 2024-08-26 PROCEDURE — 85027 COMPLETE CBC AUTOMATED: CPT

## 2024-08-26 PROCEDURE — 82947 ASSAY GLUCOSE BLOOD QUANT: CPT

## 2024-08-26 PROCEDURE — 2500000002 HC RX 250 W HCPCS SELF ADMINISTERED DRUGS (ALT 637 FOR MEDICARE OP, ALT 636 FOR OP/ED)

## 2024-08-26 PROCEDURE — 36415 COLL VENOUS BLD VENIPUNCTURE: CPT

## 2024-08-26 PROCEDURE — 97535 SELF CARE MNGMENT TRAINING: CPT | Mod: GO | Performed by: OCCUPATIONAL THERAPIST

## 2024-08-26 PROCEDURE — 2500000005 HC RX 250 GENERAL PHARMACY W/O HCPCS

## 2024-08-26 PROCEDURE — 2500000004 HC RX 250 GENERAL PHARMACY W/ HCPCS (ALT 636 FOR OP/ED)

## 2024-08-26 PROCEDURE — 82374 ASSAY BLOOD CARBON DIOXIDE: CPT

## 2024-08-26 PROCEDURE — 1200000002 HC GENERAL ROOM WITH TELEMETRY DAILY

## 2024-08-26 PROCEDURE — 2500000001 HC RX 250 WO HCPCS SELF ADMINISTERED DRUGS (ALT 637 FOR MEDICARE OP)

## 2024-08-26 PROCEDURE — 99232 SBSQ HOSP IP/OBS MODERATE 35: CPT | Performed by: SURGERY

## 2024-08-26 RX ORDER — POTASSIUM CHLORIDE 20 MEQ/1
20 TABLET, EXTENDED RELEASE ORAL ONCE
Status: COMPLETED | OUTPATIENT
Start: 2024-08-26 | End: 2024-08-26

## 2024-08-26 RX ADMIN — ACETAMINOPHEN 975 MG: 325 TABLET ORAL at 00:00

## 2024-08-26 RX ADMIN — ACETAMINOPHEN 975 MG: 325 TABLET ORAL at 18:20

## 2024-08-26 RX ADMIN — INSULIN HUMAN 2 UNITS: 100 INJECTION, SOLUTION PARENTERAL at 17:55

## 2024-08-26 RX ADMIN — KETOROLAC TROMETHAMINE 15 MG: 15 INJECTION, SOLUTION INTRAMUSCULAR; INTRAVENOUS at 00:00

## 2024-08-26 RX ADMIN — ACETAMINOPHEN 975 MG: 325 TABLET ORAL at 12:52

## 2024-08-26 RX ADMIN — ATORVASTATIN CALCIUM 40 MG: 40 TABLET, FILM COATED ORAL at 21:14

## 2024-08-26 RX ADMIN — HALOPERIDOL 0.5 MG: 0.5 TABLET ORAL at 21:14

## 2024-08-26 RX ADMIN — SODIUM CHLORIDE, POTASSIUM CHLORIDE, SODIUM LACTATE AND CALCIUM CHLORIDE 75 ML/HR: 600; 310; 30; 20 INJECTION, SOLUTION INTRAVENOUS at 21:37

## 2024-08-26 RX ADMIN — KETOROLAC TROMETHAMINE 15 MG: 15 INJECTION, SOLUTION INTRAMUSCULAR; INTRAVENOUS at 06:43

## 2024-08-26 RX ADMIN — METOPROLOL SUCCINATE 25 MG: 25 TABLET, EXTENDED RELEASE ORAL at 21:14

## 2024-08-26 RX ADMIN — METOPROLOL SUCCINATE 25 MG: 25 TABLET, EXTENDED RELEASE ORAL at 08:56

## 2024-08-26 RX ADMIN — OXYCODONE HYDROCHLORIDE 5 MG: 5 TABLET ORAL at 14:53

## 2024-08-26 RX ADMIN — ENOXAPARIN SODIUM 30 MG: 100 INJECTION SUBCUTANEOUS at 21:13

## 2024-08-26 RX ADMIN — POTASSIUM CHLORIDE 20 MEQ: 1500 TABLET, EXTENDED RELEASE ORAL at 14:45

## 2024-08-26 RX ADMIN — ENOXAPARIN SODIUM 30 MG: 100 INJECTION SUBCUTANEOUS at 09:01

## 2024-08-26 RX ADMIN — INSULIN HUMAN 4 UNITS: 100 INJECTION, SOLUTION PARENTERAL at 09:04

## 2024-08-26 RX ADMIN — VENLAFAXINE HYDROCHLORIDE 37.5 MG: 37.5 CAPSULE, EXTENDED RELEASE ORAL at 09:21

## 2024-08-26 RX ADMIN — ACETAMINOPHEN 975 MG: 325 TABLET ORAL at 06:43

## 2024-08-26 RX ADMIN — Medication 2 L/MIN: at 20:00

## 2024-08-26 ASSESSMENT — COGNITIVE AND FUNCTIONAL STATUS - GENERAL
DRESSING REGULAR UPPER BODY CLOTHING: TOTAL
PERSONAL GROOMING: TOTAL
WALKING IN HOSPITAL ROOM: TOTAL
DRESSING REGULAR LOWER BODY CLOTHING: TOTAL
DRESSING REGULAR LOWER BODY CLOTHING: TOTAL
DAILY ACTIVITIY SCORE: 6
STANDING UP FROM CHAIR USING ARMS: TOTAL
CLIMB 3 TO 5 STEPS WITH RAILING: TOTAL
MOBILITY SCORE: 6
DAILY ACTIVITIY SCORE: 6
TOILETING: TOTAL
HELP NEEDED FOR BATHING: TOTAL
MOVING FROM LYING ON BACK TO SITTING ON SIDE OF FLAT BED WITH BEDRAILS: TOTAL
TURNING FROM BACK TO SIDE WHILE IN FLAT BAD: TOTAL
HELP NEEDED FOR BATHING: TOTAL
PERSONAL GROOMING: TOTAL
MOVING TO AND FROM BED TO CHAIR: TOTAL
TOILETING: TOTAL
DRESSING REGULAR UPPER BODY CLOTHING: TOTAL
EATING MEALS: TOTAL
EATING MEALS: TOTAL

## 2024-08-26 ASSESSMENT — ACTIVITIES OF DAILY LIVING (ADL)
HOME_MANAGEMENT_TIME_ENTRY: 10
BATHING_ASSISTANCE: MODERATE

## 2024-08-26 ASSESSMENT — PAIN SCALES - GENERAL
PAINLEVEL_OUTOF10: 3
PAINLEVEL_OUTOF10: 10 - WORST POSSIBLE PAIN
PAINLEVEL_OUTOF10: 3
PAINLEVEL_OUTOF10: 7
PAINLEVEL_OUTOF10: 0 - NO PAIN
PAINLEVEL_OUTOF10: 8

## 2024-08-26 ASSESSMENT — PAIN - FUNCTIONAL ASSESSMENT
PAIN_FUNCTIONAL_ASSESSMENT: 0-10
PAIN_FUNCTIONAL_ASSESSMENT: 0-10

## 2024-08-26 NOTE — PROGRESS NOTES
[facility]  TRAUMA SERVICE - PROGRESS NOTE    Patient Name: Jennifer Mancini  MRN: [id number]  Admit Date: 824  : 1940  AGE: 84 y.o.   GENDER: female  ==============================================================================  MECHANISM OF INJURY / CHIEF COMPLAINT:   Patient is a 84 F s/p glf from standing at her assisted living facility. Patient taken to OSH where they found a T8 vertebral body fracture, transferred here for further evaluation. EMS told nursing patient had a fall last week as well.   LOC (yes/no?): Unknown  Anticoagulant / Anti-platelet Rx? (for what dx?): Denies  Referring Facility Name (N/A for scene EMR run): [facility]      INJURIES:   T8 vertebral body fracture  Subacute right-sided rib fractures      OTHER MEDICAL PROBLEMS:  CHF with reserved EF  HTN  Schizoaffective disorder  HLD  DM2      INCIDENTAL FINDINGS:  Large hiatal hernia     PROCEDURES:  None    ==============================================================================  TODAY'S ASSESSMENT AND PLAN OF CARE:    #T8 vertebral body fx  -MRI reviewed by nskei, recommend TLSO hard brace (script in chart, orthotics needs called )  - Obtain TLSO brace  - XR Upright with brace on for baseline  -cannot get out of bed without brace   -PT/OT: missed attempt due to brace fitting    #fen/gi/gu  -reg diet  -montana cath in  -replete k today    #dvt ppx  -lvx  -scds    Dispo: continue RNF    Pt discussed with Dr. Janak Blue MD  Trauma Surgery  ==============================================================================  CHIEF COMPLAINT / OVERNIGHT EVENTS:   MRI reviewed by ALVINO    MEDICAL HISTORY / ROS:  Admission history and ROS reviewed. Pertinent changes as follows:      PHYSICAL EXAM:  Heart Rate:  []   Temp:  [35.9 °C (96.6 °F)-36.4 °C (97.5 °F)]   Resp:  [18]   BP: (113-129)/(58-77)   SpO2:  [93 %-98 %]   Physical Exam  HENT:      Head: Normocephalic.      Right Ear:  External ear normal.      Left Ear: External ear normal.      Nose: Nose normal.      Mouth/Throat:      Mouth: Mucous membranes are dry.      Pharynx: Oropharynx is clear.   Eyes:      Pupils: Pupils are equal, round, and reactive to light.   Cardiovascular:      Rate and Rhythm: Normal rate.      Pulses: Normal pulses.   Pulmonary:      Effort: Pulmonary effort is normal.   Abdominal:      General: Abdomen is flat.      Palpations: Abdomen is soft.   Musculoskeletal:         General: Normal range of motion.      Cervical back: Normal range of motion.   Skin:     General: Skin is warm.   Neurological:      General: No focal deficit present.      Mental Status: She is alert and oriented to person, place, and time.         IMAGING SUMMARY:  (summary of new imaging findings, not a copy of dictation)  MRI T spine: Redemonstration of a fracture through the superior endplate of T8 with resulting disruption of the anterior longitudinal ligament at  the level of T7-T8. There is no striking retropulsion into the spinal  canal and there is no spinal canal stenosis. Evaluation for fractures  involving the posterior elements is somewhat degraded, noting this,  the fracture line extends into the junction between the left T8  vertebral body and pedicle, also seen on the prior CT. Chronic mild compression fractures of the T12 and L1 vertebral bodies.        LABS:  Results from last 7 days   Lab Units 08/26/24  0729 08/25/24  1058 08/25/24  0718 08/24/24  1223 08/23/24  2207   WBC AUTO x10*3/uL 9.3 8.5 7.2   < > 14.8*   HEMOGLOBIN g/dL 9.9* 10.7* 10.6*   < > 11.1*   HEMATOCRIT % 31.6* 34.6* 33.5*   < > 33.3*   PLATELETS AUTO x10*3/uL 262 271 262   < > 336   NEUTROS PCT AUTO %  --   --   --   --  86.9   LYMPHS PCT AUTO %  --   --   --   --  5.8   MONOS PCT AUTO %  --   --   --   --  5.6   EOS PCT AUTO %  --   --   --   --  0.5    < > = values in this interval not displayed.         Results from last 7 days   Lab Units  08/26/24  0729 08/25/24  1057 08/24/24  1814   SODIUM mmol/L 131* 134* 134*   POTASSIUM mmol/L 3.6 3.2* 3.4*   CHLORIDE mmol/L 100 99 98   CO2 mmol/L 21 27 27   BUN mg/dL 11 8 7   CREATININE mg/dL 0.35* 0.37* 0.40*   CALCIUM mg/dL 8.3* 8.5* 8.5*   PROTEIN TOTAL g/dL  --   --  5.8*   BILIRUBIN TOTAL mg/dL  --   --  0.5   ALK PHOS U/L  --   --  82   ALT U/L  --   --  11   AST U/L  --   --  11   GLUCOSE mg/dL 204* 196* 196*     Results from last 7 days   Lab Units 08/24/24  1814   BILIRUBIN TOTAL mg/dL 0.5           I have reviewed all medications, laboratory results, and imaging pertinent for today's encounter.

## 2024-08-26 NOTE — SIGNIFICANT EVENT
MRI reviewed, no significant STIR of posterior elements.  Please obtain hard brace with orthotics (likely TLSO) to be worn when out of bed.  Please obtain upright xrays in brace as baseline.

## 2024-08-26 NOTE — PROGRESS NOTES
Occupational Therapy    Occupational Therapy    Evaluation and Treatment    Patient Name: Jennifer Mancini  MRN: [id number]  Today's Date: 8/26/2024  Room: [address] [place], [place] [address]  Time Calculation  Start Time: 1640  Stop Time: 1714  Time Calculation (min): 34 min    Assessment  IP OT Assessment  OT Assessment: Pt. awaiting brace before bed mobility, functional mobility, transfers are allowed.Pt. is limited by pain and has Decreased ADL status;Decreased upper extremity range of motion;Decreased upper extremity strength;Decreased safe judgment during ADL;Decreased cognition;Decreased functional mobility;Decreased gross motor control;Decreased IADLs;  Prognosis: Fair  Barriers to Discharge: Decreased caregiver support  Evaluation/Treatment Tolerance: Patient limited by pain (Pt. awaiting thorasic brace)  Medical Staff Made Aware: Yes  End of Session Communication: Bedside nurse  End of Session Patient Position: Bed, 3 rail up, Alarm on  Plan:  Inpatient Plan  Treatment Interventions: ADL retraining, Functional transfer training, UE strengthening/ROM, Endurance training, Cognitive reorientation, Patient/family training  OT Frequency: 3 times per week  OT Discharge Recommendations: Low intensity level of continued care  Equipment Recommended upon Discharge: Wheeled walker  OT Recommended Transfer Status:  (NT)  OT - OK to Discharge: Yes  OT Assessment  Prognosis: Fair  Barriers to Discharge: Decreased caregiver support  Evaluation/Treatment Tolerance: Patient limited by pain (Pt. awaiting thorasic brace)  Medical Staff Made Aware: Yes    Subjective   Current Problem:  1. Closed fracture of eighth thoracic vertebra, unspecified fracture morphology, initial encounter (Multi)  Brace        General:  Reason for Referral: Closed fracture of eighth thoracic vertebra, unspecified fracture morphology, initial encounter (Multi).  Past Medical History Relevant to Rehab: Anemia 2/9/2024    HTN (hypertension) 2/9/2024    CAD (coronary artery  "disease) 2/9/2024    MI (myocardial infarction) (Multi) 2/9/2024    Murmur 2/9/2024    Aortic stenosis 2/9/2024. Closed stable burst fracture of third lumbar vertebra with routine healing 2/19/2024, Type 2 diabetes mellitus (Multi) 2/19/2024    Weakness 2/19/2024    Depression 2/19/2024    Schizoaffective disorder (Multi) 2/19/2024    Urinary frequency 2/20/2024    Left supracondylar humerus fracture, closed, initial encounter 2/8/2024  Family/Caregiver Present: No  General Comment:  said bed level OT was fine as Pt. awaiting for thorasic  brace before bed mobility, functional mobility, transfers are allowed. Worked on bed level grooming.   Precautions:  Medical Precautions: Fall precautions, Spinal precautions  Precautions Comment: Waiting for thorasic brace before bed mobility, functional mobility, and transfers can be done  Vital Signs:  Heart Rate: 108  Patient Position: Lying  Pain:  Pain Assessment  Pain Assessment: 0-10  0-10 (Numeric) Pain Score: 10 - Worst possible pain  Pain Type: Acute pain  Pain Location: Back  Pain Interventions: Medication (See MAR)  Lines/Tubes/Drains:  External Urinary Catheter Female (Active)   Number of days: 0         Objective   Cognition:  Overall Cognitive Status: Impaired  Arousal/Alertness: Appropriate responses to stimuli  Orientation Level: Disoriented to place, Disoriented to time, Disoriented to situation  Following Commands: Follows one step commands with increased time  Safety Judgment: Unable to assess  Problem Solving: Assistance required to identify errors made  Attention: Exceptions to WFL  Alternating Attention: Impaired  Sustained Attention: Impaired  Processing Speed: Delayed  Cognition Test Scores  Cognition Tests: Cognition Test Performed  SBT Test Score: Pt. scored a 24 on the SBT \"impaired consistent with dementia. \"   4AT  Alertness    Normal (fully alert, but not agitated, throughout assessment) 0          Mild sleepiness for <10 seconds after waking, " then normal 0  Clearly abnormal 4  Score: 0    AMT4  Age, date of birth, place (name of the hospital or building), current year  No mistakes 0  1 mistake 1  2 or more mistakes/untestable 2  Score:4    Attention  Months of the year backwards Achieves   7 months or more correctly 0  Starts but scores <7 months / refuses to start 1  Untestable (cannot start because unwell, drowsy, inattentive) 2  Score: 1    Acute change or fluctuating course  Evidence of significant change or fluctuation in: alertness, cognition, other mental function  (eg. paranoia, hallucinations) arising over the last 2 weeks and still evident in last 24hrs  No 0  Yes 4  Score: 4    Total score: 9  4 or above: possible delirium +/- cognitive impairment  1-3: possible cognitive impairment  0: delirium or severe cognitive impairment unlikely (but  delirium still possible if [4] information incomplete)      Home Living:  Type of Home: Assisted living  Home Adaptive Equipment: Walker rolling or standard, Wheelchair-manual  Home Layout: One level  Home Living Comments: Pt. reported living with daughter, but changed mid way through and reported living at assistive living.   Prior Function:  Level of [place]: Needs assistance with ADLs, Needs assistance with homemaking, Needs assistance with functional transfers  Vocational: Retired  Leisure: visit with friends at [employer], painting, ceramics, finishing crafts and giving them away as gifts.  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  IADL Comments: Pt. reported having help with all IADLs.  ADL:  Eating Assistance: Minimal  Grooming Assistance: Minimal  Bathing Assistance: Moderate  UE Dressing Assistance: Moderate  LE Dressing Assistance: Moderate  Toileting Assistance with Device: Not performed  Functional Assistance:  Not performed  ADL Comments: Pt. awaiting brace before bed mobility, functional mobility, transfers are allowed. Worked on bed level grooming.  Activity Tolerance:  Endurance: Other (Comment)  Activity Tolerance Comments: Pt limited to bed level activity only, Pt. awaiting brace before bed mobility, functional mobility, transfers are allowed.    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  IADL Comments: Pt. reported having help with all IADLs.        Hand Function:  Hand Function  Gross Grasp: Functional    Outcome Measures: [facility] Daily Activity  Putting on and taking off regular lower body clothing: Total  Bathing (including washing, rinsing, drying): Total  Putting on and taking off regular upper body clothing: Total  Toileting, which includes using toilet, bedpan or urinal: Total  Taking care of personal grooming such as brushing teeth: Total  Eating Meals: Total  Daily Activity - Total Score: 6         ,     OT Adult Other Outcome Measures  4AT: 9  Short Blessed Test (SBT): 24    Education Documentation  No documentation found.  Education Comments  No comments found.        Goals:   Encounter Problems       Encounter Problems (Active)       ADLs       Patient will perform UB and LB bathing with moderate assist level of assistance and shower chair.       Start:  08/26/24    Expected End:  09/09/24            Patient with complete upper body dressing with moderate assist level of assistance donning and doffing all UE clothes with  supported sitting       Start:  08/26/24    Expected End:  09/09/24            Patient with complete lower body dressing with moderate assist level of assistance donning and doffing all LE clothes  with  while supported sitting       Start:  08/26/24    Expected End:  09/09/24            Patient will feed self with stand  by assist level of assistance.       Start:  08/26/24    Expected End:  09/09/24            Patient will complete daily grooming tasks brushing teeth with stand by assist level of assistance and PRN adaptive equipment while supported sitting.       Start:  08/26/24    Expected End:  09/09/24            Patient will complete toileting including hygiene clothing management/hygiene with moderate assist level of assistance.       Start:  08/26/24    Expected End:  09/09/24               BALANCE       Pt will maintain dynamic standing balance during ADL task with moderate assist level of assistance in order to demonstrate decreased risk of falling and improved postural control.       Start:  08/26/24    Expected End:  09/09/24            Patientt will maintain static standing balance during ADL task with moderate assist level of assistance drop down in order to demonstrate decreased risk of falling and improved postural control.       Start:  08/26/24    Expected End:  09/09/24               COGNITION/SAFETY       Patient will recall and adhere to spinal precautions during all functional mobility/ADL tasks in order to demonstrate improved understanding and promote healing post op       Start:  08/26/24    Expected End:  09/09/24            Patient will follow Two step commands to allow improved ADL performance.       Start:  08/26/24    Expected End:  09/09/24            Patient will demonstrated orientation x 3 with verbal cues.       Start:  08/26/24    Expected End:  09/09/24       ORIENTATION            MOBILITY       Patient will perform Functional mobility around Household distances/Community Distances with moderate assist level of assistance and front wheeled walker in order to improve safety and functional mobility.       Start:  08/26/24    Expected End:  09/09/24               TRANSFERS       Patient will perform bed mobility moderate assist level of assistance and bed rails and log rolling in order to improve  safety and independence with mobility       Start:  08/26/24    Expected End:  09/09/24            Patient will complete functional transfer with front wheeled walker with moderate assist level of assistance.       Start:  08/26/24    Expected End:  09/09/24            Patient will complete sit to stand transfer with moderate assist level of assistance and front wheeled walker in order to improve safety and prepare for out of bed mobility.       Start:  08/26/24    Expected End:  09/09/24                   Treatment Completed on Evaluation     Activities of Daily Living:    Grooming  Grooming Level of Assistance: Setup  Grooming Where Assessed: Bed level  Grooming Comments: brush hair, wash face.                 OT Student under direct supervision by LAURO/L   08/26/24 at 7:12 PM   ALFREDO AGUILAR   Rehab Office: [phone]

## 2024-08-26 NOTE — PROGRESS NOTES
Physical Therapy                 Therapy Communication Note    Patient Name: Jennifer Mancini  MRN: [id number]  Today's Date: 8/26/2024     Discipline: Physical Therapy    Missed Visit Reason: Missed Visit Reason:  (Pt being fitted by orthotist for brace- he states brace will need to be customized and not ready for pt today; will hold PT until brace available.)    Missed Time: Attempt 1435        08/26/24 at 3:51 PM - Kezia Mcdonald, PT

## 2024-08-26 NOTE — PROGRESS NOTES
Physical Therapy                 Therapy Communication Note    Patient Name: Jennifer Mancini  MRN: [id number]  Today's Date: 8/26/2024     Discipline: Physical Therapy    Missed Visit Reason: Missed Visit Reason:  (Pt awaiting TLSO brace for OOB mobility.  Will reattempt as schedule permits.)    Missed Time: Attempt 0905 08/26/24 at 9:09 AM - Kezia Mcdonald, PT

## 2024-08-26 NOTE — PROGRESS NOTES
Patient still awaiting PT/OT evaluations. TLSO brace needed. SW will continue to follow to assist with the discharge plan.       DESIREE Long

## 2024-08-27 ENCOUNTER — APPOINTMENT (OUTPATIENT)
Dept: RADIOLOGY | Facility: HOSPITAL | Age: 84
End: 2024-08-27
Payer: COMMERCIAL

## 2024-08-27 LAB
ALBUMIN SERPL BCP-MCNC: 2.9 G/DL (ref 3.4–5)
ANION GAP SERPL CALC-SCNC: 13 MMOL/L (ref 10–20)
BUN SERPL-MCNC: 8 MG/DL (ref 6–23)
CALCIUM SERPL-MCNC: 8.3 MG/DL (ref 8.6–10.6)
CHLORIDE SERPL-SCNC: 97 MMOL/L (ref 98–107)
CO2 SERPL-SCNC: 23 MMOL/L (ref 21–32)
CREAT SERPL-MCNC: 0.23 MG/DL (ref 0.5–1.05)
EGFRCR SERPLBLD CKD-EPI 2021: >90 ML/MIN/1.73M*2
ERYTHROCYTE [DISTWIDTH] IN BLOOD BY AUTOMATED COUNT: 14.2 % (ref 11.5–14.5)
GLUCOSE BLD MANUAL STRIP-MCNC: 170 MG/DL (ref 74–99)
GLUCOSE BLD MANUAL STRIP-MCNC: 197 MG/DL (ref 74–99)
GLUCOSE BLD MANUAL STRIP-MCNC: 208 MG/DL (ref 74–99)
GLUCOSE SERPL-MCNC: 175 MG/DL (ref 74–99)
HCT VFR BLD AUTO: 30.5 % (ref 36–46)
HGB BLD-MCNC: 10.2 G/DL (ref 12–16)
MAGNESIUM SERPL-MCNC: 1.33 MG/DL (ref 1.6–2.4)
MCH RBC QN AUTO: 29.8 PG (ref 26–34)
MCHC RBC AUTO-ENTMCNC: 33.4 G/DL (ref 32–36)
MCV RBC AUTO: 89 FL (ref 80–100)
NRBC BLD-RTO: 0 /100 WBCS (ref 0–0)
PHOSPHATE SERPL-MCNC: 2.8 MG/DL (ref 2.5–4.9)
PLATELET # BLD AUTO: 297 X10*3/UL (ref 150–450)
POTASSIUM SERPL-SCNC: 3.6 MMOL/L (ref 3.5–5.3)
RBC # BLD AUTO: 3.42 X10*6/UL (ref 4–5.2)
SODIUM SERPL-SCNC: 129 MMOL/L (ref 136–145)
WBC # BLD AUTO: 9 X10*3/UL (ref 4.4–11.3)

## 2024-08-27 PROCEDURE — 2500000002 HC RX 250 W HCPCS SELF ADMINISTERED DRUGS (ALT 637 FOR MEDICARE OP, ALT 636 FOR OP/ED): Performed by: SURGERY

## 2024-08-27 PROCEDURE — 85027 COMPLETE CBC AUTOMATED: CPT

## 2024-08-27 PROCEDURE — 82947 ASSAY GLUCOSE BLOOD QUANT: CPT

## 2024-08-27 PROCEDURE — 83735 ASSAY OF MAGNESIUM: CPT

## 2024-08-27 PROCEDURE — 2500000004 HC RX 250 GENERAL PHARMACY W/ HCPCS (ALT 636 FOR OP/ED)

## 2024-08-27 PROCEDURE — 51701 INSERT BLADDER CATHETER: CPT

## 2024-08-27 PROCEDURE — 84100 ASSAY OF PHOSPHORUS: CPT

## 2024-08-27 PROCEDURE — 36415 COLL VENOUS BLD VENIPUNCTURE: CPT

## 2024-08-27 PROCEDURE — 2500000001 HC RX 250 WO HCPCS SELF ADMINISTERED DRUGS (ALT 637 FOR MEDICARE OP)

## 2024-08-27 PROCEDURE — 72080 X-RAY EXAM THORACOLMB 2/> VW: CPT

## 2024-08-27 PROCEDURE — 1200000002 HC GENERAL ROOM WITH TELEMETRY DAILY

## 2024-08-27 PROCEDURE — 97161 PT EVAL LOW COMPLEX 20 MIN: CPT | Mod: GP

## 2024-08-27 PROCEDURE — 2500000002 HC RX 250 W HCPCS SELF ADMINISTERED DRUGS (ALT 637 FOR MEDICARE OP, ALT 636 FOR OP/ED)

## 2024-08-27 RX ORDER — LABETALOL HYDROCHLORIDE 5 MG/ML
10 INJECTION, SOLUTION INTRAVENOUS ONCE AS NEEDED
Status: COMPLETED | OUTPATIENT
Start: 2024-08-27 | End: 2024-08-28

## 2024-08-27 RX ADMIN — ENOXAPARIN SODIUM 30 MG: 100 INJECTION SUBCUTANEOUS at 08:49

## 2024-08-27 RX ADMIN — ACETAMINOPHEN 975 MG: 325 TABLET ORAL at 13:05

## 2024-08-27 RX ADMIN — INSULIN HUMAN 4 UNITS: 100 INJECTION, SOLUTION PARENTERAL at 12:56

## 2024-08-27 RX ADMIN — VENLAFAXINE HYDROCHLORIDE 37.5 MG: 37.5 CAPSULE, EXTENDED RELEASE ORAL at 08:49

## 2024-08-27 RX ADMIN — ACETAMINOPHEN 975 MG: 325 TABLET ORAL at 00:15

## 2024-08-27 RX ADMIN — METOPROLOL SUCCINATE 25 MG: 25 TABLET, EXTENDED RELEASE ORAL at 21:26

## 2024-08-27 RX ADMIN — ENOXAPARIN SODIUM 30 MG: 100 INJECTION SUBCUTANEOUS at 21:25

## 2024-08-27 RX ADMIN — SODIUM CHLORIDE, POTASSIUM CHLORIDE, SODIUM LACTATE AND CALCIUM CHLORIDE 75 ML/HR: 600; 310; 30; 20 INJECTION, SOLUTION INTRAVENOUS at 11:25

## 2024-08-27 RX ADMIN — INSULIN HUMAN 2 UNITS: 100 INJECTION, SOLUTION PARENTERAL at 08:59

## 2024-08-27 RX ADMIN — ATORVASTATIN CALCIUM 40 MG: 40 TABLET, FILM COATED ORAL at 21:25

## 2024-08-27 RX ADMIN — ACETAMINOPHEN 975 MG: 325 TABLET ORAL at 06:28

## 2024-08-27 RX ADMIN — OXYCODONE HYDROCHLORIDE 5 MG: 5 TABLET ORAL at 17:33

## 2024-08-27 RX ADMIN — POLYETHYLENE GLYCOL 3350 17 G: 17 POWDER, FOR SOLUTION ORAL at 08:49

## 2024-08-27 RX ADMIN — LATANOPROST 1 DROP: 50 SOLUTION/ DROPS OPHTHALMIC at 21:25

## 2024-08-27 RX ADMIN — HALOPERIDOL 0.5 MG: 0.5 TABLET ORAL at 21:25

## 2024-08-27 RX ADMIN — INSULIN HUMAN 2 UNITS: 100 INJECTION, SOLUTION PARENTERAL at 21:24

## 2024-08-27 RX ADMIN — OXYCODONE HYDROCHLORIDE 5 MG: 5 TABLET ORAL at 08:57

## 2024-08-27 RX ADMIN — METOPROLOL SUCCINATE 25 MG: 25 TABLET, EXTENDED RELEASE ORAL at 09:01

## 2024-08-27 ASSESSMENT — COGNITIVE AND FUNCTIONAL STATUS - GENERAL
PERSONAL GROOMING: TOTAL
MOVING FROM LYING ON BACK TO SITTING ON SIDE OF FLAT BED WITH BEDRAILS: A LOT
STANDING UP FROM CHAIR USING ARMS: A LOT
TOILETING: TOTAL
HELP NEEDED FOR BATHING: TOTAL
MOVING FROM LYING ON BACK TO SITTING ON SIDE OF FLAT BED WITH BEDRAILS: A LOT
MOVING FROM LYING ON BACK TO SITTING ON SIDE OF FLAT BED WITH BEDRAILS: TOTAL
DRESSING REGULAR UPPER BODY CLOTHING: TOTAL
MOBILITY SCORE: 6
EATING MEALS: TOTAL
TURNING FROM BACK TO SIDE WHILE IN FLAT BAD: A LOT
CLIMB 3 TO 5 STEPS WITH RAILING: TOTAL
TOILETING: TOTAL
WALKING IN HOSPITAL ROOM: TOTAL
CLIMB 3 TO 5 STEPS WITH RAILING: TOTAL
DAILY ACTIVITIY SCORE: 6
DAILY ACTIVITIY SCORE: 6
HELP NEEDED FOR BATHING: TOTAL
DRESSING REGULAR UPPER BODY CLOTHING: TOTAL
STANDING UP FROM CHAIR USING ARMS: A LOT
DRESSING REGULAR LOWER BODY CLOTHING: TOTAL
MOVING TO AND FROM BED TO CHAIR: TOTAL
PERSONAL GROOMING: TOTAL
EATING MEALS: TOTAL
MOBILITY SCORE: 9
DRESSING REGULAR LOWER BODY CLOTHING: TOTAL
MOBILITY SCORE: 9
MOVING TO AND FROM BED TO CHAIR: TOTAL
CLIMB 3 TO 5 STEPS WITH RAILING: TOTAL
TURNING FROM BACK TO SIDE WHILE IN FLAT BAD: TOTAL
MOVING TO AND FROM BED TO CHAIR: TOTAL
TURNING FROM BACK TO SIDE WHILE IN FLAT BAD: A LOT
WALKING IN HOSPITAL ROOM: TOTAL
WALKING IN HOSPITAL ROOM: TOTAL
STANDING UP FROM CHAIR USING ARMS: TOTAL

## 2024-08-27 ASSESSMENT — PAIN SCALES - GENERAL
PAINLEVEL_OUTOF10: 9
PAINLEVEL_OUTOF10: 0 - NO PAIN
PAINLEVEL_OUTOF10: 0 - NO PAIN
PAINLEVEL_OUTOF10: 8
PAINLEVEL_OUTOF10: 8
PAINLEVEL_OUTOF10: 6
PAINLEVEL_OUTOF10: 4
PAINLEVEL_OUTOF10: 3

## 2024-08-27 ASSESSMENT — PAIN - FUNCTIONAL ASSESSMENT
PAIN_FUNCTIONAL_ASSESSMENT: 0-10

## 2024-08-27 ASSESSMENT — ACTIVITIES OF DAILY LIVING (ADL)
ADL_ASSISTANCE: NEEDS ASSISTANCE
LACK_OF_TRANSPORTATION: NO

## 2024-08-27 ASSESSMENT — PAIN SCALES - WONG BAKER
WONGBAKER_NUMERICALRESPONSE: HURTS LITTLE MORE
WONGBAKER_NUMERICALRESPONSE: HURTS WHOLE LOT

## 2024-08-27 NOTE — PROGRESS NOTES
[facility]  TRAUMA SERVICE - PROGRESS NOTE    Patient Name: Jennifer Mancini  MRN: [id number]  Admit Date: 824  : 1940  AGE: 84 y.o.   GENDER: female  ==============================================================================  MECHANISM OF INJURY / CHIEF COMPLAINT:   Patient is a 84 F s/p glf from standing at her assisted living facility. Patient taken to OSH where they found a T8 vertebral body fracture, transferred here for further evaluation. EMS told nursing patient had a fall last week as well.   LOC (yes/no?): Unknown  Anticoagulant / Anti-platelet Rx? (for what dx?): Denies  Referring Facility Name (N/A for scene EMR run): [facility]      INJURIES:   T8 vertebral body fracture  Subacute right-sided rib fractures      OTHER MEDICAL PROBLEMS:  CHF with reserved EF  HTN  Schizoaffective disorder  HLD  DM2      INCIDENTAL FINDINGS:  Large hiatal hernia     PROCEDURES:  None    ==============================================================================  TODAY'S ASSESSMENT AND PLAN OF CARE:  Having issues with urinary retention. Otherwise clinically the same as yesterday.     #T8 vertebral body fx  -MRI reviewed by nsgy, recommend TLSO hard brace (script in chart, orthotics needs called )  - TSLO brace obtained today  - XR Upright with brace on for baseline ordered  -cannot get out of bed without brace   -PT/OT: moderate intensity PT with low intensity OT    #fen/gi/gu  -reg diet  -external goel in place; TOV at 5PM with TSLO brace at bedside commode    #dvt ppx  -lvx  -scds    Dispo: continue RNF    Pt discussed with Dr. Janak Blue MD  Trauma Surgery  ==============================================================================  CHIEF COMPLAINT / OVERNIGHT EVENTS:   Goel removed yesterday around 5-6PM. Had not voided, straight cath'd at 6AM for 1L of urine. Bladder scanned for 900ml at noon, straight cath'd again.     MEDICAL HISTORY /  ROS:  Admission history and ROS reviewed. Pertinent changes as follows:      PHYSICAL EXAM:  Heart Rate:  []   Temp:  [36.3 °C (97.3 °F)]   Resp:  [18]   BP: (137-183)/()   SpO2:  [94 %-99 %]     Physical Exam  HENT:      Head: Normocephalic.      Right Ear: External ear normal.      Left Ear: External ear normal.      Nose: Nose normal.      Mouth/Throat:      Mouth: Mucous membranes are dry.      Pharynx: Oropharynx is clear.   Eyes:      Pupils: Pupils are equal, round, and reactive to light.   Cardiovascular:      Rate and Rhythm: Normal rate.      Pulses: Normal pulses.   Pulmonary:      Effort: Pulmonary effort is normal.   Abdominal:      General: Abdomen is flat.      Palpations: Abdomen is soft.   Musculoskeletal:         General: Normal range of motion.      Cervical back: Normal range of motion.   Skin:     General: Skin is warm.   Neurological:      General: No focal deficit present.      Mental Status: She is alert and oriented to person, place, and time.         IMAGING SUMMARY:  (summary of new imaging findings, not a copy of dictation)  MRI T spine: Redemonstration of a fracture through the superior endplate of T8 with resulting disruption of the anterior longitudinal ligament at  the level of T7-T8. There is no striking retropulsion into the spinal  canal and there is no spinal canal stenosis. Evaluation for fractures  involving the posterior elements is somewhat degraded, noting this,  the fracture line extends into the junction between the left T8  vertebral body and pedicle, also seen on the prior CT. Chronic mild compression fractures of the T12 and L1 vertebral bodies.        LABS:  Results from last 7 days   Lab Units 08/27/24  0910 08/26/24  0729 08/25/24  1058 08/24/24  1223 08/23/24  2207   WBC AUTO x10*3/uL 9.0 9.3 8.5   < > 14.8*   HEMOGLOBIN g/dL 10.2* 9.9* 10.7*   < > 11.1*   HEMATOCRIT % 30.5* 31.6* 34.6*   < > 33.3*   PLATELETS AUTO x10*3/uL 297 262 271   < > 336   NEUTROS  PCT AUTO %  --   --   --   --  86.9   LYMPHS PCT AUTO %  --   --   --   --  5.8   MONOS PCT AUTO %  --   --   --   --  5.6   EOS PCT AUTO %  --   --   --   --  0.5    < > = values in this interval not displayed.         Results from last 7 days   Lab Units 08/27/24  0910 08/26/24  0729 08/25/24  1057 08/24/24  1814   SODIUM mmol/L 129* 131* 134* 134*   POTASSIUM mmol/L 3.6 3.6 3.2* 3.4*   CHLORIDE mmol/L 97* 100 99 98   CO2 mmol/L 23 21 27 27   BUN mg/dL 8 11 8 7   CREATININE mg/dL 0.23* 0.35* 0.37* 0.40*   CALCIUM mg/dL 8.3* 8.3* 8.5* 8.5*   PROTEIN TOTAL g/dL  --   --   --  5.8*   BILIRUBIN TOTAL mg/dL  --   --   --  0.5   ALK PHOS U/L  --   --   --  82   ALT U/L  --   --   --  11   AST U/L  --   --   --  11   GLUCOSE mg/dL 175* 204* 196* 196*     Results from last 7 days   Lab Units 08/24/24  1814   BILIRUBIN TOTAL mg/dL 0.5           I have reviewed all medications, laboratory results, and imaging pertinent for today's encounter.

## 2024-08-27 NOTE — PROGRESS NOTES
08/27/24 1346   Discharge Planning   Living Arrangements Other (Comment)  ([facility])   Support Systems Other (Comment);Children  (AL Staff)   Assistance Needed Patient requires assistance with ADLs   Type of Residence Assisted living   Do you have animals or pets at home? No   Care Facility Name [facility]   Who is requesting discharge planning? Provider   Home or Post Acute Services Post acute facilities (Rehab/SNF/etc)   Type of Post Acute Facility Services Skilled nursing   Expected Discharge Disposition SNF   Does the patient need discharge transport arranged? Yes   RoundTrip coordination needed? Yes   Has discharge transport been arranged? No   Financial Resource Strain   How hard is it for you to pay for the very basics like food, housing, medical care, and heating? Not hard   Housing Stability   In the last 12 months, was there a time when you were not able to pay the mortgage or rent on time? N   In the past 12 months, how many times have you moved where you were living? 0   At any time in the past 12 months, were you homeless or living in a shelter (including now)? N   Transportation Needs   In the past 12 months, has lack of transportation kept you from medical appointments or from getting medications? no   In the past 12 months, has lack of transportation kept you from meetings, work, or from getting things needed for daily living? No       Transitional Care Coordinator Note: TCC spoke with patient's daughter (Ivette Tan) introduced self and role to complete assessment (see above) and discuss discharge planning. Ivette confirmed demographics:    Address:  [address] [place] [id number]  (billing)   [address] [place], [place] [address] facility  Alternate/Emergency Contact: Ivette Tan (daughter) [phone]  Patient Contact: [phone]   DME:  Wheelchair   Homecare: Denies active HC   Falls: 7 Falls   PCP: AL doctor, daughter unsure of name- last  visit July 2024   Pharmacy: [facility]   Insurance: [employer]     Patient [facility] at [facility]. Patient requires assistance with ADLs prior to admission. Per patient's daughter assistance provided by AL staff. Denies use and/or needs for oxygen, dialysis and/or diabetic care. Patient discussed in morning rounds, per medical team (trauma) patient is not medically ready. Discharge dispo: Patient evaluated by therapy team rec moderate intensity. TCC informed and educated patient's daughter on therapy recs. Ivette expressed understanding and agreeable to discharge plan, states patient has been to SNF in past. Ivette provided choices;     1) [facility]  2) [facility]   3) [facility]   4) [facility]     Ivette stated she does not want patient to go to [facility] due to previous experience.   team updated.     Ruby Squires RN BSN   Transitional Care Coordinator

## 2024-08-27 NOTE — PROGRESS NOTES
Physical Therapy                 Therapy Communication Note    Patient Name: Jennifer Mancini  MRN: [id number]  Today's Date: 8/27/2024     Discipline: Physical Therapy    Missed Visit Reason: Missed Visit Reason:  (pt still does not have TLSO. will see pt once has TLSO.)    Missed Time: Attempt    Comment:

## 2024-08-27 NOTE — CARE PLAN
The patient's goals for the shift include      The clinical goals for the shift include Patient will have SBP <160

## 2024-08-27 NOTE — PROGRESS NOTES
Physical Therapy    Physical Therapy Evaluation    Patient Name: Jennifer Mancini  MRN: [id number]  Today's Date: 8/27/2024   Time Calculation  Start Time: 1207  Stop Time: 1229  Time Calculation (min): 22 min    Assessment/Plan   PT Assessment  PT Assessment Results: Decreased strength, Decreased endurance, Impaired balance, Decreased mobility, Impaired judgement, Decreased safety awareness, Pain  Rehab Prognosis: Good  Evaluation/Treatment Tolerance: Patient tolerated treatment well  Medical Staff Made Aware: Yes  End of Session Communication: Bedside nurse  Assessment Comment: pt admitted after GLF. pt now has TLSO brace and required modAx2 for mobility. pt benefits from continued PT to address deficits in strength, balance and mobility.  End of Session Patient Position: Bed, 3 rail up, Alarm on  IP OR SWING BED PT PLAN  Inpatient or Swing Bed: Inpatient  PT Plan  Treatment/Interventions: Bed mobility, Transfer training, Gait training, Balance training, Stair training, Strengthening, Endurance training, Range of motion, Therapeutic exercise, Therapeutic activity, Home exercise program, Positioning  PT Plan: Ongoing PT  PT Frequency: 3 times per week  PT Discharge Recommendations: Moderate intensity level of continued care  PT Recommended Transfer Status: Assist x2  PT - OK to Discharge: Yes      Subjective   General Visit Information:  General  Reason for Referral: glf from standing at her assisted living facility. Patient taken to OSH where they found a T8 vertebral body fracture, transferred here for further evaluation  Past Medical History Relevant to Rehab: Anemia 2/9/2024    HTN (hypertension) 2/9/2024    CAD (coronary artery disease) 2/9/2024    MI (myocardial infarction) (Multi) 2/9/2024    Murmur 2/9/2024    Aortic stenosis 2/9/2024. Closed stable burst fracture of third lumbar vertebra with routine healing 2/19/2024, Type 2 diabetes mellitus (Multi) 2/19/2024    Weakness 2/19/2024    Depression 2/19/2024     Schizoaffective disorder (Multi) 2/19/2024    Urinary frequency 2/20/2024    Left supracondylar humerus fracture, closed, initial encounter 2/8/2024  Missed Visit: Yes  Missed Visit Reason:  (pt still does not have TLSO. will see pt once has TLSO.)  Family/Caregiver Present: No  Prior to Session Communication: Bedside nurse  Patient Position Received: Bed, 3 rail up, Alarm on  General Comment: pt supine, alert and agreeable for PT. TLSO donned. noted increased pain with mobility. ? historian  Home Living:  Home Living  Type of Home: Assisted living  Home Adaptive Equipment: Walker rolling or standard, Wheelchair-manual  Home Layout: One level  Home Access: No concerns  Bathroom Shower/Tub: Walk-in shower  Prior Level of Function:  Prior Function Per Pt/Caregiver Report  Level of [place]: Needs assistance with ADLs, Needs assistance with homemaking, Needs assistance with functional transfers  Receives Help From:  (caregivers at [facility])  ADL Assistance: Needs assistance  Ambulatory Assistance:  (pt states using WC vs FWW recently)  Prior Function Comments: multiple falls in past 6 months  Precautions:  Precautions  Medical Precautions: Fall precautions, Spinal precautions  Braces Applied: TLSO  Vital Signs:  Vital Signs  Heart Rate: 98  Heart Rate Source: Monitor    Objective   Pain:  Pain Assessment  Pain Assessment: 0-10  0-10 (Numeric) Pain Score:  (c/o increased low back pain in sitting)  Cognition:  Cognition  Orientation Level: Disoriented to place (pt stated shes at [facility])    General Assessments:     Activity Tolerance  Endurance: Tolerates 10 - 20 min exercise with multiple rests    Sensation  Light Touch: No apparent deficits      Perception  Inattention/Neglect: Appears intact      Coordination  Movements are Fluid and Coordinated: Yes    Postural Control  Postural Control: Impaired  Trunk Control: mild posterior lean    Static Sitting Balance  Static Sitting-Balance Support: Bilateral upper  extremity supported, Feet supported  Static Sitting-Level of Assistance: Minimum assistance  Dynamic Sitting Balance  Dynamic Sitting-Balance Support: Bilateral upper extremity supported, Feet supported  Dynamic Sitting-Level of Assistance: Minimum assistance  Dynamic Sitting-Balance: Trunk control activities    Static Standing Balance  Static Standing-Balance Support: Bilateral upper extremity supported  Static Standing-Level of Assistance: Moderate assistance (x2)  Dynamic Standing Balance  Dynamic Standing-Level of Assistance:  (pt declined)  Functional Assessments:  Bed Mobility  Bed Mobility: Yes  Bed Mobility 1  Bed Mobility 1: Supine to sitting  Level of Assistance 1: Moderate assistance, Moderate verbal cues, +2  Bed Mobility Comments 1: HOB elevated; log roll  Bed Mobility 2  Bed Mobility  2: Sitting to supine  Level of Assistance 2: Maximum assistance, +2  Bed Mobility Comments 2: HOB elevated; log roll  Bed Mobility 3  Bed Mobility 3: Scooting  Level of Assistance 3: Dependent, +2  Bed Mobility Comments 3: boost towards HOB    Transfers  Transfer: Yes  Transfer 1  Transfer From 1: Bed to  Transfer to 1: Stand, Sit  Technique 1: Sit to stand, Stand to sit  Transfer Device 1:  (B arm and arm)  Transfer Level of Assistance 1: Moderate assistance, +2, Moderate verbal cues  Trials/Comments 1: x1; VC for hand placement    Ambulation/Gait Training  Ambulation/Gait Training Performed: No (pt declined despite encouragment)    Stairs  Stairs: No  Extremity/Trunk Assessments:  RLE   RLE : Within Functional Limits  LLE   LLE : Within Functional Limits  Outcome Measures:  [facility] Basic Mobility  Turning from your back to your side while in a flat bed without using bedrails: A lot  Moving from lying on your back to sitting on the side of a flat bed without using bedrails: A lot  Moving to and from bed to chair (including a wheelchair): Total  Standing up from a chair using your arms (e.g. wheelchair or bedside chair): A  lot  To walk in hospital room: Total  Climbing 3-5 steps with railing: Total  Basic Mobility - Total Score: 9    Encounter Problems       Encounter Problems (Active)       Balance       STG - Maintains static sitting balance with upper extremity support >/= 10 min with SBA (Progressing)       Start:  08/27/24    Expected End:  09/10/24               Mobility       STG - Patient will ambulate >/= 100ft with minAx1 and LRAD (Progressing)       Start:  08/27/24    Expected End:  09/10/24               PT Transfers       STG - Transfer from bed to chair minAx1 and LRAD (Progressing)       Start:  08/27/24    Expected End:  09/10/24            STG - Patient will perform bed mobility Bart (Progressing)       Start:  08/27/24    Expected End:  09/10/24            STG - Patient will transfer sit to and from stand minAx1 and LRAD (Progressing)       Start:  08/27/24    Expected End:  09/10/24                     Education Documentation  Body Mechanics, taught by Lucinda Michael PT at 8/27/2024 12:55 PM.  Learner: Patient  Readiness: Acceptance  Method: Explanation  Response: Needs Reinforcement    Mobility Training, taught by Lucinda Michael PT at 8/27/2024 12:55 PM.  Learner: Patient  Readiness: Acceptance  Method: Explanation  Response: Needs Reinforcement    Precautions, taught by Lucinda Michael PT at 8/27/2024 12:55 PM.  Learner: Patient  Readiness: Acceptance  Method: Explanation  Response: Needs Reinforcement    Education Comments  No comments found.          08/27/24 at 12:56 PM - Lucinda Michael PT

## 2024-08-27 NOTE — PROGRESS NOTES
Jennifer Mancini is a 84 y.o. female on day 3 of admission presenting with Closed fracture of eighth thoracic vertebra, unspecified fracture morphology, initial encounter (Multi).    SW built and sent SNF referrals.  1) [facility]  2) [facility]   3) [facility]   4) [facility]    SW will continue to follow for assistance with discharge planning needs.

## 2024-08-28 LAB
ALBUMIN SERPL BCP-MCNC: 3 G/DL (ref 3.4–5)
ANION GAP SERPL CALC-SCNC: 11 MMOL/L (ref 10–20)
BUN SERPL-MCNC: 11 MG/DL (ref 6–23)
CALCIUM SERPL-MCNC: 8.4 MG/DL (ref 8.6–10.6)
CHLORIDE SERPL-SCNC: 92 MMOL/L (ref 98–107)
CO2 SERPL-SCNC: 25 MMOL/L (ref 21–32)
CREAT SERPL-MCNC: 0.3 MG/DL (ref 0.5–1.05)
EGFRCR SERPLBLD CKD-EPI 2021: >90 ML/MIN/1.73M*2
GLUCOSE BLD MANUAL STRIP-MCNC: 156 MG/DL (ref 74–99)
GLUCOSE BLD MANUAL STRIP-MCNC: 189 MG/DL (ref 74–99)
GLUCOSE BLD MANUAL STRIP-MCNC: 189 MG/DL (ref 74–99)
GLUCOSE BLD MANUAL STRIP-MCNC: 277 MG/DL (ref 74–99)
GLUCOSE SERPL-MCNC: 186 MG/DL (ref 74–99)
PHOSPHATE SERPL-MCNC: 2.1 MG/DL (ref 2.5–4.9)
POTASSIUM SERPL-SCNC: 3.3 MMOL/L (ref 3.5–5.3)
SODIUM SERPL-SCNC: 125 MMOL/L (ref 136–145)

## 2024-08-28 PROCEDURE — 2500000004 HC RX 250 GENERAL PHARMACY W/ HCPCS (ALT 636 FOR OP/ED)

## 2024-08-28 PROCEDURE — 1200000002 HC GENERAL ROOM WITH TELEMETRY DAILY

## 2024-08-28 PROCEDURE — 80069 RENAL FUNCTION PANEL: CPT

## 2024-08-28 PROCEDURE — 2500000001 HC RX 250 WO HCPCS SELF ADMINISTERED DRUGS (ALT 637 FOR MEDICARE OP)

## 2024-08-28 PROCEDURE — 99231 SBSQ HOSP IP/OBS SF/LOW 25: CPT | Performed by: SURGERY

## 2024-08-28 PROCEDURE — 2500000002 HC RX 250 W HCPCS SELF ADMINISTERED DRUGS (ALT 637 FOR MEDICARE OP, ALT 636 FOR OP/ED): Performed by: SURGERY

## 2024-08-28 PROCEDURE — 36415 COLL VENOUS BLD VENIPUNCTURE: CPT

## 2024-08-28 PROCEDURE — 82947 ASSAY GLUCOSE BLOOD QUANT: CPT

## 2024-08-28 RX ORDER — LABETALOL HYDROCHLORIDE 5 MG/ML
10 INJECTION, SOLUTION INTRAVENOUS ONCE AS NEEDED
Status: COMPLETED | OUTPATIENT
Start: 2024-08-28 | End: 2024-08-28

## 2024-08-28 RX ADMIN — LABETALOL HYDROCHLORIDE 10 MG: 5 INJECTION, SOLUTION INTRAVENOUS at 22:16

## 2024-08-28 RX ADMIN — LABETALOL HYDROCHLORIDE 10 MG: 5 INJECTION, SOLUTION INTRAVENOUS at 00:03

## 2024-08-28 RX ADMIN — METOPROLOL SUCCINATE 25 MG: 25 TABLET, EXTENDED RELEASE ORAL at 08:20

## 2024-08-28 RX ADMIN — VENLAFAXINE HYDROCHLORIDE 37.5 MG: 37.5 CAPSULE, EXTENDED RELEASE ORAL at 08:21

## 2024-08-28 RX ADMIN — ENOXAPARIN SODIUM 30 MG: 100 INJECTION SUBCUTANEOUS at 21:48

## 2024-08-28 RX ADMIN — ATORVASTATIN CALCIUM 40 MG: 40 TABLET, FILM COATED ORAL at 21:49

## 2024-08-28 RX ADMIN — ACETAMINOPHEN 975 MG: 325 TABLET ORAL at 12:41

## 2024-08-28 RX ADMIN — HALOPERIDOL 0.5 MG: 0.5 TABLET ORAL at 21:49

## 2024-08-28 RX ADMIN — INSULIN HUMAN 2 UNITS: 100 INJECTION, SOLUTION PARENTERAL at 08:21

## 2024-08-28 RX ADMIN — INSULIN HUMAN 2 UNITS: 100 INJECTION, SOLUTION PARENTERAL at 21:49

## 2024-08-28 RX ADMIN — METOPROLOL SUCCINATE 25 MG: 25 TABLET, EXTENDED RELEASE ORAL at 21:49

## 2024-08-28 RX ADMIN — LATANOPROST 1 DROP: 50 SOLUTION/ DROPS OPHTHALMIC at 21:49

## 2024-08-28 RX ADMIN — ACETAMINOPHEN 975 MG: 325 TABLET ORAL at 18:34

## 2024-08-28 RX ADMIN — INSULIN HUMAN 6 UNITS: 100 INJECTION, SOLUTION PARENTERAL at 12:38

## 2024-08-28 RX ADMIN — OXYCODONE HYDROCHLORIDE 5 MG: 5 TABLET ORAL at 05:57

## 2024-08-28 RX ADMIN — ENOXAPARIN SODIUM 30 MG: 100 INJECTION SUBCUTANEOUS at 08:20

## 2024-08-28 RX ADMIN — POLYETHYLENE GLYCOL 3350 17 G: 17 POWDER, FOR SOLUTION ORAL at 08:20

## 2024-08-28 ASSESSMENT — PAIN SCALES - GENERAL
PAINLEVEL_OUTOF10: 5 - MODERATE PAIN
PAINLEVEL_OUTOF10: 3
PAINLEVEL_OUTOF10: 8
PAINLEVEL_OUTOF10: 4

## 2024-08-28 ASSESSMENT — COGNITIVE AND FUNCTIONAL STATUS - GENERAL
STANDING UP FROM CHAIR USING ARMS: A LOT
DRESSING REGULAR LOWER BODY CLOTHING: A LITTLE
MOBILITY SCORE: 11
TURNING FROM BACK TO SIDE WHILE IN FLAT BAD: A LOT
MOVING FROM LYING ON BACK TO SITTING ON SIDE OF FLAT BED WITH BEDRAILS: A LOT
DRESSING REGULAR LOWER BODY CLOTHING: A LITTLE
MOVING FROM LYING ON BACK TO SITTING ON SIDE OF FLAT BED WITH BEDRAILS: A LOT
CLIMB 3 TO 5 STEPS WITH RAILING: TOTAL
HELP NEEDED FOR BATHING: A LOT
WALKING IN HOSPITAL ROOM: A LOT
CLIMB 3 TO 5 STEPS WITH RAILING: TOTAL
EATING MEALS: A LITTLE
TOILETING: A LOT
DAILY ACTIVITIY SCORE: 15
EATING MEALS: A LITTLE
MOBILITY SCORE: 11
DRESSING REGULAR UPPER BODY CLOTHING: A LITTLE
DRESSING REGULAR UPPER BODY CLOTHING: A LITTLE
WALKING IN HOSPITAL ROOM: A LOT
PERSONAL GROOMING: A LOT
DAILY ACTIVITIY SCORE: 15
TURNING FROM BACK TO SIDE WHILE IN FLAT BAD: A LOT
MOVING TO AND FROM BED TO CHAIR: A LOT
MOVING TO AND FROM BED TO CHAIR: A LOT
HELP NEEDED FOR BATHING: A LOT
PERSONAL GROOMING: A LOT
STANDING UP FROM CHAIR USING ARMS: A LOT
TOILETING: A LOT

## 2024-08-28 ASSESSMENT — PAIN - FUNCTIONAL ASSESSMENT: PAIN_FUNCTIONAL_ASSESSMENT: 0-10

## 2024-08-28 NOTE — CARE PLAN
The patient's goals for the shift include      The clinical goals for the shift include pt bp will be better controlled

## 2024-08-28 NOTE — PROGRESS NOTES
[facility]  TRAUMA SERVICE - PROGRESS NOTE    Patient Name: Jennifer Mancini  MRN: [id number]  Admit Date: 824  : 1940  AGE: 84 y.o.   GENDER: female  ==============================================================================  MECHANISM OF INJURY / CHIEF COMPLAINT:   Patient is a 84 F s/p glf from standing at her assisted living facility. Patient taken to OSH where they found a T8 vertebral body fracture, transferred here for further evaluation. EMS told nursing patient had a fall last week as well.   LOC (yes/no?): Unknown  Anticoagulant / Anti-platelet Rx? (for what dx?): Denies  Referring Facility Name (N/A for scene EMR run): [facility]      INJURIES:   T8 vertebral body fracture  Subacute right-sided rib fractures      OTHER MEDICAL PROBLEMS:  CHF with reserved EF  HTN  Schizoaffective disorder  HLD  DM2      INCIDENTAL FINDINGS:  Large hiatal hernia     PROCEDURES:  None    ==============================================================================  TODAY'S ASSESSMENT AND PLAN OF CARE:  Having issues with urinary retention. Otherwise clinically the same as yesterday.     #T8 vertebral body fx  -MRI reviewed by nsgy, recommend TLSO hard brace (script in chart, orthotics needs called )  - XR Upright with brace on completed: t8 vertebral body extension fracture deformity wo evidence of tramuatic malalignment or retropulsion into spinal canal  -cannot get out of bed without brace   -PT/OT: moderate intensity PT with low intensity OT  - PRN labetalol for SBP>180; hold if HR <60  - continue home haloperidol, zyprexa  - ISS, hold metformin/linagliptin, prednisone, methotrexate    #fen/gi/gu  -reg diet  -maintain [place]    #dvt ppx  -lvx  -scds    Dispo: continue RNF    Pt discussed with Dr. Janak Blue MD  Trauma Surgery  ==============================================================================  CHIEF COMPLAINT / OVERNIGHT EVENTS:   Required  montana replacement for bladder scan of 700. Inability to void OOB.     MEDICAL HISTORY / ROS:  Admission history and ROS reviewed. Pertinent changes as follows:      PHYSICAL EXAM:  Heart Rate:  []   Temp:  [36 °C (96.8 °F)-36.6 °C (97.9 °F)]   Resp:  [18]   BP: (158-183)/()   SpO2:  [92 %-95 %]     Physical Exam  HENT:      Head: Normocephalic.      Right Ear: External ear normal.      Left Ear: External ear normal.      Nose: Nose normal.      Mouth/Throat:      Mouth: Mucous membranes are dry.      Pharynx: Oropharynx is clear.   Eyes:      Pupils: Pupils are equal, round, and reactive to light.   Cardiovascular:      Rate and Rhythm: Normal rate.      Pulses: Normal pulses.   Pulmonary:      Effort: Pulmonary effort is normal.   Abdominal:      General: Abdomen is flat.      Palpations: Abdomen is soft.   Musculoskeletal:         General: Normal range of motion.      Cervical back: Normal range of motion.   Skin:     General: Skin is warm.   Neurological:      General: No focal deficit present.      Mental Status: She is alert and oriented to person, place, and time.         IMAGING SUMMARY:  (summary of new imaging findings, not a copy of dictation)  MRI T spine: Redemonstration of a fracture through the superior endplate of T8 with resulting disruption of the anterior longitudinal ligament at  the level of T7-T8. There is no striking retropulsion into the spinal  canal and there is no spinal canal stenosis. Evaluation for fractures  involving the posterior elements is somewhat degraded, noting this,  the fracture line extends into the junction between the left T8  vertebral body and pedicle, also seen on the prior CT. Chronic mild compression fractures of the T12 and L1 vertebral bodies.    LABS:  Results from last 7 days   Lab Units 08/27/24  0910 08/26/24  0729 08/25/24  1058 08/24/24  1223 08/23/24  2207   WBC AUTO x10*3/uL 9.0 9.3 8.5   < > 14.8*   HEMOGLOBIN g/dL 10.2* 9.9* 10.7*   < > 11.1*    HEMATOCRIT % 30.5* 31.6* 34.6*   < > 33.3*   PLATELETS AUTO x10*3/uL 297 262 271   < > 336   NEUTROS PCT AUTO %  --   --   --   --  86.9   LYMPHS PCT AUTO %  --   --   --   --  5.8   MONOS PCT AUTO %  --   --   --   --  5.6   EOS PCT AUTO %  --   --   --   --  0.5    < > = values in this interval not displayed.         Results from last 7 days   Lab Units 08/27/24  0910 08/26/24  0729 08/25/24  1057 08/24/24  1814   SODIUM mmol/L 129* 131* 134* 134*   POTASSIUM mmol/L 3.6 3.6 3.2* 3.4*   CHLORIDE mmol/L 97* 100 99 98   CO2 mmol/L 23 21 27 27   BUN mg/dL 8 11 8 7   CREATININE mg/dL 0.23* 0.35* 0.37* 0.40*   CALCIUM mg/dL 8.3* 8.3* 8.5* 8.5*   PROTEIN TOTAL g/dL  --   --   --  5.8*   BILIRUBIN TOTAL mg/dL  --   --   --  0.5   ALK PHOS U/L  --   --   --  82   ALT U/L  --   --   --  11   AST U/L  --   --   --  11   GLUCOSE mg/dL 175* 204* 196* 196*     Results from last 7 days   Lab Units 08/24/24  1814   BILIRUBIN TOTAL mg/dL 0.5           I have reviewed all medications, laboratory results, and imaging pertinent for today's encounter.

## 2024-08-28 NOTE — CARE PLAN
The patient's goals for the shift include  adequate rest and pain control     The clinical goals for the shift include to lower down SBP with ordered medications and remain free from injury      Problem: Skin  Goal: Decreased wound size/increased tissue granulation at next dressing change  Outcome: Progressing  Flowsheets (Taken 8/28/2024 0227)  Decreased wound size/increased tissue granulation at next dressing change: Promote sleep for wound healing  Goal: Prevent/manage excess moisture  Outcome: Progressing  Flowsheets (Taken 8/28/2024 0227)  Prevent/manage excess moisture: Cleanse incontinence/protect with barrier cream  Goal: Prevent/minimize sheer/friction injuries  Outcome: Progressing  Flowsheets (Taken 8/28/2024 0227)  Prevent/minimize sheer/friction injuries:   Use pull sheet   Complete micro-shifts as needed if patient unable. Adjust patient position to relieve pressure points, not a full turn  Goal: Promote/optimize nutrition  Outcome: Progressing  Goal: Promote skin healing  Outcome: Progressing  Flowsheets (Taken 8/28/2024 0227)  Promote skin healing:   Protective dressings over bony prominences   Turn/reposition every 2 hours/use positioning/transfer devices

## 2024-08-28 NOTE — PROGRESS NOTES
[facility]  TRAUMA SERVICE - PROGRESS NOTE    Patient Name: Jennifer Mancini  MRN: [id number]  Admit Date: 824  : 1940  AGE: 84 y.o.   GENDER: female  ==============================================================================  MECHANISM OF INJURY / CHIEF COMPLAINT:   Patient is a 84 F s/p glf from standing at her assisted living facility. Patient taken to OSH where they found a T8 vertebral body fracture, transferred here for further evaluation. EMS told nursing patient had a fall last week as well.   LOC (yes/no?): Unknown  Anticoagulant / Anti-platelet Rx? (for what dx?): Denies  Referring Facility Name (N/A for scene EMR run): [facility]      INJURIES:   T8 vertebral body fracture  Subacute right-sided rib fractures      OTHER MEDICAL PROBLEMS:  CHF with reserved EF  HTN  Schizoaffective disorder  HLD  DM2      INCIDENTAL FINDINGS:  Large hiatal hernia     PROCEDURES:  None    ==============================================================================  TODAY'S ASSESSMENT AND PLAN OF CARE:  Having issues with urinary retention. Otherwise clinically the same as yesterday.     #T8 vertebral body fx  -MRI reviewed by nsgy, recommend TLSO hard brace (script in chart, orthotics needs called )  - XR Upright with brace on for baseline resulted. No traumatic malalignment or retropulsion into spinal canal. Per nsgy OK for DVT PPX. No neurosurgical intervention at this time. Follow up with their team in 6 weeks with repeat Uprights prior to follow up date.   -cannot get out of bed without brace   -PT/OT: moderate intensity PT with low intensity OT    #fen/gi/gu  -reg diet  -Ogel was replaced due to inability to void overnight     #dvt ppx  -lvx  -scds    Dispo: continue RNF    Pt discussed with Dr. Janak Blue MD  Trauma Surgery  ==============================================================================  CHIEF COMPLAINT / OVERNIGHT EVENTS:   Manasa  removed yesterday around 5-6PM. Had not voided, straight cath'd at 6AM for 1L of urine. Bladder scanned for 900ml at noon, straight cath'd again.     MEDICAL HISTORY / ROS:  Admission history and ROS reviewed. Pertinent changes as follows:      PHYSICAL EXAM:  Heart Rate:  []   Temp:  [36 °C (96.8 °F)-37 °C (98.6 °F)]   Resp:  [18]   BP: (155-180)/(81-98)   Weight:  [68.9 kg (152 lb)]   SpO2:  [92 %-94 %]     Physical Exam  HENT:      Head: Normocephalic.      Right Ear: External ear normal.      Left Ear: External ear normal.      Nose: Nose normal.      Mouth/Throat:      Mouth: Mucous membranes are dry.      Pharynx: Oropharynx is clear.   Eyes:      Pupils: Pupils are equal, round, and reactive to light.   Cardiovascular:      Rate and Rhythm: Normal rate.      Pulses: Normal pulses.   Pulmonary:      Effort: Pulmonary effort is normal.   Abdominal:      General: Abdomen is flat.      Palpations: Abdomen is soft.   Musculoskeletal:         General: Normal range of motion.      Cervical back: Normal range of motion.   Skin:     General: Skin is warm.   Neurological:      General: No focal deficit present.      Mental Status: She is alert and oriented to person, place, and time.         IMAGING SUMMARY:  (summary of new imaging findings, not a copy of dictation)  MRI T spine: Redemonstration of a fracture through the superior endplate of T8 with resulting disruption of the anterior longitudinal ligament at  the level of T7-T8. There is no striking retropulsion into the spinal  canal and there is no spinal canal stenosis. Evaluation for fractures  involving the posterior elements is somewhat degraded, noting this,  the fracture line extends into the junction between the left T8  vertebral body and pedicle, also seen on the prior CT. Chronic mild compression fractures of the T12 and L1 vertebral bodies.        LABS:  Results from last 7 days   Lab Units 08/27/24  0910 08/26/24  0722 08/25/24  2570  08/24/24  1223 08/23/24  2207   WBC AUTO x10*3/uL 9.0 9.3 8.5   < > 14.8*   HEMOGLOBIN g/dL 10.2* 9.9* 10.7*   < > 11.1*   HEMATOCRIT % 30.5* 31.6* 34.6*   < > 33.3*   PLATELETS AUTO x10*3/uL 297 262 271   < > 336   NEUTROS PCT AUTO %  --   --   --   --  86.9   LYMPHS PCT AUTO %  --   --   --   --  5.8   MONOS PCT AUTO %  --   --   --   --  5.6   EOS PCT AUTO %  --   --   --   --  0.5    < > = values in this interval not displayed.         Results from last 7 days   Lab Units 08/27/24  0910 08/26/24  0729 08/25/24  1057 08/24/24  1814   SODIUM mmol/L 129* 131* 134* 134*   POTASSIUM mmol/L 3.6 3.6 3.2* 3.4*   CHLORIDE mmol/L 97* 100 99 98   CO2 mmol/L 23 21 27 27   BUN mg/dL 8 11 8 7   CREATININE mg/dL 0.23* 0.35* 0.37* 0.40*   CALCIUM mg/dL 8.3* 8.3* 8.5* 8.5*   PROTEIN TOTAL g/dL  --   --   --  5.8*   BILIRUBIN TOTAL mg/dL  --   --   --  0.5   ALK PHOS U/L  --   --   --  82   ALT U/L  --   --   --  11   AST U/L  --   --   --  11   GLUCOSE mg/dL 175* 204* 196* 196*     Results from last 7 days   Lab Units 08/24/24  1814   BILIRUBIN TOTAL mg/dL 0.5           I have reviewed all medications, laboratory results, and imaging pertinent for today's encounter.

## 2024-08-28 NOTE — SIGNIFICANT EVENT
Uprights in TLSO obtained for baseline and reviewed. Maintain TLSO when out of bed until follow up. Ok for dvt ppx. No acute neurosurgical intervention or additional neuroimaging needed at this time. A 6 week follow up will be arranged. Repeat uprights ordered and should be obtained prior to follow up. Please place in patient's discharge profile when it populates in visit history. Thank you for allowing us to participate in the care of this patient. Will sign off at this time. Please page [phone] with any questions or concerns. Plan discussed with attending Dr. Pagan.

## 2024-08-28 NOTE — PROGRESS NOTES
SW spoke with patient daughter, Ivette [phone], to confirm FOC and to confirm whether they wanted a private room for the patient. Ivette confirmed that FOC is [facility] and patient does not need to have a private room. Facility made aware. Patient is not medically ready for discharge at this time. SW will continue to follow to facilitate discharge plan.       DESIREE Long

## 2024-08-29 LAB
ALBUMIN SERPL BCP-MCNC: 2.8 G/DL (ref 3.4–5)
ALBUMIN SERPL BCP-MCNC: 3 G/DL (ref 3.4–5)
ANION GAP SERPL CALC-SCNC: 13 MMOL/L (ref 10–20)
ANION GAP SERPL CALC-SCNC: 15 MMOL/L (ref 10–20)
BUN SERPL-MCNC: 8 MG/DL (ref 6–23)
BUN SERPL-MCNC: 9 MG/DL (ref 6–23)
CALCIUM SERPL-MCNC: 7.7 MG/DL (ref 8.6–10.6)
CALCIUM SERPL-MCNC: 8.2 MG/DL (ref 8.6–10.6)
CHLORIDE SERPL-SCNC: 88 MMOL/L (ref 98–107)
CHLORIDE SERPL-SCNC: 91 MMOL/L (ref 98–107)
CO2 SERPL-SCNC: 22 MMOL/L (ref 21–32)
CO2 SERPL-SCNC: 22 MMOL/L (ref 21–32)
CREAT SERPL-MCNC: 0.23 MG/DL (ref 0.5–1.05)
CREAT SERPL-MCNC: 0.25 MG/DL (ref 0.5–1.05)
EGFRCR SERPLBLD CKD-EPI 2021: >90 ML/MIN/1.73M*2
EGFRCR SERPLBLD CKD-EPI 2021: >90 ML/MIN/1.73M*2
ERYTHROCYTE [DISTWIDTH] IN BLOOD BY AUTOMATED COUNT: 14.1 % (ref 11.5–14.5)
GLUCOSE BLD MANUAL STRIP-MCNC: 184 MG/DL (ref 74–99)
GLUCOSE BLD MANUAL STRIP-MCNC: 198 MG/DL (ref 74–99)
GLUCOSE BLD MANUAL STRIP-MCNC: 239 MG/DL (ref 74–99)
GLUCOSE BLD MANUAL STRIP-MCNC: 241 MG/DL (ref 74–99)
GLUCOSE SERPL-MCNC: 178 MG/DL (ref 74–99)
GLUCOSE SERPL-MCNC: 284 MG/DL (ref 74–99)
HCT VFR BLD AUTO: 31.2 % (ref 36–46)
HGB BLD-MCNC: 10.4 G/DL (ref 12–16)
MCH RBC QN AUTO: 30.1 PG (ref 26–34)
MCHC RBC AUTO-ENTMCNC: 33.3 G/DL (ref 32–36)
MCV RBC AUTO: 90 FL (ref 80–100)
NRBC BLD-RTO: 0 /100 WBCS (ref 0–0)
PHOSPHATE SERPL-MCNC: 1.6 MG/DL (ref 2.5–4.9)
PHOSPHATE SERPL-MCNC: 2.1 MG/DL (ref 2.5–4.9)
PLATELET # BLD AUTO: 292 X10*3/UL (ref 150–450)
POTASSIUM SERPL-SCNC: 3.2 MMOL/L (ref 3.5–5.3)
POTASSIUM SERPL-SCNC: 3.9 MMOL/L (ref 3.5–5.3)
RBC # BLD AUTO: 3.46 X10*6/UL (ref 4–5.2)
SODIUM SERPL-SCNC: 120 MMOL/L (ref 136–145)
SODIUM SERPL-SCNC: 124 MMOL/L (ref 136–145)
WBC # BLD AUTO: 8.4 X10*3/UL (ref 4.4–11.3)

## 2024-08-29 PROCEDURE — 2500000001 HC RX 250 WO HCPCS SELF ADMINISTERED DRUGS (ALT 637 FOR MEDICARE OP)

## 2024-08-29 PROCEDURE — 2500000004 HC RX 250 GENERAL PHARMACY W/ HCPCS (ALT 636 FOR OP/ED): Performed by: HEALTH CARE PROVIDER

## 2024-08-29 PROCEDURE — 1200000002 HC GENERAL ROOM WITH TELEMETRY DAILY

## 2024-08-29 PROCEDURE — 2500000002 HC RX 250 W HCPCS SELF ADMINISTERED DRUGS (ALT 637 FOR MEDICARE OP, ALT 636 FOR OP/ED)

## 2024-08-29 PROCEDURE — 36415 COLL VENOUS BLD VENIPUNCTURE: CPT | Performed by: SURGERY

## 2024-08-29 PROCEDURE — 2500000004 HC RX 250 GENERAL PHARMACY W/ HCPCS (ALT 636 FOR OP/ED)

## 2024-08-29 PROCEDURE — 83935 ASSAY OF URINE OSMOLALITY: CPT | Performed by: SURGERY

## 2024-08-29 PROCEDURE — 82436 ASSAY OF URINE CHLORIDE: CPT

## 2024-08-29 PROCEDURE — 80069 RENAL FUNCTION PANEL: CPT

## 2024-08-29 PROCEDURE — 99232 SBSQ HOSP IP/OBS MODERATE 35: CPT | Performed by: SURGERY

## 2024-08-29 PROCEDURE — 80069 RENAL FUNCTION PANEL: CPT | Performed by: SURGERY

## 2024-08-29 PROCEDURE — 2500000002 HC RX 250 W HCPCS SELF ADMINISTERED DRUGS (ALT 637 FOR MEDICARE OP, ALT 636 FOR OP/ED): Performed by: SURGERY

## 2024-08-29 PROCEDURE — 36415 COLL VENOUS BLD VENIPUNCTURE: CPT

## 2024-08-29 PROCEDURE — 85027 COMPLETE CBC AUTOMATED: CPT

## 2024-08-29 PROCEDURE — 82947 ASSAY GLUCOSE BLOOD QUANT: CPT

## 2024-08-29 PROCEDURE — 2500000004 HC RX 250 GENERAL PHARMACY W/ HCPCS (ALT 636 FOR OP/ED): Performed by: SURGERY

## 2024-08-29 RX ORDER — LABETALOL HYDROCHLORIDE 5 MG/ML
10 INJECTION, SOLUTION INTRAVENOUS EVERY 6 HOURS PRN
Status: DISCONTINUED | OUTPATIENT
Start: 2024-08-29 | End: 2024-09-05 | Stop reason: HOSPADM

## 2024-08-29 RX ORDER — POTASSIUM CHLORIDE 20 MEQ/1
40 TABLET, EXTENDED RELEASE ORAL ONCE
Status: COMPLETED | OUTPATIENT
Start: 2024-08-29 | End: 2024-08-29

## 2024-08-29 RX ORDER — POTASSIUM CHLORIDE 14.9 MG/ML
20 INJECTION INTRAVENOUS
Status: COMPLETED | OUTPATIENT
Start: 2024-08-29 | End: 2024-08-29

## 2024-08-29 RX ORDER — LABETALOL HYDROCHLORIDE 5 MG/ML
10 INJECTION, SOLUTION INTRAVENOUS ONCE
Status: COMPLETED | OUTPATIENT
Start: 2024-08-29 | End: 2024-08-29

## 2024-08-29 RX ORDER — SODIUM CHLORIDE 9 MG/ML
100 INJECTION, SOLUTION INTRAVENOUS CONTINUOUS
Status: DISCONTINUED | OUTPATIENT
Start: 2024-08-29 | End: 2024-08-31

## 2024-08-29 RX ADMIN — POTASSIUM PHOSPHATE, MONOBASIC 500 MG: 500 TABLET, SOLUBLE ORAL at 20:56

## 2024-08-29 RX ADMIN — VENLAFAXINE HYDROCHLORIDE 37.5 MG: 37.5 CAPSULE, EXTENDED RELEASE ORAL at 08:18

## 2024-08-29 RX ADMIN — ACETAMINOPHEN 975 MG: 325 TABLET ORAL at 12:23

## 2024-08-29 RX ADMIN — ENOXAPARIN SODIUM 30 MG: 100 INJECTION SUBCUTANEOUS at 08:18

## 2024-08-29 RX ADMIN — ACETAMINOPHEN 975 MG: 325 TABLET ORAL at 18:05

## 2024-08-29 RX ADMIN — POTASSIUM CHLORIDE 20 MEQ: 14.9 INJECTION, SOLUTION INTRAVENOUS at 13:57

## 2024-08-29 RX ADMIN — METOPROLOL SUCCINATE 25 MG: 25 TABLET, EXTENDED RELEASE ORAL at 08:18

## 2024-08-29 RX ADMIN — POTASSIUM CHLORIDE 40 MEQ: 1500 TABLET, EXTENDED RELEASE ORAL at 13:57

## 2024-08-29 RX ADMIN — POTASSIUM PHOSPHATE, MONOBASIC 500 MG: 500 TABLET, SOLUBLE ORAL at 18:05

## 2024-08-29 RX ADMIN — HALOPERIDOL 0.5 MG: 0.5 TABLET ORAL at 20:56

## 2024-08-29 RX ADMIN — LATANOPROST 1 DROP: 50 SOLUTION/ DROPS OPHTHALMIC at 23:06

## 2024-08-29 RX ADMIN — POTASSIUM PHOSPHATE, MONOBASIC 500 MG: 500 TABLET, SOLUBLE ORAL at 15:27

## 2024-08-29 RX ADMIN — LABETALOL HYDROCHLORIDE 10 MG: 5 INJECTION, SOLUTION INTRAVENOUS at 04:20

## 2024-08-29 RX ADMIN — METOPROLOL SUCCINATE 25 MG: 25 TABLET, EXTENDED RELEASE ORAL at 20:56

## 2024-08-29 RX ADMIN — POLYETHYLENE GLYCOL 3350 17 G: 17 POWDER, FOR SOLUTION ORAL at 08:19

## 2024-08-29 RX ADMIN — INSULIN HUMAN 4 UNITS: 100 INJECTION, SOLUTION PARENTERAL at 18:35

## 2024-08-29 RX ADMIN — ENOXAPARIN SODIUM 30 MG: 100 INJECTION SUBCUTANEOUS at 20:56

## 2024-08-29 RX ADMIN — INSULIN HUMAN 4 UNITS: 100 INJECTION, SOLUTION PARENTERAL at 13:44

## 2024-08-29 RX ADMIN — ACETAMINOPHEN 975 MG: 325 TABLET ORAL at 05:52

## 2024-08-29 RX ADMIN — SODIUM CHLORIDE 100 ML/HR: 9 INJECTION, SOLUTION INTRAVENOUS at 13:01

## 2024-08-29 RX ADMIN — POTASSIUM CHLORIDE 20 MEQ: 14.9 INJECTION, SOLUTION INTRAVENOUS at 15:55

## 2024-08-29 RX ADMIN — ATORVASTATIN CALCIUM 40 MG: 40 TABLET, FILM COATED ORAL at 20:56

## 2024-08-29 RX ADMIN — INSULIN HUMAN 2 UNITS: 100 INJECTION, SOLUTION PARENTERAL at 08:18

## 2024-08-29 RX ADMIN — LABETALOL HYDROCHLORIDE 10 MG: 5 INJECTION, SOLUTION INTRAVENOUS at 12:22

## 2024-08-29 RX ADMIN — INSULIN HUMAN 2 UNITS: 100 INJECTION, SOLUTION PARENTERAL at 21:01

## 2024-08-29 RX ADMIN — ACETAMINOPHEN 975 MG: 325 TABLET ORAL at 23:41

## 2024-08-29 ASSESSMENT — COGNITIVE AND FUNCTIONAL STATUS - GENERAL
TOILETING: A LOT
DRESSING REGULAR LOWER BODY CLOTHING: A LOT
STANDING UP FROM CHAIR USING ARMS: A LOT
MOVING TO AND FROM BED TO CHAIR: A LOT
DAILY ACTIVITIY SCORE: 14
CLIMB 3 TO 5 STEPS WITH RAILING: A LOT
PERSONAL GROOMING: A LOT
MOBILITY SCORE: 12
DRESSING REGULAR UPPER BODY CLOTHING: A LOT
MOVING FROM LYING ON BACK TO SITTING ON SIDE OF FLAT BED WITH BEDRAILS: A LOT
TOILETING: A LOT
DRESSING REGULAR LOWER BODY CLOTHING: A LOT
DRESSING REGULAR UPPER BODY CLOTHING: A LOT
TURNING FROM BACK TO SIDE WHILE IN FLAT BAD: A LOT
WALKING IN HOSPITAL ROOM: A LOT
MOVING TO AND FROM BED TO CHAIR: A LOT
DAILY ACTIVITIY SCORE: 14
CLIMB 3 TO 5 STEPS WITH RAILING: A LOT
HELP NEEDED FOR BATHING: A LOT
PERSONAL GROOMING: A LOT
WALKING IN HOSPITAL ROOM: A LOT
HELP NEEDED FOR BATHING: A LOT
STANDING UP FROM CHAIR USING ARMS: A LOT
MOVING FROM LYING ON BACK TO SITTING ON SIDE OF FLAT BED WITH BEDRAILS: A LOT
MOBILITY SCORE: 12
TURNING FROM BACK TO SIDE WHILE IN FLAT BAD: A LOT

## 2024-08-29 NOTE — CARE PLAN
The patient's goals for the shift include  comfort & pain management     The clinical goals for the shift include pt bp will be better controlled      Problem: Skin  Goal: Decreased wound size/increased tissue granulation at next dressing change  Outcome: Progressing  Flowsheets (Taken 8/28/2024 2000)  Decreased wound size/increased tissue granulation at next dressing change: Promote sleep for wound healing  Goal: Participates in plan/prevention/treatment measures  Outcome: Progressing  Flowsheets (Taken 8/28/2024 2000)  Participates in plan/prevention/treatment measures: Elevate heels  Goal: Prevent/manage excess moisture  Outcome: Progressing  Flowsheets (Taken 8/28/2024 2000)  Prevent/manage excess moisture:   Cleanse incontinence/protect with barrier cream   Monitor for/manage infection if present  Goal: Prevent/minimize sheer/friction injuries  Outcome: Progressing  Flowsheets (Taken 8/28/2024 2000)  Prevent/minimize sheer/friction injuries:   Use pull sheet   Complete micro-shifts as needed if patient unable. Adjust patient position to relieve pressure points, not a full turn  Goal: Promote/optimize nutrition  Outcome: Progressing  Flowsheets (Taken 8/28/2024 2000)  Promote/optimize nutrition: Offer water/supplements/favorite foods  Goal: Promote skin healing  Outcome: Progressing  Flowsheets (Taken 8/28/2024 0258)  Promote skin healing:   Protective dressings over bony prominences   Turn/reposition every 2 hours/use positioning/transfer devices   Assess skin/pad under line(s)/device(s)

## 2024-08-29 NOTE — CARE PLAN
The patient's goals for the shift include      The clinical goals for the shift include pt will have pain management and adequate comfort measures throughout shift

## 2024-08-29 NOTE — CONSULTS
"Nutrition Initial Assessment:   Nutrition Assessment    Reason for Assessment: Dietitian discretion (active caloire count)    Patient is a 84 y.o. female presenting s/p GLF from assisted living facility. Pt suffered a 8 vertebral body fracture and subacute right-sided rib fractures.     PMH: CHF with reserved EF, HTN, schizoaffective disorder, HLD, DM 2    Nutrition History:  Food and Nutrient History: Active caloire count order in placed. Reached out to trauma resident about plan. She says there is no c/f for need in enteral nutrition at this time. Per her note this morning, pt is eating well and has a good appetite. Met with pt this morning. She seemed slightly confused. When asked how she has been eating she said that her \"skin is starting to clear up.\" Asked pt again how her appetite is and she said \"too good.\" She had an ensure at her bedside. She says that she is drinking them. Pt then fell asleep and started snoring med conversation therfore interview was kept short to allow pt to rest. Per flowsheet, intake is varied. She is consuming 0-75% of meals.  Food Allergies/Intolerances:   none per EMR  GI Symptoms:  JORGE  Oral Problems: None       Anthropometrics:   Height: 152.4cm  Weight: 68.9 kg (152 lb)   BMI (Calculated): 29.69  IBW/kg (Dietitian Calculated): 45.5 kg  Percent of IBW: 151 %     Weight History:   Wt Readings from Last 10 Encounters:   08/28/24 68.9 kg (152 lb)   08/23/24 68.9 kg (152 lb)   08/17/24 61 kg (134 lb 7.7 oz)   02/09/24 68 kg (149 lb 14.6 oz)   02/08/24 68 kg (150 lb)   11/04/23 71.7 kg (158 lb)   08/25/21 74.8 kg (165 lb)   07/29/21 72.6 kg (160 lb)   07/01/21 73 kg (161 lb)   04/26/21 72 kg (158 lb 11.7 oz)       Weight Change %:  Weight History / % Weight Change: Current weight consistent with weight from February.    Nutrition Focused Physical Exam Findings:  Subcutaneous Fat Loss:   Orbital Fat Pads: Well nourished (slightly bulging fat pads)  Buccal Fat Pads: Well nourished " "(full, rounded cheeks)  Triceps: Well nourished (ample fat tissue)  Muscle Wasting:  Temporalis: Mild-Moderate (slight depression)  Pectoralis (Clavicular Region): Well nourished (clavicle not visible)  Deltoid/Trapezius: Well nourished (rounded appearance at arm, shoulder, neck)  Edema:  Edema: +1 trace  Edema Location: BLE  Physical Findings:  Skin:  (dry/flaky skin, wound on arm and face r/t fall)    Nutrition Significant Labs:  CBC Trend:   Results from last 7 days   Lab Units 08/29/24  0943 08/27/24  0910 08/26/24  0729 08/25/24  1058   WBC AUTO x10*3/uL 8.4 9.0 9.3 8.5   RBC AUTO x10*6/uL 3.46* 3.42* 3.30* 3.60*   HEMOGLOBIN g/dL 10.4* 10.2* 9.9* 10.7*   HEMATOCRIT % 31.2* 30.5* 31.6* 34.6*   MCV fL 90 89 96 96   PLATELETS AUTO x10*3/uL 292 297 262 271    , BMP Trend:   Results from last 7 days   Lab Units 08/28/24  1558 08/27/24  0910 08/26/24  0729 08/25/24  1057   GLUCOSE mg/dL 186* 175* 204* 196*   CALCIUM mg/dL 8.4* 8.3* 8.3* 8.5*   SODIUM mmol/L 125* 129* 131* 134*   POTASSIUM mmol/L 3.3* 3.6 3.6 3.2*   CO2 mmol/L 25 23 21 27   CHLORIDE mmol/L 92* 97* 100 99   BUN mg/dL 11 8 11 8   CREATININE mg/dL 0.30* 0.23* 0.35* 0.37*    , Renal Lab Trend:   Results from last 7 days   Lab Units 08/28/24  1558 08/27/24  0910 08/26/24  0729 08/25/24  1057   POTASSIUM mmol/L 3.3* 3.6 3.6 3.2*   PHOSPHORUS mg/dL 2.1* 2.8 2.6 2.2*   SODIUM mmol/L 125* 129* 131* 134*   MAGNESIUM mg/dL  --  1.33*  --  1.42*   EGFR mL/min/1.73m*2 >90 >90 >90 >90   BUN mg/dL 11 8 11 8   CREATININE mg/dL 0.30* 0.23* 0.35* 0.37*    , Vit D: No results found for: \"VITD25\" , Vit B12:   Lab Results   Component Value Date    [id number] 991 (H) 02/10/2024        Nutrition Specific Medications:  Scheduled medications  acetaminophen, 975 mg, oral, q6h MILEY  atorvastatin, 40 mg, oral, Nightly  enoxaparin, 30 mg, subcutaneous, q12h  haloperidol, 0.5 mg, oral, Nightly  insulin regular, 0-10 Units, subcutaneous, Before meals & nightly  latanoprost, 1 drop, " Both Eyes, Nightly  metoprolol succinate XL, 25 mg, oral, BID  [Held by provider] OLANZapine, 20 mg, oral, Nightly  polyethylene glycol, 17 g, oral, Daily  venlafaxine XR, 37.5 mg, oral, Daily      Continuous medications  lactated Ringer's, 40 mL/hr, Last Rate: 40 mL/hr (08/29/24 1059)      PRN medications  PRN medications: dextrose, dextrose, glucagon, glucagon, naloxone, naloxone, oxyCODONE, oxyCODONE      I/O:   Last BM Date:  (unknown);      Dietary Orders (From admission, onward)       Start     Ordered    08/28/24 1030  Adult diet Regular, Consistent Carb; CCD 60 gm/meal  Diet effective now        Question Answer Comment   Diet type Regular    Diet type Consistent Carb    Carb diet selection: CCD 60 gm/meal        08/28/24 1029    08/27/24 1800  Calorie count (specify)  2 times daily       08/27/24 1413    08/27/24 1415  Oral nutritional supplements  Until discontinued        Question Answer Comment   Deliver with All meals    Select supplement: Ensure High Protein        08/27/24 1414                     Estimated Needs:   Total Energy Estimated Needs (kCal): 1370 kCal  Method for Estimating Needs: ~ 30kcal/kg IBW  Total Protein Estimated Needs (g):  (50-59)  Method for Estimating Needs: 1.1-1.3g/kg IBW  Total Fluid Estimated Needs (mL):  (per MD/team)  Method for Estimating Needs: per MD/team        Nutrition Diagnosis   Malnutrition Diagnosis  Patient has Malnutrition Diagnosis: No    Nutrition Diagnosis  Patient has Nutrition Diagnosis: Yes  Diagnosis Status (1): New  Nutrition Diagnosis 1: Increased nutrient needs  Related to (1): increased metabolic demand  As Evidenced by (1): mutliple fx s/p GLF       Nutrition Interventions/Recommendations         Nutrition Prescription:  Individualized Nutrition Prescription Provided for : ONS  Continue current diet   Ensure High Protein BID  Replete low electrolytes per protocol         Nutrition Interventions:   Interventions: Medical food supplement  Medical Food  Supplement: Commercial beverage  Goal: Ensure High Protein BID (320kcal and 32gm protein)      Nutrition Monitoring and Evaluation   Food/Nutrient Related History Monitoring  Monitoring and Evaluation Plan: Energy intake  Energy Intake: Estimated energy intake  Criteria: meet > 75% est energy needs    Body Composition/Growth/Weight History  Monitoring and Evaluation Plan: Weight, Weight change  Criteria: no unintentional wt loss    Biochemical Data, Medical Tests and Procedures  Monitoring and Evaluation Plan: Electrolyte/renal panel, Glucose/endocrine profile  Criteria: labs WNL              Time Spent (min): 40 minutes

## 2024-08-29 NOTE — DISCHARGE INSTRUCTIONS
FOLLOW UP APPOINTMENTS:    A 6 week follow up was arranged with neurosurgery on 10/28/24. Please obtain upright thoracolumber spine x-rays ordered prior to follow up.     A referral was placed to nephrology department for 4 week follow up on chronic hyponatremia. Please ensure appointment is scheduled.     A referral was placed to endocrinology department for 4 week follow up on subclinical hypothyroidism. Please ensure appointment is scheduled.     A referral sent for follow up with Urology for urinary retention and failed TOV. Please ensure appointment is scheduled.     A referral sent for follow up with Psychiatry for management of haldol and zyprexa. Please ensure appointment is scheduled.     Neurology surgery follow up on 10/28/24    Please call [phone] to schedule your 2-4 week follow up with the Acute Care Surgery / Trauma Surgery Clinic.     INSTRUCTIONS FOR FACILITY:  Please continue tamsulosin and maintain montana for 2-3 days after discharge (09/08 or 09/09) and attempt trial of void with removal of montana. Trial of void 6-8h after removal of the montana and complete bladder scan. Replace montana for bladder scan of >300 ml of urine retention in bladder. Please ensure that patient has follow up appointment with urology given difficulty with urinary retention.     Please ensure patient is in brace when out of bed. Fall and delirium precautions.

## 2024-08-29 NOTE — PROGRESS NOTES
Patient discussed during morning rounds. EDOD is tomorrow. SW sent clinical updates to [facility] and requested that they start pre-cert. PLACIDO will continue to follow to facilitate discharge plan.       DESIREE Long

## 2024-08-29 NOTE — PROGRESS NOTES
Transitional Care Coordinator Note: Patient discussed in morning rounds, per medical team (trauma) patient is not medically ready, plan to monitor BP and medication regimen. Discharge dispo: Plan for patient to discharge to SNF [facility] [place], per  notes updated clinicals sent with request for facility to initiate pre-cert. TCC placed request in TCC/Therapy communication column for updated notes needed for pre-cert, SW updated.       Ruby Squires RN BSN   Transitional Care Coordinator

## 2024-08-29 NOTE — PROGRESS NOTES
"[facility]  TRAUMA SERVICE - PROGRESS NOTE    Patient Name: Jennifer Mancini  MRN: [id number]  Admit Date: 824  : 1940  AGE: 84 y.o.   GENDER: female  ==============================================================================  MECHANISM OF INJURY / CHIEF COMPLAINT:   Patient is a 84 F s/p glf from standing at her assisted living facility. Patient taken to OSH where they found a T8 vertebral body fracture, transferred here for further evaluation. EMS told nursing patient had a fall last week as well.   LOC (yes/no?): Unknown  Anticoagulant / Anti-platelet Rx? (for what dx?): Denies  Referring Facility Name (N/A for scene EMR run):  [place]      INJURIES:   T8 vertebral body fracture  Subacute right-sided rib fractures      OTHER MEDICAL PROBLEMS:  CHF with reserved EF  HTN  Schizoaffective disorder  HLD  DM2      INCIDENTAL FINDINGS:  Large hiatal hernia     PROCEDURES:  None    ==============================================================================  TODAY'S ASSESSMENT AND PLAN OF CARE:  Continues with nan this morning. Reports good appetite, 300 ml of PO intake charted. States she has hip pain, but states \"its been going on for a while\" Required labetalol PRN >180 that appears uncontrolled despite metoprolol. No other home meds for antihypertension.     #T8 vertebral body fx  -MRI reviewed by nsgy, recommend TLSO hard brace (script in chart, orthotics needs called )  - XR Upright with brace on for baseline resulted. No traumatic malalignment or retropulsion into spinal canal. Per nsgy OK for DVT PPX. No neurosurgical intervention at this time. Follow up with their team in 6 weeks with repeat Uprights prior to follow up date.   -cannot get out of bed without brace   -PT/OT: moderate intensity PT with low intensity OT    #fen/gi/gu  -reg diet  -Maintain montana    #dvt ppx  -lvx  -scds    Dispo: continue RNF    Pt discussed with Dr. Janak Blue, " MD  Trauma [id number]  ==============================================================================  CHIEF COMPLAINT / OVERNIGHT EVENTS:   NAOE. Required labetalol PRNs for SBP>180.     MEDICAL HISTORY / ROS:  Admission history and ROS reviewed. Pertinent changes as follows:      PHYSICAL EXAM:  Heart Rate:  []   Temp:  [36.1 °C (97 °F)-37.1 °C (98.8 °F)]   Resp:  [18]   BP: (154-187)/()   Weight:  [68.9 kg (152 lb)]   SpO2:  [93 %-95 %]     Physical Exam  HENT:      Head: Normocephalic.      Right Ear: External ear normal.      Left Ear: External ear normal.      Nose: Nose normal.      Mouth/Throat:      Mouth: Mucous membranes are dry.      Pharynx: Oropharynx is clear.   Eyes:      Pupils: Pupils are equal, round, and reactive to light.   Cardiovascular:      Rate and Rhythm: Normal rate.      Pulses: Normal pulses.   Pulmonary:      Effort: Pulmonary effort is normal.   Abdominal:      General: Abdomen is flat.      Palpations: Abdomen is soft.   Genitourinary:     Comments: Goel in place draining CYU  Musculoskeletal:         General: Normal range of motion.      Cervical back: Normal range of motion.   Skin:     General: Skin is warm.   Neurological:      General: No focal deficit present.      Mental Status: She is alert and oriented to person, place, and time.      Comments: In TSLO rigid brace         IMAGING SUMMARY:  (summary of new imaging findings, not a copy of dictation)  MRI T spine: Redemonstration of a fracture through the superior endplate of T8 with resulting disruption of the anterior longitudinal ligament at  the level of T7-T8. There is no striking retropulsion into the spinal  canal and there is no spinal canal stenosis. Evaluation for fractures  involving the posterior elements is somewhat degraded, noting this,  the fracture line extends into the junction between the left T8  vertebral body and pedicle, also seen on the prior CT. Chronic mild compression fractures of the  T12 and L1 vertebral bodies.        LABS:  Results from last 7 days   Lab Units 08/27/24  0910 08/26/24  0729 08/25/24  1058 08/24/24  1223 08/23/24  2207   WBC AUTO x10*3/uL 9.0 9.3 8.5   < > 14.8*   HEMOGLOBIN g/dL 10.2* 9.9* 10.7*   < > 11.1*   HEMATOCRIT % 30.5* 31.6* 34.6*   < > 33.3*   PLATELETS AUTO x10*3/uL 297 262 271   < > 336   NEUTROS PCT AUTO %  --   --   --   --  86.9   LYMPHS PCT AUTO %  --   --   --   --  5.8   MONOS PCT AUTO %  --   --   --   --  5.6   EOS PCT AUTO %  --   --   --   --  0.5    < > = values in this interval not displayed.         Results from last 7 days   Lab Units 08/28/24  1558 08/27/24  0910 08/26/24  0729 08/25/24  1057 08/24/24  1814   SODIUM mmol/L 125* 129* 131*   < > 134*   POTASSIUM mmol/L 3.3* 3.6 3.6   < > 3.4*   CHLORIDE mmol/L 92* 97* 100   < > 98   CO2 mmol/L 25 23 21   < > 27   BUN mg/dL 11 8 11   < > 7   CREATININE mg/dL 0.30* 0.23* 0.35*   < > 0.40*   CALCIUM mg/dL 8.4* 8.3* 8.3*   < > 8.5*   PROTEIN TOTAL g/dL  --   --   --   --  5.8*   BILIRUBIN TOTAL mg/dL  --   --   --   --  0.5   ALK PHOS U/L  --   --   --   --  82   ALT U/L  --   --   --   --  11   AST U/L  --   --   --   --  11   GLUCOSE mg/dL 186* 175* 204*   < > 196*    < > = values in this interval not displayed.     Results from last 7 days   Lab Units 08/24/24  1814   BILIRUBIN TOTAL mg/dL 0.5       I have reviewed all medications, laboratory results, and imaging pertinent for today's encounter.

## 2024-08-30 LAB
ALBUMIN SERPL BCP-MCNC: 2.7 G/DL (ref 3.4–5)
ALBUMIN SERPL BCP-MCNC: 2.8 G/DL (ref 3.4–5)
ALBUMIN SERPL BCP-MCNC: 2.8 G/DL (ref 3.4–5)
ALBUMIN SERPL BCP-MCNC: 2.9 G/DL (ref 3.4–5)
ANION GAP SERPL CALC-SCNC: 11 MMOL/L (ref 10–20)
ANION GAP SERPL CALC-SCNC: 13 MMOL/L (ref 10–20)
ANION GAP SERPL CALC-SCNC: 13 MMOL/L (ref 10–20)
ANION GAP SERPL CALC-SCNC: 14 MMOL/L (ref 10–20)
BUN SERPL-MCNC: 10 MG/DL (ref 6–23)
BUN SERPL-MCNC: 7 MG/DL (ref 6–23)
BUN SERPL-MCNC: 7 MG/DL (ref 6–23)
BUN SERPL-MCNC: 8 MG/DL (ref 6–23)
CALCIUM SERPL-MCNC: 7.6 MG/DL (ref 8.6–10.6)
CALCIUM SERPL-MCNC: 7.7 MG/DL (ref 8.6–10.6)
CALCIUM SERPL-MCNC: 7.8 MG/DL (ref 8.6–10.6)
CALCIUM SERPL-MCNC: 7.9 MG/DL (ref 8.6–10.6)
CHLORIDE SERPL-SCNC: 89 MMOL/L (ref 98–107)
CHLORIDE SERPL-SCNC: 89 MMOL/L (ref 98–107)
CHLORIDE SERPL-SCNC: 92 MMOL/L (ref 98–107)
CHLORIDE SERPL-SCNC: 92 MMOL/L (ref 98–107)
CHLORIDE UR-SCNC: 111 MMOL/L
CHLORIDE UR-SCNC: 114 MMOL/L
CHLORIDE/CREATININE (MMOL/G) IN URINE: 447 MMOL/G CREAT (ref 38–318)
CHLORIDE/CREATININE (MMOL/G) IN URINE: 965 MMOL/G CREAT (ref 38–318)
CO2 SERPL-SCNC: 21 MMOL/L (ref 21–32)
CO2 SERPL-SCNC: 22 MMOL/L (ref 21–32)
CO2 SERPL-SCNC: 23 MMOL/L (ref 21–32)
CO2 SERPL-SCNC: 24 MMOL/L (ref 21–32)
CREAT SERPL-MCNC: 0.2 MG/DL (ref 0.5–1.05)
CREAT SERPL-MCNC: 0.27 MG/DL (ref 0.5–1.05)
CREAT SERPL-MCNC: 0.34 MG/DL (ref 0.5–1.05)
CREAT SERPL-MCNC: 0.35 MG/DL (ref 0.5–1.05)
CREAT UR-MCNC: 11.5 MG/DL (ref 20–320)
CREAT UR-MCNC: 25.5 MG/DL (ref 20–320)
EGFRCR SERPLBLD CKD-EPI 2021: >90 ML/MIN/1.73M*2
GLUCOSE BLD MANUAL STRIP-MCNC: 102 MG/DL (ref 74–99)
GLUCOSE BLD MANUAL STRIP-MCNC: 136 MG/DL (ref 74–99)
GLUCOSE BLD MANUAL STRIP-MCNC: 158 MG/DL (ref 74–99)
GLUCOSE BLD MANUAL STRIP-MCNC: 185 MG/DL (ref 74–99)
GLUCOSE BLD MANUAL STRIP-MCNC: 208 MG/DL (ref 74–99)
GLUCOSE BLD MANUAL STRIP-MCNC: 371 MG/DL (ref 74–99)
GLUCOSE SERPL-MCNC: 149 MG/DL (ref 74–99)
GLUCOSE SERPL-MCNC: 178 MG/DL (ref 74–99)
GLUCOSE SERPL-MCNC: 199 MG/DL (ref 74–99)
GLUCOSE SERPL-MCNC: 282 MG/DL (ref 74–99)
MAGNESIUM SERPL-MCNC: 1.41 MG/DL (ref 1.6–2.4)
OSMOLALITY SERPL: 259 MOSM/KG (ref 280–300)
OSMOLALITY UR: 349 MOSM/KG (ref 200–1200)
OSMOLALITY UR: 420 MOSM/KG (ref 200–1200)
PHOSPHATE SERPL-MCNC: 2.1 MG/DL (ref 2.5–4.9)
PHOSPHATE SERPL-MCNC: 2.1 MG/DL (ref 2.5–4.9)
PHOSPHATE SERPL-MCNC: 2.3 MG/DL (ref 2.5–4.9)
PHOSPHATE SERPL-MCNC: 2.4 MG/DL (ref 2.5–4.9)
POTASSIUM SERPL-SCNC: 3.2 MMOL/L (ref 3.5–5.3)
POTASSIUM SERPL-SCNC: 3.5 MMOL/L (ref 3.5–5.3)
POTASSIUM SERPL-SCNC: 3.7 MMOL/L (ref 3.5–5.3)
POTASSIUM SERPL-SCNC: 3.8 MMOL/L (ref 3.5–5.3)
POTASSIUM UR-SCNC: 26 MMOL/L
POTASSIUM UR-SCNC: 36 MMOL/L
POTASSIUM/CREAT UR-RTO: 141 MMOL/G CREAT
POTASSIUM/CREAT UR-RTO: 226 MMOL/G CREAT
SODIUM SERPL-SCNC: 120 MMOL/L (ref 136–145)
SODIUM SERPL-SCNC: 121 MMOL/L (ref 136–145)
SODIUM SERPL-SCNC: 122 MMOL/L (ref 136–145)
SODIUM SERPL-SCNC: 125 MMOL/L (ref 136–145)
SODIUM UR-SCNC: 112 MMOL/L
SODIUM UR-SCNC: 81 MMOL/L
SODIUM/CREAT UR-RTO: 318 MMOL/G CREAT
SODIUM/CREAT UR-RTO: 974 MMOL/G CREAT
TSH SERPL-ACNC: 4.12 MIU/L (ref 0.44–3.98)

## 2024-08-30 PROCEDURE — 82947 ASSAY GLUCOSE BLOOD QUANT: CPT

## 2024-08-30 PROCEDURE — 97110 THERAPEUTIC EXERCISES: CPT | Mod: GP

## 2024-08-30 PROCEDURE — 2500000001 HC RX 250 WO HCPCS SELF ADMINISTERED DRUGS (ALT 637 FOR MEDICARE OP): Performed by: SURGERY

## 2024-08-30 PROCEDURE — 84439 ASSAY OF FREE THYROXINE: CPT | Performed by: NURSE PRACTITIONER

## 2024-08-30 PROCEDURE — 2500000004 HC RX 250 GENERAL PHARMACY W/ HCPCS (ALT 636 FOR OP/ED): Performed by: SURGERY

## 2024-08-30 PROCEDURE — 82436 ASSAY OF URINE CHLORIDE: CPT | Performed by: PHYSICIAN ASSISTANT

## 2024-08-30 PROCEDURE — 97535 SELF CARE MNGMENT TRAINING: CPT | Mod: GO | Performed by: OCCUPATIONAL THERAPIST

## 2024-08-30 PROCEDURE — 99233 SBSQ HOSP IP/OBS HIGH 50: CPT | Performed by: SURGERY

## 2024-08-30 PROCEDURE — 2500000002 HC RX 250 W HCPCS SELF ADMINISTERED DRUGS (ALT 637 FOR MEDICARE OP, ALT 636 FOR OP/ED): Performed by: SURGERY

## 2024-08-30 PROCEDURE — 2020000001 HC ICU ROOM DAILY

## 2024-08-30 PROCEDURE — 80069 RENAL FUNCTION PANEL: CPT | Performed by: SURGERY

## 2024-08-30 PROCEDURE — 83935 ASSAY OF URINE OSMOLALITY: CPT

## 2024-08-30 PROCEDURE — 97530 THERAPEUTIC ACTIVITIES: CPT | Mod: GP

## 2024-08-30 PROCEDURE — 83735 ASSAY OF MAGNESIUM: CPT | Performed by: NURSE PRACTITIONER

## 2024-08-30 PROCEDURE — 36415 COLL VENOUS BLD VENIPUNCTURE: CPT | Performed by: SURGERY

## 2024-08-30 PROCEDURE — 2500000004 HC RX 250 GENERAL PHARMACY W/ HCPCS (ALT 636 FOR OP/ED)

## 2024-08-30 PROCEDURE — 84443 ASSAY THYROID STIM HORMONE: CPT | Performed by: NURSE PRACTITIONER

## 2024-08-30 PROCEDURE — 83935 ASSAY OF URINE OSMOLALITY: CPT | Performed by: PHYSICIAN ASSISTANT

## 2024-08-30 PROCEDURE — 2500000001 HC RX 250 WO HCPCS SELF ADMINISTERED DRUGS (ALT 637 FOR MEDICARE OP)

## 2024-08-30 PROCEDURE — 2500000004 HC RX 250 GENERAL PHARMACY W/ HCPCS (ALT 636 FOR OP/ED): Performed by: PHYSICIAN ASSISTANT

## 2024-08-30 PROCEDURE — 83930 ASSAY OF BLOOD OSMOLALITY: CPT | Performed by: SURGERY

## 2024-08-30 PROCEDURE — 84100 ASSAY OF PHOSPHORUS: CPT | Performed by: SURGERY

## 2024-08-30 RX ORDER — SODIUM CHLORIDE 9 MG/ML
25 INJECTION, SOLUTION INTRAVENOUS CONTINUOUS
Status: DISCONTINUED | OUTPATIENT
Start: 2024-08-30 | End: 2024-09-02

## 2024-08-30 RX ORDER — SODIUM CHLORIDE 1000 MG
1000 TABLET, SOLUBLE MISCELLANEOUS
Status: DISCONTINUED | OUTPATIENT
Start: 2024-08-30 | End: 2024-09-05 | Stop reason: HOSPADM

## 2024-08-30 RX ORDER — INSULIN LISPRO 100 [IU]/ML
0-15 INJECTION, SOLUTION INTRAVENOUS; SUBCUTANEOUS EVERY 4 HOURS
Status: DISCONTINUED | OUTPATIENT
Start: 2024-08-31 | End: 2024-08-31

## 2024-08-30 RX ORDER — PREDNISONE 2.5 MG/1
3.75 TABLET ORAL 2 TIMES DAILY
Status: DISCONTINUED | OUTPATIENT
Start: 2024-08-30 | End: 2024-09-05 | Stop reason: HOSPADM

## 2024-08-30 RX ORDER — FUROSEMIDE 10 MG/ML
20 INJECTION INTRAMUSCULAR; INTRAVENOUS ONCE
Status: COMPLETED | OUTPATIENT
Start: 2024-08-30 | End: 2024-08-30

## 2024-08-30 RX ADMIN — INSULIN HUMAN 2 UNITS: 100 INJECTION, SOLUTION PARENTERAL at 20:42

## 2024-08-30 RX ADMIN — SODIUM CHLORIDE 1 G: 1 TABLET ORAL at 17:01

## 2024-08-30 RX ADMIN — ENOXAPARIN SODIUM 30 MG: 100 INJECTION SUBCUTANEOUS at 20:46

## 2024-08-30 RX ADMIN — VENLAFAXINE HYDROCHLORIDE 37.5 MG: 37.5 CAPSULE, EXTENDED RELEASE ORAL at 09:50

## 2024-08-30 RX ADMIN — OXYCODONE HYDROCHLORIDE 5 MG: 5 TABLET ORAL at 14:55

## 2024-08-30 RX ADMIN — POTASSIUM PHOSPHATE, MONOBASIC 500 MG: 500 TABLET, SOLUBLE ORAL at 13:35

## 2024-08-30 RX ADMIN — POLYETHYLENE GLYCOL 3350 17 G: 17 POWDER, FOR SOLUTION ORAL at 09:49

## 2024-08-30 RX ADMIN — LABETALOL HYDROCHLORIDE 10 MG: 5 INJECTION, SOLUTION INTRAVENOUS at 23:57

## 2024-08-30 RX ADMIN — LABETALOL HYDROCHLORIDE 10 MG: 5 INJECTION, SOLUTION INTRAVENOUS at 00:42

## 2024-08-30 RX ADMIN — OXYCODONE HYDROCHLORIDE 5 MG: 5 TABLET ORAL at 06:49

## 2024-08-30 RX ADMIN — OXYCODONE HYDROCHLORIDE 5 MG: 5 TABLET ORAL at 19:59

## 2024-08-30 RX ADMIN — ACETAMINOPHEN 975 MG: 325 TABLET ORAL at 17:01

## 2024-08-30 RX ADMIN — INSULIN HUMAN 4 UNITS: 100 INJECTION, SOLUTION PARENTERAL at 17:25

## 2024-08-30 RX ADMIN — ACETAMINOPHEN 975 MG: 325 TABLET ORAL at 06:00

## 2024-08-30 RX ADMIN — ATORVASTATIN CALCIUM 40 MG: 40 TABLET, FILM COATED ORAL at 20:41

## 2024-08-30 RX ADMIN — LABETALOL HYDROCHLORIDE 10 MG: 5 INJECTION, SOLUTION INTRAVENOUS at 06:46

## 2024-08-30 RX ADMIN — ENOXAPARIN SODIUM 30 MG: 100 INJECTION SUBCUTANEOUS at 09:50

## 2024-08-30 RX ADMIN — ACETAMINOPHEN 975 MG: 325 TABLET ORAL at 12:46

## 2024-08-30 RX ADMIN — SODIUM CHLORIDE 500 ML: 9 INJECTION, SOLUTION INTRAVENOUS at 22:32

## 2024-08-30 RX ADMIN — SODIUM CHLORIDE 1 G: 1 TABLET ORAL at 12:46

## 2024-08-30 RX ADMIN — POTASSIUM PHOSPHATE, MONOBASIC 500 MG: 500 TABLET, SOLUBLE ORAL at 09:50

## 2024-08-30 RX ADMIN — METOPROLOL SUCCINATE 25 MG: 25 TABLET, EXTENDED RELEASE ORAL at 09:49

## 2024-08-30 RX ADMIN — POTASSIUM PHOSPHATE, MONOBASIC 500 MG: 500 TABLET, SOLUBLE ORAL at 20:42

## 2024-08-30 RX ADMIN — HALOPERIDOL 0.5 MG: 0.5 TABLET ORAL at 20:41

## 2024-08-30 RX ADMIN — INSULIN HUMAN 8 UNITS: 100 INJECTION, SOLUTION PARENTERAL at 13:32

## 2024-08-30 RX ADMIN — FUROSEMIDE 20 MG: 10 INJECTION, SOLUTION INTRAVENOUS at 09:50

## 2024-08-30 RX ADMIN — METOPROLOL SUCCINATE 25 MG: 25 TABLET, EXTENDED RELEASE ORAL at 20:42

## 2024-08-30 RX ADMIN — SODIUM CHLORIDE 100 ML/HR: 9 INJECTION, SOLUTION INTRAVENOUS at 00:48

## 2024-08-30 RX ADMIN — POTASSIUM PHOSPHATE, MONOBASIC 500 MG: 500 TABLET, SOLUBLE ORAL at 17:01

## 2024-08-30 ASSESSMENT — PAIN SCALES - GENERAL
PAINLEVEL_OUTOF10: 0 - NO PAIN
PAINLEVEL_OUTOF10: 1
PAINLEVEL_OUTOF10: 0 - NO PAIN
PAINLEVEL_OUTOF10: 10 - WORST POSSIBLE PAIN
PAINLEVEL_OUTOF10: 10 - WORST POSSIBLE PAIN

## 2024-08-30 ASSESSMENT — PAIN SCALES - WONG BAKER
WONGBAKER_NUMERICALRESPONSE: HURTS WHOLE LOT
WONGBAKER_NUMERICALRESPONSE: HURTS LITTLE BIT

## 2024-08-30 ASSESSMENT — COGNITIVE AND FUNCTIONAL STATUS - GENERAL
WALKING IN HOSPITAL ROOM: TOTAL
WALKING IN HOSPITAL ROOM: A LOT
STANDING UP FROM CHAIR USING ARMS: TOTAL
PERSONAL GROOMING: A LOT
DRESSING REGULAR LOWER BODY CLOTHING: TOTAL
MOVING FROM LYING ON BACK TO SITTING ON SIDE OF FLAT BED WITH BEDRAILS: A LOT
TOILETING: TOTAL
DRESSING REGULAR UPPER BODY CLOTHING: A LOT
TURNING FROM BACK TO SIDE WHILE IN FLAT BAD: A LOT
MOVING TO AND FROM BED TO CHAIR: A LOT
TOILETING: A LOT
DRESSING REGULAR LOWER BODY CLOTHING: A LOT
HELP NEEDED FOR BATHING: A LOT
EATING MEALS: A LITTLE
MOBILITY SCORE: 12
CLIMB 3 TO 5 STEPS WITH RAILING: A LOT
DAILY ACTIVITIY SCORE: 14
CLIMB 3 TO 5 STEPS WITH RAILING: TOTAL
MOVING TO AND FROM BED TO CHAIR: TOTAL
STANDING UP FROM CHAIR USING ARMS: A LOT
PERSONAL GROOMING: A LOT
DAILY ACTIVITIY SCORE: 11
HELP NEEDED FOR BATHING: A LOT
TURNING FROM BACK TO SIDE WHILE IN FLAT BAD: A LOT
DRESSING REGULAR UPPER BODY CLOTHING: A LOT
MOBILITY SCORE: 8
MOVING FROM LYING ON BACK TO SITTING ON SIDE OF FLAT BED WITH BEDRAILS: A LOT

## 2024-08-30 ASSESSMENT — PAIN DESCRIPTION - LOCATION: LOCATION: RIB CAGE

## 2024-08-30 ASSESSMENT — PAIN - FUNCTIONAL ASSESSMENT
PAIN_FUNCTIONAL_ASSESSMENT: 0-10
PAIN_FUNCTIONAL_ASSESSMENT: 0-10

## 2024-08-30 ASSESSMENT — ACTIVITIES OF DAILY LIVING (ADL): HOME_MANAGEMENT_TIME_ENTRY: 50

## 2024-08-30 ASSESSMENT — PAIN DESCRIPTION - ORIENTATION: ORIENTATION: RIGHT;LEFT

## 2024-08-30 NOTE — PROGRESS NOTES
[facility]  TRAUMA SERVICE - PROGRESS NOTE    Patient Name: Jennifer Mancini  MRN: [id number]  Admit Date: 824  : 1940  AGE: 84 y.o.   GENDER: female  ==============================================================================  MECHANISM OF INJURY / CHIEF COMPLAINT:   Patient is a 84 F s/p glf from standing at her assisted living facility. Patient taken to OSH where they found a T8 vertebral body fracture, transferred here for further evaluation. EMS told nursing patient had a fall last week as well.   LOC (yes/no?): Unknown  Anticoagulant / Anti-platelet Rx? (for what dx?): Denies  Referring Facility Name (N/A for scene EMR run):  [place]      INJURIES:   T8 vertebral body fracture  Subacute right-sided rib fractures      OTHER MEDICAL PROBLEMS:  CHF with reserved EF  HTN  Schizoaffective disorder  HLD  DM2      INCIDENTAL FINDINGS:  Large hiatal hernia     PROCEDURES:  None    ==============================================================================  TODAY'S ASSESSMENT AND PLAN OF CARE:  Continues with montana this morning. RFP with persistent hyponatremia with improvement in hypokalemia, hypophosphatemia. Patient has been hypertensive and receiving PRN labetalols which may contribute to hypokalemia. Will trial her on lasix to determine if this is SIADH mediated.  She does take low dose steroid which we have been holding so may be hyponatremia 2/2 steroid deficiency hyponatremia, and may consider restarting her. Today we will collect urine osm/serum osm to help delineate cause of hyponatremia. UOP has been appropriate.     #T8 vertebral body fx  -MRI reviewed by nskei, recommend TLSO hard brace (script in chart, orthotics needs called )  - XR Upright with brace on for baseline resulted. No traumatic malalignment or retropulsion into spinal canal. Per nsgy OK for DVT PPX. No neurosurgical intervention at this time. Follow up with their team in 6 weeks with  repeat Uprights prior to follow up date.   - lasix 20 mg IV  - urine osmolality, serum osmolality, daily weights  -cannot get out of bed without brace   -PT/OT: moderate intensity PT with low intensity OT    #fen/gi/gu  -reg diet  -Maintain montana    #dvt ppx  -lvx  -scds    Dispo: continue RNF    Pt discussed with Dr. Janak Blue MD  Trauma [id number]  ==============================================================================  CHIEF COMPLAINT / OVERNIGHT EVENTS:   NAOE. Required labetalol PRNs for SBP>180.     MEDICAL HISTORY / ROS:  Admission history and ROS reviewed. Pertinent changes as follows:      PHYSICAL EXAM:  Heart Rate:  []   Temp:  [36.2 °C (97.2 °F)-36.9 °C (98.4 °F)]   Resp:  [18]   BP: (151-187)/(80-96)   SpO2:  [92 %-95 %]     Physical Exam  HENT:      Head: Normocephalic.      Right Ear: External ear normal.      Left Ear: External ear normal.      Nose: Nose normal.      Mouth/Throat:      Mouth: Mucous membranes are dry.      Pharynx: Oropharynx is clear.   Eyes:      Pupils: Pupils are equal, round, and reactive to light.   Cardiovascular:      Rate and Rhythm: Normal rate.      Pulses: Normal pulses.   Pulmonary:      Effort: Pulmonary effort is normal.   Abdominal:      General: Abdomen is flat.      Palpations: Abdomen is soft.   Genitourinary:     Comments: [place] in place draining CYU  Musculoskeletal:         General: Normal range of motion.      Cervical back: Normal range of motion.   Skin:     General: Skin is warm.   Neurological:      General: No focal deficit present.      Mental Status: She is alert and oriented to person, place, and time.      Comments: In TSLO rigid brace         IMAGING SUMMARY:  (summary of new imaging findings, not a copy of dictation)  MRI T spine: Redemonstration of a fracture through the superior endplate of T8 with resulting disruption of the anterior longitudinal ligament at  the level of T7-T8. There is no striking retropulsion  into the spinal  canal and there is no spinal canal stenosis. Evaluation for fractures  involving the posterior elements is somewhat degraded, noting this,  the fracture line extends into the junction between the left T8  vertebral body and pedicle, also seen on the prior CT. Chronic mild compression fractures of the T12 and L1 vertebral bodies.        LABS:  Results from last 7 days   Lab Units 08/29/24  0943 08/27/24  0910 08/26/24  0729 08/24/24  1223 08/23/24  2207   WBC AUTO x10*3/uL 8.4 9.0 9.3   < > 14.8*   HEMOGLOBIN g/dL 10.4* 10.2* 9.9*   < > 11.1*   HEMATOCRIT % 31.2* 30.5* 31.6*   < > 33.3*   PLATELETS AUTO x10*3/uL 292 297 262   < > 336   NEUTROS PCT AUTO %  --   --   --   --  86.9   LYMPHS PCT AUTO %  --   --   --   --  5.8   MONOS PCT AUTO %  --   --   --   --  5.6   EOS PCT AUTO %  --   --   --   --  0.5    < > = values in this interval not displayed.         Results from last 7 days   Lab Units 08/30/24  0855 08/30/24  0125 08/29/24  1829 08/25/24  1057 08/24/24  1814   SODIUM mmol/L 122* 125* 124*   < > 134*   POTASSIUM mmol/L 3.2* 3.7 3.9   < > 3.4*   CHLORIDE mmol/L 92* 92* 91*   < > 98   CO2 mmol/L 22 24 22   < > 27   BUN mg/dL 7 7 9   < > 7   CREATININE mg/dL 0.27* 0.20* 0.25*   < > 0.40*   CALCIUM mg/dL 7.7* 7.9* 8.2*   < > 8.5*   PROTEIN TOTAL g/dL  --   --   --   --  5.8*   BILIRUBIN TOTAL mg/dL  --   --   --   --  0.5   ALK PHOS U/L  --   --   --   --  82   ALT U/L  --   --   --   --  11   AST U/L  --   --   --   --  11   GLUCOSE mg/dL 178* 149* 178*   < > 196*    < > = values in this interval not displayed.     Results from last 7 days   Lab Units 08/24/24  1814   BILIRUBIN TOTAL mg/dL 0.5       I have reviewed all medications, laboratory results, and imaging pertinent for today's encounter.

## 2024-08-30 NOTE — CARE PLAN
The patient's goals for the shift include      The clinical goals for the shift include remain fall free

## 2024-08-30 NOTE — PROGRESS NOTES
Pre-cert obtained for patient to admit to [facility]. Patient is unable to discharge, as sodium is unstable at this time. Facility made aware. SW will continue to follow to facilitate discharge plan.       DESIREE Long

## 2024-08-30 NOTE — CARE PLAN
The patient's goals for the shift include      The clinical goals for the shift include pt will remain free from falls this shift

## 2024-08-30 NOTE — PROGRESS NOTES
Occupational Therapy    Treatment Completed     Patient Name: Jennifer Mancini  MRN: [id number]  Today's Date: 8/30/2024  Room: [address] [place], [place] [address]  Time Calculation  Start Time: 1230  Stop Time: 1320  Time Calculation (min): 50 min    Assessment  IP OT Assessment  OT Assessment: Pt. recieved and was wearing TLSO brace.Pt. is limited by pain and has decreased ADL status;decreased upper extremity range of motion;decreased upper extremity strength;decreased safe judgment during ADL;decreased cognition;decreased functional mobility;Decreased gross motor control;decreased IADLs. Pt. was willing to participate in EOB for eating her meal but limited by pain.  Prognosis: Fair  Barriers to Discharge: Decreased caregiver support  Evaluation/Treatment Tolerance: Patient limited by pain  Medical Staff Made Aware: Yes  End of Session Communication: Bedside nurse  End of Session Patient Position: Bed, 3 rail up, Alarm on  Plan:  Inpatient Plan  Treatment Interventions: ADL retraining, Functional transfer training, UE strengthening/ROM, Cognitive reorientation, Patient/family training  OT Frequency: 3 times per week  OT Discharge Recommendations: Moderate intensity level of continued care  Equipment Recommended upon Discharge: Wheeled walker  OT Recommended Transfer Status:  (NT)  OT - OK to Discharge: Yes  OT Assessment  Prognosis: Fair  Barriers to Discharge: Decreased caregiver support  Evaluation/Treatment Tolerance: Patient limited by pain  Medical Staff Made Aware: Yes    Subjective   Current Problem:  1. Closed fracture of eighth thoracic vertebra, unspecified fracture morphology, initial encounter (Multi)  Brace    XR thoracolumbar spine 2 views        General:  Reason for Referral: glf from standing at her assisted living facility. Patient taken to OSH where they found a T8 vertebral body fracture, transferred here for further evaluation  Past Medical History Relevant to Rehab: Anemia 2/9/2024    HTN (hypertension) 2/9/2024    CAD  "(coronary artery disease) 2/9/2024    MI (myocardial infarction) (Multi) 2/9/2024    Murmur 2/9/2024    Aortic stenosis 2/9/2024. Closed stable burst fracture of third lumbar vertebra with routine healing 2/19/2024, Type 2 diabetes mellitus (Multi) 2/19/2024    Weakness 2/19/2024    Depression 2/19/2024    Schizoaffective disorder (Multi) 2/19/2024    Urinary frequency 2/20/2024    Left supracondylar humerus fracture, closed, initial encounter 2/8/2024  Prior to Session Communication: Bedside nurse  Patient Position Received: Bed, 3 rail up  Family/Caregiver Present: No  General Comment: Pt. supine, wearing TLSO   Precautions:  UE Weight Bearing Status: Left Non-Weight Bearing  Medical Precautions: Fall precautions, Spinal precautions  Braces Applied: TLSO    Vital Signs:  Vital Signs (Past 2hrs)        Date/Time Vitals Session Patient Position Pulse Resp SpO2 BP MAP (mmHg)    08/30/24 1623 --  --  91  18  89 %  158/90  113                   Pain:  Pain Assessment  Pain Assessment: 0-10  0-10 (Numeric) Pain Score: 10 - Worst possible pain (Pt. reported brace is hurting.)  Pain Type: Acute pain  Pain Location: Back  Pain Interventions: Medication (See MAR)  Lines/Tubes/Drains:  Urethral Catheter (Active)   Number of days: 2         Objective   Cognition:  Overall Cognitive Status: Impaired, Impaired at baseline  Arousal/Alertness: Appropriate responses to stimuli  Orientation Level: Disoriented to time  Following Commands: Follows one step commands without difficulty  Safety Judgment: Unable to assess  Problem Solving: Assistance required to generate solutions  Attention: Exceptions to WFL  Alternating Attention: Impaired  Sustained Attention: Impaired  Processing Speed: Delayed  Cognition Test Scores  Cognition Tests: Cognition Test Performed  SBT Test Score: Pt. scored a 24 on the SBT \"impaired consistent with dementia. \"         Activities of Daily Living: Feeding  Feeding Level of Assistance: Setup, Close " supervision  Feeding Comments: Pt.'s brace was too high preventing proper swallowing when sitting EOB.     Balance:  Dynamic Sitting Balance  Dynamic Sitting-Balance Support: No upper extremity supported  Dynamic Sitting-Level of Assistance: Close supervision  Dynamic Sitting-Balance: Reaching for objects, Reaching across midline  Dynamic Sitting-Comments: Pt followed spinal precautions while Pt. was crossing midline for cutting food, taking medication, and drinking water.  Bed Mobility/Transfers: Bed Mobility/Transfers: Bed Mobility  Bed Mobility: Yes  Bed Mobility 1  Bed Mobility 1: Rolling right  Level of Assistance 1: Moderate assistance, +2  Bed Mobility Comments 1: HOB lowered, log roll technique  Bed Mobility 2  Bed Mobility  2: Supine to sitting  Level of Assistance 2: Moderate assistance  Bed Mobility Comments 2: Log roll technique  Bed Mobility 3  Bed Mobility 3: Scooting  Level of Assistance 3: Dependent, +2  Bed Mobility Comments 3: boost towards HOB  Bed Mobility 5  Bed Mobility 5: Sitting to supine  Level of Assistance 5: Moderate assistance  Bed Mobility Comments 5: adjusted brace under arms   and    IADL's:   Occupation: Retired  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  IADL Comments: Pt. reported having help with all IADLs.      Outcome Measures: [facility] Daily Activity  Putting on and taking off regular lower body clothing: Total  Bathing (including washing, rinsing, drying): A lot  Putting on and taking off regular upper body clothing: A lot  Toileting, which includes using toilet, bedpan or urinal: Total  Taking care of personal grooming such as brushing teeth: A lot  Eating Meals: A little  Daily Activity - Total Score: 11    Activity Tolerance:  Endurance: Tolerates 10 - 20 min exercise with multiple rests  Activity Tolerance Comments: Pt. was in pain with  the brace pressing on her hips and back side.    Education Documentation  No documentation found.  Education Comments  No comments found.        Goals:   Encounter Problems       Encounter Problems (Active)       ADLs       Patient will perform UB and LB bathing with moderate assist level of assistance and shower chair.       Start:  08/26/24    Expected End:  09/09/24            Patient with complete upper body dressing with moderate assist level of assistance donning and doffing all UE clothes with  supported sitting       Start:  08/26/24    Expected End:  09/09/24            Patient with complete lower body dressing with moderate assist level of assistance donning and doffing all LE clothes  with  while supported sitting       Start:  08/26/24    Expected End:  09/09/24            Patient will feed self with stand by assist level of assistance. (Met)       Start:  08/26/24    Expected End:  09/09/24    Resolved:  08/30/24         Patient will complete daily grooming tasks brushing teeth with stand by assist level of assistance and PRN adaptive equipment while supported sitting.       Start:  08/26/24    Expected End:  09/09/24            Patient will complete toileting including hygiene clothing management/hygiene with moderate assist level of assistance.       Start:  08/26/24    Expected End:  09/09/24               BALANCE       Pt will maintain dynamic standing balance during ADL task with min assist level of assistance in order to demonstrate decreased risk of falling and improved postural control.       Start:  08/26/24    Expected End:  09/09/24            Patient will maintain static standing balance during ADL task with min assist level of assistance drop down in order to demonstrate decreased risk of falling and improved postural control.       Start:  08/26/24    Expected End:  09/09/24            Patient will maintain static sitting at EOB during ADL tasks with min A.       Start:  08/30/24     Expected End:  09/09/24                Patient will maintain dynamic sitting at EOB during ADL tasks with min A.       Start:  08/30/24    Expected End:  09/09/24                   COGNITION/SAFETY       Patient will recall and adhere to spinal precautions during all functional mobility/ADL tasks in order to demonstrate improved understanding and promote healing post op (Progressing)       Start:  08/26/24    Expected End:  09/09/24            Patient will follow Two step commands to allow improved ADL performance. (Progressing)       Start:  08/26/24    Expected End:  09/09/24            Patient will demonstrated orientation x 3 with verbal cues.       Start:  08/26/24    Expected End:  09/09/24       ORIENTATION            MOBILITY       Patient will perform Functional mobility around Household distances/Community Distances with min assist level of assistance and front wheeled walker in order to improve safety and functional mobility.       Start:  08/26/24    Expected End:  09/09/24            Pt. will perform bed mobility min A using log roll techniques and bed rails.        Start:  08/30/24    Expected End:  09/09/24                   TRANSFERS       Patient will perform bed mobility min assist level of assistance and bed rails and log rolling in order to improve safety and independence with mobility (Progressing)       Start:  08/26/24    Expected End:  09/09/24            Patient will complete functional transfer with front wheeled walker with min assist level of assistance.       Start:  08/26/24    Expected End:  09/09/24            Patient will complete sit to stand transfer with moderate assist level of assistance and front wheeled walker in order to improve safety and prepare for out of bed mobility.       Start:  08/26/24    Expected End:  09/09/24                 OT Student under direct supervision by OTR/L        08/30/24 at 5:54 PM   ALFREDO AGUILAR   Rehab Office: [phone]

## 2024-08-30 NOTE — PROGRESS NOTES
Physical Therapy    Physical Therapy Treatment    Patient Name: Jennifer Mancini  MRN: [id number]  Today's Date: 8/30/2024  Time Calculation  Start Time: 0905  Stop Time: 0937  Time Calculation (min): 32 min         Assessment/Plan   PT Assessment  End of Session Communication: Bedside nurse  End of Session Patient Position: Bed, 3 rail up, Alarm on     PT Plan  Treatment/Interventions: Bed mobility, Transfer training, Gait training, Balance training, Stair training, Strengthening, Endurance training, Range of motion, Therapeutic exercise, Therapeutic activity, Home exercise program, Positioning  PT Plan: Ongoing PT  PT Frequency: 3 times per week  PT Discharge Recommendations: Moderate intensity level of continued care  PT Recommended Transfer Status: Assist x2  PT - OK to Discharge: Yes      General Visit Information:   PT  Visit  PT Received On: 08/30/24  General  Family/Caregiver Present: No  Prior to Session Communication: Bedside nurse  Patient Position Received: Bed, 3 rail up, Alarm on  General Comment: Pt supine upon arrival. Pt demonstrated impaired cognition, strength, and coordination. Pt reported 10/10 pain, however was unable to determine where the pain was located. Session was spent completing exercises in supine and seated positions, re-adjusting TLSO brace, and positioning. Pt will continue to benefit from continued PT services to improve mobility.    Subjective   Precautions:  Precautions  Medical Precautions: Fall precautions  Post-Surgical Precautions: Spinal precautions  Braces Applied: TLSO    Vital Signs (Past 2hrs)        Date/Time Vitals Session Patient Position Pulse Resp SpO2 BP MAP (mmHg)    08/30/24 0905 --  --  92  --  --  --  --                         Objective     Cognition:  Cognition  Overall Cognitive Status: Impaired, Impaired at baseline  Orientation Level: Disoriented to time, Disoriented to place  Coordination:     Postural Control:  Static Sitting Balance  Static  Sitting-Balance Support: Feet supported, Right upper extremity supported  Static Sitting-Level of Assistance: Contact guard  Static Sitting-Comment/Number of Minutes: Pt sat for 5 minutes  Dynamic Sitting Balance  Dynamic Sitting-Balance Support: Feet supported, Right upper extremity supported  Dynamic Sitting-Level of Assistance: Contact guard  Dynamic Sitting-Balance: Trunk control activities  Static Standing Balance  Static Standing-Balance Support: Bilateral upper extremity supported  Static Standing-Level of Assistance: Moderate assistance (x2)  Static Standing-Comment/Number of Minutes: Pt stood for 1 minute with mod a x2  Dynamic Standing Balance  Dynamic Standing-Balance Support: Bilateral upper extremity supported  Dynamic Standing-Level of Assistance: Moderate assistance (x2)  Dynamic Standing-Balance:  (lateral steps)    Activity Tolerance:  Activity Tolerance  Endurance: Tolerates 10 - 20 min exercise with multiple rests  Treatments:  Therapeutic Exercise  Therapeutic Exercise Performed: Yes  Therapeutic Exercise Activity 1: Ankle pumps bilateral 10x, supine  Therapeutic Exercise Activity 2: Heel slides bilateral 10x, supine  Therapeutic Exercise Activity 3: Knee extensions bilateral 10x, seated    Therapeutic Activity  Therapeutic Activity Performed: Yes  Therapeutic Activity 1: Pt completed multiple trials rolling R and L for the adjustment of TLSO brace, mod a x2 both directions  Therapeutic Activity 2: Pt sat EOB for 5 minutes, CGA verbal cues for upright posture    Bed Mobility  Bed Mobility: Yes  Bed Mobility 1  Bed Mobility 1: Rolling right, Rolling left  Level of Assistance 1: Moderate assistance, Minimal verbal cues, +2  Bed Mobility Comments 1: Log roll technique (Rolling right x3, rolling left x2)  Bed Mobility 2  Bed Mobility  2: Supine to sitting, Sitting to supine  Level of Assistance 2: Moderate assistance, +2, Moderate verbal cues  Bed Mobility Comments 2: Log roll technique  Bed Mobility  3  Bed Mobility 3: Scooting  Level of Assistance 3: +2, Dependent    Ambulation/Gait Training  Ambulation/Gait Training Performed: Yes  Ambulation/Gait Training 1  Surface 1: Level tile  Device 1: No device (Bilateral arm in arm)  Assistance 1: Maximum assistance, Moderate verbal cues (x2)  Quality of Gait 1: Antalgic, Narrow base of support, Decreased step length  Comments/Distance (ft) 1: Pt completed one side step for better positioning prior to getting into bed  Transfers  Transfer: Yes  Transfer 1  Transfer From 1: Bed to  Transfer to 1: Stand  Technique 1: Sit to stand, Stand to sit  Transfer Device 1:  (Bilateral arm in arm)  Transfer Level of Assistance 1: Moderate verbal cues, +2, Maximum assistance  Trials/Comments 1: L knee buckling (x1 trial)    Outcome Measures:  [facility] Basic Mobility  Turning from your back to your side while in a flat bed without using bedrails: A lot  Moving from lying on your back to sitting on the side of a flat bed without using bedrails: A lot  Moving to and from bed to chair (including a wheelchair): Total  Standing up from a chair using your arms (e.g. wheelchair or bedside chair): Total  To walk in hospital room: Total  Climbing 3-5 steps with railing: Total  Basic Mobility - Total Score: 8    Education Documentation  Body Mechanics, taught by RANJIT Davila at 8/30/2024 10:46 AM.  Learner: Patient  Readiness: Acceptance  Method: Explanation  Response: Needs Reinforcement    Mobility Training, taught by RANJIT Davila at 8/30/2024 10:46 AM.  Learner: Patient  Readiness: Acceptance  Method: Explanation  Response: Needs Reinforcement    Precautions, taught by RANJIT Davila at 8/30/2024 10:46 AM.  Learner: Patient  Readiness: Acceptance  Method: Explanation  Response: Needs Reinforcement    Education Comments  No comments found.        OP EDUCATION:       Encounter Problems       Encounter Problems (Active)       Balance       STG - Maintains static sitting balance  with upper extremity support >/= 10 min with SBA (Progressing)       Start:  08/27/24    Expected End:  09/10/24               Mobility       STG - Patient will ambulate >/= 100ft with minAx1 and LRAD (Progressing)       Start:  08/27/24    Expected End:  09/10/24               PT Transfers       STG - Transfer from bed to chair minAx1 and LRAD (Progressing)       Start:  08/27/24    Expected End:  09/10/24            STG - Patient will perform bed mobility Bart (Progressing)       Start:  08/27/24    Expected End:  09/10/24            STG - Patient will transfer sit to and from stand minAx1 and LRAD (Progressing)       Start:  08/27/24    Expected End:  09/10/24               Safety       LTG - Patient will adhere to hip precautions during ADL's and transfers       Start:  08/26/24            LTG - Patient will demonstrate safety requirements appropriate to situation/environment       Start:  08/26/24            LTG - Patient will utilize safety techniques       Start:  08/26/24            STG - Patient locks brakes on wheelchair       Start:  08/26/24            STG - Patient uses call light consistently to request assistance with transfers       Start:  08/26/24            STG - Patient uses gait belt during all transfers       Start:  08/26/24            Goal 1       Start:  08/26/24            Goal 2       Start:  08/26/24            Goal 3       Start:  08/26/24 08/30/24 at 11:02 AM - BEAU BARRAZA-PT

## 2024-08-31 LAB
ALBUMIN SERPL BCP-MCNC: 2.6 G/DL (ref 3.4–5)
ALBUMIN SERPL BCP-MCNC: 2.6 G/DL (ref 3.4–5)
ALBUMIN SERPL BCP-MCNC: 2.7 G/DL (ref 3.4–5)
ALBUMIN SERPL BCP-MCNC: 2.7 G/DL (ref 3.4–5)
ANION GAP SERPL CALC-SCNC: 10 MMOL/L (ref 10–20)
ANION GAP SERPL CALC-SCNC: 11 MMOL/L (ref 10–20)
ANION GAP SERPL CALC-SCNC: 12 MMOL/L (ref 10–20)
ANION GAP SERPL CALC-SCNC: 14 MMOL/L (ref 10–20)
BUN SERPL-MCNC: 5 MG/DL (ref 6–23)
BUN SERPL-MCNC: 6 MG/DL (ref 6–23)
BUN SERPL-MCNC: 7 MG/DL (ref 6–23)
BUN SERPL-MCNC: 7 MG/DL (ref 6–23)
CA-I BLD-SCNC: 1.04 MMOL/L (ref 1.1–1.33)
CA-I BLD-SCNC: 1.08 MMOL/L (ref 1.1–1.33)
CALCIUM SERPL-MCNC: 7.5 MG/DL (ref 8.6–10.6)
CALCIUM SERPL-MCNC: 7.6 MG/DL (ref 8.6–10.6)
CALCIUM SERPL-MCNC: 7.6 MG/DL (ref 8.6–10.6)
CALCIUM SERPL-MCNC: 7.8 MG/DL (ref 8.6–10.6)
CHLORIDE SERPL-SCNC: 93 MMOL/L (ref 98–107)
CHLORIDE SERPL-SCNC: 94 MMOL/L (ref 98–107)
CHLORIDE SERPL-SCNC: 94 MMOL/L (ref 98–107)
CHLORIDE SERPL-SCNC: 96 MMOL/L (ref 98–107)
CO2 SERPL-SCNC: 22 MMOL/L (ref 21–32)
CO2 SERPL-SCNC: 24 MMOL/L (ref 21–32)
CREAT SERPL-MCNC: 0.2 MG/DL (ref 0.5–1.05)
CREAT SERPL-MCNC: 0.22 MG/DL (ref 0.5–1.05)
CREAT SERPL-MCNC: 0.22 MG/DL (ref 0.5–1.05)
CREAT SERPL-MCNC: 0.24 MG/DL (ref 0.5–1.05)
EGFRCR SERPLBLD CKD-EPI 2021: >90 ML/MIN/1.73M*2
ERYTHROCYTE [DISTWIDTH] IN BLOOD BY AUTOMATED COUNT: 14.1 % (ref 11.5–14.5)
EST. AVERAGE GLUCOSE BLD GHB EST-MCNC: 186 MG/DL
GLUCOSE BLD MANUAL STRIP-MCNC: 104 MG/DL (ref 74–99)
GLUCOSE BLD MANUAL STRIP-MCNC: 107 MG/DL (ref 74–99)
GLUCOSE BLD MANUAL STRIP-MCNC: 144 MG/DL (ref 74–99)
GLUCOSE BLD MANUAL STRIP-MCNC: 150 MG/DL (ref 74–99)
GLUCOSE BLD MANUAL STRIP-MCNC: 221 MG/DL (ref 74–99)
GLUCOSE BLD MANUAL STRIP-MCNC: 242 MG/DL (ref 74–99)
GLUCOSE SERPL-MCNC: 113 MG/DL (ref 74–99)
GLUCOSE SERPL-MCNC: 137 MG/DL (ref 74–99)
GLUCOSE SERPL-MCNC: 199 MG/DL (ref 74–99)
GLUCOSE SERPL-MCNC: 97 MG/DL (ref 74–99)
HBA1C MFR BLD: 8.1 %
HCT VFR BLD AUTO: 31.5 % (ref 36–46)
HGB BLD-MCNC: 10.4 G/DL (ref 12–16)
MAGNESIUM SERPL-MCNC: 1.31 MG/DL (ref 1.6–2.4)
MAGNESIUM SERPL-MCNC: 1.57 MG/DL (ref 1.6–2.4)
MAGNESIUM SERPL-MCNC: 2.22 MG/DL (ref 1.6–2.4)
MCH RBC QN AUTO: 29.5 PG (ref 26–34)
MCHC RBC AUTO-ENTMCNC: 33 G/DL (ref 32–36)
MCV RBC AUTO: 89 FL (ref 80–100)
NRBC BLD-RTO: 0 /100 WBCS (ref 0–0)
OSMOLALITY SERPL: 254 MOSM/KG (ref 280–300)
OSMOLALITY SERPL: 259 MOSM/KG (ref 280–300)
OSMOLALITY UR: 465 MOSM/KG (ref 200–1200)
PHOSPHATE SERPL-MCNC: 2 MG/DL (ref 2.5–4.9)
PHOSPHATE SERPL-MCNC: 2.1 MG/DL (ref 2.5–4.9)
PHOSPHATE SERPL-MCNC: 2.5 MG/DL (ref 2.5–4.9)
PHOSPHATE SERPL-MCNC: 3.4 MG/DL (ref 2.5–4.9)
PLATELET # BLD AUTO: 364 X10*3/UL (ref 150–450)
POTASSIUM SERPL-SCNC: 3.4 MMOL/L (ref 3.5–5.3)
POTASSIUM SERPL-SCNC: 3.5 MMOL/L (ref 3.5–5.3)
POTASSIUM SERPL-SCNC: 3.9 MMOL/L (ref 3.5–5.3)
POTASSIUM SERPL-SCNC: 4.1 MMOL/L (ref 3.5–5.3)
RBC # BLD AUTO: 3.53 X10*6/UL (ref 4–5.2)
SODIUM SERPL-SCNC: 125 MMOL/L (ref 136–145)
SODIUM SERPL-SCNC: 125 MMOL/L (ref 136–145)
SODIUM SERPL-SCNC: 126 MMOL/L (ref 136–145)
SODIUM SERPL-SCNC: 127 MMOL/L (ref 136–145)
T4 FREE SERPL-MCNC: 1.72 NG/DL (ref 0.78–1.48)
WBC # BLD AUTO: 9.3 X10*3/UL (ref 4.4–11.3)

## 2024-08-31 PROCEDURE — 36415 COLL VENOUS BLD VENIPUNCTURE: CPT | Performed by: PHYSICIAN ASSISTANT

## 2024-08-31 PROCEDURE — 85027 COMPLETE CBC AUTOMATED: CPT | Performed by: STUDENT IN AN ORGANIZED HEALTH CARE EDUCATION/TRAINING PROGRAM

## 2024-08-31 PROCEDURE — 2500000004 HC RX 250 GENERAL PHARMACY W/ HCPCS (ALT 636 FOR OP/ED)

## 2024-08-31 PROCEDURE — 83735 ASSAY OF MAGNESIUM: CPT

## 2024-08-31 PROCEDURE — 82330 ASSAY OF CALCIUM: CPT | Performed by: STUDENT IN AN ORGANIZED HEALTH CARE EDUCATION/TRAINING PROGRAM

## 2024-08-31 PROCEDURE — 2500000005 HC RX 250 GENERAL PHARMACY W/O HCPCS: Performed by: PHYSICIAN ASSISTANT

## 2024-08-31 PROCEDURE — 83036 HEMOGLOBIN GLYCOSYLATED A1C: CPT | Performed by: PHYSICIAN ASSISTANT

## 2024-08-31 PROCEDURE — 2500000004 HC RX 250 GENERAL PHARMACY W/ HCPCS (ALT 636 FOR OP/ED): Performed by: PHYSICIAN ASSISTANT

## 2024-08-31 PROCEDURE — 2500000002 HC RX 250 W HCPCS SELF ADMINISTERED DRUGS (ALT 637 FOR MEDICARE OP, ALT 636 FOR OP/ED)

## 2024-08-31 PROCEDURE — 2500000005 HC RX 250 GENERAL PHARMACY W/O HCPCS: Performed by: STUDENT IN AN ORGANIZED HEALTH CARE EDUCATION/TRAINING PROGRAM

## 2024-08-31 PROCEDURE — 84100 ASSAY OF PHOSPHORUS: CPT | Performed by: PHYSICIAN ASSISTANT

## 2024-08-31 PROCEDURE — 99291 CRITICAL CARE FIRST HOUR: CPT | Performed by: SURGERY

## 2024-08-31 PROCEDURE — 80069 RENAL FUNCTION PANEL: CPT | Performed by: PHYSICIAN ASSISTANT

## 2024-08-31 PROCEDURE — 82947 ASSAY GLUCOSE BLOOD QUANT: CPT

## 2024-08-31 PROCEDURE — 2500000001 HC RX 250 WO HCPCS SELF ADMINISTERED DRUGS (ALT 637 FOR MEDICARE OP)

## 2024-08-31 PROCEDURE — 2020000001 HC ICU ROOM DAILY

## 2024-08-31 PROCEDURE — 2500000002 HC RX 250 W HCPCS SELF ADMINISTERED DRUGS (ALT 637 FOR MEDICARE OP, ALT 636 FOR OP/ED): Performed by: PHYSICIAN ASSISTANT

## 2024-08-31 PROCEDURE — 83930 ASSAY OF BLOOD OSMOLALITY: CPT | Performed by: PHYSICIAN ASSISTANT

## 2024-08-31 PROCEDURE — 82330 ASSAY OF CALCIUM: CPT | Performed by: PHYSICIAN ASSISTANT

## 2024-08-31 PROCEDURE — 83735 ASSAY OF MAGNESIUM: CPT | Performed by: PHYSICIAN ASSISTANT

## 2024-08-31 PROCEDURE — 2500000001 HC RX 250 WO HCPCS SELF ADMINISTERED DRUGS (ALT 637 FOR MEDICARE OP): Performed by: STUDENT IN AN ORGANIZED HEALTH CARE EDUCATION/TRAINING PROGRAM

## 2024-08-31 RX ORDER — INSULIN LISPRO 100 [IU]/ML
0-20 INJECTION, SOLUTION INTRAVENOUS; SUBCUTANEOUS EVERY 4 HOURS
Status: DISCONTINUED | OUTPATIENT
Start: 2024-08-31 | End: 2024-09-01

## 2024-08-31 RX ORDER — HYDROMORPHONE HYDROCHLORIDE 0.2 MG/ML
0.2 INJECTION INTRAMUSCULAR; INTRAVENOUS; SUBCUTANEOUS EVERY 4 HOURS PRN
Status: DISCONTINUED | OUTPATIENT
Start: 2024-08-31 | End: 2024-09-01 | Stop reason: RX

## 2024-08-31 RX ORDER — MAGNESIUM SULFATE HEPTAHYDRATE 40 MG/ML
2 INJECTION, SOLUTION INTRAVENOUS ONCE
Status: DISCONTINUED | OUTPATIENT
Start: 2024-08-31 | End: 2024-08-31

## 2024-08-31 RX ORDER — POTASSIUM CHLORIDE 20 MEQ/1
20 TABLET, EXTENDED RELEASE ORAL 2 TIMES DAILY
Status: DISCONTINUED | OUTPATIENT
Start: 2024-08-31 | End: 2024-08-31

## 2024-08-31 RX ORDER — METHOCARBAMOL 500 MG/1
500 TABLET, FILM COATED ORAL EVERY 8 HOURS SCHEDULED
Status: DISCONTINUED | OUTPATIENT
Start: 2024-08-31 | End: 2024-09-03

## 2024-08-31 RX ORDER — MAGNESIUM SULFATE HEPTAHYDRATE 40 MG/ML
2 INJECTION, SOLUTION INTRAVENOUS ONCE
Status: COMPLETED | OUTPATIENT
Start: 2024-08-31 | End: 2024-08-31

## 2024-08-31 RX ORDER — HYDROMORPHONE HYDROCHLORIDE 0.2 MG/ML
INJECTION INTRAMUSCULAR; INTRAVENOUS; SUBCUTANEOUS
Status: COMPLETED
Start: 2024-08-31 | End: 2024-08-31

## 2024-08-31 RX ORDER — CALCIUM CARBONATE 200(500)MG
500 TABLET,CHEWABLE ORAL DAILY
Status: DISCONTINUED | OUTPATIENT
Start: 2024-08-31 | End: 2024-09-05 | Stop reason: HOSPADM

## 2024-08-31 RX ORDER — CALCIUM GLUCONATE 20 MG/ML
1 INJECTION, SOLUTION INTRAVENOUS EVERY 4 HOURS
Status: COMPLETED | OUTPATIENT
Start: 2024-08-31 | End: 2024-08-31

## 2024-08-31 RX ORDER — POTASSIUM CHLORIDE 20 MEQ/1
20 TABLET, EXTENDED RELEASE ORAL ONCE
Status: DISCONTINUED | OUTPATIENT
Start: 2024-08-31 | End: 2024-08-31

## 2024-08-31 RX ORDER — POTASSIUM CHLORIDE 1.5 G/1.58G
20 POWDER, FOR SOLUTION ORAL 2 TIMES DAILY
Status: DISCONTINUED | OUTPATIENT
Start: 2024-08-31 | End: 2024-09-03

## 2024-08-31 RX ORDER — POTASSIUM CHLORIDE 14.9 MG/ML
20 INJECTION INTRAVENOUS
Status: COMPLETED | OUTPATIENT
Start: 2024-08-31 | End: 2024-08-31

## 2024-08-31 RX ORDER — POTASSIUM CHLORIDE 14.9 MG/ML
20 INJECTION INTRAVENOUS
Status: DISCONTINUED | OUTPATIENT
Start: 2024-08-31 | End: 2024-08-31 | Stop reason: SDUPTHER

## 2024-08-31 RX ADMIN — Medication 2 L/MIN: at 08:00

## 2024-08-31 RX ADMIN — HYDROMORPHONE HYDROCHLORIDE 0.2 MG: 0.2 INJECTION, SOLUTION INTRAMUSCULAR; INTRAVENOUS; SUBCUTANEOUS at 10:01

## 2024-08-31 RX ADMIN — PREDNISONE 3.75 MG: 2.5 TABLET ORAL at 20:07

## 2024-08-31 RX ADMIN — CALCIUM CARBONATE (ANTACID) CHEW TAB 500 MG 500 MG: 500 CHEW TAB at 15:44

## 2024-08-31 RX ADMIN — POLYETHYLENE GLYCOL 3350 17 G: 17 POWDER, FOR SOLUTION ORAL at 09:10

## 2024-08-31 RX ADMIN — OXYCODONE HYDROCHLORIDE 5 MG: 5 TABLET ORAL at 14:11

## 2024-08-31 RX ADMIN — POTASSIUM CHLORIDE 20 MEQ: 14.9 INJECTION, SOLUTION INTRAVENOUS at 03:59

## 2024-08-31 RX ADMIN — ENOXAPARIN SODIUM 30 MG: 100 INJECTION SUBCUTANEOUS at 09:09

## 2024-08-31 RX ADMIN — INSULIN LISPRO 9 UNITS: 100 INJECTION, SOLUTION INTRAVENOUS; SUBCUTANEOUS at 16:37

## 2024-08-31 RX ADMIN — HYDROMORPHONE HYDROCHLORIDE 0.2 MG: 0.2 INJECTION, SOLUTION INTRAMUSCULAR; INTRAVENOUS; SUBCUTANEOUS at 23:50

## 2024-08-31 RX ADMIN — HALOPERIDOL 0.5 MG: 0.5 TABLET ORAL at 20:06

## 2024-08-31 RX ADMIN — VENLAFAXINE HYDROCHLORIDE 37.5 MG: 37.5 CAPSULE, EXTENDED RELEASE ORAL at 09:10

## 2024-08-31 RX ADMIN — SODIUM CHLORIDE 1 G: 1 TABLET ORAL at 09:09

## 2024-08-31 RX ADMIN — METOPROLOL SUCCINATE 25 MG: 25 TABLET, EXTENDED RELEASE ORAL at 20:06

## 2024-08-31 RX ADMIN — METOPROLOL SUCCINATE 25 MG: 25 TABLET, EXTENDED RELEASE ORAL at 09:09

## 2024-08-31 RX ADMIN — PREDNISONE 3.75 MG: 2.5 TABLET ORAL at 09:12

## 2024-08-31 RX ADMIN — CALCIUM GLUCONATE 1 G: 20 INJECTION, SOLUTION INTRAVENOUS at 03:58

## 2024-08-31 RX ADMIN — SODIUM CHLORIDE 1 G: 1 TABLET ORAL at 18:21

## 2024-08-31 RX ADMIN — OXYCODONE HYDROCHLORIDE 5 MG: 5 TABLET ORAL at 09:09

## 2024-08-31 RX ADMIN — POTASSIUM CHLORIDE 20 MEQ: 1.5 POWDER, FOR SOLUTION ORAL at 11:58

## 2024-08-31 RX ADMIN — METHOCARBAMOL TABLETS 500 MG: 500 TABLET, COATED ORAL at 14:09

## 2024-08-31 RX ADMIN — ATORVASTATIN CALCIUM 40 MG: 40 TABLET, FILM COATED ORAL at 20:06

## 2024-08-31 RX ADMIN — INSULIN LISPRO 6 UNITS: 100 INJECTION, SOLUTION INTRAVENOUS; SUBCUTANEOUS at 13:45

## 2024-08-31 RX ADMIN — SODIUM CHLORIDE 1 G: 1 TABLET ORAL at 11:58

## 2024-08-31 RX ADMIN — OLANZAPINE 20 MG: 20 TABLET, FILM COATED ORAL at 20:06

## 2024-08-31 RX ADMIN — SODIUM CHLORIDE 15 MMOL: 9 INJECTION, SOLUTION INTRAVENOUS at 03:12

## 2024-08-31 RX ADMIN — PREDNISONE 3.75 MG: 2.5 TABLET ORAL at 03:12

## 2024-08-31 RX ADMIN — METHOCARBAMOL TABLETS 500 MG: 500 TABLET, COATED ORAL at 22:27

## 2024-08-31 RX ADMIN — ACETAMINOPHEN 975 MG: 325 TABLET ORAL at 11:59

## 2024-08-31 RX ADMIN — ACETAMINOPHEN 975 MG: 325 TABLET ORAL at 18:21

## 2024-08-31 RX ADMIN — LABETALOL HYDROCHLORIDE 10 MG: 5 INJECTION, SOLUTION INTRAVENOUS at 05:23

## 2024-08-31 RX ADMIN — ENOXAPARIN SODIUM 30 MG: 100 INJECTION SUBCUTANEOUS at 20:06

## 2024-08-31 RX ADMIN — MAGNESIUM SULFATE HEPTAHYDRATE 2 G: 40 INJECTION, SOLUTION INTRAVENOUS at 03:58

## 2024-08-31 RX ADMIN — MAGNESIUM SULFATE HEPTAHYDRATE 2 G: 40 INJECTION, SOLUTION INTRAVENOUS at 15:44

## 2024-08-31 RX ADMIN — POTASSIUM CHLORIDE 20 MEQ: 1.5 POWDER, FOR SOLUTION ORAL at 20:06

## 2024-08-31 RX ADMIN — POTASSIUM CHLORIDE 20 MEQ: 14.9 INJECTION, SOLUTION INTRAVENOUS at 05:23

## 2024-08-31 RX ADMIN — LATANOPROST 1 DROP: 50 SOLUTION/ DROPS OPHTHALMIC at 23:02

## 2024-08-31 RX ADMIN — SODIUM CHLORIDE 100 ML/HR: 9 INJECTION, SOLUTION INTRAVENOUS at 00:24

## 2024-08-31 ASSESSMENT — PAIN SCALES - GENERAL
PAINLEVEL_OUTOF10: 0 - NO PAIN
PAINLEVEL_OUTOF10: 0 - NO PAIN
PAINLEVEL_OUTOF10: 10 - WORST POSSIBLE PAIN
PAINLEVEL_OUTOF10: 0 - NO PAIN

## 2024-08-31 ASSESSMENT — PAIN - FUNCTIONAL ASSESSMENT
PAIN_FUNCTIONAL_ASSESSMENT: 0-10

## 2024-08-31 ASSESSMENT — PAIN DESCRIPTION - LOCATION: LOCATION: BACK

## 2024-08-31 NOTE — PROGRESS NOTES
[facility]  TRAUMA ICU - PROGRESS NOTE    Patient Name: Jennifer Mancini  MRN: [id number]  Admit Date: 824  : 1940  AGE: 84 y.o.   GENDER: female  ==============================================================================  MECHANISM OF INJURY:   Ground level fall from standing    LOC (yes/no?): unknown  Anticoagulant / Anti-platelet Rx? (for what dx?): Denies  Referring Facility Name (N/A for scene EMR run):  [facility]    INJURIES:   T8 vertebral body fracture  Subacute right-sided rib fractures       OTHER MEDICAL PROBLEMS:  T2DM  HTN  GERD  Depression  Schizoaffective disorder  Osteoporosis  CAD (Hx of MI, Echo in 2024 with EF 60-65%, mild aortic stenosis)    INCIDENTAL FINDINGS:  Large hiatal hernia    PROCEDURES:  None    ==============================================================================  TODAY'S ASSESSMENT AND PLAN OF CARE:  Jennifer Mancini is a 84 y.o. female in the ICU due to: need for frequent lab draws in context of asymptomatic hyponatremia     NEURO/PAIN/SEDATION: scheduled acetaminophen, prn oxycodone  PSYCH:  # Hx of schizoaffective  - continue home haldol 0.5mg nightly and olanzapine 20mg nightly  # Hx of depression  - continue home venlafaxine  #Acute pain  -tylenol and robaxin    RESPIRATORY: History of chronic shortness of breath secondary to obesity/hiatal hernia.  -NC while sleeping as needed    CARDIOVASC:   #CAD   #MI hx  -continue home metoprolol    GI:   soft diet    :   # Hyponatremia, hypochloremia  :: serum osm 259 suggesting true hyponatremia  :: acute on chronic (baseline Na 125-133 over last several years)  :: suspect component of hypovolemia and adrenal insufficiency  :: Urine osm 420, urine sodium 112  - Restart home prednisone (on chronically for dermatomyositis)  - NS 1L bolus and maintenance at 50mL/hr for suspected hypovolemia component  - q6hr RFP  -Monitor I/O     HEMATOLOGIC: KING, Hgb 10.4 on TSICU  admission    ENDOCRINE:   # Osteoporosis  - holding home abaloparatide, consider restarting if prolonged stay    #DM2  -SS3   - Hold home linagliptin, metformin    MUSCULOSKELETAL/SKIN:   # T8 vertebral body fracture  - Maintain TLSO brace  - plan for outpatient follow-up with NSGY in 6 weeks, no other acute   Intervention  # Dermatomyositis  - continue home prednisone, holding methotrexate    INFECTIOUS DISEASE: No active issues. WBC stable    GI PROPHYLAXIS: Not indicated    DVT PROPHYLAXIS: enoxaparin 30mg q12hr (ok per NSGY)    DISPOSITION: TSICU  ==============================================================================  CHIEF COMPLAINT / OVERNIGHT EVENTS / HPI:   Patient is an 84-year-old female who presented after ground-level fall from standing in assisted living facility on 8/24.  Trauma workup most notable for T8 vertebral body fracture.  Neurosurgery placed in TLSO with plan for outpatient follow-up.  On 8/30 admitted to TSICU with concern for worsening hyponatremia, acute on chronic.  Asymptomatic at the time.    MEDICAL HISTORY / ROS:  Admission history and ROS reviewed. Pertinent changes as follows:  None    PHYSICAL EXAM:  Heart Rate:  [71-92]   Temp:  [35.7 °C (96.3 °F)-36.8 °C (98.2 °F)]   Resp:  [16-25]   BP: (132-190)/()   SpO2:  [89 %-100 %]     Physical Exam  General: Somnolent but arousable, A&O x2  CV: 2+ radial and PT pulses. RRR  Chest: TLSO brace in place, symmetrical chest rise  Abdomen: TLSO brace obstructing examination  : [place] in [id number] of yellow urine in canister  MSK: ROM of extremities intact  Neuro: No focal deficits, MAEx4       IMAGING SUMMARY:  No new imaging    I have reviewed all medications, laboratory results, and imaging pertinent for today's encounter.    Lev Tucker MD  PGY1 General Surgery  [facility] [id number]

## 2024-08-31 NOTE — PROGRESS NOTES
[facility]  TRAUMA ICU - ATTENDING PROGRESS NOTE    Patient Name: Jennifer Mancini  MRN: [id number]  : 1940  AGE: 84 y.o.   GENDER: female  ==============================================================================    2024  11:18 AM    I evaluated and examined the patient with the critical care team. As a multidisciplinary team, we discussed all findings, as well as assessment and plan. I personally spent 35 minutes of critical care time excluding procedures at the bedside with the patient. I personally reviewed all labs, results and studies. My independent note with assessment and plan are below:    Neurologic:        GCS 14, baseline       Analgesia: tylenol, oxycodone, robaxin, dilaudid       Sedation: haldol, zyprexa PRN  HEENT: none  Cardiovascular:        PMHx: HTN, HLD (statin), CHF with preserved EF, CAD (metoprolol 25mg Q12)       Vasopressors: none  Pulmonary:        Subacute right rib fractures       Oxygen requirement:  2LNC, desaturation when sleeping  Gastrointestinal:        Diet: Regular diet as tolerated.        GI prophylaxis: none  Renal/:        Hyponatremia  Baseline 126-128  Nacl tablet 1g Q8  NS@50  Q6 labs       Goel catheter: maintain for accurate I/O  Hematologic:       Central line: none       Arterial line: none       DVT prophylaxis: SCD's; lovenox  Musculoskeletal:       Fractures: T8 vertebral fracture       ICU Mobility Score: 5       Skin breakdown: extremities, prehospital       Restraints: none  Endocrine:       Prednisone 3.75mg Q12       Type 2 diabetes mellitus       Glucose control <180, POC glucose checks and insulin sliding scale  ID:       KING       Antibiotics: none    Disposition: The patient remains remains critically ill.    Franki Fulton MD

## 2024-08-31 NOTE — PROGRESS NOTES
[facility]  TRAUMA SERVICE - PROGRESS NOTE    Patient Name: Jennifer Mancini  MRN: [id number]  Admit Date: 824  : 1940  AGE: 84 y.o.   GENDER: female  ==============================================================================  MECHANISM OF INJURY:   83 yo F fall from standing    LOC (yes/no?): unknown  Anticoagulant / Anti-platelet Rx? (for what dx?): no  Referring Facility Name (N/A for scene EMR run):  [place]    INJURIES:   T8 vertebral body fracture  Subacute right-sided rib fractures    OTHER MEDICAL PROBLEMS:  T2DM  HTN  GERD  Depression  Schizoaffective disorder  Osteoporosis  CAD (Hx of MI, Echo in 2024 with EF 60-65%, mild aortic stenosis)    INCIDENTAL FINDINGS:  Large hiatal hernia    PROCEDURES:  None    ==============================================================================  TODAY'S ASSESSMENT AND PLAN OF CARE:  Jennifer Mancini is an 83 yo F s/p fall who sustained T8 vertebral body fracture. Transferred to ICU  for hyponatremia with Na 120.     - Continue correction of acute on chronic hyponatremia (baseline ~130) with NS  - Q6H RFP  - Regular diet  - Continue home prednisone  - DVT ppx - lovenox  - Maintain TLSO brace - follow-up with NSGY in 6 weeks    Discussed with attending surgeon, Dr. Garcia.    Marylin Delvalle MD  PGY4  General Surgery   Trauma Surgery    ==============================================================================  CHIEF COMPLAINT / OVERNIGHT EVENTS:   Transferred to TICU overnight for hyponatremia. Improving with NS bolus and maintenance.     MEDICAL HISTORY / ROS:  Admission history and ROS reviewed. Pertinent changes as follows:  No changes    PHYSICAL EXAM:  Heart Rate:  [71-91]   Temp:  [35.7 °C (96.3 °F)-36.8 °C (98.2 °F)]   Resp:  [16-25]   BP: (132-190)/()   SpO2:  [89 %-100 %]   Physical Exam  Constitutional:       Comments: Sleepy but arousable   Eyes:      Pupils: Pupils are equal, round, and  reactive to light.   Cardiovascular:      Rate and Rhythm: Normal rate and regular rhythm.   Pulmonary:      Effort: Pulmonary effort is normal. No respiratory distress.   Abdominal:      Comments: TLSO in place   Genitourinary:     Comments: Goel in place draining clear light yellow urine  Skin:     General: Skin is warm and dry.   Neurological:      General: No focal deficit present.         IMAGING SUMMARY:  (summary of new imaging findings, not a copy of dictation)  No new imaging.    LABS:  Results from last 7 days   Lab Units 08/29/24  0943 08/27/24  0910 08/26/24  0729   WBC AUTO x10*3/uL 8.4 9.0 9.3   HEMOGLOBIN g/dL 10.4* 10.2* 9.9*   HEMATOCRIT % 31.2* 30.5* 31.6*   PLATELETS AUTO x10*3/uL 292 297 262         Results from last 7 days   Lab Units 08/31/24  0606 08/31/24  0026 08/30/24  1828 08/25/24  1057 08/24/24  1814   SODIUM mmol/L 125* 127* 120*   < > 134*   POTASSIUM mmol/L 3.5 3.4* 3.8   < > 3.4*   CHLORIDE mmol/L 93* 94* 89*   < > 98   CO2 mmol/L 22 24 21   < > 27   BUN mg/dL 6 7 10   < > 7   CREATININE mg/dL 0.22* 0.20* 0.34*   < > 0.40*   CALCIUM mg/dL 7.6* 7.6* 7.6*   < > 8.5*   PROTEIN TOTAL g/dL  --   --   --   --  5.8*   BILIRUBIN TOTAL mg/dL  --   --   --   --  0.5   ALK PHOS U/L  --   --   --   --  82   ALT U/L  --   --   --   --  11   AST U/L  --   --   --   --  11   GLUCOSE mg/dL 113* 97 199*   < > 196*    < > = values in this interval not displayed.     Results from last 7 days   Lab Units 08/24/24  1814   BILIRUBIN TOTAL mg/dL 0.5           I have reviewed all medications, laboratory results, and imaging pertinent for today's encounter.

## 2024-08-31 NOTE — PROCEDURES
[facility]  TRAUMA ICU - ATTENDING PROGRESS NOTE    Patient Name: Jennifer Mancini  MRN: [id number]  : 1940  AGE: 84 y.o.   GENDER: female  ==============================================================================    2024  11:18 AM    I evaluated and examined the patient with the critical care team. As a multidisciplinary team, we discussed all findings, as well as assessment and plan. I personally spent 35 minutes of critical care time excluding procedures at the bedside with the patient. I personally reviewed all labs, results and studies. My independent note with assessment and plan are below:    Neurologic:        GCS 14, baseline       Analgesia: tylenol, oxycodone, robaxin, dilaudid       Sedation: haldol, zyprexa PRN  HEENT: none  Cardiovascular:        PMHx: HTN, HLD (statin), CHF with preserved EF, CAD (metoprolol 25mg Q12)       Vasopressors: none  Pulmonary:        Subacute right rib fractures       Oxygen requirement:  2LNC, desaturation when sleeping  Gastrointestinal:        Diet: Regular diet as tolerated.        GI prophylaxis: none  Renal/:        Hyponatremia  Baseline 126-128  Nacl tablet 1g Q8  NS@50  Q6 labs       Goel catheter: maintain for accurate I/O  Hematologic:       Central line: none       Arterial line: none       DVT prophylaxis: SCD's; lovenox  Musculoskeletal:       Fractures: T8 vertebral fracture       ICU Mobility Score: 5       Skin breakdown: none       Restraints: none  Endocrine:       Prednisone 3.75mg Q12       Type 2 diabetes mellitus       Glucose control <180, POC glucose checks and insulin sliding scale  ID:       KING       Antibiotics: none    Disposition: The patient remains remains critically ill.    Franki Fulton MD  
no fever and no chills.

## 2024-09-01 VITALS
DIASTOLIC BLOOD PRESSURE: 67 MMHG | WEIGHT: 152 LBS | SYSTOLIC BLOOD PRESSURE: 123 MMHG | TEMPERATURE: 96.6 F | RESPIRATION RATE: 18 BRPM | OXYGEN SATURATION: 96 % | HEART RATE: 78 BPM | BODY MASS INDEX: 29.69 KG/M2

## 2024-09-01 LAB
ALBUMIN SERPL BCP-MCNC: 2.5 G/DL (ref 3.4–5)
ALBUMIN SERPL BCP-MCNC: 2.7 G/DL (ref 3.4–5)
ALBUMIN SERPL BCP-MCNC: 2.8 G/DL (ref 3.4–5)
ANION GAP SERPL CALC-SCNC: 10 MMOL/L (ref 10–20)
ANION GAP SERPL CALC-SCNC: 12 MMOL/L (ref 10–20)
ANION GAP SERPL CALC-SCNC: 13 MMOL/L (ref 10–20)
BUN SERPL-MCNC: 5 MG/DL (ref 6–23)
BUN SERPL-MCNC: 5 MG/DL (ref 6–23)
BUN SERPL-MCNC: 6 MG/DL (ref 6–23)
CALCIUM SERPL-MCNC: 7.7 MG/DL (ref 8.6–10.6)
CALCIUM SERPL-MCNC: 7.9 MG/DL (ref 8.6–10.6)
CALCIUM SERPL-MCNC: 8.3 MG/DL (ref 8.6–10.6)
CHLORIDE SERPL-SCNC: 94 MMOL/L (ref 98–107)
CHLORIDE SERPL-SCNC: 96 MMOL/L (ref 98–107)
CHLORIDE SERPL-SCNC: 97 MMOL/L (ref 98–107)
CO2 SERPL-SCNC: 23 MMOL/L (ref 21–32)
CO2 SERPL-SCNC: 24 MMOL/L (ref 21–32)
CO2 SERPL-SCNC: 27 MMOL/L (ref 21–32)
CREAT SERPL-MCNC: 0.23 MG/DL (ref 0.5–1.05)
CREAT SERPL-MCNC: 0.27 MG/DL (ref 0.5–1.05)
CREAT SERPL-MCNC: <0.2 MG/DL (ref 0.5–1.05)
EGFRCR SERPLBLD CKD-EPI 2021: >90 ML/MIN/1.73M*2
EGFRCR SERPLBLD CKD-EPI 2021: >90 ML/MIN/1.73M*2
EGFRCR SERPLBLD CKD-EPI 2021: ABNORMAL ML/MIN/{1.73_M2}
GLUCOSE BLD MANUAL STRIP-MCNC: 134 MG/DL (ref 74–99)
GLUCOSE BLD MANUAL STRIP-MCNC: 139 MG/DL (ref 74–99)
GLUCOSE BLD MANUAL STRIP-MCNC: 153 MG/DL (ref 74–99)
GLUCOSE BLD MANUAL STRIP-MCNC: 185 MG/DL (ref 74–99)
GLUCOSE BLD MANUAL STRIP-MCNC: 226 MG/DL (ref 74–99)
GLUCOSE SERPL-MCNC: 125 MG/DL (ref 74–99)
GLUCOSE SERPL-MCNC: 128 MG/DL (ref 74–99)
GLUCOSE SERPL-MCNC: 159 MG/DL (ref 74–99)
PHOSPHATE SERPL-MCNC: 1.4 MG/DL (ref 2.5–4.9)
PHOSPHATE SERPL-MCNC: 2.2 MG/DL (ref 2.5–4.9)
PHOSPHATE SERPL-MCNC: 2.6 MG/DL (ref 2.5–4.9)
POTASSIUM SERPL-SCNC: 3.3 MMOL/L (ref 3.5–5.3)
POTASSIUM SERPL-SCNC: 3.9 MMOL/L (ref 3.5–5.3)
POTASSIUM SERPL-SCNC: 4 MMOL/L (ref 3.5–5.3)
SODIUM SERPL-SCNC: 128 MMOL/L (ref 136–145)
SODIUM SERPL-SCNC: 128 MMOL/L (ref 136–145)
SODIUM SERPL-SCNC: 129 MMOL/L (ref 136–145)

## 2024-09-01 PROCEDURE — 84100 ASSAY OF PHOSPHORUS: CPT | Performed by: PHYSICIAN ASSISTANT

## 2024-09-01 PROCEDURE — 2500000004 HC RX 250 GENERAL PHARMACY W/ HCPCS (ALT 636 FOR OP/ED): Performed by: STUDENT IN AN ORGANIZED HEALTH CARE EDUCATION/TRAINING PROGRAM

## 2024-09-01 PROCEDURE — 2500000001 HC RX 250 WO HCPCS SELF ADMINISTERED DRUGS (ALT 637 FOR MEDICARE OP): Performed by: STUDENT IN AN ORGANIZED HEALTH CARE EDUCATION/TRAINING PROGRAM

## 2024-09-01 PROCEDURE — 2500000001 HC RX 250 WO HCPCS SELF ADMINISTERED DRUGS (ALT 637 FOR MEDICARE OP): Performed by: SURGERY

## 2024-09-01 PROCEDURE — 80069 RENAL FUNCTION PANEL: CPT | Performed by: STUDENT IN AN ORGANIZED HEALTH CARE EDUCATION/TRAINING PROGRAM

## 2024-09-01 PROCEDURE — 1200000002 HC GENERAL ROOM WITH TELEMETRY DAILY

## 2024-09-01 PROCEDURE — 82947 ASSAY GLUCOSE BLOOD QUANT: CPT

## 2024-09-01 PROCEDURE — 2500000004 HC RX 250 GENERAL PHARMACY W/ HCPCS (ALT 636 FOR OP/ED)

## 2024-09-01 PROCEDURE — 2500000002 HC RX 250 W HCPCS SELF ADMINISTERED DRUGS (ALT 637 FOR MEDICARE OP, ALT 636 FOR OP/ED): Performed by: SURGERY

## 2024-09-01 PROCEDURE — 99232 SBSQ HOSP IP/OBS MODERATE 35: CPT | Performed by: SURGERY

## 2024-09-01 PROCEDURE — 82306 VITAMIN D 25 HYDROXY: CPT | Performed by: INTERNAL MEDICINE

## 2024-09-01 PROCEDURE — 36415 COLL VENOUS BLD VENIPUNCTURE: CPT | Performed by: PHYSICIAN ASSISTANT

## 2024-09-01 PROCEDURE — 2500000002 HC RX 250 W HCPCS SELF ADMINISTERED DRUGS (ALT 637 FOR MEDICARE OP, ALT 636 FOR OP/ED): Performed by: STUDENT IN AN ORGANIZED HEALTH CARE EDUCATION/TRAINING PROGRAM

## 2024-09-01 RX ORDER — METOPROLOL SUCCINATE 25 MG/1
37.5 TABLET, EXTENDED RELEASE ORAL 2 TIMES DAILY
Status: DISCONTINUED | OUTPATIENT
Start: 2024-09-01 | End: 2024-09-05 | Stop reason: HOSPADM

## 2024-09-01 RX ORDER — INSULIN LISPRO 100 [IU]/ML
0-20 INJECTION, SOLUTION INTRAVENOUS; SUBCUTANEOUS
Status: DISCONTINUED | OUTPATIENT
Start: 2024-09-01 | End: 2024-09-05 | Stop reason: HOSPADM

## 2024-09-01 RX ORDER — HYDROMORPHONE HYDROCHLORIDE 1 MG/ML
0.2 INJECTION, SOLUTION INTRAMUSCULAR; INTRAVENOUS; SUBCUTANEOUS EVERY 4 HOURS PRN
Status: DISCONTINUED | OUTPATIENT
Start: 2024-09-01 | End: 2024-09-05 | Stop reason: HOSPADM

## 2024-09-01 RX ADMIN — INSULIN LISPRO 4 UNITS: 100 INJECTION, SOLUTION INTRAVENOUS; SUBCUTANEOUS at 17:21

## 2024-09-01 RX ADMIN — HYDROMORPHONE HYDROCHLORIDE 0.2 MG: 1 INJECTION, SOLUTION INTRAMUSCULAR; INTRAVENOUS; SUBCUTANEOUS at 10:37

## 2024-09-01 RX ADMIN — SODIUM CHLORIDE 1 G: 1 TABLET ORAL at 12:02

## 2024-09-01 RX ADMIN — OXYCODONE HYDROCHLORIDE 5 MG: 5 TABLET ORAL at 17:32

## 2024-09-01 RX ADMIN — INSULIN LISPRO 8 UNITS: 100 INJECTION, SOLUTION INTRAVENOUS; SUBCUTANEOUS at 12:38

## 2024-09-01 RX ADMIN — METHOCARBAMOL TABLETS 500 MG: 500 TABLET, COATED ORAL at 05:14

## 2024-09-01 RX ADMIN — METHOCARBAMOL TABLETS 500 MG: 500 TABLET, COATED ORAL at 21:11

## 2024-09-01 RX ADMIN — HALOPERIDOL 0.5 MG: 0.5 TABLET ORAL at 21:13

## 2024-09-01 RX ADMIN — ENOXAPARIN SODIUM 30 MG: 100 INJECTION SUBCUTANEOUS at 21:12

## 2024-09-01 RX ADMIN — ACETAMINOPHEN 975 MG: 325 TABLET ORAL at 12:37

## 2024-09-01 RX ADMIN — SODIUM CHLORIDE 1 G: 1 TABLET ORAL at 17:20

## 2024-09-01 RX ADMIN — ACETAMINOPHEN 975 MG: 325 TABLET ORAL at 17:24

## 2024-09-01 RX ADMIN — ATORVASTATIN CALCIUM 40 MG: 40 TABLET, FILM COATED ORAL at 21:11

## 2024-09-01 RX ADMIN — METOPROLOL SUCCINATE 37.5 MG: 25 TABLET, EXTENDED RELEASE ORAL at 12:37

## 2024-09-01 RX ADMIN — LATANOPROST 1 DROP: 50 SOLUTION/ DROPS OPHTHALMIC at 21:41

## 2024-09-01 RX ADMIN — OLANZAPINE 20 MG: 20 TABLET, FILM COATED ORAL at 21:11

## 2024-09-01 RX ADMIN — POTASSIUM CHLORIDE 20 MEQ: 1.5 POWDER, FOR SOLUTION ORAL at 21:12

## 2024-09-01 RX ADMIN — METHOCARBAMOL TABLETS 500 MG: 500 TABLET, COATED ORAL at 12:47

## 2024-09-01 RX ADMIN — HYDROMORPHONE HYDROCHLORIDE 0.2 MG: 0.2 INJECTION, SOLUTION INTRAMUSCULAR; INTRAVENOUS; SUBCUTANEOUS at 05:14

## 2024-09-01 RX ADMIN — ACETAMINOPHEN 975 MG: 325 TABLET ORAL at 05:14

## 2024-09-01 RX ADMIN — METOPROLOL SUCCINATE 37.5 MG: 25 TABLET, EXTENDED RELEASE ORAL at 21:12

## 2024-09-01 RX ADMIN — PREDNISONE 3.75 MG: 2.5 TABLET ORAL at 21:12

## 2024-09-01 ASSESSMENT — COGNITIVE AND FUNCTIONAL STATUS - GENERAL
TURNING FROM BACK TO SIDE WHILE IN FLAT BAD: A LOT
DRESSING REGULAR LOWER BODY CLOTHING: A LOT
WALKING IN HOSPITAL ROOM: TOTAL
MOVING TO AND FROM BED TO CHAIR: A LOT
TURNING FROM BACK TO SIDE WHILE IN FLAT BAD: A LOT
PERSONAL GROOMING: A LOT
DAILY ACTIVITIY SCORE: 12
CLIMB 3 TO 5 STEPS WITH RAILING: TOTAL
DRESSING REGULAR UPPER BODY CLOTHING: TOTAL
MOVING FROM LYING ON BACK TO SITTING ON SIDE OF FLAT BED WITH BEDRAILS: A LOT
HELP NEEDED FOR BATHING: TOTAL
STANDING UP FROM CHAIR USING ARMS: TOTAL
EATING MEALS: A LOT
MOBILITY SCORE: 12
STANDING UP FROM CHAIR USING ARMS: A LOT
TOILETING: A LOT
MOVING FROM LYING ON BACK TO SITTING ON SIDE OF FLAT BED WITH BEDRAILS: A LOT
EATING MEALS: A LOT
PERSONAL GROOMING: A LOT
MOVING TO AND FROM BED TO CHAIR: TOTAL
WALKING IN HOSPITAL ROOM: A LOT
HELP NEEDED FOR BATHING: A LOT
DRESSING REGULAR LOWER BODY CLOTHING: A LOT
DRESSING REGULAR UPPER BODY CLOTHING: A LOT
DAILY ACTIVITIY SCORE: 9
CLIMB 3 TO 5 STEPS WITH RAILING: A LOT
TOILETING: TOTAL
MOBILITY SCORE: 8

## 2024-09-01 ASSESSMENT — PAIN SCALES - WONG BAKER: WONGBAKER_NUMERICALRESPONSE: NO HURT

## 2024-09-01 ASSESSMENT — PAIN DESCRIPTION - DESCRIPTORS: DESCRIPTORS: ACHING

## 2024-09-01 ASSESSMENT — PAIN SCALES - GENERAL
PAINLEVEL_OUTOF10: 3
PAINLEVEL_OUTOF10: 0 - NO PAIN
PAINLEVEL_OUTOF10: 7

## 2024-09-01 ASSESSMENT — PAIN - FUNCTIONAL ASSESSMENT
PAIN_FUNCTIONAL_ASSESSMENT: 0-10
PAIN_FUNCTIONAL_ASSESSMENT: 0-10

## 2024-09-01 NOTE — PROGRESS NOTES
[facility]  TRAUMA SERVICE - PROGRESS NOTE    Patient Name: Jennifer Mancini  MRN: [id number]  Admit Date: 824  : 1940  AGE: 84 y.o.   GENDER: female  ==============================================================================  MECHANISM OF INJURY:   Fall from standing    LOC (yes/no?): Unknown  Anticoagulant / Anti-platelet Rx? (for what dx?): Denies  Referring Facility Name (N/A for scene EMR run):  [place]      INJURIES:   T8 vertebral body fracture  Subacute right-sided rib fractures      OTHER MEDICAL PROBLEMS:  CHF with reserved EF  HTN  Schizoaffective disorder  HLD  DM2      INCIDENTAL FINDINGS:  Large hiatal hernia      PROCEDURES:  None     ==============================================================================  TODAY'S ASSESSMENT AND PLAN OF CARE:  #T8 vertebral body fx  - MRI reviewed by nsgy, recommend TLSO hard brace  - Per nsgy OK for DVT PPX. No neurosurgical intervention at this time. Follow up with their team in 6 weeks with repeat Uprights prior to follow up date.   - Lovenox 30 mg, SCD's  - lasix 20 mg IV  -cannot get out of bed without brace   -PT/OT: moderate intensity PT with low intensity OT     #fen/gi/gu  - reg diet  - Maintain [place]  - Sodium stable at at 129 this AM, will continue to monitor closely  - NS adjusted from 50 ml to 25 ml/hr, plan to wean off NS  - [place] removal w/ Void trial today     Dispo: continue RNF     Sergio Arauz DMD, MD  Trauma Surgery [facility]   [id number]  ==============================================================================  CHIEF COMPLAINT / OVERNIGHT EVENTS:   Doing well this morning. No complaints.     MEDICAL HISTORY / ROS:  Admission history and ROS reviewed. Pertinent changes as follows:      PHYSICAL EXAM:  Heart Rate:  []   Temp:  [36 °C (96.8 °F)-37 °C (98.6 °F)]   Resp:  [13-27]   BP: (129-179)/()   SpO2:  [95 %-100 %]     Physical Exam  Gen: NC/AT, conversional, NAD, TLSO brace in  place  Resp: Breath sounds b/l, no respiratory distress, no stridor  CV: Nontachy, normotensive  Abd: soft  Extremities: ROM intact    IMAGING SUMMARY:  (summary of new imaging findings, not a copy of dictation)      LABS:  Results from last 7 days   Lab Units 08/31/24  1147 08/29/24  0943 08/27/24  0910   WBC AUTO x10*3/uL 9.3 8.4 9.0   HEMOGLOBIN g/dL 10.4* 10.4* 10.2*   HEMATOCRIT % 31.5* 31.2* 30.5*   PLATELETS AUTO x10*3/uL 364 292 297         Results from last 7 days   Lab Units 09/01/24  1517 09/01/24  0553 09/01/24  0128   SODIUM mmol/L 128* 129* 128*   POTASSIUM mmol/L 3.3* 4.0 3.9   CHLORIDE mmol/L 94* 97* 96*   CO2 mmol/L 27 23 24   BUN mg/dL 6 5* 5*   CREATININE mg/dL 0.27* 0.23* <0.20*   CALCIUM mg/dL 8.3* 7.7* 7.9*   GLUCOSE mg/dL 159* 125* 128*               I have reviewed all medications, laboratory results, and imaging pertinent for today's encounter.

## 2024-09-01 NOTE — PROGRESS NOTES
[facility]  TRAUMA SERVICE - HISTORY AND PHYSICAL / CONSULT    Patient Name: Jennifer Mancini  MRN: [id number]  Admit Date: 824  : 1940  AGE: 84 y.o.   GENDER: female  ==============================================================================  MECHANISM OF INJURY / CHIEF COMPLAINT:   ***  LOC (yes/no?): ***  Anticoagulant / Anti-platelet Rx? (for what dx?): ***  Referring Facility Name (N/A for scene EMR run): ***    INJURIES:   ***    OTHER MEDICAL PROBLEMS:  ***    INCIDENTAL FINDINGS:  ***    ==============================================================================  ADMISSION PLAN OF CARE:  ***  Consultants notified (specialty, provider name, time): ***    ==============================================================================  PAST MEDICAL HISTORY:   PMH: ***  Past Medical History:   Diagnosis Date    HTN (hypertension) 2024    Hypertensive heart disease without heart failure 2019    Hypertensive heart disease without CHF    Other nondisplaced fracture of seventh cervical vertebra, subsequent encounter for fracture with routine healing 2019    Other closed nondisplaced fracture of seventh cervical vertebra with routine healing, subsequent encounter    Other nondisplaced fracture of sixth cervical vertebra, subsequent encounter for fracture with routine healing 2019    Other closed nondisplaced fracture of sixth cervical vertebra with routine healing, subsequent encounter    Personal history of other diseases of the female genital tract 2019    History of uterine prolapse    Personal history of other endocrine, nutritional and metabolic disease 2019    History of hyperlipidemia    Personal history of other mental and behavioral disorders 2021    History of schizoaffective disorder    Personal history of other specified conditions 2021    History of chronic pain    Schizoaffective disorder, bipolar type  (Multi) 12/17/2019    Schizoaffective disorder, bipolar type    Unspecified fracture of left femur, initial encounter for closed fracture (Multi) 06/14/2021    Fracture of left femur     Last menstrual period: ***    PSH: ***  Past Surgical History:   Procedure Laterality Date    CT ANGIO NECK  12/13/2019    CT NECK ANGIO W AND WO IV CONTRAST 12/13/2019 [facility] CLINICAL LEGACY    OTHER SURGICAL HISTORY  12/17/2019    Tonsillectomy    OTHER SURGICAL HISTORY  12/17/2019    Rotator cuff repair    OTHER SURGICAL HISTORY  12/17/2019    Wrist surgery    OTHER SURGICAL HISTORY  12/17/2019    Uterine surgery     FH: ***  Family History   Problem Relation Name Age of Onset    No Known Problems Mother      No Known Problems Father       SOCIAL HISTORY:    Smoking: ***   Social History     Tobacco Use   Smoking Status Unknown   Smokeless Tobacco Not on file       Alcohol: ***   Social History     Substance and Sexual Activity   Alcohol Use Never       Drug use: ***    MEDICATIONS: ***  Prior to Admission medications    Medication Sig Start Date End Date Taking? Authorizing Provider   bisacodyl (Dulcolax) 10 mg suppository Insert 1 suppository (10 mg) into the rectum once daily as needed for constipation (if no BM 8 hours after milk of magnesium). 3/4/24  Yes Historical Provider, MD   Glucagon Emergency Kit, human, 1 mg injection Inject 1 mg under the skin every 15 minutes if needed for low blood sugar - see comments (BG <70). 3/4/24  Yes Historical Provider, MD   ibuprofen 600 mg tablet Take 1 tablet (600 mg) by mouth every 6 hours if needed for mild pain (1 - 3). 7/25/24  Yes Historical Provider, MD   abaloparatide (Tymlos) 80 mcg (3,120 mcg/1.56 mL) pen injector Inject 80 mcg under the skin once daily.    Historical Provider, MD   acetaminophen (Tylenol) 325 mg tablet Take 3 tablets (975 mg) by mouth 3 times a day. Do not crush, chew, or split.    Historical Provider, MD   atorvastatin (Lipitor) 40 mg tablet Take 1 tablet (40  mg) by mouth once daily at bedtime.    Historical Provider, MD   benzocaine-menthol (Cepacol Sore Throat, nargis-men,) 15-3.6 mg lozenge Dissolve 1 lozenge in the mouth every 4 hours if needed for sore throat.    Historical Provider, MD   calcium carbonate-vitamin D3 600 mg-20 mcg (800 unit) tablet Take 1 tablet by mouth 2 times a day.    Rachell Provider, MD   clobetasol (Temovate) 0.05 % cream Apply 1 Application topically 2 times a day.    Rachell Provider, MD   clobetasoL 0.05 % shampoo Apply 1 Application topically 1 (one) time per week.    Historical Provider, MD   docusate sodium (Colace) 100 mg capsule Take 1 capsule (100 mg) by mouth once daily in the morning. Take before meals.    Historical Provider, MD   dorzolamide (Trusopt) 2 % ophthalmic solution Administer 1 drop into both eyes 3 times a day.    Historical Provider, MD   famotidine (Pepcid) 40 mg tablet Take 1 tablet (40 mg) by mouth once daily at bedtime.    Historical Provider, MD   fluticasone (Flonase) 50 mcg/actuation nasal spray Administer 1 spray into each nostril once daily. Shake gently. Before first use, prime pump. After use, clean tip and replace cap.    Historical Provider, MD   folic acid (Folvite) 1 mg tablet Take 1 tablet (1 mg) by mouth once daily.    Historical Provider, MD   guaiFENesin (Robitussin) 100 mg/5 mL syrup Take 10 mL (200 mg) by mouth every 4 hours if needed for cough or congestion.    Historical Provider, MD   haloperidol (Haldol) 0.5 mg tablet Take 1 tablet (0.5 mg) by mouth once daily at bedtime.    Historical Provider, MD   hydrocortisone 2.5 % cream Apply 1 Application topically 3 times a day. To forehead    Historical Provider, MD   latanoprost (Xalatan) 0.005 % ophthalmic solution Administer 1 drop into both eyes once daily at bedtime.    Rachell Provider, MD   linaGLIPtin (Tradjenta) 5 mg tablet Take 1 tablet (5 mg) by mouth once daily.    Historical Provider, MD   magnesium hydroxide (Milk of Magnesia)  400 mg/5 mL suspension Take 30 mL by mouth once daily as needed for constipation (if no BM in 3 consecutive days).    Historical Provider, MD   magnesium oxide (Mag-Ox) 400 mg tablet Take 1 tablet (400 mg) by mouth once daily.    Historical Provider, MD   melatonin 5 mg tablet Take 1 tablet (5 mg) by mouth once daily at bedtime.    Historical Provider, MD   metFORMIN (Glucophage) 500 mg tablet Take 1 tablet (500 mg) by mouth 2 times daily (morning and late afternoon).    Historical Provider, MD   methotrexate (Trexall) 2.5 mg tablet Take 6 tablets (15 mg total) by mouth 1 (one) time per week.  6 tablets (15mg) once a week on Fridays    Historical Provider, MD   metoprolol succinate XL (Toprol-XL) 25 mg 24 hr tablet Take 1 tablet (25 mg) by mouth 2 times a day. Do not crush or chew.    Historical Provider, MD   OLANZapine (ZyPREXA) 20 mg tablet Take 1 tablet (20 mg) by mouth once daily at bedtime.    Historical Provider, MD   polyethylene glycol (Glycolax, Miralax) 17 gram packet Take 17 g by mouth 2 times a day.    Historical Provider, MD   predniSONE (Deltasone) 2.5 mg tablet Take 1.5 tablets (3.75 mg) by mouth 2 times a day.    Historical Provider, MD   sodium phosphates (Fleet, Pediatric,) 9.5-3.5 gram/59 mL enema Insert 1 enema into the rectum once daily as needed for constipation (if no BM 8 hours after receiving bisacodyl suppository).    Historical Provider, MD   venlafaxine XR (Effexor-XR) 37.5 mg 24 hr capsule Take 1 capsule (37.5 mg) by mouth once daily. Do not crush or chew.    Historical Provider, MD     ALLERGIES: ***  Allergies   Allergen Reactions    House Dust Unknown    Mold Unknown    Pollen Extracts Cough       REVIEW OF SYSTEMS:  Review of Systems  PHYSICAL EXAM:  PRIMARY SURVEY:  Primary Survey  SECONDARY SURVEY/PHYSICAL EXAM:  Physical Exam  IMAGING SUMMARY:  (summary of findings, not a copy of dictation)  CT Head/Face: ***  CT C-Spine: ***  CT Chest/Abd/Pelvis: ***  CXR/PXR: ***  Other(s):  ***    LABS:  Results from last 7 days   Lab Units 08/31/24  1147 08/29/24  0943 08/27/24  0910   WBC AUTO x10*3/uL 9.3 8.4 9.0   HEMOGLOBIN g/dL 10.4* 10.4* 10.2*   HEMATOCRIT % 31.5* 31.2* 30.5*   PLATELETS AUTO x10*3/uL 364 292 297         Results from last 7 days   Lab Units 09/01/24  0553 09/01/24  0128 08/31/24  1809   SODIUM mmol/L 129* 128* 126*   POTASSIUM mmol/L 4.0 3.9 4.1   CHLORIDE mmol/L 97* 96* 96*   CO2 mmol/L 23 24 24   BUN mg/dL 5* 5* 7   CREATININE mg/dL 0.23* <0.20* 0.24*   CALCIUM mg/dL 7.7* 7.9* 7.8*   GLUCOSE mg/dL 125* 128* 137*               I have reviewed all laboratory and imaging results ordered/pertinent for this encounter.

## 2024-09-02 LAB
ALBUMIN SERPL BCP-MCNC: 2.8 G/DL (ref 3.4–5)
ALBUMIN SERPL BCP-MCNC: 2.8 G/DL (ref 3.4–5)
ALBUMIN SERPL BCP-MCNC: 3.1 G/DL (ref 3.4–5)
ANION GAP SERPL CALC-SCNC: 11 MMOL/L (ref 10–20)
ANION GAP SERPL CALC-SCNC: 11 MMOL/L (ref 10–20)
ANION GAP SERPL CALC-SCNC: 15 MMOL/L (ref 10–20)
BUN SERPL-MCNC: 5 MG/DL (ref 6–23)
BUN SERPL-MCNC: 7 MG/DL (ref 6–23)
BUN SERPL-MCNC: 8 MG/DL (ref 6–23)
CALCIUM SERPL-MCNC: 8.1 MG/DL (ref 8.6–10.6)
CALCIUM SERPL-MCNC: 8.3 MG/DL (ref 8.6–10.6)
CALCIUM SERPL-MCNC: 8.9 MG/DL (ref 8.6–10.6)
CHLORIDE SERPL-SCNC: 93 MMOL/L (ref 98–107)
CHLORIDE SERPL-SCNC: 93 MMOL/L (ref 98–107)
CHLORIDE SERPL-SCNC: 94 MMOL/L (ref 98–107)
CO2 SERPL-SCNC: 25 MMOL/L (ref 21–32)
CO2 SERPL-SCNC: 26 MMOL/L (ref 21–32)
CO2 SERPL-SCNC: 28 MMOL/L (ref 21–32)
CREAT SERPL-MCNC: 0.24 MG/DL (ref 0.5–1.05)
CREAT SERPL-MCNC: 0.32 MG/DL (ref 0.5–1.05)
CREAT SERPL-MCNC: <0.2 MG/DL (ref 0.5–1.05)
EGFRCR SERPLBLD CKD-EPI 2021: >90 ML/MIN/1.73M*2
EGFRCR SERPLBLD CKD-EPI 2021: >90 ML/MIN/1.73M*2
EGFRCR SERPLBLD CKD-EPI 2021: ABNORMAL ML/MIN/{1.73_M2}
GLUCOSE BLD MANUAL STRIP-MCNC: 133 MG/DL (ref 74–99)
GLUCOSE BLD MANUAL STRIP-MCNC: 166 MG/DL (ref 74–99)
GLUCOSE BLD MANUAL STRIP-MCNC: 169 MG/DL (ref 74–99)
GLUCOSE BLD MANUAL STRIP-MCNC: 172 MG/DL (ref 74–99)
GLUCOSE BLD MANUAL STRIP-MCNC: 173 MG/DL (ref 74–99)
GLUCOSE BLD MANUAL STRIP-MCNC: 195 MG/DL (ref 74–99)
GLUCOSE BLD MANUAL STRIP-MCNC: 267 MG/DL (ref 74–99)
GLUCOSE SERPL-MCNC: 148 MG/DL (ref 74–99)
GLUCOSE SERPL-MCNC: 181 MG/DL (ref 74–99)
GLUCOSE SERPL-MCNC: 239 MG/DL (ref 74–99)
PHOSPHATE SERPL-MCNC: 1.8 MG/DL (ref 2.5–4.9)
PHOSPHATE SERPL-MCNC: 1.9 MG/DL (ref 2.5–4.9)
PHOSPHATE SERPL-MCNC: 2.1 MG/DL (ref 2.5–4.9)
POTASSIUM SERPL-SCNC: 3.5 MMOL/L (ref 3.5–5.3)
POTASSIUM SERPL-SCNC: 3.8 MMOL/L (ref 3.5–5.3)
POTASSIUM SERPL-SCNC: 4.1 MMOL/L (ref 3.5–5.3)
SODIUM SERPL-SCNC: 126 MMOL/L (ref 136–145)
SODIUM SERPL-SCNC: 129 MMOL/L (ref 136–145)
SODIUM SERPL-SCNC: 129 MMOL/L (ref 136–145)

## 2024-09-02 PROCEDURE — 83930 ASSAY OF BLOOD OSMOLALITY: CPT | Performed by: PHYSICIAN ASSISTANT

## 2024-09-02 PROCEDURE — 2500000004 HC RX 250 GENERAL PHARMACY W/ HCPCS (ALT 636 FOR OP/ED): Performed by: STUDENT IN AN ORGANIZED HEALTH CARE EDUCATION/TRAINING PROGRAM

## 2024-09-02 PROCEDURE — 2500000001 HC RX 250 WO HCPCS SELF ADMINISTERED DRUGS (ALT 637 FOR MEDICARE OP): Performed by: STUDENT IN AN ORGANIZED HEALTH CARE EDUCATION/TRAINING PROGRAM

## 2024-09-02 PROCEDURE — 80069 RENAL FUNCTION PANEL: CPT | Performed by: STUDENT IN AN ORGANIZED HEALTH CARE EDUCATION/TRAINING PROGRAM

## 2024-09-02 PROCEDURE — 99231 SBSQ HOSP IP/OBS SF/LOW 25: CPT | Performed by: SURGERY

## 2024-09-02 PROCEDURE — 2500000004 HC RX 250 GENERAL PHARMACY W/ HCPCS (ALT 636 FOR OP/ED)

## 2024-09-02 PROCEDURE — 36415 COLL VENOUS BLD VENIPUNCTURE: CPT | Performed by: STUDENT IN AN ORGANIZED HEALTH CARE EDUCATION/TRAINING PROGRAM

## 2024-09-02 PROCEDURE — 2500000005 HC RX 250 GENERAL PHARMACY W/O HCPCS

## 2024-09-02 PROCEDURE — 2500000002 HC RX 250 W HCPCS SELF ADMINISTERED DRUGS (ALT 637 FOR MEDICARE OP, ALT 636 FOR OP/ED): Performed by: STUDENT IN AN ORGANIZED HEALTH CARE EDUCATION/TRAINING PROGRAM

## 2024-09-02 PROCEDURE — 1200000002 HC GENERAL ROOM WITH TELEMETRY DAILY

## 2024-09-02 PROCEDURE — 2500000001 HC RX 250 WO HCPCS SELF ADMINISTERED DRUGS (ALT 637 FOR MEDICARE OP): Performed by: SURGERY

## 2024-09-02 PROCEDURE — 2500000002 HC RX 250 W HCPCS SELF ADMINISTERED DRUGS (ALT 637 FOR MEDICARE OP, ALT 636 FOR OP/ED): Performed by: SURGERY

## 2024-09-02 PROCEDURE — 82947 ASSAY GLUCOSE BLOOD QUANT: CPT

## 2024-09-02 RX ADMIN — OXYCODONE HYDROCHLORIDE 5 MG: 5 TABLET ORAL at 21:24

## 2024-09-02 RX ADMIN — METHOCARBAMOL TABLETS 500 MG: 500 TABLET, COATED ORAL at 14:09

## 2024-09-02 RX ADMIN — SODIUM CHLORIDE 1 G: 1 TABLET ORAL at 08:33

## 2024-09-02 RX ADMIN — ENOXAPARIN SODIUM 30 MG: 100 INJECTION SUBCUTANEOUS at 21:48

## 2024-09-02 RX ADMIN — POTASSIUM CHLORIDE 20 MEQ: 1.5 POWDER, FOR SOLUTION ORAL at 21:23

## 2024-09-02 RX ADMIN — VENLAFAXINE HYDROCHLORIDE 37.5 MG: 37.5 CAPSULE, EXTENDED RELEASE ORAL at 08:32

## 2024-09-02 RX ADMIN — ACETAMINOPHEN 975 MG: 325 TABLET ORAL at 05:40

## 2024-09-02 RX ADMIN — ACETAMINOPHEN 975 MG: 325 TABLET ORAL at 18:02

## 2024-09-02 RX ADMIN — OLANZAPINE 20 MG: 20 TABLET, FILM COATED ORAL at 21:24

## 2024-09-02 RX ADMIN — OXYCODONE HYDROCHLORIDE 5 MG: 5 TABLET ORAL at 05:40

## 2024-09-02 RX ADMIN — HALOPERIDOL 0.5 MG: 0.5 TABLET ORAL at 21:24

## 2024-09-02 RX ADMIN — LABETALOL HYDROCHLORIDE 10 MG: 5 INJECTION, SOLUTION INTRAVENOUS at 10:44

## 2024-09-02 RX ADMIN — ATORVASTATIN CALCIUM 40 MG: 40 TABLET, FILM COATED ORAL at 21:24

## 2024-09-02 RX ADMIN — METHOCARBAMOL TABLETS 500 MG: 500 TABLET, COATED ORAL at 05:41

## 2024-09-02 RX ADMIN — INSULIN LISPRO 12 UNITS: 100 INJECTION, SOLUTION INTRAVENOUS; SUBCUTANEOUS at 17:43

## 2024-09-02 RX ADMIN — ACETAMINOPHEN 975 MG: 325 TABLET ORAL at 00:04

## 2024-09-02 RX ADMIN — ENOXAPARIN SODIUM 30 MG: 100 INJECTION SUBCUTANEOUS at 08:40

## 2024-09-02 RX ADMIN — METOPROLOL SUCCINATE 37.5 MG: 25 TABLET, EXTENDED RELEASE ORAL at 08:33

## 2024-09-02 RX ADMIN — OXYCODONE HYDROCHLORIDE 5 MG: 5 TABLET ORAL at 00:05

## 2024-09-02 RX ADMIN — INSULIN LISPRO 4 UNITS: 100 INJECTION, SOLUTION INTRAVENOUS; SUBCUTANEOUS at 08:48

## 2024-09-02 RX ADMIN — INSULIN LISPRO 4 UNITS: 100 INJECTION, SOLUTION INTRAVENOUS; SUBCUTANEOUS at 20:03

## 2024-09-02 RX ADMIN — INSULIN LISPRO 4 UNITS: 100 INJECTION, SOLUTION INTRAVENOUS; SUBCUTANEOUS at 12:57

## 2024-09-02 RX ADMIN — POLYETHYLENE GLYCOL 3350 17 G: 17 POWDER, FOR SOLUTION ORAL at 08:29

## 2024-09-02 RX ADMIN — PREDNISONE 3.75 MG: 2.5 TABLET ORAL at 21:23

## 2024-09-02 RX ADMIN — METHOCARBAMOL TABLETS 500 MG: 500 TABLET, COATED ORAL at 21:23

## 2024-09-02 RX ADMIN — SODIUM CHLORIDE 1 G: 1 TABLET ORAL at 12:37

## 2024-09-02 RX ADMIN — METOPROLOL SUCCINATE 37.5 MG: 25 TABLET, EXTENDED RELEASE ORAL at 21:23

## 2024-09-02 RX ADMIN — ACETAMINOPHEN 975 MG: 325 TABLET ORAL at 12:37

## 2024-09-02 RX ADMIN — POTASSIUM CHLORIDE 20 MEQ: 1.5 POWDER, FOR SOLUTION ORAL at 08:29

## 2024-09-02 RX ADMIN — POTASSIUM PHOSPHATE, MONOBASIC POTASSIUM PHOSPHATE, DIBASIC 21 MMOL: 224; 236 INJECTION, SOLUTION, CONCENTRATE INTRAVENOUS at 21:48

## 2024-09-02 RX ADMIN — OXYCODONE HYDROCHLORIDE 5 MG: 5 TABLET ORAL at 14:10

## 2024-09-02 RX ADMIN — CALCIUM CARBONATE (ANTACID) CHEW TAB 500 MG 500 MG: 500 CHEW TAB at 08:32

## 2024-09-02 RX ADMIN — PREDNISONE 3.75 MG: 2.5 TABLET ORAL at 08:30

## 2024-09-02 SDOH — SOCIAL STABILITY: SOCIAL INSECURITY: DO YOU FEEL UNSAFE GOING BACK TO THE PLACE WHERE YOU ARE LIVING?: UNABLE TO ASSESS

## 2024-09-02 SDOH — SOCIAL STABILITY: SOCIAL INSECURITY: ARE THERE ANY APPARENT SIGNS OF INJURIES/BEHAVIORS THAT COULD BE RELATED TO ABUSE/NEGLECT?: UNABLE TO ASSESS

## 2024-09-02 SDOH — SOCIAL STABILITY: SOCIAL INSECURITY: HAS ANYONE EVER THREATENED TO HURT YOUR FAMILY OR YOUR PETS?: UNABLE TO ASSESS

## 2024-09-02 SDOH — SOCIAL STABILITY: SOCIAL INSECURITY: DOES ANYONE TRY TO KEEP YOU FROM HAVING/CONTACTING OTHER FRIENDS OR DOING THINGS OUTSIDE YOUR HOME?: UNABLE TO ASSESS

## 2024-09-02 SDOH — SOCIAL STABILITY: SOCIAL INSECURITY: DO YOU FEEL ANYONE HAS EXPLOITED OR TAKEN ADVANTAGE OF YOU FINANCIALLY OR OF YOUR PERSONAL PROPERTY?: UNABLE TO ASSESS

## 2024-09-02 SDOH — SOCIAL STABILITY: SOCIAL INSECURITY: ARE YOU OR HAVE YOU BEEN THREATENED OR ABUSED PHYSICALLY, EMOTIONALLY, OR SEXUALLY BY ANYONE?: UNABLE TO ASSESS

## 2024-09-02 SDOH — SOCIAL STABILITY: SOCIAL INSECURITY: HAVE YOU HAD THOUGHTS OF HARMING ANYONE ELSE?: UNABLE TO ASSESS

## 2024-09-02 SDOH — SOCIAL STABILITY: SOCIAL INSECURITY: ABUSE: ADULT

## 2024-09-02 SDOH — SOCIAL STABILITY: SOCIAL INSECURITY: WERE YOU ABLE TO COMPLETE ALL THE BEHAVIORAL HEALTH SCREENINGS?: NO

## 2024-09-02 SDOH — SOCIAL STABILITY: SOCIAL INSECURITY: HAVE YOU HAD ANY THOUGHTS OF HARMING ANYONE ELSE?: UNABLE TO ASSESS

## 2024-09-02 ASSESSMENT — PATIENT HEALTH QUESTIONNAIRE - PHQ9
2. FEELING DOWN, DEPRESSED OR HOPELESS: NOT AT ALL
SUM OF ALL RESPONSES TO PHQ9 QUESTIONS 1 & 2: 0
1. LITTLE INTEREST OR PLEASURE IN DOING THINGS: NOT AT ALL

## 2024-09-02 ASSESSMENT — COGNITIVE AND FUNCTIONAL STATUS - GENERAL
CLIMB 3 TO 5 STEPS WITH RAILING: A LOT
DRESSING REGULAR UPPER BODY CLOTHING: A LOT
MOVING TO AND FROM BED TO CHAIR: A LOT
DRESSING REGULAR LOWER BODY CLOTHING: A LOT
TURNING FROM BACK TO SIDE WHILE IN FLAT BAD: A LOT
DAILY ACTIVITIY SCORE: 14
DRESSING REGULAR UPPER BODY CLOTHING: A LOT
TURNING FROM BACK TO SIDE WHILE IN FLAT BAD: A LOT
PERSONAL GROOMING: A LITTLE
HELP NEEDED FOR BATHING: A LOT
STANDING UP FROM CHAIR USING ARMS: A LOT
CLIMB 3 TO 5 STEPS WITH RAILING: A LOT
MOVING TO AND FROM BED TO CHAIR: A LOT
DAILY ACTIVITIY SCORE: 12
DRESSING REGULAR LOWER BODY CLOTHING: A LOT
PATIENT BASELINE BEDBOUND: NO
HELP NEEDED FOR BATHING: A LOT
WALKING IN HOSPITAL ROOM: A LOT
TOILETING: A LOT
MOBILITY SCORE: 12
WALKING IN HOSPITAL ROOM: A LOT
MOVING FROM LYING ON BACK TO SITTING ON SIDE OF FLAT BED WITH BEDRAILS: A LOT
EATING MEALS: A LITTLE
PERSONAL GROOMING: A LOT
MOBILITY SCORE: 12
TOILETING: A LOT
MOVING FROM LYING ON BACK TO SITTING ON SIDE OF FLAT BED WITH BEDRAILS: A LOT
EATING MEALS: A LOT
STANDING UP FROM CHAIR USING ARMS: A LOT

## 2024-09-02 ASSESSMENT — PAIN SCALES - WONG BAKER
WONGBAKER_NUMERICALRESPONSE: NO HURT
WONGBAKER_NUMERICALRESPONSE: HURTS EVEN MORE
WONGBAKER_NUMERICALRESPONSE: HURTS LITTLE MORE

## 2024-09-02 ASSESSMENT — ACTIVITIES OF DAILY LIVING (ADL)
HEARING - RIGHT EAR: FUNCTIONAL
TOILETING: NEEDS ASSISTANCE
DRESSING YOURSELF: NEEDS ASSISTANCE
FEEDING YOURSELF: NEEDS ASSISTANCE
JUDGMENT_ADEQUATE_SAFELY_COMPLETE_DAILY_ACTIVITIES: NO
WALKS IN HOME: NEEDS ASSISTANCE
ADEQUATE_TO_COMPLETE_ADL: YES
PATIENT'S MEMORY ADEQUATE TO SAFELY COMPLETE DAILY ACTIVITIES?: NO
GROOMING: NEEDS ASSISTANCE
HEARING - LEFT EAR: FUNCTIONAL
BATHING: NEEDS ASSISTANCE

## 2024-09-02 ASSESSMENT — PAIN DESCRIPTION - ORIENTATION: ORIENTATION: UPPER

## 2024-09-02 ASSESSMENT — PAIN SCALES - GENERAL
PAINLEVEL_OUTOF10: 9
PAINLEVEL_OUTOF10: 3
PAINLEVEL_OUTOF10: 0 - NO PAIN

## 2024-09-02 ASSESSMENT — LIFESTYLE VARIABLES
HOW MANY STANDARD DRINKS CONTAINING ALCOHOL DO YOU HAVE ON A TYPICAL DAY: PATIENT UNABLE TO ANSWER
HOW OFTEN DO YOU HAVE A DRINK CONTAINING ALCOHOL: PATIENT UNABLE TO ANSWER

## 2024-09-02 ASSESSMENT — PAIN - FUNCTIONAL ASSESSMENT: PAIN_FUNCTIONAL_ASSESSMENT: 0-10

## 2024-09-02 ASSESSMENT — PAIN DESCRIPTION - LOCATION: LOCATION: BACK

## 2024-09-02 NOTE — NURSING NOTE
Told Dr Ronnell Blue through secure chat that pt bp was  184/87 hr 76  and I gave her 37.5 of metrop.      Also messaged her about the repeat bp of 187/120 hr 72 and a manual of 180/100 , following the prn protocol for bp.

## 2024-09-02 NOTE — PROGRESS NOTES
[facility]  TRAUMA SERVICE - PROGRESS NOTE    Patient Name: Jennifer Mancini  MRN: [id number]  Admit Date: 824  : 1940  AGE: 84 y.o.   GENDER: female  ==============================================================================  MECHANISM OF INJURY:   Fall from standing    LOC (yes/no?): Unknown  Anticoagulant / Anti-platelet Rx? (for what dx?): Denies  Referring Facility Name (N/A for scene EMR run):  [place]      INJURIES:   T8 vertebral body fracture  Subacute right-sided rib fractures      OTHER MEDICAL PROBLEMS:  CHF with reserved EF  HTN  Schizoaffective disorder  HLD  DM2      INCIDENTAL FINDINGS:  Large hiatal hernia      PROCEDURES:  None     ==============================================================================  TODAY'S ASSESSMENT AND PLAN OF CARE:  #T8 vertebral body fx  - MRI reviewed by nsgy, recommend TLSO hard brace  - Per nsgy OK for DVT PPX. No neurosurgical intervention at this time. Follow up with their team in 6 weeks with repeat Uprights prior to follow up date.   - Lovenox 30 mg, SCD's  -cannot get out of bed without brace   -PT/OT: moderate intensity PT with low intensity OT     #fen/gi/gu  - reg diet  - calorie counts  - Hypo is 126 from 129 with hypophosphatemia  - Discontinue NS   - Orthostatic vitals  - Geriatrics consult for guidance on TSH, thyroxine levels, and hyponatremia management  - Maintain [place]  - Replete K, phos     Dispo: continue RNF    Seen and d/w Dr. Lisa Blue MD  PGY-1 General Surgery  Trauma [id number]       ==============================================================================  CHIEF COMPLAINT / OVERNIGHT EVENTS:   Doing well this morning. No complaints.     MEDICAL HISTORY / ROS:  Admission history and ROS reviewed. Pertinent changes as follows:      PHYSICAL EXAM:  Heart Rate:  [72-92]   Temp:  [35.9 °C (96.6 °F)-36.5 °C (97.7 °F)]   Resp:  [17-18]   BP: (123-187)/()   SpO2:  [95  %-98 %]     Physical Exam  Gen: NC/AT, conversional, NAD, in bed without TSLO brace during rounds  Resp: Nonlabored breathing   CV: Nontachy, normotensive  Abd: Soft, nontender, nondistended   Extremities: ROM intact    IMAGING SUMMARY: No new    LABS:  Results from last 7 days   Lab Units 08/31/24  1147 08/29/24  0943 08/27/24  0910   WBC AUTO x10*3/uL 9.3 8.4 9.0   HEMOGLOBIN g/dL 10.4* 10.4* 10.2*   HEMATOCRIT % 31.5* 31.2* 30.5*   PLATELETS AUTO x10*3/uL 364 292 297         Results from last 7 days   Lab Units 09/02/24  0744 09/02/24  0014 09/01/24  1517   SODIUM mmol/L 126* 129* 128*   POTASSIUM mmol/L 3.5 4.1 3.3*   CHLORIDE mmol/L 93* 93* 94*   CO2 mmol/L 26 25 27   BUN mg/dL 5* 7 6   CREATININE mg/dL <0.20* 0.24* 0.27*   CALCIUM mg/dL 8.1* 8.9 8.3*   GLUCOSE mg/dL 181* 148* 159*               I have reviewed all medications, laboratory results, and imaging pertinent for today's encounter.

## 2024-09-02 NOTE — PROGRESS NOTES
Patient sodium still unstable. She is pending discharge to [facility] when medically ready. Patient has pre-cert, which expires tomorrow. SW will continue to follow to facilitate discharge plan.       DESIREE Long

## 2024-09-02 NOTE — CARE PLAN
The patient's goals for the shift include      The clinical goals for the shift include pt will remain free from falling

## 2024-09-03 LAB
ALBUMIN SERPL BCP-MCNC: 2.8 G/DL (ref 3.4–5)
ALBUMIN SERPL BCP-MCNC: 3 G/DL (ref 3.4–5)
ALBUMIN SERPL BCP-MCNC: 3.1 G/DL (ref 3.4–5)
ALBUMIN SERPL BCP-MCNC: 3.1 G/DL (ref 3.4–5)
ANION GAP SERPL CALC-SCNC: 11 MMOL/L (ref 10–20)
ANION GAP SERPL CALC-SCNC: 11 MMOL/L (ref 10–20)
ANION GAP SERPL CALC-SCNC: 12 MMOL/L (ref 10–20)
ANION GAP SERPL CALC-SCNC: 15 MMOL/L (ref 10–20)
BUN SERPL-MCNC: 12 MG/DL (ref 6–23)
BUN SERPL-MCNC: 12 MG/DL (ref 6–23)
BUN SERPL-MCNC: 9 MG/DL (ref 6–23)
BUN SERPL-MCNC: 9 MG/DL (ref 6–23)
CALCIUM SERPL-MCNC: 8.5 MG/DL (ref 8.6–10.6)
CALCIUM SERPL-MCNC: 8.7 MG/DL (ref 8.6–10.6)
CALCIUM SERPL-MCNC: 9 MG/DL (ref 8.6–10.6)
CALCIUM SERPL-MCNC: 9 MG/DL (ref 8.6–10.6)
CHLORIDE SERPL-SCNC: 91 MMOL/L (ref 98–107)
CHLORIDE SERPL-SCNC: 91 MMOL/L (ref 98–107)
CHLORIDE SERPL-SCNC: 92 MMOL/L (ref 98–107)
CHLORIDE SERPL-SCNC: 93 MMOL/L (ref 98–107)
CHLORIDE UR-SCNC: 120 MMOL/L
CHLORIDE/CREATININE (MMOL/G) IN URINE: 642 MMOL/G CREAT (ref 38–318)
CO2 SERPL-SCNC: 24 MMOL/L (ref 21–32)
CO2 SERPL-SCNC: 27 MMOL/L (ref 21–32)
CO2 SERPL-SCNC: 28 MMOL/L (ref 21–32)
CO2 SERPL-SCNC: 29 MMOL/L (ref 21–32)
CREAT SERPL-MCNC: 0.26 MG/DL (ref 0.5–1.05)
CREAT SERPL-MCNC: 0.31 MG/DL (ref 0.5–1.05)
CREAT SERPL-MCNC: 0.33 MG/DL (ref 0.5–1.05)
CREAT SERPL-MCNC: 0.35 MG/DL (ref 0.5–1.05)
CREAT UR-MCNC: 18.7 MG/DL (ref 20–320)
EGFRCR SERPLBLD CKD-EPI 2021: >90 ML/MIN/1.73M*2
GLUCOSE BLD MANUAL STRIP-MCNC: 141 MG/DL (ref 74–99)
GLUCOSE BLD MANUAL STRIP-MCNC: 157 MG/DL (ref 74–99)
GLUCOSE BLD MANUAL STRIP-MCNC: 190 MG/DL (ref 74–99)
GLUCOSE BLD MANUAL STRIP-MCNC: 217 MG/DL (ref 74–99)
GLUCOSE BLD MANUAL STRIP-MCNC: 223 MG/DL (ref 74–99)
GLUCOSE BLD MANUAL STRIP-MCNC: 226 MG/DL (ref 74–99)
GLUCOSE SERPL-MCNC: 131 MG/DL (ref 74–99)
GLUCOSE SERPL-MCNC: 180 MG/DL (ref 74–99)
GLUCOSE SERPL-MCNC: 188 MG/DL (ref 74–99)
GLUCOSE SERPL-MCNC: 258 MG/DL (ref 74–99)
OSMOLALITY SERPL: 273 MOSM/KG (ref 280–300)
OSMOLALITY UR: 425 MOSM/KG (ref 200–1200)
PHOSPHATE SERPL-MCNC: 2.9 MG/DL (ref 2.5–4.9)
PHOSPHATE SERPL-MCNC: 3.1 MG/DL (ref 2.5–4.9)
PHOSPHATE SERPL-MCNC: 3.3 MG/DL (ref 2.5–4.9)
PHOSPHATE SERPL-MCNC: 4.7 MG/DL (ref 2.5–4.9)
POTASSIUM SERPL-SCNC: 4.1 MMOL/L (ref 3.5–5.3)
POTASSIUM SERPL-SCNC: 4.2 MMOL/L (ref 3.5–5.3)
POTASSIUM SERPL-SCNC: 4.5 MMOL/L (ref 3.5–5.3)
POTASSIUM SERPL-SCNC: 5.6 MMOL/L (ref 3.5–5.3)
POTASSIUM UR-SCNC: 37 MMOL/L
POTASSIUM/CREAT UR-RTO: 198 MMOL/G CREAT
SODIUM SERPL-SCNC: 124 MMOL/L (ref 136–145)
SODIUM SERPL-SCNC: 127 MMOL/L (ref 136–145)
SODIUM UR-SCNC: 112 MMOL/L
SODIUM/CREAT UR-RTO: 599 MMOL/G CREAT
T4 FREE SERPL-MCNC: 1.36 NG/DL (ref 0.78–1.48)
TSH SERPL-ACNC: 4.02 MIU/L (ref 0.44–3.98)
URATE SERPL-MCNC: 1.6 MG/DL (ref 2.3–6.7)

## 2024-09-03 PROCEDURE — 99223 1ST HOSP IP/OBS HIGH 75: CPT | Performed by: INTERNAL MEDICINE

## 2024-09-03 PROCEDURE — 2500000001 HC RX 250 WO HCPCS SELF ADMINISTERED DRUGS (ALT 637 FOR MEDICARE OP): Performed by: STUDENT IN AN ORGANIZED HEALTH CARE EDUCATION/TRAINING PROGRAM

## 2024-09-03 PROCEDURE — 83935 ASSAY OF URINE OSMOLALITY: CPT

## 2024-09-03 PROCEDURE — 2500000002 HC RX 250 W HCPCS SELF ADMINISTERED DRUGS (ALT 637 FOR MEDICARE OP, ALT 636 FOR OP/ED)

## 2024-09-03 PROCEDURE — 82565 ASSAY OF CREATININE: CPT | Performed by: STUDENT IN AN ORGANIZED HEALTH CARE EDUCATION/TRAINING PROGRAM

## 2024-09-03 PROCEDURE — 82947 ASSAY GLUCOSE BLOOD QUANT: CPT

## 2024-09-03 PROCEDURE — 82436 ASSAY OF URINE CHLORIDE: CPT | Performed by: PHYSICIAN ASSISTANT

## 2024-09-03 PROCEDURE — 36415 COLL VENOUS BLD VENIPUNCTURE: CPT

## 2024-09-03 PROCEDURE — 97530 THERAPEUTIC ACTIVITIES: CPT | Mod: GP

## 2024-09-03 PROCEDURE — 1200000002 HC GENERAL ROOM WITH TELEMETRY DAILY

## 2024-09-03 PROCEDURE — 84100 ASSAY OF PHOSPHORUS: CPT | Performed by: STUDENT IN AN ORGANIZED HEALTH CARE EDUCATION/TRAINING PROGRAM

## 2024-09-03 PROCEDURE — 2500000004 HC RX 250 GENERAL PHARMACY W/ HCPCS (ALT 636 FOR OP/ED): Performed by: STUDENT IN AN ORGANIZED HEALTH CARE EDUCATION/TRAINING PROGRAM

## 2024-09-03 PROCEDURE — 84439 ASSAY OF FREE THYROXINE: CPT

## 2024-09-03 PROCEDURE — 36415 COLL VENOUS BLD VENIPUNCTURE: CPT | Performed by: STUDENT IN AN ORGANIZED HEALTH CARE EDUCATION/TRAINING PROGRAM

## 2024-09-03 PROCEDURE — 2500000002 HC RX 250 W HCPCS SELF ADMINISTERED DRUGS (ALT 637 FOR MEDICARE OP, ALT 636 FOR OP/ED): Performed by: SURGERY

## 2024-09-03 PROCEDURE — 2500000004 HC RX 250 GENERAL PHARMACY W/ HCPCS (ALT 636 FOR OP/ED)

## 2024-09-03 PROCEDURE — 2500000001 HC RX 250 WO HCPCS SELF ADMINISTERED DRUGS (ALT 637 FOR MEDICARE OP): Performed by: SURGERY

## 2024-09-03 PROCEDURE — 84550 ASSAY OF BLOOD/URIC ACID: CPT | Performed by: PHYSICIAN ASSISTANT

## 2024-09-03 PROCEDURE — 97530 THERAPEUTIC ACTIVITIES: CPT | Mod: GO | Performed by: OCCUPATIONAL THERAPIST

## 2024-09-03 PROCEDURE — G0316 PR PROLONGED INPATIENT/OBSERVATION EM SVC EA 15 MIN: HCPCS | Performed by: INTERNAL MEDICINE

## 2024-09-03 PROCEDURE — 84443 ASSAY THYROID STIM HORMONE: CPT

## 2024-09-03 RX ORDER — HALOPERIDOL 5 MG/ML
0.5 INJECTION INTRAMUSCULAR NIGHTLY
Status: DISCONTINUED | OUTPATIENT
Start: 2024-09-03 | End: 2024-09-03

## 2024-09-03 RX ORDER — LIDOCAINE 560 MG/1
1 PATCH PERCUTANEOUS; TOPICAL; TRANSDERMAL DAILY
Status: DISCONTINUED | OUTPATIENT
Start: 2024-09-03 | End: 2024-09-05 | Stop reason: HOSPADM

## 2024-09-03 RX ORDER — OLANZAPINE 5 MG/1
20 TABLET ORAL NIGHTLY
Status: DISCONTINUED | OUTPATIENT
Start: 2024-09-03 | End: 2024-09-05 | Stop reason: HOSPADM

## 2024-09-03 RX ORDER — HALOPERIDOL 5 MG/ML
0.5 INJECTION INTRAMUSCULAR NIGHTLY
Status: DISCONTINUED | OUTPATIENT
Start: 2024-09-03 | End: 2024-09-05 | Stop reason: HOSPADM

## 2024-09-03 RX ADMIN — OXYCODONE HYDROCHLORIDE 5 MG: 5 TABLET ORAL at 18:51

## 2024-09-03 RX ADMIN — HALOPERIDOL LACTATE 0.5 MG: 5 INJECTION, SOLUTION INTRAMUSCULAR at 22:52

## 2024-09-03 RX ADMIN — LABETALOL HYDROCHLORIDE 10 MG: 5 INJECTION, SOLUTION INTRAVENOUS at 18:10

## 2024-09-03 RX ADMIN — SODIUM CHLORIDE 1 G: 1 TABLET ORAL at 17:18

## 2024-09-03 RX ADMIN — VENLAFAXINE HYDROCHLORIDE 37.5 MG: 37.5 CAPSULE, EXTENDED RELEASE ORAL at 08:12

## 2024-09-03 RX ADMIN — OXYCODONE HYDROCHLORIDE 5 MG: 5 TABLET ORAL at 06:05

## 2024-09-03 RX ADMIN — SODIUM CHLORIDE 1 G: 1 TABLET ORAL at 12:30

## 2024-09-03 RX ADMIN — LATANOPROST 1 DROP: 50 SOLUTION/ DROPS OPHTHALMIC at 22:31

## 2024-09-03 RX ADMIN — ENOXAPARIN SODIUM 30 MG: 100 INJECTION SUBCUTANEOUS at 22:33

## 2024-09-03 RX ADMIN — INSULIN LISPRO 8 UNITS: 100 INJECTION, SOLUTION INTRAVENOUS; SUBCUTANEOUS at 09:58

## 2024-09-03 RX ADMIN — METOPROLOL SUCCINATE 37.5 MG: 25 TABLET, EXTENDED RELEASE ORAL at 08:11

## 2024-09-03 RX ADMIN — LATANOPROST 1 DROP: 50 SOLUTION/ DROPS OPHTHALMIC at 02:49

## 2024-09-03 RX ADMIN — ACETAMINOPHEN 975 MG: 325 TABLET ORAL at 12:30

## 2024-09-03 RX ADMIN — OXYCODONE HYDROCHLORIDE 5 MG: 5 TABLET ORAL at 23:03

## 2024-09-03 RX ADMIN — METOPROLOL SUCCINATE 37.5 MG: 25 TABLET, EXTENDED RELEASE ORAL at 22:31

## 2024-09-03 RX ADMIN — PREDNISONE 3.75 MG: 2.5 TABLET ORAL at 08:11

## 2024-09-03 RX ADMIN — ACETAMINOPHEN 975 MG: 325 TABLET ORAL at 23:03

## 2024-09-03 RX ADMIN — ATORVASTATIN CALCIUM 40 MG: 40 TABLET, FILM COATED ORAL at 22:42

## 2024-09-03 RX ADMIN — OLANZAPINE 20 MG: 5 TABLET, FILM COATED ORAL at 22:31

## 2024-09-03 RX ADMIN — ACETAMINOPHEN 975 MG: 325 TABLET ORAL at 06:05

## 2024-09-03 RX ADMIN — INSULIN LISPRO 8 UNITS: 100 INJECTION, SOLUTION INTRAVENOUS; SUBCUTANEOUS at 14:21

## 2024-09-03 RX ADMIN — HYDROMORPHONE HYDROCHLORIDE 0.2 MG: 1 INJECTION, SOLUTION INTRAMUSCULAR; INTRAVENOUS; SUBCUTANEOUS at 16:52

## 2024-09-03 RX ADMIN — OXYCODONE HYDROCHLORIDE 5 MG: 5 TABLET ORAL at 10:12

## 2024-09-03 RX ADMIN — POLYETHYLENE GLYCOL 3350 17 G: 17 POWDER, FOR SOLUTION ORAL at 08:12

## 2024-09-03 RX ADMIN — METHOCARBAMOL TABLETS 500 MG: 500 TABLET, COATED ORAL at 06:05

## 2024-09-03 RX ADMIN — ENOXAPARIN SODIUM 30 MG: 100 INJECTION SUBCUTANEOUS at 08:11

## 2024-09-03 RX ADMIN — SODIUM CHLORIDE 1 G: 1 TABLET ORAL at 08:11

## 2024-09-03 RX ADMIN — CALCIUM CARBONATE (ANTACID) CHEW TAB 500 MG 500 MG: 500 CHEW TAB at 08:11

## 2024-09-03 RX ADMIN — ACETAMINOPHEN 975 MG: 325 TABLET ORAL at 17:18

## 2024-09-03 RX ADMIN — INSULIN LISPRO 8 UNITS: 100 INJECTION, SOLUTION INTRAVENOUS; SUBCUTANEOUS at 22:33

## 2024-09-03 RX ADMIN — PREDNISONE 3.75 MG: 2.5 TABLET ORAL at 22:32

## 2024-09-03 ASSESSMENT — COGNITIVE AND FUNCTIONAL STATUS - GENERAL
EATING MEALS: A LITTLE
DAILY ACTIVITIY SCORE: 14
CLIMB 3 TO 5 STEPS WITH RAILING: A LOT
TURNING FROM BACK TO SIDE WHILE IN FLAT BAD: A LOT
MOVING TO AND FROM BED TO CHAIR: A LOT
MOVING FROM LYING ON BACK TO SITTING ON SIDE OF FLAT BED WITH BEDRAILS: A LOT
EATING MEALS: A LITTLE
TOILETING: A LOT
MOBILITY SCORE: 12
PERSONAL GROOMING: A LITTLE
HELP NEEDED FOR BATHING: A LOT
DRESSING REGULAR UPPER BODY CLOTHING: A LOT
HELP NEEDED FOR BATHING: A LOT
TURNING FROM BACK TO SIDE WHILE IN FLAT BAD: A LOT
MOVING FROM LYING ON BACK TO SITTING ON SIDE OF FLAT BED WITH BEDRAILS: A LOT
STANDING UP FROM CHAIR USING ARMS: A LOT
DRESSING REGULAR UPPER BODY CLOTHING: A LOT
TOILETING: TOTAL
DAILY ACTIVITIY SCORE: 11
WALKING IN HOSPITAL ROOM: A LOT
MOBILITY SCORE: 8
DRESSING REGULAR LOWER BODY CLOTHING: A LOT
MOVING TO AND FROM BED TO CHAIR: TOTAL
STANDING UP FROM CHAIR USING ARMS: TOTAL
PERSONAL GROOMING: A LOT
CLIMB 3 TO 5 STEPS WITH RAILING: TOTAL
WALKING IN HOSPITAL ROOM: TOTAL
DRESSING REGULAR LOWER BODY CLOTHING: TOTAL

## 2024-09-03 ASSESSMENT — PAIN SCALES - WONG BAKER
WONGBAKER_NUMERICALRESPONSE: HURTS LITTLE BIT
WONGBAKER_NUMERICALRESPONSE: HURTS WHOLE LOT

## 2024-09-03 ASSESSMENT — PAIN SCALES - GENERAL
PAINLEVEL_OUTOF10: 10 - WORST POSSIBLE PAIN
PAINLEVEL_OUTOF10: 4
PAINLEVEL_OUTOF10: 10 - WORST POSSIBLE PAIN
PAINLEVEL_OUTOF10: 10 - WORST POSSIBLE PAIN

## 2024-09-03 ASSESSMENT — PAIN - FUNCTIONAL ASSESSMENT: PAIN_FUNCTIONAL_ASSESSMENT: 0-10

## 2024-09-03 NOTE — PROGRESS NOTES
Transitional Care Coordinator Note: Patient discussed in morning rounds, per medical team (trauma) patient is not medically ready, pending sodium labs. Discharge dispo: Plan for patient to discharge SNF [facility] [place]. Auth to admit expires 9/3, trauma team updated.       Ruby Suqires RN BSN   Transitional Care Coordinator

## 2024-09-03 NOTE — CARE PLAN
Problem: Skin  Goal: Promote/optimize nutrition  Outcome: Progressing  Goal: Promote skin healing  Outcome: Progressing     Problem: Pain  Goal: Takes deep breaths with improved pain control throughout the shift  Outcome: Progressing  Goal: Turns in bed with improved pain control throughout the shift  Outcome: Progressing     Problem: Pain - Adult  Goal: Verbalizes/displays adequate comfort level or baseline comfort level  Outcome: Progressing     Problem: Safety - Adult  Goal: Free from fall injury  Outcome: Progressing     Problem: Discharge Planning  Goal: Discharge to home or other facility with appropriate resources  Outcome: Progressing     Problem: Chronic Conditions and Co-morbidities  Goal: Patient's chronic conditions and co-morbidity symptoms are monitored and maintained or improved  Outcome: Progressing   The patient's goals for the shift include pain management     The clinical goals for the shift include pain management

## 2024-09-03 NOTE — PROGRESS NOTES
[facility]  TRAUMA SERVICE - PROGRESS NOTE    Patient Name: Jennifer Mancini  MRN: [id number]  Admit Date: 824  : 1940  AGE: 84 y.o.   GENDER: female  ==============================================================================  MECHANISM OF INJURY:   Fall from standing    LOC (yes/no?): Unknown  Anticoagulant / Anti-platelet Rx? (for what dx?): Denies  Referring Facility Name (N/A for scene EMR run):  [facility]     INJURIES:   T8 vertebral body fracture  Subacute right-sided rib fractures      OTHER MEDICAL PROBLEMS:  CHF with reserved EF  HTN  Schizoaffective disorder  HLD  DM2      INCIDENTAL FINDINGS:  Large hiatal hernia      PROCEDURES:  None     ==============================================================================  TODAY'S ASSESSMENT AND PLAN OF CARE:  #T8 vertebral body fx  - MRI reviewed by nsgy, recommend TLSO hard brace  - Per nsgy OK for DVT PPX. No neurosurgical intervention at this time. Follow up with their team in 6 weeks with repeat [facility] prior to follow up date.   - Lovenox 30 mg, SCD's  -cannot get out of bed without brace   -PT/OT: moderate intensity PT with low intensity OT     #fen/gi/gu  - reg diet  - calorie counts  - Hypo is 124 from 126, hyperkalemic to 5.6  - Orthostatic vitals  - Geriatrics consult, appreciate recs:  Serum osm, urine osm, urine studies, TSH, T4 levels ordered and reviewed; Suspect SIADH. Suspect subclinical hypothyroidism   - discontinue robaxin    - 4-6wks OP follow up for subclinical hypothyroidism   - 4-6 wks OP follow up for nephrology for hyponatremia   - fluid restriction to 1 L per day   - lidoderm patches applied to paravertebral and ribs  - Continue zyprexa (home med), hold haldol.   - Follow RFPs    Dispo: continue RNF    D/w Dr. Lisa Blue MD  PGY-1 General Surgery  Trauma [id number]  ==============================================================================  CHIEF COMPLAINT /  OVERNIGHT EVENTS:   Complaints of pain this morning. Robaxin d/c'd and lidocaine patches added. Hyponatremic to 124 and hypokalemic to 5.6 on labs this morning. K+ repletion stopped.     MEDICAL HISTORY / ROS:  Admission history and ROS reviewed. Pertinent changes as follows:      PHYSICAL EXAM:  Heart Rate:  []   Temp:  [35.8 °C (96.4 °F)-36.7 °C (98.1 °F)]   Resp:  [17-18]   BP: (125-181)/(64-98)   SpO2:  [93 %-98 %]     Physical Exam  Gen: NC/AT, conversional, NAD, in bed without TSLO brace during rounds  Resp: Nonlabored breathing   CV: Nontachy, normotensive  Abd: Soft, nontender, nondistended   Extremities: ROM intact    IMAGING SUMMARY: No new    LABS:  Results from last 7 days   Lab Units 08/31/24  1147 08/29/24  0943   WBC AUTO x10*3/uL 9.3 8.4   HEMOGLOBIN g/dL 10.4* 10.4*   HEMATOCRIT % 31.5* 31.2*   PLATELETS AUTO x10*3/uL 364 292         Results from last 7 days   Lab Units 09/03/24  0820 09/03/24  0042 09/02/24  1528   SODIUM mmol/L 127* 124* 129*   POTASSIUM mmol/L 4.5 5.6* 3.8   CHLORIDE mmol/L 93* 91* 94*   CO2 mmol/L 28 24 28   BUN mg/dL 9 12 8   CREATININE mg/dL 0.31* 0.26* 0.32*   CALCIUM mg/dL 8.5* 8.7 8.3*   GLUCOSE mg/dL 188* 180* 239*     I have reviewed all medications, laboratory results, and imaging pertinent for today's encounter.

## 2024-09-03 NOTE — PROGRESS NOTES
"Physical Therapy    Physical Therapy Treatment    Patient Name: Jennifer Mancini  MRN: [id number]  Today's Date: 9/3/2024  Time Calculation  Start Time: 0856  Stop Time: 0919  Time Calculation (min): 23 min         Assessment/Plan   PT Assessment  End of Session Communication: Bedside nurse  Assessment Comment: Patient tolerated session fair; limited by pain. required x2 assist for all mobility. continue to rec mod  End of Session Patient Position: Bed, 3 rail up, Alarm on     PT Plan  Treatment/Interventions: Bed mobility, Transfer training, Gait training, Balance training, Stair training, Strengthening, Endurance training, Range of motion, Therapeutic exercise, Therapeutic activity, Home exercise program, Positioning  PT Plan: Ongoing PT  PT Frequency: 3 times per week  PT Discharge Recommendations: Moderate intensity level of continued care  PT Recommended Transfer Status: Assist x2  PT - OK to Discharge: Yes      General Visit Information:   PT  Visit  PT Received On: 09/03/24  General  Co-Treatment: OT  Co-Treatment Reason: maximize pt safety and function with low AMPAC score  Prior to Session Communication: Bedside nurse  Patient Position Received: Bed, 3 rail up, Alarm on  General Comment: pt supine in bed, RN cleared. easily irritable and high pain levels    Subjective   Precautions:  Precautions  Medical Precautions: Fall precautions, Spinal precautions  Braces Applied: TLSO      Objective   Pain:  Pain Assessment  Pain Assessment:  (\"12\")  Pain Type: Acute pain  Pain Location: Back  Cognition:  Cognition  Orientation Level: Oriented X4  Following Commands: Follows one step commands with increased time  Coordination:     Postural Control:  Static Sitting Balance  Static Sitting-Level of Assistance: Contact guard  Static Sitting-Comment/Number of Minutes: 5    Activity Tolerance:  Activity Tolerance  Endurance: Tolerates 10 - 20 min exercise with multiple rests  Treatments:  Therapeutic Activity  Therapeutic " Activity Performed: Yes  Therapeutic Activity 1: pt rolled in bed multiple times to don brace, complete log roll and doff brace once returned to supine.  Therapeutic Activity 2: pt sat EOB for 5 min and completed 1 stand.    Bed Mobility  Bed Mobility: Yes  Bed Mobility 1  Bed Mobility 1: Rolling right  Level of Assistance 1: Moderate assistance, Moderate verbal cues, Moderate tactile cues  Bed Mobility Comments 1: 3 rolls to the R  Bed Mobility 2  Bed Mobility  2: Rolling left  Level of Assistance 2: Moderate assistance  Bed Mobility Comments 2: 1 roll to L  Bed Mobility 3  Bed Mobility 3: Supine sitting, Sitting to supine, Log roll  Level of Assistance 3: Maximum assistance, +2, Moderate verbal cues, Moderate tactile cues  Bed Mobility Comments 3: cues for sequencing, increased time to complete       Transfers  Transfer: Yes  Transfer 1  Transfer From 1: Sit to, Stand to  Transfer to 1: Stand, Sit  Technique 1: Sit to stand, Stand to sit  Transfer Device 1:  (bilateral arm in arm)  Transfer Level of Assistance 1: Maximum assistance, +2, Moderate verbal cues, Moderate tactile cues  Trials/Comments 1: decreased clearnace of bed and decreased standing tolerance    Outcome Measures:  [facility] Basic Mobility  Turning from your back to your side while in a flat bed without using bedrails: A lot  Moving from lying on your back to sitting on the side of a flat bed without using bedrails: A lot  Moving to and from bed to chair (including a wheelchair): Total  Standing up from a chair using your arms (e.g. wheelchair or bedside chair): Total  To walk in hospital room: Total  Climbing 3-5 steps with railing: Total  Basic Mobility - Total Score: 8    Education Documentation  Mobility Training, taught by Erin Miller PT at 9/3/2024 10:41 AM.  Learner: Patient  Readiness: Acceptance  Method: Explanation  Response: Verbalizes Understanding  Comment: edu on role of PT and precautions    Precautions, taught by Erin Miller PT at  9/3/2024 10:41 AM.  Learner: Patient  Readiness: Acceptance  Method: Explanation  Response: Verbalizes Understanding  Comment: edu on role of PT and precautions    Education Comments  No comments found.        OP EDUCATION:       Encounter Problems       Encounter Problems (Active)       Balance       STG - Maintains static sitting balance with upper extremity support >/= 10 min with SBA (Progressing)       Start:  08/27/24    Expected End:  09/10/24               Mobility       STG - Patient will ambulate >/= 100ft with minAx1 and LRAD (Progressing)       Start:  08/27/24    Expected End:  09/10/24               PT Transfers       STG - Transfer from bed to chair minAx1 and LRAD (Progressing)       Start:  08/27/24    Expected End:  09/10/24            STG - Patient will perform bed mobility Bart (Progressing)       Start:  08/27/24    Expected End:  09/10/24            STG - Patient will transfer sit to and from stand minAx1 and LRAD (Progressing)       Start:  08/27/24    Expected End:  09/10/24

## 2024-09-03 NOTE — CONSULTS
Inpatient consult to Geriatric Medicine  Consult performed by: Melissa Ohara MD  Consult ordered by: Slim Mercer DO      Primary Team: Trauma     Admit Date: 8/24/2024    Emergency Contact:   Extended Emergency Contact Information  Primary Emergency Contact: Ivette Tan  Home Phone: [phone]  Mobile Phone: [phone]  Relation: Child     Reason For Consult: Thyroid management and hyponatremia    History Of Present Illness:  Jennifer Mancini is a 84 y.o. female, with a past medical history of HTN, HLD, HFrEF, CAD with hx of MI,  DM TII, osteoporosis, dermatomyositis (on methotrexate/steroids), WEN without CPAP, anemia, cognitive impairment, and schizoaffective disorder, presenting with T8 vertebral body fracture sustained after falling backwards from ground level.    Patient initially presented to [facility] after falling backwards. Per chart review, she lives in an assisted living facility mostly ambulating with a wheelchair. However on 08/23 she wheeled herself to the end of the table and attempted to get up to see what was on the table, lost her balance and fell backwards. Upon presentation to outside facility, she was found to have hypomagnesemia, hyponatremia, and CT of the spine revealed a vertebral body fracture of T8. She was subsequently transferred on 08/24 to [facility] for further neurosurgery and trauma surgery evaluation.    Throughout her hospitalization, trauma surgery has been the primary care team with neurosurgery consulted. Neurosurgery recommended no surgical intervention, TLSO hard brace, and follow-up in in 6 weeks. PT/OT recommended moderate intensity PT with low intensity OT. She continues to have hyponatremia, which upon reviewing her medical history seems like a chronic issue dating back to as far as 2019.    Patient was seen by geriatrics team on 02/08/2024 during hospitalization for fall and sustaining left humerus fracture, L3 clarice fracture, and nasal bone  "fracture.    History per patient:  The patient herself states that she has difficulty with her short term memory at times. When asked about her resent fall, she notes that she has fallen in the past but does not remember the specific events. She currently has sutures above her left eyebrow. She states those were obtained from falling previously, not the current episode.     When asked about this most recent fall prior to hospitalization, the patient states she does not remember the entire event, but does remember standing up out of her wheelchair (which she uses to ambulate almost exclusively since the \"doctors told her to stop using her walker\") and falling backwards. She denies LOC or hitting her head.      History per family/caregiver: (obtained in addition due to patient’s cognitive history) Daughter/Guardian Ivette:    Have been unable to reach by phone today.    Per last consult note 02/09/2024:   Patient living in assisted living at [facility] for 3 years. Patient's daughter does endorse baseline cognitive impairment including memory, misplacing things, short-term forgetfulness and sundowning. Patient uses a walker and a wheel chair to ambulate. Patient does have strong family support. Patient is reliant on facility for majority of instrumental ADLs as below, as well as basic ADLs including bathing, dressing, and toileting.     What matters most to the patient:  Reducing her pain (back pain/rib pain) and her family    Prior to Admission Meds  Prior to Admission Medications   Prescriptions Last Dose Informant Patient Reported? Taking?   Glucagon Emergency Kit, human, 1 mg injection Unknown  Yes Yes   Sig: Inject 1 mg under the skin every 15 minutes if needed for low blood sugar - see comments (BG <70).   OLANZapine (ZyPREXA) 20 mg tablet Unknown  Yes No   Sig: Take 1 tablet (20 mg) by mouth once daily at bedtime.   abaloparatide (Tymlos) 80 mcg (3,120 mcg/1.56 mL) pen injector Unknown  Yes No   Sig: " Inject 80 mcg under the skin once daily.   acetaminophen (Tylenol) 325 mg tablet Unknown  Yes No   Sig: Take 3 tablets (975 mg) by mouth 3 times a day. Do not crush, chew, or split.   atorvastatin (Lipitor) 40 mg tablet Unknown  Yes No   Sig: Take 1 tablet (40 mg) by mouth once daily at bedtime.   benzocaine-menthol (Cepacol Sore Throat, nargis-men,) 15-3.6 mg lozenge Unknown  Yes No   Sig: Dissolve 1 lozenge in the mouth every 4 hours if needed for sore throat.   bisacodyl (Dulcolax) 10 mg suppository Unknown  Yes Yes   Sig: Insert 1 suppository (10 mg) into the rectum once daily as needed for constipation (if no BM 8 hours after milk of magnesium).   calcium carbonate-vitamin D3 600 mg-20 mcg (800 unit) tablet Unknown  Yes No   Sig: Take 1 tablet by mouth 2 times a day.   clobetasoL 0.05 % shampoo Unknown  Yes No   Sig: Apply 1 Application topically 1 (one) time per week.   clobetasol (Temovate) 0.05 % cream Unknown  Yes No   Sig: Apply 1 Application topically 2 times a day.   docusate sodium (Colace) 100 mg capsule Unknown  Yes No   Sig: Take 1 capsule (100 mg) by mouth once daily in the morning. Take before meals.   dorzolamide (Trusopt) 2 % ophthalmic solution Unknown  Yes No   Sig: Administer 1 drop into both eyes 3 times a day.   famotidine (Pepcid) 40 mg tablet Unknown  Yes No   Sig: Take 1 tablet (40 mg) by mouth once daily at bedtime.   fluticasone (Flonase) 50 mcg/actuation nasal spray Unknown  Yes No   Sig: Administer 1 spray into each nostril once daily. Shake gently. Before first use, prime pump. After use, clean tip and replace cap.   folic acid (Folvite) 1 mg tablet Unknown  Yes No   Sig: Take 1 tablet (1 mg) by mouth once daily.   guaiFENesin (Robitussin) 100 mg/5 mL syrup Unknown  Yes No   Sig: Take 10 mL (200 mg) by mouth every 4 hours if needed for cough or congestion.   haloperidol (Haldol) 0.5 mg tablet Unknown  Yes No   Sig: Take 1 tablet (0.5 mg) by mouth once daily at bedtime.   hydrocortisone  2.5 % cream Unknown  Yes No   Sig: Apply 1 Application topically 3 times a day. To forehead   ibuprofen 600 mg tablet Unknown  Yes Yes   Sig: Take 1 tablet (600 mg) by mouth every 6 hours if needed for mild pain (1 - 3).   latanoprost (Xalatan) 0.005 % ophthalmic solution Unknown  Yes No   Sig: Administer 1 drop into both eyes once daily at bedtime.   linaGLIPtin (Tradjenta) 5 mg tablet Unknown  Yes No   Sig: Take 1 tablet (5 mg) by mouth once daily.   magnesium hydroxide (Milk of Magnesia) 400 mg/5 mL suspension Unknown  Yes No   Sig: Take 30 mL by mouth once daily as needed for constipation (if no BM in 3 consecutive days).   magnesium oxide (Mag-Ox) 400 mg tablet Unknown  Yes No   Sig: Take 1 tablet (400 mg) by mouth once daily.   melatonin 5 mg tablet Unknown  Yes No   Sig: Take 1 tablet (5 mg) by mouth once daily at bedtime.   metFORMIN (Glucophage) 500 mg tablet Unknown  Yes No   Sig: Take 1 tablet (500 mg) by mouth 2 times daily (morning and late afternoon).   methotrexate (Trexall) 2.5 mg tablet Unknown  Yes No   Sig: Take 6 tablets (15 mg total) by mouth 1 (one) time per week.  6 tablets (15mg) once a week on Fridays   metoprolol succinate XL (Toprol-XL) 25 mg 24 hr tablet Unknown  Yes No   Sig: Take 1 tablet (25 mg) by mouth 2 times a day. Do not crush or chew.   polyethylene glycol (Glycolax, Miralax) 17 gram packet Unknown  Yes No   Sig: Take 17 g by mouth 2 times a day.   predniSONE (Deltasone) 2.5 mg tablet Unknown  Yes No   Sig: Take 1.5 tablets (3.75 mg) by mouth 2 times a day.   sodium phosphates (Fleet, Pediatric,) 9.5-3.5 gram/59 mL enema Unknown  Yes No   Sig: Insert 1 enema into the rectum once daily as needed for constipation (if no BM 8 hours after receiving bisacodyl suppository).   venlafaxine XR (Effexor-XR) 37.5 mg 24 hr capsule Unknown  Yes No   Sig: Take 1 capsule (37.5 mg) by mouth once daily. Do not crush or chew.      Facility-Administered Medications: None        Current Meds in  Hospital  Current Facility-Administered Medications   Medication Dose Route Frequency Provider Last Rate Last Admin    acetaminophen (Tylenol) tablet 975 mg  975 mg oral q6h MILEY Sergio Arauz DMD, MD   975 mg at 09/03/24 0605    atorvastatin (Lipitor) tablet 40 mg  40 mg oral Nightly Sergio Arauz DMD, MD   40 mg at 09/02/24 2124    calcium carbonate (Tums) chewable tablet 500 mg  500 mg oral Daily Sergio Arauz DMD, MD   500 mg at 09/02/24 0832    dextrose 50 % injection 12.5 g  12.5 g intravenous q15 min PRN Sergio Arauz DMD, MD        dextrose 50 % injection 25 g  25 g intravenous q15 min PRN Sergio Arauz DMD, MD        enoxaparin (Lovenox) syringe 30 mg  30 mg subcutaneous q12h Sergio Arauz DMD, MD   30 mg at 09/02/24 2148    glucagon (Glucagen) injection 1 mg  1 mg intramuscular q15 min PRN Sergio Arauz DMD, MD        glucagon (Glucagen) injection 1 mg  1 mg intramuscular q15 min PRN Sergio Arauz DMD, MD        haloperidol (Haldol) tablet 0.5 mg  0.5 mg oral Nightly Sergio Arauz DMD, MD   0.5 mg at 09/02/24 2124    HYDROmorphone (Dilaudid) injection 0.2 mg  0.2 mg intravenous q4h PRN Sergio Arauz DMD, MD   0.2 mg at 09/01/24 1037    insulin lispro (HumaLOG) injection 0-20 Units  0-20 Units subcutaneous Before meals & nightly Marylin Medrano DO   4 Units at 09/02/24 2003    labetaloL (Normodyne,Trandate) injection 10 mg  10 mg intravenous q6h PRN Sergio Arauz DMD, MD   10 mg at 09/02/24 1044    latanoprost (Xalatan) 0.005 % ophthalmic solution 1 drop  1 drop Both Eyes Nightly Sergio Arauz DMD, MD   1 drop at 09/03/24 0249    metoprolol succinate XL (Toprol-XL) 24 hr tablet 37.5 mg  37.5 mg oral BID Marylin Medrano DO   37.5 mg at 09/02/24 2123    naloxone (Narcan) injection 0.2 mg  0.2 mg intravenous q5 min PRN Sergio Arauz DMD, MD        naloxone (Narcan) injection 0.2 mg  0.2 mg intravenous q5 min PRN Sergio Arauz DMD, MD        OLANZapine (ZyPREXA)  tablet 20 mg  20 mg oral Nightly Sergio Arauz DMD, MD   20 mg at 09/02/24 2124    oxyCODONE (Roxicodone) immediate release tablet 2.5 mg  2.5 mg oral q6h PRN Sergio Arauz DMD, MD        oxyCODONE (Roxicodone) immediate release tablet 5 mg  5 mg oral q4h PRN Sergio Arauz DMD, MD   5 mg at 09/03/24 0605    oxygen (O2) therapy   inhalation Continuous PRN - O2/gases Sergio Arauz DMD, MD   2 L/min at 08/31/24 0800    polyethylene glycol (Glycolax, Miralax) packet 17 g  17 g oral Daily Sergio Arauz DMD, MD   17 g at 09/02/24 0829    predniSONE (Deltasone) tablet 3.75 mg  3.75 mg oral BID Sergio Arauz DMD, MD   3.75 mg at 09/02/24 2123    sodium chloride tablet 1 g  1,000 mg oral TID Sergio Arauz DMD, MD   1 g at 09/02/24 1237    venlafaxine XR (Effexor-XR) 24 hr capsule 37.5 mg  37.5 mg oral Daily Sergio Arauz DMD, MD   37.5 mg at 09/02/24 0832        The patient's outpatient electronic medical record (EMR) is reviewed, notable information includes.    02/08/24 Left supracondylar humerus fracture after a fall on gound, closed, s/p ORIF    02/09/2024 seen by geriatrics in consultation for history of falls while hospitalized for left supracondylar humerus fracture.     03/19/2024 follow-up of osteoporosis (supposed to be on abaloparatide) and dermatomyositis (current regimen methotrexate sodium every friday and prednisone BID) and low sodium. Visit recommendations included:    1. dermatomyositis   - continue prednisone 7.5 mg daily  - continue MTX (methotrexate) 15 mg weekly + folic acid 1 mg daily   - routine lab every 3 months, due on 6/1/24 need overnight fast  - continue to f/u with Dermatology for scalp rash     2. Osteoporosis  - DXA       - 1/17/24 [place] T score R hip neck -3.7, total -3.6; LS DDD; left forearm distal 1/3: -3.4  - Spine fx (T12, L1, L3), on prednisone therapy, back pain, using wheelchair  - 2/8/24 Left supracondylar humerus fracture after a fall on gound, closed, s/p ORIF    -please start abaloparatide (TYMLOS) in your first favored time   -1200 mg - 1500 mg calcium per day if no history of renal calculi for adults 50 years and over.  -1,000-2,000 International Units of vitamin D3 per day if no history of renal calculi for adults 50 years and over.      3. Low sodium level, halley LFTs  - stable    07/08/2024 Patient presented to the ED at [facility] after falling while using her walker to go to the bathroom. She sustained a R forehead laceration for which running sutures were placed. X-ray also demonstrated right clavicular fracture, she was placed in a figure eight sling. Orthopedics out patient f/u was placed. She was discharged to prior assisted living facility.    07/09/2024 Ortho follow-up for closed displaced fracture of the acromial end of the right clavicle. Recommended ibuprofen for pain control, follow-up in 2-3 weeks the ok to start [id number]-motion, continue shoulder immobilizer.    08/17/2024 Patient presented to the ED after falling. Per note, patient was getting up form the bathroom when she felt dizzy, felling forward and hitting her head. She sustained a laceration to the left forehead, which was repaired. Work-up for acute intracranial abnormalities was negative. She was discharged to prior assisted living facility. Pt was offered admission for possible placement to rehab facility, pt declined and chose to f/u with her PCP.          Past Medical History  Past Medical History:   Diagnosis Date    HTN (hypertension) 2/9/2024    Hypertensive heart disease without heart failure 12/17/2019    Hypertensive heart disease without CHF    Other nondisplaced fracture of seventh cervical vertebra, subsequent encounter for fracture with routine healing 12/17/2019    Other closed nondisplaced fracture of seventh cervical vertebra with routine healing, subsequent encounter    Other nondisplaced fracture of sixth cervical vertebra, subsequent encounter for fracture with  routine healing 12/17/2019    Other closed nondisplaced fracture of sixth cervical vertebra with routine healing, subsequent encounter    Personal history of other diseases of the female genital tract 12/17/2019    History of uterine prolapse    Personal history of other endocrine, nutritional and metabolic disease 12/17/2019    History of hyperlipidemia    Personal history of other mental and behavioral disorders 06/14/2021    History of schizoaffective disorder    Personal history of other specified conditions 06/14/2021    History of chronic pain    Schizoaffective disorder, bipolar type (Multi) 12/17/2019    Schizoaffective disorder, bipolar type    Unspecified fracture of left femur, initial encounter for closed fracture (Multi) 06/14/2021    Fracture of left femur        Surgical History  Past Surgical History:   Procedure Laterality Date    CT ANGIO NECK  12/13/2019    CT NECK ANGIO W AND WO IV CONTRAST 12/13/2019 [facility] CLINICAL LEGACY    OTHER SURGICAL HISTORY  12/17/2019    Tonsillectomy    OTHER SURGICAL HISTORY  12/17/2019    Rotator cuff repair    OTHER SURGICAL HISTORY  12/17/2019    Wrist surgery    OTHER SURGICAL HISTORY  12/17/2019    Uterine surgery        Family History  Uterine CA, mother  Colon CA, mother  Ovarian CA, sister  CAD, brother  TII DM, grandmother      Social History  She reports that she does not drink alcohol and does not use drugs. No history on file for tobacco use.  -Alcohol use: No  -Tobacco use: No, prior use  -Illicit drug use: No  -Exercise: None, PT through assisted living  -Spiritual needs: None  -Marital Status:   Occupation: Retired, worked as a  for [employer] organization, unable to work due to shizophrenia history  Highest Level of Education: Trade School, Pt completed high school and then went to typing school  Community Resources: none  : No  Current living environment: Lives in assisted living at [facility]    Activities of  Daily Living:  Basic ADLs: (I= independent, A= assistance, D= dependent)  Bathing: A, Dressing: A, Toileting: A, Transferring: A, Continence: A, Feeding: I    Chery Index:  1  Instrumental ADLs: (I= independent, A= assistance, D= dependent)  Ability to use phone: A, Shopping: D, Cooking: D, Housekeeping: D, Laundry: D, Transportation: D, Medications: D, Handle Finances: D   [place] Scale:  0    Allergies  House dust, Mold, and Pollen extracts    Review of Systems  Review of System:  Constitutional:   -Night sweats/fever: denies     -Weight change: denies    Integumentary: Multiple bruises on arms from recent falls    Ears, Nose, Throat:  -Hearing loss: denies issues        Eyes:  -Vision loss: wears reading glasses    Cardiovascular:  -Chest Pain: denies  -Orthopnea: denies      -Paroxysmal nocturnal dyspnea: denies  -Edema: occasional LE edema at end of day    Respiratory:   -Dyspnea: some SOB with walking, no acute changes  -Wheezing: denies    Gastrointestinal:           -Appetite: denies  -Difficulty chewing/ swallowing: Poor dentation   -Heartburn: denies  -Bowels: some occasional constipation     Genitourinary:   -Incontinence: denies, currently [place] in place  -Nocturia: denies    Musculoskeletal:  -Mobility: decreased, uses wheelchair to ambulate  -Pain: Back pain and rib pain    Neurological:  -Confusion: denies  -Falls: multiple  -Gait: uses wheelchair to ambulate  -Memory: endorses short term memory difficulties   -Tremor: denies    Psychiatric:  -Mood: stable  -Sleep: sleeps well at home, has has some difficulty here       Documents on file and valid:  Advance Directive/Living Will: No   Health Care Power of : Yes  LEGAL GUARDIAN: Ivette Tan, daughter    Code Status: Full Code    Physical Exam  Constitutional:       General: She is not in acute distress.  HENT:      Head: Normocephalic. Laceration (with sutures in place above left eyebrow) present.      Right Ear: External ear normal.       Left Ear: External ear normal.      Nose: Nose normal. No congestion or rhinorrhea.      Mouth/Throat:      Mouth: Mucous membranes are moist.      Pharynx: Oropharynx is clear.   Eyes:      Extraocular Movements: Extraocular movements intact.      Conjunctiva/sclera: Conjunctivae normal.   Cardiovascular:      Rate and Rhythm: Normal rate and regular rhythm.      Heart sounds: Murmur (holo-systolic murmur) heard.      No friction rub. No gallop.   Pulmonary:      Effort: Pulmonary effort is normal.      Breath sounds: Normal breath sounds.   Abdominal:      General: There is no distension.      Palpations: Abdomen is soft.      Tenderness: There is no abdominal tenderness. There is no guarding.   Musculoskeletal:         General: Normal range of motion.      Cervical back: Normal range of motion and neck supple.      Right lower leg: No edema.      Left lower leg: No edema.   Skin:     General: Skin is warm and dry.      Findings: Bruising (multiple areas of bruising from previous falls, apparent on bilateral arms) present.   Neurological:      General: No focal deficit present.      Mental Status: She is alert. Mental status is at baseline.      Comments: Patient is oriented to person and time without error, aware that she is in the hospital but did not know she was at [facility]   Psychiatric:         Behavior: Behavior normal.         Thought Content: Thought content normal.         Last Recorded Vitals      9/2/2024    10:37 AM 9/2/2024    12:15 PM 9/2/2024     5:35 PM 9/2/2024     7:47 PM 9/2/2024    11:07 PM 9/3/2024     3:49 AM 9/3/2024     7:56 AM   Vitals   Systolic 180 139 158 167 157 125 181   Diastolic 100 64 86 84 77 79 98   Heart Rate 80 75 91 81 103 84 90   Temp  36 °C (96.8 °F) 36.7 °C (98.1 °F) 35.8 °C (96.4 °F) 35.9 °C (96.6 °F) 35.9 °C (96.6 °F) 36 °C (96.8 °F)   Resp  17 18 18 18 18 17      Vitals:    08/28/24 0900   Weight: 68.9 kg (152 lb)        Confusion Assessment Method(CAM) for diagnosis of  delirium:    1.  Acute onset or fluctuating course: absent  2.  Inattention: present  3.  Disorganized thinking: absent  4.  Altered level of consciousness: absent  CAM: negative    AT Score For Assessment of Delirium and Cognitive Impairment:    Alertness: 0  Normal(fully alert,but not agitated, throughout assessment)=0  Mild sleepiness for <10 seconds after walking, then normal=0  Clearly abnormal=4  2.  AMT4: 1  No mistakes=0  One mistake=1  Two or more mistakes/untestable=2  3.  Attention: 1  Achieves seven months or more correctly=0  Starts but scores <7 months/ refuses to start=1  Untestable(cannot start because unwell, drowsy, inattentive)=2  4.  Acute: 0  No=0  Yes=4    Total Score: 2  4 or above: Possible delirium +/- cognitive impairment  1-3: Possible cognitive impairment  0: Delirium or severe cognitive impairment unlikely(but delirium still possible if (4) information incomplete)     Relevant Results  Lab Results   Component Value Date    TSH 4.12 (H) 08/30/2024    [id number] 991 (H) 02/10/2024    HGBA1C 8.1 (H) 08/31/2024     Results from last 7 days   Lab Units 09/03/24  0042 09/02/24  1528 09/02/24  0744 08/31/24  1809 08/31/24  1147 08/31/24  0606 08/31/24  0026 08/29/24  0944 08/29/24  0943 08/28/24  1558 08/27/24  0910   WBC AUTO x10*3/uL  --   --   --   --  9.3  --   --   --  8.4  --  9.0   HEMOGLOBIN g/dL  --   --   --   --  10.4*  --   --   --  10.4*  --  10.2*   HEMATOCRIT %  --   --   --   --  31.5*  --   --   --  31.2*  --  30.5*   SODIUM mmol/L 124* 129* 126*   < > 125*   < > 127*   < >  --    < > 129*   POTASSIUM mmol/L 5.6* 3.8 3.5   < > 3.9   < > 3.4*   < >  --    < > 3.6   CHLORIDE mmol/L 91* 94* 93*   < > 94*   < > 94*   < >  --    < > 97*   CREATININE mg/dL 0.26* 0.32* <0.20*   < > 0.22*   < > 0.20*   < >  --    < > 0.23*   BUN mg/dL 12 8 5*   < > 5*   < > 7   < >  --    < > 8   CO2 mmol/L 24 28 26   < > 24   < > 24   < >  --    < > 23   HEMOGLOBIN A1C %  --   --   --   --   --   --   8.1*  --   --   --   --     < > = values in this interval not displayed.       Recent Imaging Results      XR thoracolumbar spine 2 views  Narrative: Interpreted By:  Adenike Muller,  and Mario Alberto Mcdaniels   STUDY:  XR THORACOLUMBAR SPINE 2 VIEWS; ;  8/27/2024 7:18 pm      INDICATION:  Signs/Symptoms:With TSLO brace on.      COMPARISON:  CT thoracic spine 08/24/2024.      ACCESSION NUMBER(S):  [id number]      ORDERING CLINICIAN:  ROMAN MORRISSEY      FINDINGS:  Thoracic spine, two views.      T8 vertebral body extension fracture deformity, better visualized on  CT 08/24/2024. Alignment appears maintained.      Multiple lumbar vertebral body compression fractures that are likely  chronic are better evaluated on the previous CT.      Multiple gas-filled loops of small and large bowel.      Impression: T8 vertebral body extension fracture deformity better visualized on  CT 08/24/2024, without evidence of traumatic malalignment or  retropulsion into the spinal canal.      I personally reviewed the images/study and I agree with the findings  as stated. This study was interpreted at [place], [place].      MACRO:  None      Signed by: Adenike Muller 8/28/2024 10:51 AM  Dictation workstation:   [id number]      Head/Brain Imaging  === Results for orders placed during the hospital encounter of 08/23/24 ===    CT head wo IV contrast [[id number]] 08/24/2024    Status: Normal  CT HEAD:  No acute intracranial abnormality or calvarial fracture.  Global volume loss advanced chronic small vessel ischemic change.  Query any clinical evidence of dementia.      CT CERVICAL SPINE:  No acute fracture or traumatic malalignment of the cervical spine.  Postsurgical changes. Demineralization. Scattered degenerative  changes. No prevertebral soft tissue swelling.    Signed by: Desean Staley 8/24/2024 1:20 AM  Dictation workstation:   [id number]  No results found for this or  any previous visit.        DATA:  EKG: QTC  Encounter Date: 08/23/24   ECG 12 lead   Result Value    Ventricular Rate 114    Atrial Rate 114    MT Interval 126    QRS Duration 68    QT Interval 310    QTC Calculation(Bazett) 427    P Axis 62    R Axis -8    T Axis 115    QRS Count 18    Q Onset 219    P Onset 156    P Offset 200    T Offset 374    QTC Fredericia 383    Narrative    Sinus tachycardia  Nonspecific ST and T wave abnormality  Abnormal ECG  When compared with ECG of 23-AUG-2024 22:06,  PREVIOUS ECG IS PRESENT  See ED provider note for full interpretation and clinical correlation  Confirmed by Nesha Gallardo ([id number]) on 8/26/2024 1:09:39 PM     Anti-psychotics in 48 hours: Haldol (home med) and olanzapine (home med)  Opioids/Benzodiazepines in 48 hours: Oxycodone 5 mg   Anticholinergics on board:No  Restraints:No  Indwelling catheters:Yes - urethral catheter in place, 6 days  Last BM: None charted  UO in 24 hours: nearly 2L  Activity in the past 24 hours: With PT/OT  Need for ambulatory devices: wheelchair at baseline, previously used walker    Assessment/Plan   This is a/an 84 y.o. year old female, with past medical history relevant for HTN, HLD, HFrEF, CAD with hx of MI,  DM TII, osteoporosis, dermatomyositis (on methotrexate/steroids), WEN without CPAP, anemia, cognitive impairment, and schizoaffective disorder, presenting for T8 vertebral body fracture sustained after falling backwards from ground level, being seen in geriatric consultation for thyroid management and hyponatremia.     Principal Problem:    Closed fracture of eighth thoracic vertebra, unspecified fracture morphology, initial encounter (Multi)    #Hyponatremia, chronic  #C/f SIADH, chronic  -Pt admitted with Na of 128, throughout admission has ranged from 120-129   -Currently 127, stable  -Upon chart review, patient has been persistently hyponatremic since 2020 and has been seen in the outpatient setting for this issues  -Throughout  this admission has received one dose of Lasix and some continuous IVFs   -Currently on sodium chloride 1g oral TID  -FENa from most recent urine chem revealed score of 1.6%  Plan:  -Recommend discontinuation of continuous IVFs  -Recommend oral fluid restriction of 1L per day  -Recommend outpatient nephrology consult for further evaluation and management     #Elevated TSH  #C/f subclinical hypothyroidism  -Patient has a long-standing history of elevated TSH dating back to 2020, however free T4 was previously WNL  -Also on methotrexate therapy for dermatomyositis   -TSH on admission 4.12, with free T4 elevated to 1.72  -Repeat TSH today revealing 4.02 and free T4 of 1.36   -For elderly patients, typically would not start therapy until TSH is 8 or above   -TSH may become acutely elevated in hospitalized patients  -Patient is currently euthyroid clinically  Plan:  -Recommend follow-up with PCP or endocrinology in 4-6 weeks     #Fall  #Acute T8 vertebral body fracture  #Subacute right-sided rib fracture  #Pain management  -Patient has history of multiple falls in the recent past in the setting of osteoporosis    -Vit D from April 2023 revealing 52    -Patient is on calcium and Vit D supplementation at home   -Vit B12 991 6 months ago   -TSH elevated, per above  -DEXA scan 1/17/24 with lowest T score -3.7 in the right hip and T12, L1 and L4 fractures consistent with x-rays 10/30/2023    -Falls likely multifactorial in the setting of age, deconditioning, and underlying cognitive impairment   -Currently admitted for fall sustaining a T8 vertebral body fracture   -Neurosurgery consulted and recommended TLSO hard brace, no surgical intervention, PT/OT consult  Plan:  -Agree with neurosurgery recommendations, continue per recommendations   -PT/OT recommending moderate intensity and low intensity respectively   -Agree with scheduled Tylenol for pain, but would recommended 975 mg TID    -OK to continue oxy as needed for pain,  but would like to see utilization of 2.5 mg   -Recommend lidocaine patches to paravertebral area and ribs  -Recommend avoidance of muscle relaxer (robaxin)  -Recommend collecting updated Vit D level  -Recommend obtaining orthostatic vitals when able    #Underlying cognitive impairment in setting of schizoaffective disorder  #History of moderate dementia in setting of sundowning  #Polypharmacy   -Recommend permanent discontinuation of ibuprofen at discharge, not given hospitalization  -In favor of continuing Tylenol 975 TID for pain  -Patient is on many home medications for her mood / schizoaffective disorder  -home olanzapine 20mg nightly  -home haldol 0.5mg daily  -home venlafaxine 37.5mg daily  -OK to continue for now, but recommend patient see outpatient psych to address the combination   -Ok to continue famotidine in setting of chronic steroid use  -Most recent A1c 8.1, goal for this patient 8.5   -BS have been elevated this admission   -Recommend continue SS during hospitalization   -Recommend restarting home Tradjenta close to discharge in combo with SS  -Recommend resume metformin at discharge   -Agree with Miralax daily for bowel regimen   -If patient continues to go without a bowel movement, especially in the setting of opioid use, would recommend increasing to BID and/or adding a stimulant laxative, such as senna to promote regular bowel movements   -With the conditions to discontinue if pt is having more than 3 BM per day or has loose stool    Care Transitions:  -Recommended level for discharge: Per PT and OT recommendations  -Home going considerations: Per PT/OT and guardian   -Primary care physician: Dr. Ivy Ayala    Goals of Care:  -Health care power of : DaughterIvette  -Living will: no  Code status: Full Code    4M AGE-FRIENDLY INITIATIVE:  What matters most to patient: pain reduction, family  Medications: oxycodone  Mentation: at baseline  Mobility: with wheelchair, limited due to  recent falls (clavicle fracture, T8 fracture, subacute rib fractures)       Geriatric medicine will continue to follow the patient. Thank you for allowing geriatric medicine to be involved in the care of your patient. Geriatric medicine consultation team is available during work hours Monday through Friday. For any emergency issues requiring immediate assistance over the weekend, please page Geriatrics pager [phone]    Consult Billing Time  Prep time on date of patient encounter(minutes):30  Time directly with patient/family/caregiver(minutes):65  Documentation time(minutes):30  TOTAL TIME(minutes):125    Melissa Ohara MD  PGY-1 Internal Medicine     I saw and evaluated the patient.  I personally obtained the key and critical portions of the history and physical exam or was physically present for key and critical portions performed by the resident. I reviewed the resident’s documentation and discussed the patient with the resident.  I agree with the resident’s medical decision making as documented in their note.     Fannie Strickland MD

## 2024-09-03 NOTE — PROGRESS NOTES
"Occupational Therapy    OT Treatment    Patient Name: Jennifer Mancini  MRN: [id number]  Today's Date: 9/3/2024  Time Calculation  Start Time: 0856  Stop Time: 0919  Time Calculation (min): 23 min        Assessment:  Evaluation/Treatment Tolerance: Patient limited by pain  Medical Staff Made Aware: Yes  End of Session Communication: Bedside nurse  End of Session Patient Position: Bed, 3 rail up, Alarm on  Evaluation/Treatment Tolerance: Patient limited by pain  Medical Staff Made Aware: Yes  Plan:  Treatment Interventions: ADL retraining, Functional transfer training, UE strengthening/ROM, Cognitive reorientation, Patient/family training  OT Frequency: 3 times per week  OT Discharge Recommendations: Moderate intensity level of continued care  Equipment Recommended upon Discharge: Wheeled walker  OT Recommended Transfer Status:  (NT)  OT - OK to Discharge: Yes  Treatment Interventions: ADL retraining, Functional transfer training, UE strengthening/ROM, Cognitive reorientation, Patient/family training    Subjective   Previous Visit Info:  OT Last Visit  OT Received On: 09/03/24  General:  General  Co-Treatment: PT  Co-Treatment Reason: to maximize pt safety and therapeutic potential in pt requiring 2 skilled assist; [facility] 9  Prior to Session Communication: Bedside nurse  Patient Position Received: Bed, 3 rail up, Alarm on  General Comment: Met in bed, initially cooperative, mostly pleasant however occasional irritable outbursts  Precautions:  Medical Precautions: Fall precautions, Spinal precautions  Braces Applied: TLSO    Vital Signs (Past 2hrs)                 Pain:  Pain Assessment  Pain Assessment:  (\"12/10\" did not porevent participation)    Objective    Cognition:  Cognition  Arousal/Alertness: Appropriate responses to stimuli  Orientation Level: Oriented X4  Following Commands: Follows one step commands with increased time  Insight: Moderate  Impulsive: Mildly     Activities of Daily Living:      UE Dressing  UE " Dressing Comments: dependent assist to don/doff TLSO at bedlevel    LE Dressing  LE Dressing: Yes  Sock Level of Assistance: Dependent  LE Dressing Where Assessed: Bed level    Toileting  Toileting Comments: [place]     Bed Mobility/Transfers: Bed Mobility  Bed Mobility: Yes  Bed Mobility 1  Bed Mobility 1: Rolling right  Level of Assistance 1: Moderate assistance, Moderate verbal cues, Moderate tactile cues  Bed Mobility Comments 1: x3  Bed Mobility 2  Bed Mobility  2: Rolling left  Level of Assistance 2: Moderate assistance  Bed Mobility Comments 2: x1  Bed Mobility 3  Bed Mobility 3: Supine sitting, Sitting to supine  Level of Assistance 3: Maximum assistance, +2, Moderate verbal cues, Moderate tactile cues  Bed Mobility Comments 3: via logroll, increased time, cues and assist, use of bedrails  Bed Mobility 5  Bed Mobility 5: Scooting  Level of Assistance 5: Dependent, +2  Bed Mobility Comments 5: boost/podsitioning wiethin bed    Transfers  Transfer: Yes  Transfer 1  Technique 1: Sit to stand, Stand to sit  Transfer Device 1:  (BUE arm in arm A)  Transfer Level of Assistance 1: Maximum assistance, +2, Moderate verbal cues, Moderate tactile cues  Trials/Comments 1: minimal clearance with decreased standing tolerance       Outcome Measures:[facility] Daily Activity  Putting on and taking off regular lower body clothing: Total  Bathing (including washing, rinsing, drying): A lot  Putting on and taking off regular upper body clothing: A lot  Toileting, which includes using toilet, bedpan or urinal: Total  Taking care of personal grooming such as brushing teeth: A lot  Eating Meals: A little  Daily Activity - Total Score: 11        Education Documentation  Precautions, taught by Brielle Guaman OT at 9/3/2024  9:34 AM.  Learner: Patient  Readiness: Acceptance  Method: Explanation  Response: Needs Reinforcement    ADL Training, taught by Brielle Guaman OT at 9/3/2024  9:34 AM.  Learner: Patient  Readiness:  Acceptance  Method: Explanation  Response: Needs Reinforcement    Education Comments  No comments found.        Goals:  Encounter Problems       Encounter Problems (Active)       ADLs       Patient will perform UB and LB bathing with moderate assist level of assistance and shower chair.       Start:  08/26/24    Expected End:  09/09/24            Patient with complete upper body dressing with moderate assist level of assistance donning and doffing all UE clothes with  supported sitting (Progressing)       Start:  08/26/24    Expected End:  09/09/24            Patient with complete lower body dressing with moderate assist level of assistance donning and doffing all LE clothes  with  while supported sitting (Progressing)       Start:  08/26/24    Expected End:  09/09/24            Patient will feed self with stand by assist level of assistance. (Met)       Start:  08/26/24    Expected End:  09/09/24    Resolved:  08/30/24         Patient will complete daily grooming tasks brushing teeth with stand by assist level of assistance and PRN adaptive equipment while supported sitting.       Start:  08/26/24    Expected End:  09/09/24            Patient will complete toileting including hygiene clothing management/hygiene with moderate assist level of assistance.       Start:  08/26/24    Expected End:  09/09/24               BALANCE       Pt will maintain dynamic standing balance during ADL task with min assist level of assistance in order to demonstrate decreased risk of falling and improved postural control.       Start:  08/26/24    Expected End:  09/09/24            Patient will maintain static standing balance during ADL task with min assist level of assistance drop down in order to demonstrate decreased risk of falling and improved postural control.       Start:  08/26/24    Expected End:  09/09/24            Patient will maintain static sitting at EOB during ADL tasks with min A. (Progressing)       Start:  08/30/24     Expected End:  09/09/24                Patient will maintain dynamic sitting at EOB during ADL tasks with min A. (Progressing)       Start:  08/30/24    Expected End:  09/09/24                   COGNITION/SAFETY       Patient will recall and adhere to spinal precautions during all functional mobility/ADL tasks in order to demonstrate improved understanding and promote healing post op (Progressing)       Start:  08/26/24    Expected End:  09/09/24            Patient will follow Two step commands to allow improved ADL performance. (Progressing)       Start:  08/26/24    Expected End:  09/09/24            Patient will demonstrated orientation x 3 with verbal cues. (Met)       Start:  08/26/24    Expected End:  09/09/24    Resolved:  09/03/24    ORIENTATION            MOBILITY       Patient will perform Functional mobility around Household distances/Community Distances with min assist level of assistance and front wheeled walker in order to improve safety and functional mobility.       Start:  08/26/24    Expected End:  09/09/24            Pt. will perform bed mobility min A using log roll techniques and bed rails.  (Progressing)       Start:  08/30/24    Expected End:  09/09/24                   TRANSFERS       Patient will perform bed mobility min assist level of assistance and bed rails and log rolling in order to improve safety and independence with mobility (Progressing)       Start:  08/26/24    Expected End:  09/09/24            Patient will complete functional transfer with front wheeled walker with min assist level of assistance. (Progressing)       Start:  08/26/24    Expected End:  09/09/24            Patient will complete sit to stand transfer with moderate assist level of assistance and front wheeled walker in order to improve safety and prepare for out of bed mobility. (Progressing)       Start:  08/26/24    Expected End:  09/09/24 09/03/24 at 10:20 AM   Brielle Guaman OT   Rehab  Office: [phone]

## 2024-09-04 LAB
ALBUMIN SERPL BCP-MCNC: 2.7 G/DL (ref 3.4–5)
ALBUMIN SERPL BCP-MCNC: 2.8 G/DL (ref 3.4–5)
ALBUMIN SERPL BCP-MCNC: 2.8 G/DL (ref 3.4–5)
ALBUMIN SERPL BCP-MCNC: 2.9 G/DL (ref 3.4–5)
ANION GAP SERPL CALC-SCNC: 10 MMOL/L (ref 10–20)
ANION GAP SERPL CALC-SCNC: 10 MMOL/L (ref 10–20)
ANION GAP SERPL CALC-SCNC: 13 MMOL/L (ref 10–20)
ANION GAP SERPL CALC-SCNC: 13 MMOL/L (ref 10–20)
BUN SERPL-MCNC: 10 MG/DL (ref 6–23)
BUN SERPL-MCNC: 11 MG/DL (ref 6–23)
CALCIUM SERPL-MCNC: 8.7 MG/DL (ref 8.6–10.6)
CALCIUM SERPL-MCNC: 8.8 MG/DL (ref 8.6–10.6)
CALCIUM SERPL-MCNC: 8.9 MG/DL (ref 8.6–10.6)
CALCIUM SERPL-MCNC: 8.9 MG/DL (ref 8.6–10.6)
CHLORIDE SERPL-SCNC: 91 MMOL/L (ref 98–107)
CHLORIDE SERPL-SCNC: 92 MMOL/L (ref 98–107)
CHLORIDE SERPL-SCNC: 95 MMOL/L (ref 98–107)
CHLORIDE SERPL-SCNC: 95 MMOL/L (ref 98–107)
CO2 SERPL-SCNC: 26 MMOL/L (ref 21–32)
CO2 SERPL-SCNC: 27 MMOL/L (ref 21–32)
CO2 SERPL-SCNC: 28 MMOL/L (ref 21–32)
CO2 SERPL-SCNC: 29 MMOL/L (ref 21–32)
CREAT SERPL-MCNC: 0.31 MG/DL (ref 0.5–1.05)
CREAT SERPL-MCNC: 0.32 MG/DL (ref 0.5–1.05)
CREAT SERPL-MCNC: 0.35 MG/DL (ref 0.5–1.05)
CREAT SERPL-MCNC: 0.39 MG/DL (ref 0.5–1.05)
EGFRCR SERPLBLD CKD-EPI 2021: >90 ML/MIN/1.73M*2
GLUCOSE BLD MANUAL STRIP-MCNC: 162 MG/DL (ref 74–99)
GLUCOSE BLD MANUAL STRIP-MCNC: 179 MG/DL (ref 74–99)
GLUCOSE BLD MANUAL STRIP-MCNC: 183 MG/DL (ref 74–99)
GLUCOSE BLD MANUAL STRIP-MCNC: 189 MG/DL (ref 74–99)
GLUCOSE BLD MANUAL STRIP-MCNC: 229 MG/DL (ref 74–99)
GLUCOSE SERPL-MCNC: 136 MG/DL (ref 74–99)
GLUCOSE SERPL-MCNC: 159 MG/DL (ref 74–99)
GLUCOSE SERPL-MCNC: 160 MG/DL (ref 74–99)
GLUCOSE SERPL-MCNC: 165 MG/DL (ref 74–99)
PHOSPHATE SERPL-MCNC: 3.3 MG/DL (ref 2.5–4.9)
PHOSPHATE SERPL-MCNC: 3.6 MG/DL (ref 2.5–4.9)
PHOSPHATE SERPL-MCNC: 4 MG/DL (ref 2.5–4.9)
PHOSPHATE SERPL-MCNC: 4 MG/DL (ref 2.5–4.9)
POTASSIUM SERPL-SCNC: 3.8 MMOL/L (ref 3.5–5.3)
POTASSIUM SERPL-SCNC: 4.4 MMOL/L (ref 3.5–5.3)
POTASSIUM SERPL-SCNC: 4.5 MMOL/L (ref 3.5–5.3)
POTASSIUM SERPL-SCNC: 4.6 MMOL/L (ref 3.5–5.3)
SODIUM SERPL-SCNC: 125 MMOL/L (ref 136–145)
SODIUM SERPL-SCNC: 126 MMOL/L (ref 136–145)
SODIUM SERPL-SCNC: 130 MMOL/L (ref 136–145)
SODIUM SERPL-SCNC: 130 MMOL/L (ref 136–145)

## 2024-09-04 PROCEDURE — 2500000001 HC RX 250 WO HCPCS SELF ADMINISTERED DRUGS (ALT 637 FOR MEDICARE OP): Performed by: STUDENT IN AN ORGANIZED HEALTH CARE EDUCATION/TRAINING PROGRAM

## 2024-09-04 PROCEDURE — 2500000001 HC RX 250 WO HCPCS SELF ADMINISTERED DRUGS (ALT 637 FOR MEDICARE OP)

## 2024-09-04 PROCEDURE — 80069 RENAL FUNCTION PANEL: CPT | Performed by: STUDENT IN AN ORGANIZED HEALTH CARE EDUCATION/TRAINING PROGRAM

## 2024-09-04 PROCEDURE — 2500000001 HC RX 250 WO HCPCS SELF ADMINISTERED DRUGS (ALT 637 FOR MEDICARE OP): Performed by: SURGERY

## 2024-09-04 PROCEDURE — 2500000002 HC RX 250 W HCPCS SELF ADMINISTERED DRUGS (ALT 637 FOR MEDICARE OP, ALT 636 FOR OP/ED)

## 2024-09-04 PROCEDURE — 82947 ASSAY GLUCOSE BLOOD QUANT: CPT

## 2024-09-04 PROCEDURE — 36415 COLL VENOUS BLD VENIPUNCTURE: CPT | Performed by: STUDENT IN AN ORGANIZED HEALTH CARE EDUCATION/TRAINING PROGRAM

## 2024-09-04 PROCEDURE — 99232 SBSQ HOSP IP/OBS MODERATE 35: CPT | Performed by: NURSE PRACTITIONER

## 2024-09-04 PROCEDURE — 2500000004 HC RX 250 GENERAL PHARMACY W/ HCPCS (ALT 636 FOR OP/ED)

## 2024-09-04 PROCEDURE — 2500000004 HC RX 250 GENERAL PHARMACY W/ HCPCS (ALT 636 FOR OP/ED): Performed by: STUDENT IN AN ORGANIZED HEALTH CARE EDUCATION/TRAINING PROGRAM

## 2024-09-04 PROCEDURE — 1200000002 HC GENERAL ROOM WITH TELEMETRY DAILY

## 2024-09-04 PROCEDURE — 2500000002 HC RX 250 W HCPCS SELF ADMINISTERED DRUGS (ALT 637 FOR MEDICARE OP, ALT 636 FOR OP/ED): Performed by: SURGERY

## 2024-09-04 PROCEDURE — 2500000005 HC RX 250 GENERAL PHARMACY W/O HCPCS

## 2024-09-04 RX ORDER — OXYCODONE HYDROCHLORIDE 5 MG/1
5 TABLET ORAL EVERY 4 HOURS PRN
Qty: 15 TABLET | Refills: 0 | Status: CANCELLED | OUTPATIENT
Start: 2024-09-04

## 2024-09-04 RX ORDER — HALOPERIDOL 0.5 MG/1
0.5 TABLET ORAL NIGHTLY
Status: CANCELLED
Start: 2024-09-04

## 2024-09-04 RX ORDER — AMOXICILLIN 250 MG
1 CAPSULE ORAL NIGHTLY
Status: DISCONTINUED | OUTPATIENT
Start: 2024-09-04 | End: 2024-09-05 | Stop reason: HOSPADM

## 2024-09-04 RX ORDER — OXYCODONE HYDROCHLORIDE 5 MG/1
2.5 TABLET ORAL EVERY 6 HOURS PRN
Qty: 15 TABLET | Refills: 0 | Status: CANCELLED | OUTPATIENT
Start: 2024-09-04

## 2024-09-04 RX ORDER — METOPROLOL SUCCINATE 25 MG/1
37.5 TABLET, EXTENDED RELEASE ORAL 2 TIMES DAILY
Status: CANCELLED
Start: 2024-09-04

## 2024-09-04 RX ADMIN — OLANZAPINE 20 MG: 5 TABLET, FILM COATED ORAL at 22:04

## 2024-09-04 RX ADMIN — HALOPERIDOL LACTATE 0.5 MG: 5 INJECTION, SOLUTION INTRAMUSCULAR at 22:10

## 2024-09-04 RX ADMIN — LIDOCAINE 1 PATCH: 4 PATCH TOPICAL at 08:40

## 2024-09-04 RX ADMIN — METOPROLOL SUCCINATE 37.5 MG: 25 TABLET, EXTENDED RELEASE ORAL at 08:41

## 2024-09-04 RX ADMIN — SODIUM CHLORIDE 1 G: 1 TABLET ORAL at 17:24

## 2024-09-04 RX ADMIN — INSULIN LISPRO 4 UNITS: 100 INJECTION, SOLUTION INTRAVENOUS; SUBCUTANEOUS at 21:24

## 2024-09-04 RX ADMIN — ACETAMINOPHEN 975 MG: 325 TABLET ORAL at 12:10

## 2024-09-04 RX ADMIN — SODIUM CHLORIDE 1 G: 1 TABLET ORAL at 09:28

## 2024-09-04 RX ADMIN — PREDNISONE 3.75 MG: 2.5 TABLET ORAL at 09:28

## 2024-09-04 RX ADMIN — ACETAMINOPHEN 975 MG: 325 TABLET ORAL at 06:30

## 2024-09-04 RX ADMIN — OXYCODONE HYDROCHLORIDE 5 MG: 5 TABLET ORAL at 09:29

## 2024-09-04 RX ADMIN — LATANOPROST 1 DROP: 50 SOLUTION/ DROPS OPHTHALMIC at 22:07

## 2024-09-04 RX ADMIN — VENLAFAXINE HYDROCHLORIDE 37.5 MG: 37.5 CAPSULE, EXTENDED RELEASE ORAL at 09:28

## 2024-09-04 RX ADMIN — ENOXAPARIN SODIUM 30 MG: 100 INJECTION SUBCUTANEOUS at 08:41

## 2024-09-04 RX ADMIN — OXYCODONE HYDROCHLORIDE 5 MG: 5 TABLET ORAL at 22:19

## 2024-09-04 RX ADMIN — SODIUM CHLORIDE 1 G: 1 TABLET ORAL at 12:11

## 2024-09-04 RX ADMIN — INSULIN LISPRO 8 UNITS: 100 INJECTION, SOLUTION INTRAVENOUS; SUBCUTANEOUS at 12:15

## 2024-09-04 RX ADMIN — SENNOSIDES AND DOCUSATE SODIUM 1 TABLET: 50; 8.6 TABLET ORAL at 22:08

## 2024-09-04 RX ADMIN — METOPROLOL SUCCINATE 37.5 MG: 25 TABLET, EXTENDED RELEASE ORAL at 22:04

## 2024-09-04 RX ADMIN — CALCIUM CARBONATE (ANTACID) CHEW TAB 500 MG 500 MG: 500 CHEW TAB at 08:41

## 2024-09-04 RX ADMIN — INSULIN LISPRO 4 UNITS: 100 INJECTION, SOLUTION INTRAVENOUS; SUBCUTANEOUS at 17:24

## 2024-09-04 RX ADMIN — PREDNISONE 3.75 MG: 2.5 TABLET ORAL at 22:05

## 2024-09-04 RX ADMIN — POLYETHYLENE GLYCOL 3350 17 G: 17 POWDER, FOR SOLUTION ORAL at 08:40

## 2024-09-04 RX ADMIN — ATORVASTATIN CALCIUM 40 MG: 40 TABLET, FILM COATED ORAL at 22:06

## 2024-09-04 RX ADMIN — INSULIN LISPRO 4 UNITS: 100 INJECTION, SOLUTION INTRAVENOUS; SUBCUTANEOUS at 09:29

## 2024-09-04 RX ADMIN — ENOXAPARIN SODIUM 30 MG: 100 INJECTION SUBCUTANEOUS at 22:04

## 2024-09-04 ASSESSMENT — COGNITIVE AND FUNCTIONAL STATUS - GENERAL
TOILETING: A LITTLE
STANDING UP FROM CHAIR USING ARMS: A LOT
HELP NEEDED FOR BATHING: A LITTLE
DAILY ACTIVITIY SCORE: 17
MOVING TO AND FROM BED TO CHAIR: A LOT
WALKING IN HOSPITAL ROOM: A LOT
MOBILITY SCORE: 12
PERSONAL GROOMING: A LITTLE
DRESSING REGULAR UPPER BODY CLOTHING: A LITTLE
TURNING FROM BACK TO SIDE WHILE IN FLAT BAD: A LOT
DRESSING REGULAR LOWER BODY CLOTHING: A LOT
MOVING FROM LYING ON BACK TO SITTING ON SIDE OF FLAT BED WITH BEDRAILS: A LOT
EATING MEALS: A LITTLE
CLIMB 3 TO 5 STEPS WITH RAILING: A LOT

## 2024-09-04 ASSESSMENT — PAIN DESCRIPTION - LOCATION: LOCATION: BACK

## 2024-09-04 ASSESSMENT — PAIN SCALES - GENERAL
PAINLEVEL_OUTOF10: 7
PAINLEVEL_OUTOF10: 0 - NO PAIN
PAINLEVEL_OUTOF10: 9

## 2024-09-04 ASSESSMENT — PAIN - FUNCTIONAL ASSESSMENT
PAIN_FUNCTIONAL_ASSESSMENT: 0-10

## 2024-09-04 ASSESSMENT — PAIN SCALES - WONG BAKER
WONGBAKER_NUMERICALRESPONSE: HURTS WHOLE LOT
WONGBAKER_NUMERICALRESPONSE: NO HURT
WONGBAKER_NUMERICALRESPONSE: HURTS EVEN MORE

## 2024-09-04 ASSESSMENT — PAIN DESCRIPTION - ORIENTATION: ORIENTATION: MID

## 2024-09-04 NOTE — PROGRESS NOTES
[facility]  TRAUMA SERVICE - PROGRESS NOTE    Patient Name: Jennifer Mancini  MRN: [id number]  Admit Date: 824  : 1940  AGE: 84 y.o.   GENDER: female  ==============================================================================  MECHANISM OF INJURY:   Fall from standing    LOC (yes/no?): Unknown  Anticoagulant / Anti-platelet Rx? (for what dx?): Denies  Referring Facility Name (N/A for scene EMR run):  [facility]     INJURIES:   T8 vertebral body fracture  Subacute right-sided rib fractures      OTHER MEDICAL PROBLEMS:  CHF with reserved EF  HTN  Schizoaffective disorder  HLD  DM2      INCIDENTAL FINDINGS:  Large hiatal hernia      PROCEDURES:  None     ==============================================================================  TODAY'S ASSESSMENT AND PLAN OF CARE:  #T8 vertebral body fx  - MRI reviewed by nsgy, recommend TLSO hard brace  - Per nsgy OK for DVT PPX. No neurosurgical intervention at this time. Follow up with their team in 6 weeks with repeat [employer] prior to follow up date.   - Lovenox 30 mg, SCD's  -cannot get out of bed without brace   -PT/OT: moderate intensity PT with low intensity OT     #fen/gi/gu  - reg diet  - calorie counts  - Hypo is 124 from 126, hyperkalemic to 5.6  - Orthostatic vitals  - Geriatrics consult, appreciate recs:  Serum osm, urine osm, urine studies, TSH, T4 levels ordered and reviewed; Suspect SIADH. Suspect subclinical hypothyroidism   - discontinue robaxin    - 4-6wks OP follow up for subclinical hypothyroidism   - 4-6 wks OP follow up for nephrology for hyponatremia   - continue fluid restriction to 1 L per day   - lidoderm patches applied to paravertebral and ribs   - restart tradjenta and metformin at discharge   - recommend adding pericolace, 2 tablets 2x/day   - consider dulcolax suppository  - Continue zyprexa (home med), hold haldol.   - Follow RFPs    Dispo: continue RNF; Needs precert resubmitted for approval    D/w  Dr. Lisa Blue MD  PGY-1 General Surgery  Trauma [id number]  ==============================================================================  CHIEF COMPLAINT / OVERNIGHT EVENTS:   Improvement in hyponatremia to Na of 130 with fluid restriciton. Doing well this morning.     MEDICAL HISTORY / ROS:  Admission history and ROS reviewed. Pertinent changes as follows:      PHYSICAL EXAM:  Heart Rate:  [67-99]   Temp:  [35.9 °C (96.6 °F)-36.8 °C (98.2 °F)]   Resp:  [17-18]   BP: (109-187)/()   SpO2:  [96 %-99 %]     Physical Exam  Gen: NC/AT, conversional, NAD, in bed without TSLO brace during rounds  Resp: Nonlabored breathing   CV: Nontachy, normotensive  Abd: Soft, nontender, nondistended   Extremities: ROM intact    IMAGING SUMMARY: No new    LABS:  Results from last 7 days   Lab Units 08/31/24  1147 08/29/24  0943   WBC AUTO x10*3/uL 9.3 8.4   HEMOGLOBIN g/dL 10.4* 10.4*   HEMATOCRIT % 31.5* 31.2*   PLATELETS AUTO x10*3/uL 364 292         Results from last 7 days   Lab Units 09/04/24  1600 09/04/24  0824 09/03/24  1813   SODIUM mmol/L 125* 130*  130* 127*   POTASSIUM mmol/L 4.5 4.4  4.6 4.1   CHLORIDE mmol/L 91* 95*  95* 91*   CO2 mmol/L 26 29  27 29   BUN mg/dL 11 11  10 12   CREATININE mg/dL 0.39* 0.32*  0.35* 0.35*   CALCIUM mg/dL 8.9 8.9  8.7 9.0   GLUCOSE mg/dL 136* 165*  160* 131*     I have reviewed all medications, laboratory results, and imaging pertinent for today's encounter.

## 2024-09-04 NOTE — PROGRESS NOTES
"Subjective   Seen today for follow up visit. Patient states she is \"okay.\" She notes back pain when moving around. She notes pain with deep breaths. She is concerned she hasn't had a bowel movement. She denies abdominal pain and n/v.     Intermittent elevation of BP, likely related to pain.  Blood sugars running 141-258   Working with PT/OT     Objective     Current Facility-Administered Medications   Medication Dose Route Frequency Provider Last Rate Last Admin    acetaminophen (Tylenol) tablet 975 mg  975 mg oral q6h [facility] Sergio Arauz DMD, MD   975 mg at 09/04/24 0630    atorvastatin (Lipitor) tablet 40 mg  40 mg oral Nightly Sergio Arauz DMD, MD   40 mg at 09/03/24 2242    calcium carbonate (Tums) chewable tablet 500 mg  500 mg oral Daily Sergio Arauz DMD, MD   500 mg at 09/04/24 0841    dextrose 50 % injection 12.5 g  12.5 g intravenous q15 min PRN Sergio Arauz DMD, MD        dextrose 50 % injection 25 g  25 g intravenous q15 min PRN Sergio Arauz DMD, MD        enoxaparin (Lovenox) syringe 30 mg  30 mg subcutaneous q12h Sergio Arauz DMD, MD   30 mg at 09/04/24 0841    glucagon (Glucagen) injection 1 mg  1 mg intramuscular q15 min PRN Sergio Arauz DMD, MD        glucagon (Glucagen) injection 1 mg  1 mg intramuscular q15 min PRN Sergio Arauz DMD, MD        haloperidol lactate (Haldol) injection 0.5 mg  0.5 mg intramuscular Nightly Ronnell Blue MD   0.5 mg at 09/03/24 2252    HYDROmorphone (Dilaudid) injection 0.2 mg  0.2 mg intravenous q4h PRN Sergio Arauz DMD, MD   0.2 mg at 09/03/24 1652    insulin lispro (HumaLOG) injection 0-20 Units  0-20 Units subcutaneous Before meals & nightly Marylin Medrano DO   8 Units at 09/03/24 2233    labetaloL (Normodyne,Trandate) injection 10 mg  10 mg intravenous q6h PRN Sergio Arauz DMD, MD   10 mg at 09/03/24 1810    latanoprost (Xalatan) 0.005 % ophthalmic solution 1 drop  1 drop Both Eyes Nightly Sergio Arauz DMD, MD   1 drop at " 09/03/24 2231    lidocaine 4 % patch 1 patch  1 patch transdermal Daily Ronnell Blue MD   1 patch at 09/04/24 0840    metoprolol succinate XL (Toprol-XL) 24 hr tablet 37.5 mg  37.5 mg oral BID Marylin KohlikimKyleDO   37.5 mg at 09/04/24 0841    naloxone (Narcan) injection 0.2 mg  0.2 mg intravenous q5 min PRN Sergio Arauz DMD, MD        naloxone (Narcan) injection 0.2 mg  0.2 mg intravenous q5 min PRN Sergio Arauz DMD, MD        OLANZapine (ZyPREXA) tablet 20 mg  20 mg oral Nightly Ronnell Blue MD   20 mg at 09/03/24 2231    oxyCODONE (Roxicodone) immediate release tablet 2.5 mg  2.5 mg oral q6h PRN Sergio Arauz DMD, MD        oxyCODONE (Roxicodone) immediate release tablet 5 mg  5 mg oral q4h PRN Sergio Arauz DMD, MD   5 mg at 09/03/24 2303    oxygen (O2) therapy   inhalation Continuous PRN - O2/gases Sergio Arauz DMD, MD   2 L/min at 08/31/24 0800    polyethylene glycol (Glycolax, Miralax) packet 17 g  17 g oral Daily Sergio Arauz DMD, MD   17 g at 09/04/24 0840    predniSONE (Deltasone) tablet 3.75 mg  3.75 mg oral BID Sergio Arauz DMD, MD   3.75 mg at 09/03/24 2232    sodium chloride tablet 1 g  1,000 mg oral TID Sergio Arauz DMD, MD   1 g at 09/03/24 1718    venlafaxine XR (Effexor-XR) 24 hr capsule 37.5 mg  37.5 mg oral Daily Sergio Arauz DMD, MD   37.5 mg at 09/03/24 0812       Physical Exam  HENT:      Head:      Comments: Sutured laceration above left eye with ecchymosis noted      Nose: Nose normal.      Mouth/Throat:      Mouth: Mucous membranes are moist.   Eyes:      Extraocular Movements: Extraocular movements intact.      Conjunctiva/sclera: Conjunctivae normal.      Pupils: Pupils are equal, round, and reactive to light.   Cardiovascular:      Rate and Rhythm: Normal rate and regular rhythm.      Heart sounds: Murmur heard.   Pulmonary:      Effort: Pulmonary effort is normal.      Breath sounds: Normal breath sounds.   Abdominal:      General: Bowel sounds are  normal. There is no distension.      Palpations: Abdomen is soft.      Tenderness: There is no abdominal tenderness.   Skin:     Findings: Bruising present.   Neurological:      Mental Status: She is alert.         Confusion Assessment Method(CAM) for diagnosis of delirium:    1.  Acute onset or fluctuating course: absent/present: Absent  2.  Inattention: absent/present: Absent  3.  Disorganized thinking: absent/present: Absent  4.  Altered level of consciousness: absent/present: Absent  CAM: negative    AT Score For Assessment of Delirium and Cognitive Impairment:    Alertness: 0  Normal(fully alert,but not agitated, throughout assessment)=0  Mild sleepiness for <10 seconds after walking, then normal=0  Clearly abnormal=4  2.  AMT4: 1  No mistakes=0  One mistake=1  Two or more mistakes/untestable=2  3.  Attention: 1  Achieves seven months or more correctly=0  Starts but scores <7 months/ refuses to start=1  Untestable(cannot start because unwell, drowsy, inattentive)=2  4.  Acute: 0  No=0  Yes=4    Total Score: 2  4 or above: Possible delirium +/- cognitive impairment  1-3: Possible cognitive impairment  0: Delirium or severe cognitive impairment unlikely(but delirium still possible if (4) information incomplete)      Last Recorded Vitals      9/3/2024     7:56 AM 9/3/2024    12:33 PM 9/3/2024     6:06 PM 9/3/2024     7:05 PM 9/3/2024    11:40 PM 9/4/2024     3:26 AM 9/4/2024     7:50 AM   Vitals   Systolic 181 168 187 147 109 148 171   Diastolic 98 86 114 76 53 61 92   Heart Rate 90 86 92 76 67 76 91   Temp 36 °C (96.8 °F) 35.9 °C (96.6 °F) 36.1 °C (97 °F) 35.9 °C (96.6 °F) 35.9 °C (96.6 °F) 36.2 °C (97.2 °F) 36.5 °C (97.7 °F)   Resp 17 17 17 18 18 18 17      Vitals:    08/28/24 0900   Weight: 68.9 kg (152 lb)        Relevant Results  Lab Results   Component Value Date    TSH 4.02 (H) 09/03/2024    [id number] 991 (H) 02/10/2024    HGBA1C 8.1 (H) 08/31/2024     Results from last 7 days   Lab Units 09/03/24  3860  09/03/24  1106 09/03/24  0820 08/31/24  1809 08/31/24  1147 08/31/24  0606 08/31/24  0026 08/29/24  0944 08/29/24  0943   WBC AUTO x10*3/uL  --   --   --   --  9.3  --   --   --  8.4   HEMOGLOBIN g/dL  --   --   --   --  10.4*  --   --   --  10.4*   HEMATOCRIT %  --   --   --   --  31.5*  --   --   --  31.2*   SODIUM mmol/L 127* 127* 127*   < > 125*   < > 127*   < >  --    POTASSIUM mmol/L 4.1 4.2 4.5   < > 3.9   < > 3.4*   < >  --    CHLORIDE mmol/L 91* 92* 93*   < > 94*   < > 94*   < >  --    CREATININE mg/dL 0.35* 0.33* 0.31*   < > 0.22*   < > 0.20*   < >  --    BUN mg/dL 12 9 9   < > 5*   < > 7   < >  --    CO2 mmol/L 29 27 28   < > 24   < > 24   < >  --    HEMOGLOBIN A1C %  --   --   --   --   --   --  8.1*  --   --     < > = values in this interval not displayed.          XR thoracolumbar spine 2 views  Narrative: Interpreted By:  Adenike Muller  and Mario Alberto Webb   STUDY:  XR THORACOLUMBAR SPINE 2 VIEWS; ;  8/27/2024 7:18 pm      INDICATION:  Signs/Symptoms:With TSLO brace on.      COMPARISON:  CT thoracic spine 08/24/2024.      ACCESSION NUMBER(S):  [id number]      ORDERING CLINICIAN:  ROMAN MORRISSEY      FINDINGS:  Thoracic spine, two views.      T8 vertebral body extension fracture deformity, better visualized on  CT 08/24/2024. Alignment appears maintained.      Multiple lumbar vertebral body compression fractures that are likely  chronic are better evaluated on the previous CT.      Multiple gas-filled loops of small and large bowel.      Impression: T8 vertebral body extension fracture deformity better visualized on  CT 08/24/2024, without evidence of traumatic malalignment or  retropulsion into the spinal canal.      I personally reviewed the images/study and I agree with the findings  as stated. This study was interpreted at [facility], [place], [place].      MACRO:  None      Signed by: Adenike Muller 8/28/2024 10:51 AM  Dictation workstation:    [id number]        DATA:  EKG: QTC  Encounter Date: 08/23/24   ECG 12 lead   Result Value    Ventricular Rate 114    Atrial Rate 114    MN Interval 126    QRS Duration 68    QT Interval 310    QTC Calculation(Bazett) 427    P Axis 62    R Axis -8    T Axis 115    QRS Count 18    Q Onset 219    P Onset 156    P Offset 200    T Offset 374    QTC Fredericia 383    Narrative    Sinus tachycardia  Nonspecific ST and T wave abnormality  Abnormal ECG  When compared with ECG of 23-AUG-2024 22:06,  PREVIOUS ECG IS PRESENT  See ED provider note for full interpretation and clinical correlation  Confirmed by Nesha Gallardo ([id number]) on 8/26/2024 1:09:39 PM      Anti-psychotics in 48 hours: Haldol (home med)  and olanzapine (home med)   Opioids/Benzodiazepines in 48 hours: hydromorphone an oxycodone   Anticholinergics on board:No  Restraints:No  Indwelling catheters: yes, [place]   Last BM: none recorded   UO in 24 hours: 2575 mL   Activity in the past 24 hours: working with PT/OT   Need for ambulatory devices: wheelchair at baseline       This patient has a urinary catheter   Reason for the urinary catheter remaining today? urinary retention/bladder outlet obstruction, acute or chronic      Assessment/Plan     This is a/an 84 y.o. year old female, with past medical history relevant for HTN, HLD, HFrEF, CAD with hx of MI,  DM TII, osteoporosis, dermatomyositis (on methotrexate/steroids), WEN without CPAP, anemia, cognitive impairment, and schizoaffective disorder, presenting for T8 vertebral body fracture sustained after falling backwards from ground level, being seen in geriatric consultation for thyroid management and hyponatremia.     Principal Problem:    Closed fracture of eighth thoracic vertebra, unspecified fracture morphology, initial encounter (Multi)    1. Hyponatremia, chronic, c/f SIADH, chronic  - Na 130 today  - Continue sodium tablets  - Recommend 1 L fluid restriction daily  - Recommend follow up with  outpatient nephrology     2. Fall with acute T8 vertebral body fracture, subacute right sided rib fracture, acute pain due to fractures   - Recommend checking orthostatic vital signs when able  - Continue TLSO hard brace when out of bed  - Continue scheduled acetaminophen with as needed oxycodone for breakthrough pain  - Lidocaine patches   - Continue calcium  - Recommend checking vitamin D     3. Underlying cognitive impairment, history of moderate dementia in the setting of sundowning, schizophrenia   - CAM negative, 4 AT: 2   - continue home doses of haldol and olanzapine  - continue venlafaxine     4. T2DM  - blood sugars 141-258   - continue sliding scale insulin  - recommend restarting Tradjenta and metformin at discharge    5. Constipation  - No BM   - Continue Miralax  - Recommend adding Mary-colace, 2 tablets 2x/day  - Consider Dulcolax suppository     6. Urinary retention  - Consider voiding trial once bowel are moving        4M AGE-FRIENDLY INITIATIVE:  What matters most to patient: Pain reduction, family   Medications: oxycodone   Mentation: CAM negative, 4 AT: 2   Mobility: wheelchair     Geriatric medicine will continue to follow the patient. Thank you for allowing geriatric medicine to be involved in the care of your patient. Geriatric medicine consultation team is available during work hours Monday through Friday. For any emergency issues requiring immediate assistance over the weekend, please page Geriatrics pager [phone]    DENNIS Cifuentes-CNP

## 2024-09-04 NOTE — HOSPITAL COURSE
84F with history hof HFpED, HTN, HLD, DM2, hyponatremia, and dermatomyositis (on prednisone), and schizoaffective disorder transferred to [facility] on 8/24 for further management of T8 VB fracture after sustaining a GLF at her SNF.   Found to have subacute rib fractures as well.  Had fall at SNF the week prior to presentation as well.   NSGY consulted, a MRI T/L spines recommended.  Patient injury deemed non-operative and a TLSO brace recommended.  Noted to have hyponatremia to 120 (baseline 126-128).  Was transferred to the TSICU on 8/28 for close monitoring.  Improved sodium levels with hydration and salt tabs.  Deemed appropriate for transfer out of ICU 9/1.    Geriatrics consulted to assist with management while in the hospital.  Concerns for SIADH; placed on 1L/day fluid restriction with salt tabs.  To follow up with nephrology 4-6 weeks after hospitalization.  Was also found to have concerns for subclinical hypothyroidism.  Plan for PCP or endocrinology follow up in 4-6 weeks.    During stay, patient failed over 3 Goel catheter removals.  She was discharged with Goel and plan to to follow up with urology outpatient.    PT/OT worked with patient during stay.  Plan for discharge to SNF.  Patient medically ready for discharge from hospital at 09/05/24 with improvement in hyponatremia to 131. Flomax added per geriatric recommendations for urinary retention with plan to continue trial of voids in a few days at facility.

## 2024-09-04 NOTE — PROGRESS NOTES
Pre-cert resubmitted for approval. SW will continue to follow to facilitate discharge plan.       DESIREE Long

## 2024-09-05 ENCOUNTER — PHARMACY VISIT (OUTPATIENT)
Dept: PHARMACY | Facility: CLINIC | Age: 84
End: 2024-09-05
Payer: COMMERCIAL

## 2024-09-05 VITALS
OXYGEN SATURATION: 94 % | TEMPERATURE: 97.9 F | RESPIRATION RATE: 18 BRPM | SYSTOLIC BLOOD PRESSURE: 135 MMHG | WEIGHT: 152 LBS | BODY MASS INDEX: 29.69 KG/M2 | DIASTOLIC BLOOD PRESSURE: 69 MMHG | HEART RATE: 69 BPM

## 2024-09-05 LAB
25(OH)D3 SERPL-MCNC: 32 NG/ML (ref 30–100)
ALBUMIN SERPL BCP-MCNC: 2.8 G/DL (ref 3.4–5)
ANION GAP SERPL CALC-SCNC: 11 MMOL/L (ref 10–20)
BUN SERPL-MCNC: 9 MG/DL (ref 6–23)
CALCIUM SERPL-MCNC: 8.5 MG/DL (ref 8.6–10.6)
CHLORIDE SERPL-SCNC: 97 MMOL/L (ref 98–107)
CO2 SERPL-SCNC: 27 MMOL/L (ref 21–32)
CREAT SERPL-MCNC: 0.33 MG/DL (ref 0.5–1.05)
EGFRCR SERPLBLD CKD-EPI 2021: >90 ML/MIN/1.73M*2
GLUCOSE BLD MANUAL STRIP-MCNC: 143 MG/DL (ref 74–99)
GLUCOSE BLD MANUAL STRIP-MCNC: 221 MG/DL (ref 74–99)
GLUCOSE BLD MANUAL STRIP-MCNC: 258 MG/DL (ref 74–99)
GLUCOSE SERPL-MCNC: 142 MG/DL (ref 74–99)
PHOSPHATE SERPL-MCNC: 3.8 MG/DL (ref 2.5–4.9)
POTASSIUM SERPL-SCNC: 4.2 MMOL/L (ref 3.5–5.3)
SODIUM SERPL-SCNC: 131 MMOL/L (ref 136–145)

## 2024-09-05 PROCEDURE — 99232 SBSQ HOSP IP/OBS MODERATE 35: CPT

## 2024-09-05 PROCEDURE — 2500000001 HC RX 250 WO HCPCS SELF ADMINISTERED DRUGS (ALT 637 FOR MEDICARE OP): Performed by: STUDENT IN AN ORGANIZED HEALTH CARE EDUCATION/TRAINING PROGRAM

## 2024-09-05 PROCEDURE — 2500000005 HC RX 250 GENERAL PHARMACY W/O HCPCS

## 2024-09-05 PROCEDURE — 2500000002 HC RX 250 W HCPCS SELF ADMINISTERED DRUGS (ALT 637 FOR MEDICARE OP, ALT 636 FOR OP/ED): Performed by: SURGERY

## 2024-09-05 PROCEDURE — 82947 ASSAY GLUCOSE BLOOD QUANT: CPT

## 2024-09-05 PROCEDURE — 2500000004 HC RX 250 GENERAL PHARMACY W/ HCPCS (ALT 636 FOR OP/ED): Performed by: STUDENT IN AN ORGANIZED HEALTH CARE EDUCATION/TRAINING PROGRAM

## 2024-09-05 PROCEDURE — 97110 THERAPEUTIC EXERCISES: CPT | Mod: GP

## 2024-09-05 PROCEDURE — 80069 RENAL FUNCTION PANEL: CPT

## 2024-09-05 PROCEDURE — 2500000001 HC RX 250 WO HCPCS SELF ADMINISTERED DRUGS (ALT 637 FOR MEDICARE OP): Performed by: SURGERY

## 2024-09-05 PROCEDURE — RXMED WILLOW AMBULATORY MEDICATION CHARGE

## 2024-09-05 PROCEDURE — 36415 COLL VENOUS BLD VENIPUNCTURE: CPT

## 2024-09-05 RX ORDER — SODIUM CHLORIDE 1000 MG
1000 TABLET, SOLUBLE MISCELLANEOUS
Start: 2024-09-05

## 2024-09-05 RX ORDER — TAMSULOSIN HYDROCHLORIDE 0.4 MG/1
0.4 CAPSULE ORAL DAILY
Start: 2024-09-05

## 2024-09-05 RX ORDER — HYDROCORTISONE 25 MG/G
1 CREAM TOPICAL
Start: 2024-09-05

## 2024-09-05 RX ORDER — GUAIFENESIN 100 MG/5ML
200 SOLUTION ORAL EVERY 4 HOURS PRN
Start: 2024-09-05

## 2024-09-05 RX ORDER — LATANOPROST 50 UG/ML
1 SOLUTION/ DROPS OPHTHALMIC NIGHTLY
Start: 2024-09-05

## 2024-09-05 RX ORDER — POLYETHYLENE GLYCOL 3350 17 G/17G
17 POWDER, FOR SOLUTION ORAL DAILY
Start: 2024-09-06

## 2024-09-05 RX ORDER — OLANZAPINE 20 MG/1
20 TABLET ORAL NIGHTLY
Start: 2024-09-05

## 2024-09-05 RX ORDER — METFORMIN HYDROCHLORIDE 500 MG/1
500 TABLET ORAL
Start: 2024-09-05

## 2024-09-05 RX ORDER — FLUTICASONE PROPIONATE 50 MCG
1 SPRAY, SUSPENSION (ML) NASAL DAILY
Start: 2024-09-05

## 2024-09-05 RX ORDER — METHOTREXATE 2.5 MG/1
15 TABLET ORAL
Start: 2024-09-08

## 2024-09-05 RX ORDER — HALOPERIDOL 5 MG/ML
0.5 INJECTION INTRAMUSCULAR NIGHTLY
Start: 2024-09-05

## 2024-09-05 RX ORDER — DOCUSATE SODIUM 100 MG/1
100 CAPSULE, LIQUID FILLED ORAL
Start: 2024-09-05

## 2024-09-05 RX ORDER — OXYCODONE HYDROCHLORIDE 5 MG/1
5 TABLET ORAL EVERY 6 HOURS PRN
Qty: 15 TABLET | Refills: 0 | OUTPATIENT
Start: 2024-09-05

## 2024-09-05 RX ORDER — FOLIC ACID 1 MG/1
1 TABLET ORAL DAILY
Start: 2024-09-05

## 2024-09-05 RX ORDER — NALOXONE HYDROCHLORIDE 0.4 MG/ML
0.2 INJECTION, SOLUTION INTRAMUSCULAR; INTRAVENOUS; SUBCUTANEOUS EVERY 5 MIN PRN
Qty: 1 ML | OUTPATIENT
Start: 2024-09-05

## 2024-09-05 RX ORDER — HALOPERIDOL 0.5 MG/1
0.5 TABLET ORAL NIGHTLY
Start: 2024-09-05

## 2024-09-05 RX ORDER — DEXTROSE 50 % IN WATER (D50W) INTRAVENOUS SYRINGE
25
Start: 2024-09-05

## 2024-09-05 RX ORDER — CALCIUM CARBONATE 300MG(750)
400 TABLET,CHEWABLE ORAL DAILY
Start: 2024-09-05

## 2024-09-05 RX ORDER — METOPROLOL SUCCINATE 25 MG/1
37.5 TABLET, EXTENDED RELEASE ORAL 2 TIMES DAILY
Start: 2024-09-05

## 2024-09-05 RX ORDER — ACETAMINOPHEN 500 MG
5 TABLET ORAL NIGHTLY
Start: 2024-09-05

## 2024-09-05 RX ORDER — VENLAFAXINE HYDROCHLORIDE 37.5 MG/1
37.5 CAPSULE, EXTENDED RELEASE ORAL DAILY
Start: 2024-09-05 | End: 2024-10-05

## 2024-09-05 RX ORDER — FAMOTIDINE 40 MG/1
40 TABLET, FILM COATED ORAL NIGHTLY
Start: 2024-09-05

## 2024-09-05 RX ORDER — ENOXAPARIN SODIUM 100 MG/ML
30 INJECTION SUBCUTANEOUS EVERY 12 HOURS
Start: 2024-09-05

## 2024-09-05 RX ORDER — CALCIUM CARBONATE 200(500)MG
500 TABLET,CHEWABLE ORAL DAILY
Start: 2024-09-05

## 2024-09-05 RX ORDER — INSULIN LISPRO 100 [IU]/ML
0-20 INJECTION, SOLUTION INTRAVENOUS; SUBCUTANEOUS
Start: 2024-09-05

## 2024-09-05 RX ORDER — CLOBETASOL PROPIONATE 0.05 G/100ML
1 SHAMPOO TOPICAL
Start: 2024-09-08

## 2024-09-05 RX ORDER — AMOXICILLIN 250 MG
1 CAPSULE ORAL NIGHTLY
Start: 2024-09-05

## 2024-09-05 RX ORDER — TAMSULOSIN HYDROCHLORIDE 0.4 MG/1
0.4 CAPSULE ORAL DAILY
Status: DISCONTINUED | OUTPATIENT
Start: 2024-09-05 | End: 2024-09-05 | Stop reason: HOSPADM

## 2024-09-05 RX ORDER — DORZOLAMIDE HCL 20 MG/ML
1 SOLUTION/ DROPS OPHTHALMIC 3 TIMES DAILY
Start: 2024-09-05

## 2024-09-05 RX ORDER — BISACODYL 10 MG/1
10 SUPPOSITORY RECTAL DAILY PRN
Start: 2024-09-05

## 2024-09-05 RX ORDER — CLOBETASOL PROPIONATE 0.5 MG/G
1 CREAM TOPICAL 2 TIMES DAILY
Start: 2024-09-05

## 2024-09-05 RX ORDER — OXYCODONE HYDROCHLORIDE 5 MG/1
10 TABLET ORAL EVERY 6 HOURS PRN
Qty: 15 TABLET | Refills: 0 | Status: SHIPPED | OUTPATIENT
Start: 2024-09-05

## 2024-09-05 RX ORDER — ACETAMINOPHEN 325 MG/1
975 TABLET ORAL EVERY 6 HOURS SCHEDULED
Start: 2024-09-05

## 2024-09-05 RX ORDER — ACETAMINOPHEN 500 MG
1 TABLET ORAL 2 TIMES DAILY
Start: 2024-09-05

## 2024-09-05 RX ORDER — LIDOCAINE 560 MG/1
1 PATCH PERCUTANEOUS; TOPICAL; TRANSDERMAL DAILY
Start: 2024-09-05

## 2024-09-05 RX ORDER — ABALOPARATIDE 2000 UG/ML
80 INJECTION, SOLUTION SUBCUTANEOUS DAILY
Start: 2024-09-05

## 2024-09-05 RX ORDER — LABETALOL HYDROCHLORIDE 5 MG/ML
10 INJECTION, SOLUTION INTRAVENOUS EVERY 6 HOURS PRN
Start: 2024-09-05

## 2024-09-05 RX ORDER — ADHESIVE BANDAGE
30 BANDAGE TOPICAL DAILY PRN
Start: 2024-09-05

## 2024-09-05 RX ORDER — BENZOCAINE AND MENTHOL 15; 3.6 MG/1; MG/1
1 LOZENGE ORAL EVERY 4 HOURS PRN
Start: 2024-09-05

## 2024-09-05 RX ORDER — IBUPROFEN 600 MG/1
600 TABLET ORAL EVERY 6 HOURS PRN
Start: 2024-09-05

## 2024-09-05 RX ORDER — LIDOCAINE 560 MG/1
1 PATCH PERCUTANEOUS; TOPICAL; TRANSDERMAL DAILY
Start: 2024-09-06

## 2024-09-05 RX ORDER — VENLAFAXINE HYDROCHLORIDE 37.5 MG/1
37.5 CAPSULE, EXTENDED RELEASE ORAL DAILY
Start: 2024-09-06

## 2024-09-05 RX ORDER — PREDNISONE 2.5 MG/1
3.75 TABLET ORAL 2 TIMES DAILY
Start: 2024-09-05

## 2024-09-05 RX ORDER — NALOXONE HYDROCHLORIDE 4 MG/.1ML
4 SPRAY NASAL AS NEEDED
Qty: 2 EACH | Refills: 11 | Status: SHIPPED | OUTPATIENT
Start: 2024-09-05

## 2024-09-05 RX ORDER — DEXTROSE 50 % IN WATER (D50W) INTRAVENOUS SYRINGE
12.5
Start: 2024-09-05

## 2024-09-05 RX ORDER — CALCIUM CARBONATE 200(500)MG
500 TABLET,CHEWABLE ORAL DAILY
Start: 2024-09-06

## 2024-09-05 RX ORDER — OXYCODONE HYDROCHLORIDE 5 MG/1
5 TABLET ORAL EVERY 4 HOURS PRN
Qty: 15 TABLET | Refills: 0 | Status: SHIPPED | OUTPATIENT
Start: 2024-09-05

## 2024-09-05 RX ORDER — POLYETHYLENE GLYCOL 3350 17 G/17G
17 POWDER, FOR SOLUTION ORAL DAILY
Start: 2024-09-05

## 2024-09-05 RX ORDER — NALOXONE HYDROCHLORIDE 0.4 MG/ML
0.2 INJECTION, SOLUTION INTRAMUSCULAR; INTRAVENOUS; SUBCUTANEOUS EVERY 5 MIN PRN
Qty: 1 ML | Status: SHIPPED | OUTPATIENT
Start: 2024-09-05 | End: 2024-09-05 | Stop reason: HOSPADM

## 2024-09-05 RX ADMIN — PREDNISONE 3.75 MG: 2.5 TABLET ORAL at 09:12

## 2024-09-05 RX ADMIN — OXYCODONE HYDROCHLORIDE 5 MG: 5 TABLET ORAL at 16:27

## 2024-09-05 RX ADMIN — SODIUM CHLORIDE 1 G: 1 TABLET ORAL at 12:25

## 2024-09-05 RX ADMIN — CALCIUM CARBONATE (ANTACID) CHEW TAB 500 MG 500 MG: 500 CHEW TAB at 09:08

## 2024-09-05 RX ADMIN — POLYETHYLENE GLYCOL 3350 17 G: 17 POWDER, FOR SOLUTION ORAL at 09:12

## 2024-09-05 RX ADMIN — SODIUM CHLORIDE 1 G: 1 TABLET ORAL at 09:12

## 2024-09-05 RX ADMIN — LIDOCAINE 1 PATCH: 4 PATCH TOPICAL at 09:13

## 2024-09-05 RX ADMIN — ACETAMINOPHEN 975 MG: 325 TABLET ORAL at 17:34

## 2024-09-05 RX ADMIN — ACETAMINOPHEN 975 MG: 325 TABLET ORAL at 06:42

## 2024-09-05 RX ADMIN — ENOXAPARIN SODIUM 30 MG: 100 INJECTION SUBCUTANEOUS at 09:14

## 2024-09-05 RX ADMIN — VENLAFAXINE HYDROCHLORIDE 37.5 MG: 37.5 CAPSULE, EXTENDED RELEASE ORAL at 09:08

## 2024-09-05 RX ADMIN — METOPROLOL SUCCINATE 37.5 MG: 25 TABLET, EXTENDED RELEASE ORAL at 09:13

## 2024-09-05 RX ADMIN — ACETAMINOPHEN 975 MG: 325 TABLET ORAL at 12:24

## 2024-09-05 RX ADMIN — OXYCODONE HYDROCHLORIDE 5 MG: 5 TABLET ORAL at 09:08

## 2024-09-05 RX ADMIN — INSULIN LISPRO 8 UNITS: 100 INJECTION, SOLUTION INTRAVENOUS; SUBCUTANEOUS at 13:25

## 2024-09-05 ASSESSMENT — PAIN SCALES - GENERAL
PAINLEVEL_OUTOF10: 3
PAINLEVEL_OUTOF10: 7
PAINLEVEL_OUTOF10: 7
PAINLEVEL_OUTOF10: 4
PAINLEVEL_OUTOF10: 5 - MODERATE PAIN

## 2024-09-05 ASSESSMENT — COGNITIVE AND FUNCTIONAL STATUS - GENERAL
TURNING FROM BACK TO SIDE WHILE IN FLAT BAD: TOTAL
STANDING UP FROM CHAIR USING ARMS: TOTAL
TOILETING: A LITTLE
MOVING TO AND FROM BED TO CHAIR: A LOT
HELP NEEDED FOR BATHING: A LITTLE
EATING MEALS: A LITTLE
DRESSING REGULAR LOWER BODY CLOTHING: A LOT
CLIMB 3 TO 5 STEPS WITH RAILING: TOTAL
WALKING IN HOSPITAL ROOM: TOTAL
MOVING FROM LYING ON BACK TO SITTING ON SIDE OF FLAT BED WITH BEDRAILS: A LOT
PERSONAL GROOMING: A LITTLE
MOVING FROM LYING ON BACK TO SITTING ON SIDE OF FLAT BED WITH BEDRAILS: TOTAL
DRESSING REGULAR UPPER BODY CLOTHING: A LITTLE
TURNING FROM BACK TO SIDE WHILE IN FLAT BAD: A LOT
DAILY ACTIVITIY SCORE: 17
WALKING IN HOSPITAL ROOM: A LOT
MOVING TO AND FROM BED TO CHAIR: TOTAL
MOBILITY SCORE: 6
CLIMB 3 TO 5 STEPS WITH RAILING: A LOT
STANDING UP FROM CHAIR USING ARMS: A LOT
MOBILITY SCORE: 12

## 2024-09-05 ASSESSMENT — PAIN DESCRIPTION - LOCATION
LOCATION: BACK
LOCATION: RIB CAGE
LOCATION: BACK
LOCATION: BACK

## 2024-09-05 ASSESSMENT — PAIN SCALES - WONG BAKER
WONGBAKER_NUMERICALRESPONSE: HURTS LITTLE MORE
WONGBAKER_NUMERICALRESPONSE: HURTS LITTLE MORE
WONGBAKER_NUMERICALRESPONSE: HURTS LITTLE BIT
WONGBAKER_NUMERICALRESPONSE: HURTS LITTLE BIT
WONGBAKER_NUMERICALRESPONSE: HURTS LITTLE MORE

## 2024-09-05 ASSESSMENT — PAIN - FUNCTIONAL ASSESSMENT
PAIN_FUNCTIONAL_ASSESSMENT: 0-10
PAIN_FUNCTIONAL_ASSESSMENT: 0-10

## 2024-09-05 ASSESSMENT — PAIN DESCRIPTION - ORIENTATION
ORIENTATION: MID

## 2024-09-05 NOTE — PROGRESS NOTES
Transitional Care Coordinator Note: Patient discussed in morning rounds, per medical team (trauma) patient is medically ready. Discharge dispo:Plan for patient to discharge to SNF [facility] [place]. Transport requested by  for 2:30pm, awaiting confirmed time.         Ruby Squires RN BSN   Transitional Care Coordinator

## 2024-09-05 NOTE — PROGRESS NOTES
Patient discharging today at 4:30p. She is going to [facility] [place], as her pre-cert was extended. SW made a phone call to patient daughter, Ivette [phone] and left a voicemail with details regarding her transfer. [place] and 7000 completed. Facility and provider updated as well.       DESIREE Long

## 2024-09-05 NOTE — PROGRESS NOTES
"Physical Therapy    Physical Therapy Treatment    Patient Name: Jennifer Mancini  MRN: [id number]  Today's Date: 9/5/2024  Time Calculation  Start Time: 1440  Stop Time: 1454  Time Calculation (min): 14 min         Assessment/Plan   PT Assessment  End of Session Communication: Bedside nurse  Assessment Comment: pt tolerated session poorly 2/2 pain. Patient is limited due to pain and self. dc rec mod  End of Session Patient Position: Bed, 3 rail up, Alarm on     PT Plan  Treatment/Interventions: Bed mobility, Transfer training, Gait training, Balance training, Stair training, Strengthening, Endurance training, Range of motion, Therapeutic exercise, Therapeutic activity, Home exercise program, Positioning  PT Plan: Ongoing PT  PT Frequency: 3 times per week  PT Discharge Recommendations: Moderate intensity level of continued care  PT Recommended Transfer Status: Assist x2  PT - OK to Discharge: Yes      General Visit Information:   PT  Visit  PT Received On: 09/05/24  Response to Previous Treatment: Patient with no complaints from previous session.  General  Prior to Session Communication: Bedside nurse  Patient Position Received: Bed, 3 rail up, Alarm on  General Comment: pt supine in bed, RN cleared. pt in pain with mobility, andrequired encouragement to participate in session. pt refused sitting EOB attempt due to not having x2 assist.    Subjective   Precautions:  Precautions  Medical Precautions: Fall precautions, Spinal precautions  Braces Applied: TLSO      Objective   Pain:  Pain Assessment  Pain Assessment: 0-10  0-10 (Numeric) Pain Score:  (pt resting in bed calmy, moving LEs but then when asked to lift LEs to start rolling pt screaming in pain. reports her pain is a constant \"12\")  Pain Type: Acute pain  Cognition:  Cognition  Overall Cognitive Status: Within Functional Limits  Orientation Level: Oriented X4  Activity Tolerance:  Activity Tolerance  Endurance: Tolerates less than 10 min exercise, no " significant change in vital signs  Treatments:  Therapeutic Exercise  Therapeutic Exercise Performed: Yes  Therapeutic Exercise Activity 1: supine ankle pumps x10, heel slides x2 but reported increased pain. SLR x3    Therapeutic Activity  Therapeutic Activity Performed: Yes  Therapeutic Activity 1: roll attempted to R and pt unable to complete 2/2 pain.    Bed Mobility 1  Bed Mobility 1: Rolling right  Bed Mobility Comments 1: attempted but unable to complete due to pain. completed 25% of roll    Outcome Measures:  [facility] Basic Mobility  Turning from your back to your side while in a flat bed without using bedrails: Total  Moving from lying on your back to sitting on the side of a flat bed without using bedrails: Total  Moving to and from bed to chair (including a wheelchair): Total  Standing up from a chair using your arms (e.g. wheelchair or bedside chair): Total  To walk in hospital room: Total  Climbing 3-5 steps with railing: Total  Basic Mobility - Total Score: 6    Education Documentation  Precautions, taught by Erin Miller PT at 9/5/2024  3:16 PM.  Learner: Patient  Readiness: Acceptance  Method: Explanation  Response: Needs Reinforcement  Comment: edu on role of PT and pain positioning for comfort    Education Comments  No comments found.        OP EDUCATION:       Encounter Problems       Encounter Problems (Active)       Balance       STG - Maintains static sitting balance with upper extremity support >/= 10 min with SBA (Progressing)       Start:  08/27/24    Expected End:  09/10/24               Mobility       STG - Patient will ambulate >/= 100ft with minAx1 and LRAD (Progressing)       Start:  08/27/24    Expected End:  09/10/24               PT Transfers       STG - Transfer from bed to chair minAx1 and LRAD (Progressing)       Start:  08/27/24    Expected End:  09/10/24            STG - Patient will perform bed mobility Bart (Progressing)       Start:  08/27/24    Expected End:  09/10/24             STG - Patient will transfer sit to and from stand minAx1 and LRAD (Progressing)       Start:  08/27/24    Expected End:  09/10/24               Pain - Adult

## 2024-09-05 NOTE — PROGRESS NOTES
Subjective   Upon evaluation this morning, the patient was awake and alert, resting in bed. No acute events overnight. Patient endorses some back pain this morning, stating it is difficult to move due to the pain and became teary, but quickly redirectable. She has yet to have a bowel movement.        Objective     Current Facility-Administered Medications   Medication Dose Route Frequency Provider Last Rate Last Admin    acetaminophen (Tylenol) tablet 975 mg  975 mg oral q6h [facility] Sergio Arauz DMD, MD   975 mg at 09/05/24 0642    atorvastatin (Lipitor) tablet 40 mg  40 mg oral Nightly Sergio Arauz DMD, MD   40 mg at 09/04/24 2206    calcium carbonate (Tums) chewable tablet 500 mg  500 mg oral Daily Sergio Arauz DMD, MD   500 mg at 09/04/24 0841    dextrose 50 % injection 12.5 g  12.5 g intravenous q15 min PRN Sergio Arauz DMD, MD        dextrose 50 % injection 25 g  25 g intravenous q15 min PRN Sergio Arauz DMD, MD        enoxaparin (Lovenox) syringe 30 mg  30 mg subcutaneous q12h Sergio Arauz DMD, MD   30 mg at 09/04/24 2204    glucagon (Glucagen) injection 1 mg  1 mg intramuscular q15 min PRN Sergio Arauz DMD, MD        glucagon (Glucagen) injection 1 mg  1 mg intramuscular q15 min PRN Sergio Arauz DMD, MD        haloperidol lactate (Haldol) injection 0.5 mg  0.5 mg intramuscular Nightly Ronnell Blue MD   0.5 mg at 09/04/24 2210    HYDROmorphone (Dilaudid) injection 0.2 mg  0.2 mg intravenous q4h PRN Sergio Arauz DMD, MD   0.2 mg at 09/03/24 1652    insulin lispro (HumaLOG) injection 0-20 Units  0-20 Units subcutaneous Before meals & nightly Marylin Medrano DO   4 Units at 09/04/24 2124    labetaloL (Normodyne,Trandate) injection 10 mg  10 mg intravenous q6h PRN Sergio Arauz DMD, MD   10 mg at 09/03/24 1810    latanoprost (Xalatan) 0.005 % ophthalmic solution 1 drop  1 drop Both Eyes Nightly Sergio Arauz DMD, MD   1 drop at 09/04/24 3146    lidocaine 4 % patch 1 patch  1  patch transdermal Daily Ronnell Blue MD   1 patch at 09/04/24 0840    metoprolol succinate XL (Toprol-XL) 24 hr tablet 37.5 mg  37.5 mg oral BID Marylin Medrano DO   37.5 mg at 09/04/24 2204    naloxone (Narcan) injection 0.2 mg  0.2 mg intravenous q5 min PRN Sergio Arauz DMD, MD        naloxone (Narcan) injection 0.2 mg  0.2 mg intravenous q5 min PRN Sergio Arauz DMD, MD        OLANZapine (ZyPREXA) tablet 20 mg  20 mg oral Nightly Ronnell Blue MD   20 mg at 09/04/24 2204    oxyCODONE (Roxicodone) immediate release tablet 2.5 mg  2.5 mg oral q6h PRN Sergio Arauz DMD, MD        oxyCODONE (Roxicodone) immediate release tablet 5 mg  5 mg oral q4h PRN Sergio Arauz DMD, MD   5 mg at 09/04/24 2219    oxygen (O2) therapy   inhalation Continuous PRN - O2/gases Sergio Arauz DMD, MD   2 L/min at 08/31/24 0800    polyethylene glycol (Glycolax, Miralax) packet 17 g  17 g oral Daily Sergio Arauz DMD, MD   17 g at 09/04/24 0840    predniSONE (Deltasone) tablet 3.75 mg  3.75 mg oral BID Sergio Arauz DMD, MD   3.75 mg at 09/04/24 2205    sennosides-docusate sodium (Mary-Colace) 8.6-50 mg per tablet 1 tablet  1 tablet oral Nightly Ronnell Blue MD   1 tablet at 09/04/24 2208    sodium chloride tablet 1 g  1,000 mg oral TID Sergio Arauz DMD, MD   1 g at 09/04/24 1724    venlafaxine XR (Effexor-XR) 24 hr capsule 37.5 mg  37.5 mg oral Daily Sergio Arauz DMD, MD   37.5 mg at 09/04/24 0928       Physical Exam  Constitutional:       General: She is not in acute distress.  HENT:      Head: Normocephalic.      Nose: Nose normal. No congestion or rhinorrhea.      Mouth/Throat:      Mouth: Mucous membranes are moist.      Pharynx: Oropharynx is clear.   Eyes:      Extraocular Movements: Extraocular movements intact.      Conjunctiva/sclera: Conjunctivae normal.   Cardiovascular:      Rate and Rhythm: Normal rate and regular rhythm.      Heart sounds: Murmur (holosystolic) heard.   Pulmonary:      Effort:  Pulmonary effort is normal.      Breath sounds: Normal breath sounds.   Abdominal:      General: Abdomen is flat. There is no distension.      Palpations: Abdomen is soft.      Tenderness: There is no abdominal tenderness.   Musculoskeletal:         General: Normal range of motion.      Right lower leg: No edema.      Left lower leg: No edema.   Skin:     General: Skin is warm and dry.      Findings: Bruising (apparent on arms, sustained from recent falls) present.   Neurological:      Mental Status: She is alert.      Comments: Patient is alert, oriented to person and time. Understands that she is in the hospital but was unsure which hospital.          Confusion Assessment Method(CAM) for diagnosis of delirium:    1.  Acute onset or fluctuating course: absent  2.  Inattention: present  3.  Disorganized thinking: absent  4.  Altered level of consciousness: absent  CAM: negative    AT Score For Assessment of Delirium and Cognitive Impairment:    Alertness: 0  Normal(fully alert,but not agitated, throughout assessment)=0  Mild sleepiness for <10 seconds after walking, then normal=0  Clearly abnormal=4  2.  AMT4: 1  No mistakes=0  One mistake=1  Two or more mistakes/untestable=2  3.  Attention: 1  Achieves seven months or more correctly=0  Starts but scores <7 months/ refuses to start=1  Untestable(cannot start because unwell, drowsy, inattentive)=2  4.  Acute: 0  No=0  Yes=4    Total Score: 2  4 or above: Possible delirium +/- cognitive impairment  1-3: Possible cognitive impairment  0: Delirium or severe cognitive impairment unlikely(but delirium still possible if (4) information incomplete)      Last Recorded Vitals      9/4/2024     7:50 AM 9/4/2024    11:07 AM 9/4/2024     1:26 PM 9/4/2024     3:40 PM 9/4/2024     8:53 PM 9/4/2024    11:32 PM 9/5/2024     3:44 AM   Vitals   Systolic 171 152 153 159 135 131 134   Diastolic 92 98 98 74 84 79 83   Heart Rate 91 92 85 99 91 91 78   Temp 36.5 °C (97.7 °F) 36.8 °C (98.2  °F) 36.5 °C (97.7 °F) 36.8 °C (98.2 °F) 36.8 °C (98.2 °F) 37.3 °C (99.1 °F) 36.7 °C (98.1 °F)   Resp 17 18 18 18 18 18 18      Vitals:    08/28/24 0900   Weight: 68.9 kg (152 lb)        Relevant Results  Lab Results   Component Value Date    TSH 4.02 (H) 09/03/2024    [id number] 991 (H) 02/10/2024    HGBA1C 8.1 (H) 08/31/2024     Results from last 7 days   Lab Units 09/04/24  1800 09/04/24  1600 09/04/24  0824 08/31/24  1809 08/31/24  1147 08/31/24  0606 08/31/24  0026 08/29/24  0944 08/29/24  0943   WBC AUTO x10*3/uL  --   --   --   --  9.3  --   --   --  8.4   HEMOGLOBIN g/dL  --   --   --   --  10.4*  --   --   --  10.4*   HEMATOCRIT %  --   --   --   --  31.5*  --   --   --  31.2*   SODIUM mmol/L 126* 125* 130*  130*   < > 125*   < > 127*   < >  --    POTASSIUM mmol/L 3.8 4.5 4.4  4.6   < > 3.9   < > 3.4*   < >  --    CHLORIDE mmol/L 92* 91* 95*  95*   < > 94*   < > 94*   < >  --    CREATININE mg/dL 0.31* 0.39* 0.32*  0.35*   < > 0.22*   < > 0.20*   < >  --    BUN mg/dL 11 11 11  10   < > 5*   < > 7   < >  --    CO2 mmol/L 28 26 29  27   < > 24   < > 24   < >  --    HEMOGLOBIN A1C %  --   --   --   --   --   --  8.1*  --   --     < > = values in this interval not displayed.          XR thoracolumbar spine 2 views  Narrative: Interpreted By:  Adenike Muller  and Mario Alberto Mcdaniels   STUDY:  XR THORACOLUMBAR SPINE 2 VIEWS; ;  8/27/2024 7:18 pm      INDICATION:  Signs/Symptoms:With TSLO brace on.      COMPARISON:  CT thoracic spine 08/24/2024.      ACCESSION NUMBER(S):  [id number]      ORDERING CLINICIAN:  ROMAN MORRISSEY      FINDINGS:  Thoracic spine, two views.      T8 vertebral body extension fracture deformity, better visualized on  CT 08/24/2024. Alignment appears maintained.      Multiple lumbar vertebral body compression fractures that are likely  chronic are better evaluated on the previous CT.      Multiple gas-filled loops of small and large bowel.      Impression: T8 vertebral body  extension fracture deformity better visualized on  CT 08/24/2024, without evidence of traumatic malalignment or  retropulsion into the spinal canal.      I personally reviewed the images/study and I agree with the findings  as stated. This study was interpreted at [facility], [place], [place].      MACRO:  None      Signed by: Adenike Muller 8/28/2024 10:51 AM  Dictation workstation:   [id number]      DATA:  EKG: QTC  Encounter Date: 08/23/24   ECG 12 lead   Result Value    Ventricular Rate 114    Atrial Rate 114    VT Interval 126    QRS Duration 68    QT Interval 310    QTC Calculation(Bazett) 427    P Axis 62    R Axis -8    T Axis 115    QRS Count 18    Q Onset 219    P Onset 156    P Offset 200    T Offset 374    QTC Fredericia 383    Narrative    Sinus tachycardia  Nonspecific ST and T wave abnormality  Abnormal ECG  When compared with ECG of 23-AUG-2024 22:06,  PREVIOUS ECG IS PRESENT  See ED provider note for full interpretation and clinical correlation  Confirmed by Nesha Gallardo ([id number]) on 8/26/2024 1:09:39 PM      Anti-psychotics in 48 hours: Haldol (home med) and olanzapine (home med)   Opioids/Benzodiazepines in 48 hours: oxycodone  Anticholinergics on board:No  Restraints:No  Indwelling catheters:Yes - montana   Last BM: Pt states she may have had one a few days ago, not documented  UO in 24 hours: ~3L  Activity in the past 24 hours: With PT/OT  Need for ambulatory devices: wheelchair at baseline           Assessment/Plan   Jennifer Mancini is a 84 y.o. female on day 12 of admission, with past medical history relevant for HTN, HLD, HFrEF, CAD with hx of MI,  DM TII, osteoporosis, dermatomyositis (on methotrexate/steroids), WEN without CPAP, anemia, cognitive impairment, and schizoaffective disorder, presenting for T8 vertebral body fracture sustained after falling backwards from ground level, being seen in geriatric consultation for thyroid management and  hyponatremia.     Principal Problem:    Closed fracture of eighth thoracic vertebra, unspecified fracture morphology, initial encounter (Multi)    #Hyponatremia, chronic  #C/f SIADH, chronic  -Pt admitted with Na of 128, throughout admission has ranged from 120-129              -Currently 126, stable  Plan:  -Recommend continue sodium tabs  -Recommend continue 1L fluid restriction daily  -Recommend outpatient nephrology consult for further evaluation and management        #Elevated TSH  #C/f subclinical hypothyroidism  -Patient has a long-standing history of elevated TSH dating back to 2020  -Also on methotrexate therapy for dermatomyositis   -TSH on admission 4.12, with free T4 elevated to 1.72  -Repeat TSH revealing 4.02 and free T4 of 1.36  Plan:  -Recommend follow-up with PCP or endocrinology in 4-6 weeks        #Fall  #Acute T8 vertebral body fracture  #Subacute right-sided rib fracture  #Pain management  -Patient has history of multiple falls in the recent past in the setting of osteoporosis   -Falls likely multifactorial in the setting of age, deconditioning, and underlying cognitive impairment   -Currently admitted for fall sustaining a T8 vertebral body fracture              -Neurosurgery consulted and recommended TLSO hard brace, no surgical intervention, and PT/OT  Plan:  -Agree with neurosurgery recommendations   -TLSO brace when out of bed  -PT/OT recommending moderate intensity and low intensity respectively   -Agree with scheduled Tylenol for pain, but would recommended 975 mg Q8 HRS               -OK to continue oxy as needed for pain, recommend 5-10 mg prn  -Continue lidocaine patches to paravertebral area and ribs  -Recommend collecting updated Vit D level  -Recommend obtaining orthostatic vitals when able       #Underlying cognitive impairment   #Hx of schizoaffective disorder  #Hx of moderate dementia in setting of sundowning  -Recommend permanent discontinuation of ibuprofen at discharge, not  given hospitalization  -In favor of continuing Tylenol 975 TID for pain  -Patient is on many home medications for her mood / schizoaffective disorder  -Continue home olanzapine 20mg nightly  -Continue home haldol 0.5mg daily  -Continue home venlafaxine 37.5mg daily  -Recommend referral to outpatient psych prior to d/c to address the combination       #T2DM  -Most recent A1c 8.1, goal for this patient 8.5  -BS have been elevated this admission  Plan:  -Recommend continue SS during hospitalization  -Recommend restarting Tradjenta and metformin at discharge      #Constipation  -Pt has gone multiple days without a BM  Plan:  -Agree with Miralax daily  -Sennoside-docusate 1 tablet nightly added last night   -Recommend increasing to 2 tablets BID   -Consider Dulcolax suppository       #Urinary Retention  -Patient has montana in place for eight days  -She cannot get out of bed without TLSO brace on, and dependent on help, so bedside commode is not a good option but purewick cath could be a good option for her  -Recommend removal of montana and trail of void     4M AGE-FRIENDLY INITIATIVE:  What matters most to patient: pain reduction, family  Medications: oxycodone  Mentation: CAM negative, 4 AT 2  Mobility: wheelchair    Geriatric medicine will continue to follow the patient. Thank you for allowing geriatric medicine to be involved in the care of your patient. Geriatric medicine consultation team is available during work hours Monday through Friday. For any emergency issues requiring immediate assistance over the weekend, please page Geriatrics pager [phone]    Melissa Ohara MD  PGY-1 Internal Medicine

## 2024-09-05 NOTE — CARE PLAN
Problem: Skin  Goal: Decreased wound size/increased tissue granulation at next dressing change  Outcome: Progressing   The patient's goals for the shift include      The clinical goals for the shift include pain management

## 2024-09-06 ENCOUNTER — LAB REQUISITION (OUTPATIENT)
Dept: LAB | Facility: HOSPITAL | Age: 84
End: 2024-09-06
Payer: COMMERCIAL

## 2024-09-06 ENCOUNTER — NURSING HOME VISIT (OUTPATIENT)
Dept: POST ACUTE CARE | Facility: EXTERNAL LOCATION | Age: 84
End: 2024-09-06
Payer: COMMERCIAL

## 2024-09-06 DIAGNOSIS — J31.0 RHINITIS, UNSPECIFIED TYPE: ICD-10-CM

## 2024-09-06 DIAGNOSIS — S22.009S: Primary | ICD-10-CM

## 2024-09-06 DIAGNOSIS — I10 HYPERTENSION, UNSPECIFIED TYPE: ICD-10-CM

## 2024-09-06 DIAGNOSIS — K58.9 IRRITABLE BOWEL SYNDROME, UNSPECIFIED TYPE: ICD-10-CM

## 2024-09-06 DIAGNOSIS — I10 ESSENTIAL (PRIMARY) HYPERTENSION: ICD-10-CM

## 2024-09-06 DIAGNOSIS — E22.2 SIADH (SYNDROME OF INAPPROPRIATE ADH PRODUCTION) (MULTI): ICD-10-CM

## 2024-09-06 DIAGNOSIS — Z79.4 TYPE 2 DIABETES MELLITUS WITHOUT COMPLICATION, WITH LONG-TERM CURRENT USE OF INSULIN (MULTI): ICD-10-CM

## 2024-09-06 DIAGNOSIS — E78.5 DYSLIPIDEMIA: ICD-10-CM

## 2024-09-06 DIAGNOSIS — E87.8 ELECTROLYTE DISORDER: ICD-10-CM

## 2024-09-06 DIAGNOSIS — M33.13 DERMATOMYOSITIS (MULTI): ICD-10-CM

## 2024-09-06 DIAGNOSIS — S22.49XS CLOSED FRACTURE OF MULTIPLE RIBS, UNSPECIFIED LATERALITY, SEQUELA: ICD-10-CM

## 2024-09-06 DIAGNOSIS — I50.9 HEART FAILURE, UNSPECIFIED: ICD-10-CM

## 2024-09-06 DIAGNOSIS — E03.8 SUBCLINICAL HYPOTHYROIDISM: ICD-10-CM

## 2024-09-06 DIAGNOSIS — E56.9 VITAMIN DEFICIENCY: ICD-10-CM

## 2024-09-06 DIAGNOSIS — N13.9 OBSTRUCTIVE UROPATHY: ICD-10-CM

## 2024-09-06 DIAGNOSIS — K21.9 GASTROESOPHAGEAL REFLUX DISEASE, UNSPECIFIED WHETHER ESOPHAGITIS PRESENT: ICD-10-CM

## 2024-09-06 DIAGNOSIS — E11.9 TYPE 2 DIABETES MELLITUS WITHOUT COMPLICATION, WITH LONG-TERM CURRENT USE OF INSULIN (MULTI): ICD-10-CM

## 2024-09-06 DIAGNOSIS — M81.0 OSTEOPOROSIS, UNSPECIFIED OSTEOPOROSIS TYPE, UNSPECIFIED PATHOLOGICAL FRACTURE PRESENCE: ICD-10-CM

## 2024-09-06 DIAGNOSIS — J02.9 PHARYNGITIS, UNSPECIFIED ETIOLOGY: ICD-10-CM

## 2024-09-06 LAB
ALBUMIN SERPL BCP-MCNC: 2.9 G/DL (ref 3.4–5)
ALP SERPL-CCNC: 140 U/L (ref 33–136)
ALT SERPL W P-5'-P-CCNC: 11 U/L (ref 7–45)
ANION GAP SERPL CALC-SCNC: 16 MMOL/L (ref 10–20)
AST SERPL W P-5'-P-CCNC: 16 U/L (ref 9–39)
BILIRUB SERPL-MCNC: 0.5 MG/DL (ref 0–1.2)
BNP SERPL-MCNC: 75 PG/ML (ref 0–99)
BUN SERPL-MCNC: 8 MG/DL (ref 6–23)
CALCIUM SERPL-MCNC: 8.2 MG/DL (ref 8.6–10.3)
CHLORIDE SERPL-SCNC: 95 MMOL/L (ref 98–107)
CO2 SERPL-SCNC: 23 MMOL/L (ref 21–32)
CREAT SERPL-MCNC: 0.37 MG/DL (ref 0.5–1.05)
EGFRCR SERPLBLD CKD-EPI 2021: >90 ML/MIN/1.73M*2
ERYTHROCYTE [DISTWIDTH] IN BLOOD BY AUTOMATED COUNT: 14.9 % (ref 11.5–14.5)
GLUCOSE SERPL-MCNC: 238 MG/DL (ref 74–99)
HCT VFR BLD AUTO: 36.7 % (ref 36–46)
HGB BLD-MCNC: 11.3 G/DL (ref 12–16)
MCH RBC QN AUTO: 29.5 PG (ref 26–34)
MCHC RBC AUTO-ENTMCNC: 30.8 G/DL (ref 32–36)
MCV RBC AUTO: 96 FL (ref 80–100)
NRBC BLD-RTO: 0 /100 WBCS (ref 0–0)
PLATELET # BLD AUTO: 550 X10*3/UL (ref 150–450)
POTASSIUM SERPL-SCNC: 4.3 MMOL/L (ref 3.5–5.3)
PROT SERPL-MCNC: 5.1 G/DL (ref 6.4–8.2)
RBC # BLD AUTO: 3.83 X10*6/UL (ref 4–5.2)
SODIUM SERPL-SCNC: 130 MMOL/L (ref 136–145)
WBC # BLD AUTO: 10.9 X10*3/UL (ref 4.4–11.3)

## 2024-09-06 PROCEDURE — 83880 ASSAY OF NATRIURETIC PEPTIDE: CPT | Mod: OUT | Performed by: FAMILY MEDICINE

## 2024-09-06 PROCEDURE — 84075 ASSAY ALKALINE PHOSPHATASE: CPT | Mod: OUT | Performed by: FAMILY MEDICINE

## 2024-09-06 PROCEDURE — 85027 COMPLETE CBC AUTOMATED: CPT | Mod: OUT | Performed by: FAMILY MEDICINE

## 2024-09-06 PROCEDURE — 36415 COLL VENOUS BLD VENIPUNCTURE: CPT | Mod: OUT | Performed by: FAMILY MEDICINE

## 2024-09-06 PROCEDURE — 99310 SBSQ NF CARE HIGH MDM 45: CPT | Performed by: NURSE PRACTITIONER

## 2024-09-08 NOTE — PROGRESS NOTES
*Provider Impression*    Patient is an 84 year old female who is seen today for management of multiple medical problems       #T8 vertebral body fracture / Rib fractures / Osteoporosis - PT/OT, TLSO, oxycodone 5mg q4h PRN, ibuprofen 600mg q6h PRN, lovenox 30mg BID, acetaminophen 975mg TID, lidocaine 4% BID, Tymlos 80mcg daily, f/u w/ neurosurgery 10/28  #HTN / HLD - atorvastatin 40mg QHS, metoprolol ER 25mg daily  #T2DM - linagliptin 5mg daily, metformin 500mg BID, insulin lispro SSI  #GERD / IBS - colace 100mg daily, pepcid 40mg QHS, miralax 17grams BID, calcium carbonate 500mg daily, senna-S 8.6/50mg QHS, imodium 2mg q6h PRN  #SIADH - NaCl 1gram TID, f/u w/ nephrology  #Subclinical hypothyroidism - repeat TSH, f/u w/ endocrinology in October  #Rhinitis / Pharyngitis - flonase 50mcg daily, guaifenesin 200mg q4h PRN, cepacol q2h PRN  #Obstructive uropathy - montana, tamsulosin 0.4mg daily, f/u w/ urology, plan for void trial  #Dermatomyositis - triamcinolone 0.05% BID PRN, hydrocortisone 2.5% TID, methotrexate 15mg weekly, prednisone 4mg BID  #Vitamin deficiency / Electrolyte disorder - folic acid 1mg daily, magnesium oxide 400mg daily, calcium+D 600mg/800units BID,   #Glaucoma - mgmt per ophthalmology - latanoprost, azopt  #Schizophrenia / Insomnia - mgmt per psych - haldol, melatonin, olanzapine, venlafaxine  #[facility]  Follow up as needed      *Chief Complaint*     T8 fracture    *History of Present Illness*    Patient is an 85 y/o female w/ PMH as below who was transferred to [facility] on 8/24 for further management of T8 VB fracture after sustaining a ground level fall at her SNF.  She was found to have subacute rib fractures as well due to fall at SNF the week prior to presentation as well. Neurosurgery consulted, a MRI T/L spines was recommended.  Her injury was deemed non-operative and a TLSO brace recommended. Incidentally she was noted to have hyponatremia to 120 (baseline 126-128), so was transferred to the  "TSICU on 8/28 for close monitoring and sodium levels improved gradually w/ hydration and salt tabs and was transferred out of ICU 9/1.  Geriatrics consulted to assist with management while in the hospital. Due to concerns for SIADH she was placed on 1L/day fluid restriction with salt tabs and recommended to follow up with nephrology 4-6 weeks after hospitalization. Was also found to have concerns for subclinical hypothyroidism.  Plan for PCP or endocrinology follow up in 4-6 weeks.  During her stay, she failed over 3 Goel catheter removals.  She was discharged with Goel and plan to to follow up with urology outpatient.   PT/OT worked with her during stay w/ plan for discharge to SNF.  She was determined to be medically ready for discharge from hospital at 9/5/24 with improvement in hyponatremia to 131. Flomax added per geriatric recommendations for urinary retention with plan to continue trial of voids in a few days at facility. She was d/c to [facility] on 9/5.    Labs appreciated as below.    She is seen sitting up in her room today and still pain in back up to \"11/10,\" at worst, 0/10 at best, oxy helps, no HA, dizziness, f/c, sweats, CP, SOB, cough, still having back spasms, no n/v, constipation, diarrhea, LUTS, no new neuropathy, no focal weakness, or any other new c/o presently.     Allergies - Dust, Mold  PMH - HFpEF, HTN, HLD, MI, aortic stenosis, T2DM, hyponatremia, dermatomyositis, schizoaffective disorder, GERD, anemia, depression, T8 fracture, uterine prolapse  PSH - Tonsillectomy, Rotator cuff repair, wrist surgery, uterine surgery  FH - none reported  SocHx - None reported, No EtOH    *Review of Systems*  All other systems reviewed are negative except as noted in the HPI     *Vital Signs*   Date: 9/6/24  - T: 98.9  P: 98  R: 22  BP: 79/42  SpO2: 97% on RA    *Results / Data*  CBC - Date: 9/6/24  WBC: 10.4  HGB: 11.3  HCT: 36.7  PLT: 550  ;   BMP - Date: 9/6/24  Na: 130  K: 4.3  Cl: 95  CO2: 23  " BUN: 8  Cr: 0.37  Glu: 238  Ca: 8.2  ;   LFT - Date: 9/6/24  AST: 16  ALT: 11  ALP: 140  Tbili: 0.5  ;   Coags - Date:   INR:   PT:  Other - Date: 9/6/24  BNP: 75    *Physical Exam*  Gen: (+) NAD, (+) well-appearing  HEENT: (+) normocephalic, (+) MMM  Neck: (+) supple  Lungs: (+) CTAB, (-) wheezes, (-) rales, (-) rhonchi  Heart: (+) RRR, (+) S1 S2, (-) murmurs  Pulses: (+) palpable  Abd: (+) soft, (+) NT, (+) ND, (+) BS+  Ext: (-) edema, (-) deformity  MSK: (-) joint swelling  Skin: (+) warm, (+) dry, (-) rash  Neuro: (+) follows commands, (-) tremor, (+) alert

## 2024-09-09 ENCOUNTER — NURSING HOME VISIT (OUTPATIENT)
Dept: POST ACUTE CARE | Facility: EXTERNAL LOCATION | Age: 84
End: 2024-09-09
Payer: COMMERCIAL

## 2024-09-09 DIAGNOSIS — E87.1 HYPONATREMIA: ICD-10-CM

## 2024-09-09 DIAGNOSIS — S22.069A CLOSED FRACTURE OF EIGHTH THORACIC VERTEBRA, UNSPECIFIED FRACTURE MORPHOLOGY, INITIAL ENCOUNTER (MULTI): ICD-10-CM

## 2024-09-09 PROCEDURE — 99305 1ST NF CARE MODERATE MDM 35: CPT | Performed by: FAMILY MEDICINE

## 2024-09-09 NOTE — LETTER
Patient: Jennifer Mancini  : 1940    Encounter Date: 2024    Jennifer Mancini is a 84 y.o. female with Chief Complaint of SNF ([facility]) H&P    Resident Seen 24 -- MP    CC: SNF ([facility]) H&P    : 1940  SNF H&P done 10/1/20, 24  DC Summary dated 24 reviewed 24  ALLERGY: Dust, Mold  DNR-CC    S: 83 yo woman with PD, HTN, DM, schizoaffective disorder. Admitted to SNF rehab after hospitalization for non-operable T8 vertebral fx.  No cp, sob. Med list & problem list reviewed.    O: VSS AFEB Wt 138# (down 9# from 2020)  Awake, alert, pleasantly confused, NAD. Chest cta. Heart rrr, 2/6 MADELYN. Ext no c/c/e. TLSO back brace on nightstand. HAHN.    LAB (24) Na 130, K 4.3, Gluc 238, Cr 0.37, Alb 2.9, Hgb 11.3    A/P:  # Weakness/fall: SNF PT/OT  # T8 compression fx: TLSO brace.  # Obstructive uropathy: HAHN  # SIADH: Na stable OFF FR. NaCL 1g daily.  # Osteoporosis; Tymlos injection  # Dermatomyositis: prednisone, MTX, Clobetasol.  # PD: OFF Sinemet, PT/OT.  # Hx Right Humerus Fx: healed. .  # Anemia: Iron  # HTN: ACEi  # Schizoaffective DO: lamictal, Zyprexa, Haldol, clonazepam.  # insomnia: tolerating trazodone 50 mg at hs.  # DM w hyperglycemia:metformin, Linagliptin, Humalog SSI, statin.      Past Surgical History:   Procedure Laterality Date   • CT ANGIO NECK  2019    CT NECK ANGIO W AND WO IV CONTRAST 2019 [facility] CLINICAL LEGACY   • OTHER SURGICAL HISTORY  2019    Tonsillectomy   • OTHER SURGICAL HISTORY  2019    Rotator cuff repair   • OTHER SURGICAL HISTORY  2019    Wrist surgery   • OTHER SURGICAL HISTORY  2019    Uterine surgery      Social History     Socioeconomic History   • Marital status:      Spouse name: Not on file   • Number of children: Not on file   • Years of education: Not on file   • Highest education level: Not on file   Occupational History   • Not on file   Tobacco Use   • Smoking status: Unknown   • Smokeless  tobacco: Not on file   Substance and Sexual Activity   • Alcohol use: Never   • Drug use: Never   • Sexual activity: Not on file   Other Topics Concern   • Not on file   Social History Narrative   • Not on file     Social Determinants of Health     Financial Resource Strain: Low Risk  (8/27/2024)    Overall Financial Resource Strain (CARDIA)    • Difficulty of Paying Living Expenses: Not hard at all   Food Insecurity: Not on file   Transportation Needs: No Transportation Needs (8/27/2024)    PRAPARE - Transportation    • Lack of Transportation (Medical): No    • Lack of Transportation (Non-Medical): No   Physical Activity: Not on file   Stress: Not on file   Social Connections: Not on file   Intimate Partner Violence: Not on file   Housing Stability: Low Risk  (8/27/2024)    Housing Stability Vital Sign    • Unable to Pay for Housing in the Last Year: No    • Number of Times Moved in the Last Year: 0    • Homeless in the Last Year: No     Past Medical History:   Diagnosis Date   • HTN (hypertension) 2/9/2024   • Hypertensive heart disease without heart failure 12/17/2019    Hypertensive heart disease without CHF   • Other nondisplaced fracture of seventh cervical vertebra, subsequent encounter for fracture with routine healing 12/17/2019    Other closed nondisplaced fracture of seventh cervical vertebra with routine healing, subsequent encounter   • Other nondisplaced fracture of sixth cervical vertebra, subsequent encounter for fracture with routine healing 12/17/2019    Other closed nondisplaced fracture of sixth cervical vertebra with routine healing, subsequent encounter   • Personal history of other diseases of the female genital tract 12/17/2019    History of uterine prolapse   • Personal history of other endocrine, nutritional and metabolic disease 12/17/2019    History of hyperlipidemia   • Personal history of other mental and behavioral disorders 06/14/2021    History of schizoaffective disorder   •  Personal history of other specified conditions 06/14/2021    History of chronic pain   • Schizoaffective disorder, bipolar type (Multi) 12/17/2019    Schizoaffective disorder, bipolar type   • Unspecified fracture of left femur, initial encounter for closed fracture (Multi) 06/14/2021    Fracture of left femur      Family History   Problem Relation Name Age of Onset   • No Known Problems Mother     • No Known Problems Father          Review of Systems   Constitutional:  Negative for chills, fatigue and fever.   HENT:  Negative for rhinorrhea and sore throat.    Eyes:  Negative for pain and redness.   Respiratory:  Negative for cough and shortness of breath.    Cardiovascular:  Negative for chest pain and palpitations.   Gastrointestinal:  Negative for abdominal pain and blood in stool.   Endocrine: Negative for polydipsia and polyuria.   Genitourinary:  Negative for dysuria and hematuria.   Musculoskeletal:  Negative for back pain and neck stiffness.   Skin:  Negative for rash and wound.   Allergic/Immunologic: Negative for environmental allergies and food allergies.   Neurological:  Positive for weakness. Negative for headaches.   Hematological:  Negative for adenopathy. Does not bruise/bleed easily.   Psychiatric/Behavioral:  Negative for hallucinations and suicidal ideas.       There were no vitals taken for this visit.  Physical Exam  Vitals reviewed.   Constitutional:       General: She is not in acute distress.     Appearance: She is not ill-appearing.   HENT:      Head: Normocephalic and atraumatic.      Right Ear: Tympanic membrane normal.      Left Ear: Tympanic membrane normal.      Nose: No congestion or rhinorrhea.      Mouth/Throat:      Pharynx: No oropharyngeal exudate or posterior oropharyngeal erythema.   Eyes:      Extraocular Movements: Extraocular movements intact.      Conjunctiva/sclera: Conjunctivae normal.      Pupils: Pupils are equal, round, and reactive to light.   Cardiovascular:       "Rate and Rhythm: Normal rate and regular rhythm.      Heart sounds: Murmur heard.      No friction rub. No gallop.   Pulmonary:      Effort: Pulmonary effort is normal.      Breath sounds: Normal breath sounds. No wheezing, rhonchi or rales.   Abdominal:      General: There is no distension.      Palpations: Abdomen is soft.      Tenderness: There is no abdominal tenderness. There is no guarding or rebound.   Genitourinary:     Comments: HAHN  Musculoskeletal:         General: No swelling or deformity.      Cervical back: Normal range of motion and neck supple.      Right lower leg: No edema.      Left lower leg: No edema.   Skin:     Capillary Refill: Capillary refill takes less than 2 seconds.      Coloration: Skin is not jaundiced.      Findings: No rash.   Neurological:      General: No focal deficit present.      Mental Status: She is alert.      Motor: Weakness present.   Psychiatric:         Mood and Affect: Mood normal.         Behavior: Behavior normal.       Lab Results   Component Value Date    WBC 10.9 09/06/2024    HGB 11.3 (L) 09/06/2024    HCT 36.7 09/06/2024    MCV 96 09/06/2024     (H) 09/06/2024     No results found for: \"CHOL\"  No results found for: \"HDL\"  No results found for: \"LDLCALC\"  No results found for: \"TRIG\"  No components found for: \"CHOLHDL\"  Lab Results   Component Value Date    HGBA1C 8.1 (H) 08/31/2024       Assessment/Plan  Problem List Items Addressed This Visit       Closed fracture of eighth thoracic vertebra, unspecified fracture morphology, initial encounter (Multi)    Hyponatremia       Electronically Signed By: Bo Batista MD   9/23/24  9:27 PM  "

## 2024-09-12 PROBLEM — E87.1 HYPONATREMIA: Status: ACTIVE | Noted: 2024-09-12

## 2024-09-12 NOTE — PROGRESS NOTES
Jennifer Mancini is a 84 y.o. female with Chief Complaint of SNF ([facility]) H&P    Resident Seen 24 -- MP    CC: SNF ([facility]) H&P    : 1940  SNF H&P done 10/1/20, 24  DC Summary dated 24 reviewed 24  ALLERGY: Dust, Mold  DNR-CC    S: 83 yo woman with PD, HTN, DM, schizoaffective disorder. Admitted to SNF rehab after hospitalization for non-operable T8 vertebral fx.  No cp, sob. Med list & problem list reviewed.    O: VSS AFEB Wt 138# (down 9# from 2020)  Awake, alert, pleasantly confused, NAD. Chest cta. Heart rrr,  MADELYN. Ext no c/c/e. TLSO back brace on nightstand. HAHN.    LAB (24) Na 130, K 4.3, Gluc 238, Cr 0.37, Alb 2.9, Hgb 11.3    A/P:  # Weakness/fall: SNF PT/OT  # T8 compression fx: TLSO brace.  # Obstructive uropathy: HAHN  # SIADH: Na stable OFF FR. NaCL 1g daily.  # Osteoporosis; Tymlos injection  # Dermatomyositis: prednisone, MTX, Clobetasol.  # PD: OFF Sinemet, PT/OT.  # Hx Right Humerus Fx: healed. .  # Anemia: Iron  # HTN: ACEi  # Schizoaffective DO: lamictal, Zyprexa, Haldol, clonazepam.  # insomnia: tolerating trazodone 50 mg at hs.  # DM w hyperglycemia:metformin, Linagliptin, Humalog SSI, statin.      Past Surgical History:   Procedure Laterality Date    CT ANGIO NECK  2019    CT NECK ANGIO W AND WO IV CONTRAST 2019 [facility] CLINICAL LEGACY    OTHER SURGICAL HISTORY  2019    Tonsillectomy    OTHER SURGICAL HISTORY  2019    Rotator cuff repair    OTHER SURGICAL HISTORY  2019    Wrist surgery    OTHER SURGICAL HISTORY  2019    Uterine surgery      Social History     Socioeconomic History    Marital status:      Spouse name: Not on file    Number of children: Not on file    Years of education: Not on file    Highest education level: Not on file   Occupational History    Not on file   Tobacco Use    Smoking status: Unknown    Smokeless tobacco: Not on file   Substance and Sexual Activity    Alcohol use: Never    Drug use:  Never    Sexual activity: Not on file   Other Topics Concern    Not on file   Social History Narrative    Not on file     Social Determinants of Health     Financial Resource Strain: Low Risk  (8/27/2024)    Overall Financial Resource Strain (CARDIA)     Difficulty of Paying Living Expenses: Not hard at all   Food Insecurity: Not on file   Transportation Needs: No Transportation Needs (8/27/2024)    PRAPARE - Transportation     Lack of Transportation (Medical): No     Lack of Transportation (Non-Medical): No   Physical Activity: Not on file   Stress: Not on file   Social Connections: Not on file   Intimate Partner Violence: Not on file   Housing Stability: Low Risk  (8/27/2024)    Housing Stability Vital Sign     Unable to Pay for Housing in the Last Year: No     Number of Times Moved in the Last Year: 0     Homeless in the Last Year: No     Past Medical History:   Diagnosis Date    HTN (hypertension) 2/9/2024    Hypertensive heart disease without heart failure 12/17/2019    Hypertensive heart disease without CHF    Other nondisplaced fracture of seventh cervical vertebra, subsequent encounter for fracture with routine healing 12/17/2019    Other closed nondisplaced fracture of seventh cervical vertebra with routine healing, subsequent encounter    Other nondisplaced fracture of sixth cervical vertebra, subsequent encounter for fracture with routine healing 12/17/2019    Other closed nondisplaced fracture of sixth cervical vertebra with routine healing, subsequent encounter    Personal history of other diseases of the female genital tract 12/17/2019    History of uterine prolapse    Personal history of other endocrine, nutritional and metabolic disease 12/17/2019    History of hyperlipidemia    Personal history of other mental and behavioral disorders 06/14/2021    History of schizoaffective disorder    Personal history of other specified conditions 06/14/2021    History of chronic pain    Schizoaffective disorder,  bipolar type (Multi) 12/17/2019    Schizoaffective disorder, bipolar type    Unspecified fracture of left femur, initial encounter for closed fracture (Multi) 06/14/2021    Fracture of left femur      Family History   Problem Relation Name Age of Onset    No Known Problems Mother      No Known Problems Father          Review of Systems   Constitutional:  Negative for chills, fatigue and fever.   HENT:  Negative for rhinorrhea and sore throat.    Eyes:  Negative for pain and redness.   Respiratory:  Negative for cough and shortness of breath.    Cardiovascular:  Negative for chest pain and palpitations.   Gastrointestinal:  Negative for abdominal pain and blood in stool.   Endocrine: Negative for polydipsia and polyuria.   Genitourinary:  Negative for dysuria and hematuria.   Musculoskeletal:  Negative for back pain and neck stiffness.   Skin:  Negative for rash and wound.   Allergic/Immunologic: Negative for environmental allergies and food allergies.   Neurological:  Positive for weakness. Negative for headaches.   Hematological:  Negative for adenopathy. Does not bruise/bleed easily.   Psychiatric/Behavioral:  Negative for hallucinations and suicidal ideas.       There were no vitals taken for this visit.  Physical Exam  Vitals reviewed.   Constitutional:       General: She is not in acute distress.     Appearance: She is not ill-appearing.   HENT:      Head: Normocephalic and atraumatic.      Right Ear: Tympanic membrane normal.      Left Ear: Tympanic membrane normal.      Nose: No congestion or rhinorrhea.      Mouth/Throat:      Pharynx: No oropharyngeal exudate or posterior oropharyngeal erythema.   Eyes:      Extraocular Movements: Extraocular movements intact.      Conjunctiva/sclera: Conjunctivae normal.      Pupils: Pupils are equal, round, and reactive to light.   Cardiovascular:      Rate and Rhythm: Normal rate and regular rhythm.      Heart sounds: Murmur heard.      No friction rub. No gallop.  "  Pulmonary:      Effort: Pulmonary effort is normal.      Breath sounds: Normal breath sounds. No wheezing, rhonchi or rales.   Abdominal:      General: There is no distension.      Palpations: Abdomen is soft.      Tenderness: There is no abdominal tenderness. There is no guarding or rebound.   Genitourinary:     Comments: HAHN  Musculoskeletal:         General: No swelling or deformity.      Cervical back: Normal range of motion and neck supple.      Right lower leg: No edema.      Left lower leg: No edema.   Skin:     Capillary Refill: Capillary refill takes less than 2 seconds.      Coloration: Skin is not jaundiced.      Findings: No rash.   Neurological:      General: No focal deficit present.      Mental Status: She is alert.      Motor: Weakness present.   Psychiatric:         Mood and Affect: Mood normal.         Behavior: Behavior normal.       Lab Results   Component Value Date    WBC 10.9 09/06/2024    HGB 11.3 (L) 09/06/2024    HCT 36.7 09/06/2024    MCV 96 09/06/2024     (H) 09/06/2024     No results found for: \"CHOL\"  No results found for: \"HDL\"  No results found for: \"LDLCALC\"  No results found for: \"TRIG\"  No components found for: \"CHOLHDL\"  Lab Results   Component Value Date    HGBA1C 8.1 (H) 08/31/2024       Assessment/Plan   Problem List Items Addressed This Visit       Closed fracture of eighth thoracic vertebra, unspecified fracture morphology, initial encounter (Multi)    Hyponatremia       "

## 2024-09-13 ENCOUNTER — NURSING HOME VISIT (OUTPATIENT)
Dept: POST ACUTE CARE | Facility: EXTERNAL LOCATION | Age: 84
End: 2024-09-13
Payer: COMMERCIAL

## 2024-09-13 DIAGNOSIS — J02.9 PHARYNGITIS, UNSPECIFIED ETIOLOGY: ICD-10-CM

## 2024-09-13 DIAGNOSIS — M33.13 DERMATOMYOSITIS (MULTI): ICD-10-CM

## 2024-09-13 DIAGNOSIS — I10 HYPERTENSION, UNSPECIFIED TYPE: ICD-10-CM

## 2024-09-13 DIAGNOSIS — J31.0 RHINITIS, UNSPECIFIED TYPE: ICD-10-CM

## 2024-09-13 DIAGNOSIS — E11.9 TYPE 2 DIABETES MELLITUS WITHOUT COMPLICATION, WITH LONG-TERM CURRENT USE OF INSULIN (MULTI): ICD-10-CM

## 2024-09-13 DIAGNOSIS — S22.009S: Primary | ICD-10-CM

## 2024-09-13 DIAGNOSIS — K21.9 GASTROESOPHAGEAL REFLUX DISEASE, UNSPECIFIED WHETHER ESOPHAGITIS PRESENT: ICD-10-CM

## 2024-09-13 DIAGNOSIS — N13.9 OBSTRUCTIVE UROPATHY: ICD-10-CM

## 2024-09-13 DIAGNOSIS — E78.5 DYSLIPIDEMIA: ICD-10-CM

## 2024-09-13 DIAGNOSIS — M81.0 OSTEOPOROSIS, UNSPECIFIED OSTEOPOROSIS TYPE, UNSPECIFIED PATHOLOGICAL FRACTURE PRESENCE: ICD-10-CM

## 2024-09-13 DIAGNOSIS — E22.2 SIADH (SYNDROME OF INAPPROPRIATE ADH PRODUCTION) (MULTI): ICD-10-CM

## 2024-09-13 DIAGNOSIS — S22.49XS CLOSED FRACTURE OF MULTIPLE RIBS, UNSPECIFIED LATERALITY, SEQUELA: ICD-10-CM

## 2024-09-13 DIAGNOSIS — Z79.4 TYPE 2 DIABETES MELLITUS WITHOUT COMPLICATION, WITH LONG-TERM CURRENT USE OF INSULIN (MULTI): ICD-10-CM

## 2024-09-13 DIAGNOSIS — K58.9 IRRITABLE BOWEL SYNDROME, UNSPECIFIED TYPE: ICD-10-CM

## 2024-09-13 DIAGNOSIS — E56.9 VITAMIN DEFICIENCY: ICD-10-CM

## 2024-09-13 DIAGNOSIS — E03.8 SUBCLINICAL HYPOTHYROIDISM: ICD-10-CM

## 2024-09-13 PROCEDURE — 99309 SBSQ NF CARE MODERATE MDM 30: CPT | Performed by: NURSE PRACTITIONER

## 2024-09-17 NOTE — PROGRESS NOTES
*Provider Impression*    Patient is an 84 year old female who is seen today for management of multiple medical problems       #T8 vertebral body fracture / Rib fractures / Osteoporosis - PT/OT, TLSO, oxycodone 5mg q4h PRN, ibuprofen 600mg q6h PRN, lovenox 30mg BID, acetaminophen 975mg TID, lidocaine 4% BID, Tymlos 80mcg daily, methocarbamol 500mg q6h PRN, f/u w/ neurosurgery 10/28  #HTN / HLD - atorvastatin 40mg QHS, metoprolol ER 25mg daily  #T2DM - linagliptin 5mg daily, metformin 500mg BID, insulin lispro SSI  #GERD / IBS - colace 100mg daily, pepcid 40mg QHS, miralax 17grams BID, calcium carbonate 500mg daily, senna-S 8.6/50mg QHS, imodium 2mg q6h PRN  #SIADH - NaCl 1gram TID, f/u w/ nephrology  #Subclinical hypothyroidism - repeat TSH, f/u w/ endocrinology in October  #Rhinitis / Pharyngitis - flonase 50mcg daily, guaifenesin 200mg q4h PRN, cepacol q2h PRN  #Obstructive uropathy - montnaa, tamsulosin 0.4mg daily, f/u w/ urology, plan for void trial  #Dermatomyositis - triamcinolone 0.05% BID PRN, hydrocortisone 2.5% TID, methotrexate 15mg weekly, prednisone 4mg BID  #Vitamin deficiency / Electrolyte disorder - folic acid 1mg daily, magnesium oxide 400mg daily, calcium+D 600mg/800units BID,   #Glaucoma - mgmt per ophthalmology - latanoprost, azopt  #Schizophrenia / Insomnia - mgmt per psych - haldol, melatonin, olanzapine, venlafaxine  #ACP - DNC  Follow up as needed      *Chief Complaint*     T8 fracture    *History of Present Illness*    Patient is an 85 y/o female w/ PMH as below who was transferred to [facility] on 8/24 for further management of T8 VB fracture after sustaining a ground level fall at her SNF.  She was found to have subacute rib fractures as well due to fall at SNF the week prior to presentation as well. Neurosurgery consulted, a MRI T/L spines was recommended.  Her injury was deemed non-operative and a TLSO brace recommended. Incidentally she was noted to have hyponatremia to 120 (baseline  126-128), so was transferred to the TSICU on 8/28 for close monitoring and sodium levels improved gradually w/ hydration and salt tabs and was transferred out of ICU 9/1.  Geriatrics consulted to assist with management while in the hospital. Due to concerns for SIADH she was placed on 1L/day fluid restriction with salt tabs and recommended to follow up with nephrology 4-6 weeks after hospitalization. Was also found to have concerns for subclinical hypothyroidism.  Plan for PCP or endocrinology follow up in 4-6 weeks.  During her stay, she failed over 3 Goel catheter removals.  She was discharged with Goel and plan to to follow up with urology outpatient.   PT/OT worked with her during stay w/ plan for discharge to SNF.  She was determined to be medically ready for discharge from hospital at 9/5/24 with improvement in hyponatremia to 131. Flomax added per geriatric recommendations for urinary retention with plan to continue trial of voids in a few days at facility. She was d/c to [facility] on 9/5.    Robaxin added.    She is seen lying in bed today and reports she is still having a lot of pain, muscle spasms, the methocarbamol helps, no CP, no new SOB, cough, n/v, constipation, dairrhea, LUTS, or any other new c/o presently.     Allergies - Dust, Mold  PMH - HFpEF, HTN, HLD, MI, aortic stenosis, T2DM, hyponatremia, dermatomyositis, schizoaffective disorder, GERD, anemia, depression, T8 fracture, uterine prolapse  PSH - Tonsillectomy, Rotator cuff repair, wrist surgery, uterine surgery  FH - none reported  SocHx - None reported, No EtOH    *Review of Systems*  All other systems reviewed are negative except as noted in the HPI     *Vital Signs*   Date: 9/13/24  - T: 97.1  P: 102  R: 18  BP: 111/74  SpO2: 97% on RA ; Date: 9/12/24  Wt: 138.9    *Results / Data*  CBC - Date: 9/6/24  WBC: 10.4  HGB: 11.3  HCT: 36.7  PLT: 550  ;   BMP - Date: 9/6/24  Na: 130  K: 4.3  Cl: 95  CO2: 23  BUN: 8  Cr: 0.37  Glu: 238  Ca:  8.2  ;   LFT - Date: 9/6/24  AST: 16  ALT: 11  ALP: 140  Tbili: 0.5  ;   Coags - Date:   INR:   PT:  Other - Date: 9/6/24  BNP: 75    *Physical Exam*  Gen: (+) NAD, (+) well-appearing  HEENT: (+) normocephalic, (+) MMM  Neck: (+) supple  Lungs: (+) CTAB, (-) wheezes, (-) rales, (-) rhonchi  Heart: (+) RRR, (+) S1 S2, (-) murmurs  Pulses: (+) palpable  Abd: (+) soft, (+) NT, (+) ND, (+) BS+  Ext: (-) edema, (-) deformity  MSK: (-) joint swelling  Skin: (+) warm, (+) dry, (-) rash  Neuro: (+) follows commands, (-) tremor, (+) alert

## 2024-09-18 ENCOUNTER — NURSING HOME VISIT (OUTPATIENT)
Dept: POST ACUTE CARE | Facility: EXTERNAL LOCATION | Age: 84
End: 2024-09-18
Payer: COMMERCIAL

## 2024-09-18 DIAGNOSIS — J02.9 PHARYNGITIS, UNSPECIFIED ETIOLOGY: ICD-10-CM

## 2024-09-18 DIAGNOSIS — E11.9 TYPE 2 DIABETES MELLITUS WITHOUT COMPLICATION, WITH LONG-TERM CURRENT USE OF INSULIN (MULTI): ICD-10-CM

## 2024-09-18 DIAGNOSIS — K58.9 IRRITABLE BOWEL SYNDROME, UNSPECIFIED TYPE: ICD-10-CM

## 2024-09-18 DIAGNOSIS — M81.0 OSTEOPOROSIS, UNSPECIFIED OSTEOPOROSIS TYPE, UNSPECIFIED PATHOLOGICAL FRACTURE PRESENCE: ICD-10-CM

## 2024-09-18 DIAGNOSIS — E78.5 DYSLIPIDEMIA: ICD-10-CM

## 2024-09-18 DIAGNOSIS — E87.8 ELECTROLYTE DISORDER: ICD-10-CM

## 2024-09-18 DIAGNOSIS — M33.13 DERMATOMYOSITIS (MULTI): ICD-10-CM

## 2024-09-18 DIAGNOSIS — S22.49XS CLOSED FRACTURE OF MULTIPLE RIBS, UNSPECIFIED LATERALITY, SEQUELA: ICD-10-CM

## 2024-09-18 DIAGNOSIS — E03.8 SUBCLINICAL HYPOTHYROIDISM: ICD-10-CM

## 2024-09-18 DIAGNOSIS — Z79.4 TYPE 2 DIABETES MELLITUS WITHOUT COMPLICATION, WITH LONG-TERM CURRENT USE OF INSULIN (MULTI): ICD-10-CM

## 2024-09-18 DIAGNOSIS — N13.9 OBSTRUCTIVE UROPATHY: ICD-10-CM

## 2024-09-18 DIAGNOSIS — S22.009S: Primary | ICD-10-CM

## 2024-09-18 DIAGNOSIS — I10 HYPERTENSION, UNSPECIFIED TYPE: ICD-10-CM

## 2024-09-18 DIAGNOSIS — E56.9 VITAMIN DEFICIENCY: ICD-10-CM

## 2024-09-18 DIAGNOSIS — K21.9 GASTROESOPHAGEAL REFLUX DISEASE, UNSPECIFIED WHETHER ESOPHAGITIS PRESENT: ICD-10-CM

## 2024-09-18 DIAGNOSIS — J31.0 RHINITIS, UNSPECIFIED TYPE: ICD-10-CM

## 2024-09-18 DIAGNOSIS — E22.2 SIADH (SYNDROME OF INAPPROPRIATE ADH PRODUCTION) (MULTI): ICD-10-CM

## 2024-09-18 PROCEDURE — 99309 SBSQ NF CARE MODERATE MDM 30: CPT | Performed by: NURSE PRACTITIONER

## 2024-09-19 NOTE — PROGRESS NOTES
*Provider Impression*    Patient is an 84 year old female who is seen today for management of multiple medical problems       #T8 vertebral body fracture / Rib fractures / Osteoporosis - PT/OT, TLSO, oxycodone 5mg q4h PRN for pain 4-6, oxycodone 10mg q6h PRN for pain 7-10, ibuprofen 600mg q6h PRN, lovenox 30mg BID, acetaminophen 975mg TID, lidocaine 4% BID, Tymlos 80mcg daily, calcium+D 600mg/800units BID, methocarbamol 750mg BID and 500mg q6h PRN, Assess and document pain q 4 hours and medicate for pain per parameters (4-6= 5mg, 7-10 = mg), f/u w/ neurosurgery 10/28  #HTN / HLD - atorvastatin 40mg QHS, metoprolol ER 25mg daily  #T2DM - linagliptin 5mg daily, metformin 500mg BID, insulin lispro SSI  #GERD / IBS - colace 100mg daily, pepcid 40mg QHS, miralax 17grams BID, calcium carbonate 500mg daily, senna-S 8.6/50mg QHS, imodium 2mg q6h PRN  #SIADH - NaCl 1gram TID, f/u w/ nephrology  #Subclinical hypothyroidism - repeat TSH, f/u w/ endocrinology in October  #Rhinitis / Pharyngitis - flonase 50mcg daily, guaifenesin 200mg q4h PRN, cepacol q2h PRN  #Obstructive uropathy - [place], tamsulosin 0.4mg daily, f/u w/ urology, plan for void trial  #Dermatomyositis - triamcinolone 0.05% BID PRN, hydrocortisone 2.5% TID, methotrexate 15mg weekly, prednisone 4mg BID  #Vitamin deficiency / Electrolyte disorder - folic acid 1mg daily, magnesium oxide 400mg daily,   #Glaucoma - mgmt per ophthalmology - latanoprost, azopt  #Schizophrenia / Insomnia - mgmt per psych - haldol, melatonin, olanzapine, venlafaxine  #[facility]  Follow up as needed      *Chief Complaint*     T8 fracture    *History of Present Illness*    Patient is an 83 y/o female w/ PMH as below who was transferred to [facility] on 8/24 for further management of T8 VB fracture after sustaining a ground level fall at her SNF.  She was found to have subacute rib fractures as well due to fall at SNF the week prior to presentation as well. Neurosurgery consulted, a MRI T/L  spines was recommended.  Her injury was deemed non-operative and a TLSO brace recommended. Incidentally she was noted to have hyponatremia to 120 (baseline 126-128), so was transferred to the TSICU on 8/28 for close monitoring and sodium levels improved gradually w/ hydration and salt tabs and was transferred out of ICU 9/1.  Geriatrics consulted to assist with management while in the hospital. Due to concerns for SIADH she was placed on 1L/day fluid restriction with salt tabs and recommended to follow up with nephrology 4-6 weeks after hospitalization. Was also found to have concerns for subclinical hypothyroidism.  Plan for PCP or endocrinology follow up in 4-6 weeks.  During her stay, she failed over 3 Goel catheter removals.  She was discharged with Goel and plan to to follow up with urology outpatient.   PT/OT worked with her during stay w/ plan for discharge to SNF.  She was determined to be medically ready for discharge from hospital at 9/5/24 with improvement in hyponatremia to 131. Flomax added per geriatric recommendations for urinary retention with plan to continue trial of voids in a few days at facility. She was d/c to [facility] on 9/5.    Her oxy had been increased over the weekend. Nurse contacted me yesterday asking for more scheduled meds. Upon review of her chart she had only been utilized less than 10% of her available PRN opioids. I ordered to add routine methocarbamol 750mg BID and assess and document pain q 4 hours and medicate for pain per parameters (4-6= 5mg, 7-10 = mg).     She is seen sitting up in her room today and reports she is making slow but sure progress, wearing the brace, tried standing up but wasn't able to stand for long, no numbness, tingling, paresthesias, no bowel or bladder incontinence, constipation, diarrhea, pain is up 9/10, down to 4/10, counseled on utilization of PRNs, no cough, CP, SOB, just shallow breathing d/t rib pain. No other new c/o presently.      Allergies - Dust, Mold  PMH - HFpEF, HTN, HLD, MI, aortic stenosis, T2DM, hyponatremia, dermatomyositis, schizoaffective disorder, GERD, anemia, depression, T8 fracture, uterine prolapse  PSH - Tonsillectomy, Rotator cuff repair, wrist surgery, uterine surgery  FH - none reported  SocHx - None reported, No EtOH    *Review of Systems*  All other systems reviewed are negative except as noted in the HPI     *Vital Signs*   Date: 9/17/24  - T: 98.0  P: 90  R: 18  BP: 143/87  SpO2: 94% on RA ; Date: 9/12/24  Wt: 138.9    *Results / Data*  CBC - Date: 9/6/24  WBC: 10.4  HGB: 11.3  HCT: 36.7  PLT: 550  ;   BMP - Date: 9/6/24  Na: 130  K: 4.3  Cl: 95  CO2: 23  BUN: 8  Cr: 0.37  Glu: 238  Ca: 8.2  ;   LFT - Date: 9/6/24  AST: 16  ALT: 11  ALP: 140  Tbili: 0.5  ;   Coags - Date:   INR:   PT:  Other - Date: 9/6/24  BNP: 75    *Physical Exam*  Gen: (+) NAD, (+) well-appearing  HEENT: (+) normocephalic, (+) MMM  Neck: (+) supple  Lungs: (+) CTAB, (-) wheezes, (-) rales, (-) rhonchi  Heart: (+) RRR, (+) S1 S2, (-) murmurs  Pulses: (+) palpable  Abd: (+) soft, (+) NT, (+) ND, (+) BS+  Ext: (-) edema, (-) deformity  MSK: (-) joint swelling  Skin: (+) warm, (+) dry, (-) rash  Neuro: (+) follows commands, (-) tremor, (+) alert

## 2024-09-23 ENCOUNTER — NURSING HOME VISIT (OUTPATIENT)
Dept: POST ACUTE CARE | Facility: EXTERNAL LOCATION | Age: 84
End: 2024-09-23
Payer: COMMERCIAL

## 2024-09-23 DIAGNOSIS — E22.2 SIADH (SYNDROME OF INAPPROPRIATE ADH PRODUCTION) (MULTI): ICD-10-CM

## 2024-09-23 DIAGNOSIS — E11.9 TYPE 2 DIABETES MELLITUS WITHOUT COMPLICATION, UNSPECIFIED WHETHER LONG TERM INSULIN USE (MULTI): ICD-10-CM

## 2024-09-23 PROCEDURE — 99308 SBSQ NF CARE LOW MDM 20: CPT | Performed by: FAMILY MEDICINE

## 2024-09-23 ASSESSMENT — ENCOUNTER SYMPTOMS
BLOOD IN STOOL: 0
RHINORRHEA: 0
EYE REDNESS: 0
HALLUCINATIONS: 0
WOUND: 0
EYE PAIN: 0
CHILLS: 0
FEVER: 0
PALPITATIONS: 0
COUGH: 0
DYSURIA: 0
SHORTNESS OF BREATH: 0
HEMATURIA: 0
POLYDIPSIA: 0
BACK PAIN: 0
HEADACHES: 0
FATIGUE: 0
ADENOPATHY: 0
SORE THROAT: 0
BRUISES/BLEEDS EASILY: 0
WEAKNESS: 1
NECK STIFFNESS: 0
ABDOMINAL PAIN: 0

## 2024-09-23 NOTE — LETTER
Patient: Jennifer Mancini  : 1940    Encounter Date: 2024    Resident Seen 24 -- MP    CC: SNF ([facility]) Recheck    : 1940  SNF H&P done 10/1/20, 24  DC Summary dated 24 reviewed 24  ALLERGY: Dust, Mold  DNR-CC    S: 83 yo woman with PD, HTN, DM, schizoaffective disorder. Admitted to SNF rehab after hospitalization for non-operable T8 vertebral fx.  No cp, sob. Med list & problem list reviewed.    O: VSS AFEB Wt 141# (up 3#)  Awake, alert, pleasantly confused, NAD. Chest cta. Heart rrr, 2/6 MADELYN. Ext no c/c/e. TLSO back brace on nightstand. HAHN.    LAB (24) Na 130, K 4.3, Gluc 238, Cr 0.37, Alb 2.9, Hgb 11.3    A/P:  # Weakness/fall: Continue SNF PT/OT  # T8 compression fx: TLSO brace.  # Obstructive uropathy: HAHN  # SIADH: Na stable OFF FR. NaCL 1g daily.  # Osteoporosis; Tymlos injection  # Dermatomyositis: prednisone, MTX, Clobetasol.  # PD: OFF Sinemet, PT/OT.  # Hx Right Humerus Fx: healed. .  # Anemia: Iron  # HTN: ACEi  # Schizoaffective DO: lamictal, Zyprexa, Haldol, clonazepam.  # insomnia: tolerating trazodone 50 mg at hs.  # DM: metformin, Linagliptin, Humalog SSI, statin.      Electronically Signed By: Bo Batista MD   24  4:18 PM

## 2024-09-24 PROBLEM — E22.2 SIADH (SYNDROME OF INAPPROPRIATE ADH PRODUCTION) (MULTI): Status: ACTIVE | Noted: 2024-09-24

## 2024-09-24 NOTE — PROGRESS NOTES
Resident Seen 24 -- MP    CC: SNF ([facility]) Recheck    : 1940  SNF H&P done 10/1/20, 24  DC Summary dated 24 reviewed 24  ALLERGY: Dust, Mold  DNR-CC    S: 85 yo woman with PD, HTN, DM, schizoaffective disorder. Admitted to SNF rehab after hospitalization for non-operable T8 vertebral fx.  No cp, sob. Med list & problem list reviewed.    O: VSS AFEB Wt 141# (up 3#)  Awake, alert, pleasantly confused, NAD. Chest cta. Heart rrr, 2/6 MADELYN. Ext no c/c/e. TLSO back brace on . HAHN.    LAB (24) Na 130, K 4.3, Gluc 238, Cr 0.37, Alb 2.9, Hgb 11.3    A/P:  # Weakness/fall: Continue SNF PT/OT  # T8 compression fx: TLSO brace.  # Obstructive uropathy: HAHN  # SIADH: Na stable OFF FR. NaCL 1g daily.  # Osteoporosis; Tymlos injection  # Dermatomyositis: prednisone, MTX, Clobetasol.  # PD: OFF Sinemet, PT/OT.  # Hx Right Humerus Fx: healed. .  # Anemia: Iron  # HTN: ACEi  # Schizoaffective DO: lamictal, Zyprexa, Haldol, clonazepam.  # insomnia: tolerating trazodone 50 mg at hs.  # DM: metformin, Linagliptin, Humalog SSI, statin.

## 2024-09-25 ENCOUNTER — NURSING HOME VISIT (OUTPATIENT)
Dept: POST ACUTE CARE | Facility: EXTERNAL LOCATION | Age: 84
End: 2024-09-25
Payer: COMMERCIAL

## 2024-09-25 DIAGNOSIS — E22.2 SIADH (SYNDROME OF INAPPROPRIATE ADH PRODUCTION) (MULTI): ICD-10-CM

## 2024-09-25 DIAGNOSIS — K21.9 GASTROESOPHAGEAL REFLUX DISEASE, UNSPECIFIED WHETHER ESOPHAGITIS PRESENT: ICD-10-CM

## 2024-09-25 DIAGNOSIS — I10 HYPERTENSION, UNSPECIFIED TYPE: ICD-10-CM

## 2024-09-25 DIAGNOSIS — E03.8 SUBCLINICAL HYPOTHYROIDISM: ICD-10-CM

## 2024-09-25 DIAGNOSIS — M33.13 DERMATOMYOSITIS (MULTI): ICD-10-CM

## 2024-09-25 DIAGNOSIS — K58.9 IRRITABLE BOWEL SYNDROME, UNSPECIFIED TYPE: ICD-10-CM

## 2024-09-25 DIAGNOSIS — J02.9 PHARYNGITIS, UNSPECIFIED ETIOLOGY: ICD-10-CM

## 2024-09-25 DIAGNOSIS — J31.0 RHINITIS, UNSPECIFIED TYPE: ICD-10-CM

## 2024-09-25 DIAGNOSIS — M81.0 OSTEOPOROSIS, UNSPECIFIED OSTEOPOROSIS TYPE, UNSPECIFIED PATHOLOGICAL FRACTURE PRESENCE: ICD-10-CM

## 2024-09-25 DIAGNOSIS — E78.5 DYSLIPIDEMIA: ICD-10-CM

## 2024-09-25 DIAGNOSIS — S22.009S: Primary | ICD-10-CM

## 2024-09-25 DIAGNOSIS — S22.49XS CLOSED FRACTURE OF MULTIPLE RIBS, UNSPECIFIED LATERALITY, SEQUELA: ICD-10-CM

## 2024-09-25 DIAGNOSIS — Z79.4 TYPE 2 DIABETES MELLITUS WITHOUT COMPLICATION, WITH LONG-TERM CURRENT USE OF INSULIN (MULTI): ICD-10-CM

## 2024-09-25 DIAGNOSIS — J40 BRONCHITIS: ICD-10-CM

## 2024-09-25 DIAGNOSIS — N13.9 OBSTRUCTIVE UROPATHY: ICD-10-CM

## 2024-09-25 DIAGNOSIS — E11.9 TYPE 2 DIABETES MELLITUS WITHOUT COMPLICATION, WITH LONG-TERM CURRENT USE OF INSULIN (MULTI): ICD-10-CM

## 2024-09-25 PROCEDURE — 99309 SBSQ NF CARE MODERATE MDM 30: CPT | Performed by: NURSE PRACTITIONER

## 2024-10-01 NOTE — PROGRESS NOTES
*Provider Impression*    Patient is an 84 year old female who is seen today for management of multiple medical problems       #T8 vertebral body fracture / Rib fractures / Osteoporosis - PT/OT, TLSO, oxycodone 5mg q4h PRN for pain 4-6, oxycodone 10mg q6h PRN for pain 7-10, ibuprofen 600mg q6h PRN, lovenox 30mg BID, acetaminophen 975mg TID, lidocaine 4% BID, Tymlos 80mcg daily, calcium+D 600mg/800units BID, methocarbamol 750mg BID and 500mg q6h PRN, Assess and document pain q 4 hours and medicate for pain per parameters (4-6= 5mg, 7-10 = mg), f/u w/ neurosurgery 10/28  #HTN / HLD - atorvastatin 40mg QHS, metoprolol ER 25mg daily  #Bronchitis - albuterol 2 puff q4h PRN, Robitussin DM 200mg/20mg/10mL q4h PRN  #T2DM - linagliptin 5mg daily, metformin 500mg BID, insulin lispro SSI  #GERD / IBS - colace 100mg daily, pepcid 40mg QHS, miralax 17grams BID, calcium carbonate 500mg daily, imodium 2mg q6h PRN, increase sennokot-S 8.6/50mg 2 tabs QHS  #SIADH - NaCl 1gram TID, f/u w/ nephrology  #Subclinical hypothyroidism - repeat TSH, f/u w/ endocrinology in October  #Rhinitis / Pharyngitis - flonase 50mcg daily, guaifenesin 200mg q4h PRN, cepacol q2h PRN  #Obstructive uropathy - [place], tamsulosin 0.4mg daily, f/u w/ urology, plan for void trial  #Dermatomyositis - triamcinolone 0.05% BID PRN, hydrocortisone 2.5% TID, methotrexate 15mg weekly, prednisone 4mg BID  #Vitamin deficiency / Electrolyte disorder - folic acid 1mg daily, magnesium oxide 400mg daily,   #Glaucoma - mgmt per ophthalmology - latanoprost, azopt  #Schizophrenia / Insomnia - mgmt per psych - haldol, melatonin, olanzapine, venlafaxine  #[facility]  Follow up as needed      *Chief Complaint*     T8 fracture    *History of Present Illness*    Patient is an 83 y/o female w/ PMH as below who was transferred to [facility] on 8/24 for further management of T8 VB fracture after sustaining a ground level fall at her SNF.  She was found to have subacute rib fractures as  well due to fall at SNF the week prior to presentation as well. Neurosurgery consulted, a MRI T/L spines was recommended.  Her injury was deemed non-operative and a TLSO brace recommended. Incidentally she was noted to have hyponatremia to 120 (baseline 126-128), so was transferred to the TSICU on 8/28 for close monitoring and sodium levels improved gradually w/ hydration and salt tabs and was transferred out of ICU 9/1.  Geriatrics consulted to assist with management while in the hospital. Due to concerns for SIADH she was placed on 1L/day fluid restriction with salt tabs and recommended to follow up with nephrology 4-6 weeks after hospitalization. Was also found to have concerns for subclinical hypothyroidism.  Plan for PCP or endocrinology follow up in 4-6 weeks.  During her stay, she failed over 3 Goel catheter removals.  She was discharged with Goel and plan to to follow up with urology outpatient.   PT/OT worked with her during stay w/ plan for discharge to SNF.  She was determined to be medically ready for discharge from hospital at 9/5/24 with improvement in hyponatremia to 131. Flomax added per geriatric recommendations for urinary retention with plan to continue trial of voids in a few days at facility. She was d/c to [facility] on 9/5.    She is seen sitting up in her room today and reports therapy going well, still pain in back and R side, some relief w/ oxycodone, pain is 4/10 at best, 10/10 at worst, no f/c, sweats, CP, SOB, cough, n/v, constipation, diarrhea, LBM today, but before that it was 3 days ago; no other new c/o presently.     She does report there has been trouble with her brace not fitting. D/w nursing supervisor.     Allergies - Dust, Mold  PMH - HFpEF, HTN, HLD, MI, aortic stenosis, T2DM, hyponatremia, dermatomyositis, schizoaffective disorder, GERD, anemia, depression, T8 fracture, uterine prolapse  PSH - Tonsillectomy, Rotator cuff repair, wrist surgery, uterine surgery  FH - none  reported  SocHx - None reported, No EtOH    *Review of Systems*  All other systems reviewed are negative except as noted in the HPI     *Vital Signs*   Date: 9/25/24  - T: 97.4  P: 97  R: 18  BP: 105/66  SpO2: 96% on RA ; Date: 9/25/24  Wt: 138.6    *Results / Data*  CBC - Date: 9/6/24  WBC: 10.4  HGB: 11.3  HCT: 36.7  PLT: 550  ;   BMP - Date: 9/6/24  Na: 130  K: 4.3  Cl: 95  CO2: 23  BUN: 8  Cr: 0.37  Glu: 238  Ca: 8.2  ;   LFT - Date: 9/6/24  AST: 16  ALT: 11  ALP: 140  Tbili: 0.5  ;   Coags - Date:   INR:   PT:  Other - Date: 9/6/24  BNP: 75    *Physical Exam*  Gen: (+) NAD, (+) well-appearing  HEENT: (+) normocephalic, (+) MMM  Neck: (+) supple  Lungs: (+) CTAB, (-) wheezes, (-) rales, (-) rhonchi  Heart: (+) RRR, (+) S1 S2, (-) murmurs  Pulses: (+) palpable  Abd: (+) soft, (+) NT, (+) ND, (+) BS+  Ext: (-) edema, (-) deformity  MSK: (-) joint swelling  Skin: (+) warm, (+) dry, (-) rash  Neuro: (+) follows commands, (-) tremor, (+) alert

## 2024-10-02 ENCOUNTER — NURSING HOME VISIT (OUTPATIENT)
Dept: POST ACUTE CARE | Facility: EXTERNAL LOCATION | Age: 84
End: 2024-10-02
Payer: COMMERCIAL

## 2024-10-02 DIAGNOSIS — I10 HYPERTENSION, UNSPECIFIED TYPE: ICD-10-CM

## 2024-10-02 DIAGNOSIS — E03.8 SUBCLINICAL HYPOTHYROIDISM: ICD-10-CM

## 2024-10-02 DIAGNOSIS — K58.9 IRRITABLE BOWEL SYNDROME, UNSPECIFIED TYPE: ICD-10-CM

## 2024-10-02 DIAGNOSIS — E78.5 DYSLIPIDEMIA: ICD-10-CM

## 2024-10-02 DIAGNOSIS — M81.0 OSTEOPOROSIS, UNSPECIFIED OSTEOPOROSIS TYPE, UNSPECIFIED PATHOLOGICAL FRACTURE PRESENCE: ICD-10-CM

## 2024-10-02 DIAGNOSIS — Z79.4 TYPE 2 DIABETES MELLITUS WITHOUT COMPLICATION, WITH LONG-TERM CURRENT USE OF INSULIN (MULTI): ICD-10-CM

## 2024-10-02 DIAGNOSIS — E22.2 SIADH (SYNDROME OF INAPPROPRIATE ADH PRODUCTION) (MULTI): ICD-10-CM

## 2024-10-02 DIAGNOSIS — K21.9 GASTROESOPHAGEAL REFLUX DISEASE, UNSPECIFIED WHETHER ESOPHAGITIS PRESENT: ICD-10-CM

## 2024-10-02 DIAGNOSIS — J02.9 PHARYNGITIS, UNSPECIFIED ETIOLOGY: ICD-10-CM

## 2024-10-02 DIAGNOSIS — J31.0 RHINITIS, UNSPECIFIED TYPE: ICD-10-CM

## 2024-10-02 DIAGNOSIS — E11.9 TYPE 2 DIABETES MELLITUS WITHOUT COMPLICATION, WITH LONG-TERM CURRENT USE OF INSULIN (MULTI): ICD-10-CM

## 2024-10-02 DIAGNOSIS — S22.009S: Primary | ICD-10-CM

## 2024-10-02 DIAGNOSIS — J40 BRONCHITIS: ICD-10-CM

## 2024-10-02 DIAGNOSIS — N13.9 OBSTRUCTIVE UROPATHY: ICD-10-CM

## 2024-10-02 DIAGNOSIS — S22.49XS CLOSED FRACTURE OF MULTIPLE RIBS, UNSPECIFIED LATERALITY, SEQUELA: ICD-10-CM

## 2024-10-02 PROCEDURE — 99309 SBSQ NF CARE MODERATE MDM 30: CPT | Performed by: NURSE PRACTITIONER

## 2024-10-03 ENCOUNTER — NURSING HOME VISIT (OUTPATIENT)
Dept: POST ACUTE CARE | Facility: EXTERNAL LOCATION | Age: 84
End: 2024-10-03
Payer: COMMERCIAL

## 2024-10-03 DIAGNOSIS — I10 HYPERTENSION, UNSPECIFIED TYPE: ICD-10-CM

## 2024-10-03 DIAGNOSIS — G20.A1 PARKINSON'S DISEASE, UNSPECIFIED WHETHER DYSKINESIA PRESENT, UNSPECIFIED WHETHER MANIFESTATIONS FLUCTUATE: ICD-10-CM

## 2024-10-03 PROCEDURE — 99309 SBSQ NF CARE MODERATE MDM 30: CPT | Performed by: FAMILY MEDICINE

## 2024-10-03 NOTE — LETTER
Patient: Jennifer Mancini  : 1940    Encounter Date: 10/03/2024    Resident Seen 10/3/24 -- MP    CC: SNF ([facility]) Recheck    : 1940  SNF H&P done 10/1/20, 24  DC Summary dated 24 reviewed 24  ALLERGY: Dust, Mold  DNR-CC    S: 85 yo woman with PD, HTN, DM, schizoaffective disorder. Admitted to SNF rehab after hospitalization for non-operable T8 vertebral fx.  No cp, sob. Med list & problem list reviewed.    O: VSS AFEB Wt 142# (up 1#) Awake, alert, pleasantly confused, NAD. Chest cta. Heart rrr, /6 MADELYN. Ext no c/c/e. TLSO back brace on nightstand. HAHN.    LAB (24) Na 130, K 4.3, Gluc 238, Cr 0.37, Alb 2.9, Hgb 11.3    A/P:  # PD: OFF Sinemet, completed PT/OT. LTC.  # T8 compression fx: TLSO brace.  # Obstructive uropathy: HAHN  # SIADH: Na stable OFF FR. NaCL 1g daily.  # Osteoporosis; Tymlos injection  # Dermatomyositis: prednisone, MTX, Clobetasol.  # Hx Right Humerus Fx: healed. .  # Anemia: Iron  # HTN: ACEi  # Schizoaffective DO: lamictal, Zyprexa, Haldol, clonazepam.  # insomnia: tolerating trazodone 50 mg at hs.  # DM: metformin, Linagliptin, Humalog SSI, statin.      Electronically Signed By: Bo Batista MD   10/30/24  9:36 PM

## 2024-10-06 NOTE — PROGRESS NOTES
*Provider Impression*    Patient is an 84 year old female who is seen today for management of multiple medical problems       #T8 vertebral body fracture / Rib fractures / Osteoporosis - PT/OT, TLSO, oxycodone 5mg q4h PRN for pain 4-6, oxycodone 10mg q6h PRN for pain 7-10, ibuprofen 600mg q6h PRN, lovenox 30mg BID, acetaminophen 975mg TID, lidocaine 4% BID, Tymlos 80mcg daily, calcium+D 600mg/800units BID, methocarbamol 750mg BID and 500mg q6h PRN, Assess and document pain q 4 hours and medicate for pain per parameters (4-6= 5mg, 7-10 = mg), f/u w/ neurosurgery Dr. Pagan 10/28  #HTN / HLD - atorvastatin 40mg QHS, metoprolol ER 25mg daily  #Bronchitis - albuterol 2 puff q4h PRN, Robitussin DM 200mg/20mg/10mL q4h PRN  #T2DM - linagliptin 5mg daily, metformin 500mg BID, insulin lispro SSI  #GERD / IBS - colace 100mg daily, pepcid 40mg QHS, calcium carbonate 500mg daily, imodium 2mg q6h PRN, sennokot-S 8.6/50mg 2 tabs QHS, decrease miralax 17grams daily  #SIADH - NaCl 1gram TID, f/u w/ nephrology  #Subclinical hypothyroidism - repeat TSH, f/u w/ endocrinology in October  #Rhinitis / Pharyngitis - flonase 50mcg daily, guaifenesin 200mg q4h PRN, cepacol q2h PRN  #Obstructive uropathy - [place], tamsulosin 0.4mg daily, f/u w/ urology, plan for void trial  #Dermatomyositis - triamcinolone 0.05% BID PRN, hydrocortisone 2.5% TID, methotrexate 15mg weekly, prednisone 4mg BID  #Vitamin deficiency / Electrolyte disorder - folic acid 1mg daily, magnesium oxide 400mg daily,   #Glaucoma - mgmt per ophthalmology - latanoprost, azopt  #Schizophrenia / Insomnia - mgmt per psych - haldol, melatonin, olanzapine, venlafaxine  #[facility]  Follow up as needed      *Chief Complaint*     T8 fracture    *History of Present Illness*    Patient is an 85 y/o female w/ PMH as below who was transferred to [facility] on 8/24 for further management of T8 VB fracture after sustaining a ground level fall at her SNF.  She was found to have subacute rib  fractures as well due to fall at SNF the week prior to presentation as well. Neurosurgery consulted, a MRI T/L spines was recommended.  Her injury was deemed non-operative and a TLSO brace recommended. Incidentally she was noted to have hyponatremia to 120 (baseline 126-128), so was transferred to the TSICU on 8/28 for close monitoring and sodium levels improved gradually w/ hydration and salt tabs and was transferred out of ICU 9/1.  Geriatrics consulted to assist with management while in the hospital. Due to concerns for SIADH she was placed on 1L/day fluid restriction with salt tabs and recommended to follow up with nephrology 4-6 weeks after hospitalization. Was also found to have concerns for subclinical hypothyroidism.  Plan for PCP or endocrinology follow up in 4-6 weeks.  During her stay, she failed over 3 Goel catheter removals.  She was discharged with Goel and plan to to follow up with urology outpatient.   PT/OT worked with her during stay w/ plan for discharge to SNF.  She was determined to be medically ready for discharge from hospital at 9/5/24 with improvement in hyponatremia to 131. Flomax added per geriatric recommendations for urinary retention with plan to continue trial of voids in a few days at facility. She was d/c to [facility] on 9/5.    She is seen lying in bed wearing the TLSO. Reports pain is improved, up to 8/10 at worst, 0/10 at best, no numbness, tingling, paresthesias, constipation, diarrhea, new incontinence, or saddle anesthesias, if anything her bowels a little loose but has been holding off on miralax, no f/c, cough, CP, SOB, n/v, or any other new c/o presently. Has f/u w/ Dr. Pagan 10/28.     Allergies - Dust, Mold  PMH - HFpEF, HTN, HLD, MI, aortic stenosis, T2DM, hyponatremia, dermatomyositis, schizoaffective disorder, GERD, anemia, depression, T8 fracture, uterine prolapse  PSH - Tonsillectomy, Rotator cuff repair, wrist surgery, uterine surgery  FH - none  reported  SocHx - None reported, No EtOH    *Review of Systems*  All other systems reviewed are negative except as noted in the HPI     *Vital Signs*   Date: 9/25/24  - T: 97.4  P: 97  R: 18  BP: 105/66  SpO2: 96% on RA ; Date: 9/25/24  Wt: 138.6    *Results / Data*  CBC - Date: 9/6/24  WBC: 10.4  HGB: 11.3  HCT: 36.7  PLT: 550  ;   BMP - Date: 9/6/24  Na: 130  K: 4.3  Cl: 95  Bicarb: 23  BUN: 8  Cr: 0.37  Glu: 238  Ca: 8.2  ;   LFT - Date: 9/6/24  AST: 16  ALT: 11  ALP: 140  Tbili: 0.5  ;   Coags - Date:   INR:   PT:  Other - Date: 9/6/24  BNP: 75    *Physical Exam*  Gen: (+) NAD, (+) well-appearing  HEENT: (+) normocephalic, (+) MMM  Neck: (+) supple  Lungs: (+) CTAB, (-) wheezes, (-) rales, (-) rhonchi  Heart: (+) RRR, (+) S1 S2, (-) murmurs  Pulses: (+) palpable  Abd: (+) soft, (+) NT, (+) ND, (+) BS+  Ext: (-) edema, (-) deformity  MSK: (-) joint swelling  Skin: (+) warm, (+) dry, (-) rash  Neuro: (+) follows commands, (-) tremor, (+) alert

## 2024-10-09 PROBLEM — G20.A1 PARKINSON'S DISEASE (MULTI): Status: ACTIVE | Noted: 2024-10-09

## 2024-10-09 NOTE — PROGRESS NOTES
Resident Seen 10/3/24 -- MP    CC: SNF ([facility]) Recheck    : 1940  SNF H&P done 10/1/20, 24  DC Summary dated 24 reviewed 24  ALLERGY: Dust, Mold  DNR-CC    S: 83 yo woman with PD, HTN, DM, schizoaffective disorder. Admitted to SNF rehab after hospitalization for non-operable T8 vertebral fx.  No cp, sob. Med list & problem list reviewed.    O: VSS AFEB Wt 142# (up 1#) Awake, alert, pleasantly confused, NAD. Chest cta. Heart rrr, 2/6 MADELYN. Ext no c/c/e. TLSO back brace on . HAHN.    LAB (24) Na 130, K 4.3, Gluc 238, Cr 0.37, Alb 2.9, Hgb 11.3    A/P:  # PD: OFF Sinemet, completed PT/OT. LTC.  # T8 compression fx: TLSO brace.  # Obstructive uropathy: HAHN  # SIADH: Na stable OFF FR. NaCL 1g daily.  # Osteoporosis; Tymlos injection  # Dermatomyositis: prednisone, MTX, Clobetasol.  # Hx Right Humerus Fx: healed. .  # Anemia: Iron  # HTN: ACEi  # Schizoaffective DO: lamictal, Zyprexa, Haldol, clonazepam.  # insomnia: tolerating trazodone 50 mg at hs.  # DM: metformin, Linagliptin, Humalog SSI, statin.

## 2024-10-23 NOTE — PROGRESS NOTES
Patient care signed out to me by Dr. Youngblood. Pain medication ordered for the patient prior to transfer.    MDM/ED Course  ED Course as of 10/23/24 1004   Sat Aug 24, 2024   0830 Pt requesting pain medication, ordered. [JH]      ED Course User Index  [JH] Desean Blanco MD         Diagnoses as of 10/23/24 1004   Closed fracture of posterior thoracic vertebral body, initial encounter (Multi)   Fall, initial encounter   Hypomagnesemia   Hyponatremia

## 2024-10-28 ENCOUNTER — APPOINTMENT (OUTPATIENT)
Dept: NEUROSURGERY | Facility: CLINIC | Age: 84
End: 2024-10-28
Payer: COMMERCIAL

## 2024-10-28 DIAGNOSIS — S22.069A CLOSED FRACTURE OF EIGHTH THORACIC VERTEBRA, UNSPECIFIED FRACTURE MORPHOLOGY, INITIAL ENCOUNTER (MULTI): Primary | ICD-10-CM

## 2024-10-28 PROCEDURE — 99212 OFFICE O/P EST SF 10 MIN: CPT | Performed by: NEUROLOGICAL SURGERY

## 2024-11-01 NOTE — DISCHARGE SUMMARY
Discharge Diagnosis  Closed fracture of eighth thoracic vertebra, unspecified fracture morphology, initial encounter (Multi)    Issues Requiring Follow-Up  Referral sent for 4 week follow up with Nephrology for chronic hyponatremia  Referral sent fo 4 week follow up with Endocrinology for subclinical hypothyroidism  Referral sent for follow up with Urology for urinary retention and failed TOV.   Referral sent for follow up with Psychiatry for management of haldol and zyprexa.   Patient should schedule a follow up visit with PCP.   Neurology surgery follow up on 10/28/24    Test Results Pending At Discharge  Pending Labs       No current pending labs.          Hospital Course  84F with history hof HFpED, HTN, HLD, DM2, hyponatremia, and dermatomyositis (on prednisone), and schizoaffective disorder transferred to [facility] on 8/24 for further management of T8 VB fracture after sustaining a GLF at her SNF.   Found to have subacute rib fractures as well.  Had fall at SNF the week prior to presentation as well.   NSGY consulted, a MRI T/L spines recommended.  Patient injury deemed non-operative and a TLSO brace recommended.  Noted to have hyponatremia to 120 (baseline 126-128).  Was transferred to the TSICU on 8/28 for close monitoring.  Improved sodium levels with hydration and salt tabs.  Deemed appropriate for transfer out of ICU 9/1.    Geriatrics consulted to assist with management while in the hospital.  Concerns for SIADH; placed on 1L/day fluid restriction with salt tabs.  To follow up with nephrology 4-6 weeks after hospitalization.  Was also found to have concerns for subclinical hypothyroidism.  Plan for PCP or endocrinology follow up in 4-6 weeks.    During stay, patient failed over 3 Goel catheter removals.  She was discharged with Goel and plan to to follow up with urology outpatient.    PT/OT worked with patient during stay.  Plan for discharge to SNF.  Patient medically ready for discharge from hospital at  Plan on initial consult on 10/30/2024:  A multimodal plan was developed today to treat your pain.  Multimodal analgesia is a strategy that reduces reliance on opioids through the use of non-opioid analgesics and therapies that have different mechanisms of action.    Referral to a Orthopedics hand specialist.  Apply heat prn.    Continue to follow up with Dr. Perez for knee pain.    Continue to see chiropractor.        Diagnostics:   Consider new lumbar MRI since last MRI is from 2016.        Medications:  Discussed compounding cream Lidocaine 5% and Ketamine 5% four times daily.  She would like to try this.    The following OTC pain medications may be helpful, use as directed: Voltaren Gel 1%, CBD products, Arnica products, Capsaicin products, Australian Dream Cream, Epson It, Lidocaine Patch, Solanpas, Biofreeze, Aspercream, Tiger Balm and Jorge Emu cream.  Apply heat or cold PRN.      Therapies:  Discussed Pain Psychology - consider in the future  Psychological treatments are also important part of pain management.  Understanding and managing the thoughts, emotions and behaviors that accompany the discomfort can help you cope more effectively with your pain and can actually reduce the intensity of your pain.      PHYSICAL THERAPY   Discussed the importance of core strengthening, ROM, stretching exercises with the patient and how each of these entities is important in decreasing pain.  Explained to the patient that the purpose of physical therapy is to teach the patient a home exercise program.  These exercises need to be performed every day in order to decrease pain and prevent future occurrences of pain.        Discussed Grounding Mat - handout provided  https://www.youtube.com/watch?v=[email or web address]    Discussed Frequency Specific Microcurrent - handout provided  Treatment for Neuropathic Pain.   [facility] [phone]  [facility] [phone]  [facility] [phone]  Discovery  09/05/24 with improvement in hyponatremia to 131. Flomax added per geriatric recommendations for urinary retention with plan to continue trial of voids in a few days at facility.    Pertinent Physical Exam At Time of Discharge  Gen: NC/AT, conversional, NAD, in bed without TSLO brace during rounds  Resp: Nonlabored breathing   CV: Nontachy, normotensive  Abd: Soft, nontender, nondistended   Extremities: ROM intact    Home Medications     Medication List      START taking these medications     calcium carbonate 200 mg calcium chewable tablet; Commonly known as:   Tums; Chew 1 tablet (500 mg) once daily.   enoxaparin 30 mg/0.3 mL syringe; Commonly known as: Lovenox; Inject 0.3   mL (30 mg) under the skin every 12 hours.   haloperidol lactate 5 mg/mL injection; Commonly known as: Haldol; Inject   0.1 mL (0.5 mg) into the muscle once daily at bedtime.   insulin lispro 100 unit/mL injection; Commonly known as: HumaLOG; Inject   0-20 Units under the skin 4 times a day before meals. Take as directed per   insulin instructions.   labetaloL 5 mg/mL injection; Commonly known as: Normodyne,Trandate;   Infuse 2 mL (10 mg) into a venous catheter every 6 hours if needed   (SBP>180, hold for HR<60).   lidocaine 4 % patch; Place 1 patch over 12 hours on the skin once daily.   Remove & discard patch within 12 hours or as directed by MD.   naloxone 0.4 mg/mL injection; Commonly known as: Narcan; Infuse 0.5 mL   (0.2 mg) into a venous catheter every 5 minutes if needed for opioid   reversal (Sedation).   * oxyCODONE 5 mg immediate release tablet; Commonly known as:   Roxicodone; Take 1 tablet (5 mg) by mouth every 4 hours if needed for   moderate pain (4 - 6).   * oxyCODONE 5 mg immediate release tablet; Commonly known as:   Roxicodone; Take 2 tablets (10 mg) by mouth every 6 hours if needed for   severe pain (7 - 10).   sennosides-docusate sodium 8.6-50 mg tablet; Commonly known as:   Mary-Colace; Take 1 tablet by mouth once daily at  chiropractic [phone]  May be able to be billed as a chiropractic service depending on your insurance coverage.     Discussed Acupuncture.    Discussed TENs unit.      Interventions:  none      Follow up:     Return to clinic as needed.        OZIEL Pa, RN, CNP, FNP  [facility]      Clinic Number:  [phone]   Call with any questions about your care and for scheduling assistance.   Calls are returned Monday through Friday between 8 AM and 4:30 PM. We usually get back to you within 2 business days depending on the issue/request.    If we are prescribing your medications:  For opioid medication refills, call the clinic or send a Arynga message 7 days in advance.  Please include:  Name of requested medication  Name of the pharmacy.  For non-opioid medications, call your pharmacy directly to request a refill. Please allow 3-4 days to be processed.   Per [place] State Law:  All controlled substance prescriptions must be filled within 30 days of being written.    For those controlled substances allowing refills, pickup must occur within 30 days of last fill.      We believe regular attendance is key to your success in our program!    Any time you are unable to keep your appointment we ask that you call us at least 24 hours in advance to cancel.This will allow us to offer the appointment time to another patient.   Multiple missed appointments may lead to dismissal from the clinic.     bedtime.   sodium chloride 1,000 mg tablet; Take 1 tablet (1 g) by mouth 3 times   daily (morning, midday, late afternoon).   tamsulosin 0.4 mg 24 hr capsule; Commonly known as: Flomax; Take 1   capsule (0.4 mg) by mouth once daily.  * This list has 2 medication(s) that are the same as other medications   prescribed for you. Read the directions carefully, and ask your doctor or   other care provider to review them with you.     CHANGE how you take these medications     acetaminophen 325 mg tablet; Commonly known as: Tylenol; Take 3 tablets   (975 mg) by mouth every 6 hours.; What changed: when to take this,   additional instructions   clobetasol 0.05 % cream; Commonly known as: Temovate; Apply 1   Application topically 2 times a day.; What changed: Another medication   with the same name was removed. Continue taking this medication, and   follow the directions you see here.   metoprolol succinate XL 25 mg 24 hr tablet; Commonly known as:   Toprol-XL; Take 1.5 tablets (37.5 mg) by mouth 2 times a day. Do not crush   or chew.; What changed: how much to take   polyethylene glycol 17 gram packet; Commonly known as: Glycolax,   Miralax; Take 17 g by mouth once daily.; What changed: when to take this     CONTINUE taking these medications     bisacodyl 10 mg suppository; Commonly known as: Dulcolax; Insert 1   suppository (10 mg) into the rectum once daily as needed for constipation   (if no BM 8 hours after milk of magnesium).   calcium carbonate-vitamin D3 600 mg-20 mcg (800 unit) tablet; Take 1   tablet by mouth 2 times a day.   Cepacol Sore Throat (nargis-men) 15-3.6 mg lozenge; Generic drug:   benzocaine-menthol; Dissolve 1 lozenge in the mouth every 4 hours if   needed for sore throat.   docusate sodium 100 mg capsule; Commonly known as: Colace; Take 1   capsule (100 mg) by mouth once daily in the morning. Take before meals.   dorzolamide 2 % ophthalmic solution; Commonly known as: Trusopt;   Administer 1 drop into  both eyes 3 times a day.   famotidine 40 mg tablet; Commonly known as: Pepcid; Take 1 tablet (40   mg) by mouth once daily at bedtime.   fluticasone 50 mcg/actuation nasal spray; Commonly known as: Flonase;   Administer 1 spray into each nostril once daily. Shake gently. Before   first use, prime pump. After use, clean tip and replace cap.   folic acid 1 mg tablet; Commonly known as: Folvite; Take 1 tablet (1 mg)   by mouth once daily.   guaiFENesin 100 mg/5 mL syrup; Commonly known as: Robitussin; Take 10 mL   (200 mg) by mouth every 4 hours if needed for cough or congestion.   hydrocortisone 2.5 % cream; Apply 1 Application topically 3 times a day.   To forehead   latanoprost 0.005 % ophthalmic solution; Commonly known as: Xalatan;   Administer 1 drop into both eyes once daily at bedtime.   linaGLIPtin 5 mg tablet; Commonly known as: Tradjenta; Take 1 tablet (5   mg) by mouth once daily.   magnesium hydroxide 400 mg/5 mL suspension; Commonly known as: Milk of   Magnesia; Take 30 mL by mouth once daily as needed for constipation (if no   BM in 3 consecutive days).   melatonin 5 mg tablet; Take 1 tablet (5 mg) by mouth once daily at   bedtime.   metFORMIN 500 mg tablet; Commonly known as: Glucophage; Take 1 tablet   (500 mg) by mouth 2 times daily (morning and late afternoon).   methotrexate 2.5 mg tablet; Commonly known as: Trexall; Take 6 tablets   (15 mg total) by mouth 1 (one) time per week.  6 tablets (15mg) once a   week on Fridays; Start taking on: September 8, 2024   OLANZapine 20 mg tablet; Commonly known as: ZyPREXA; Take 1 tablet (20   mg) by mouth once daily at bedtime.   predniSONE 2.5 mg tablet; Commonly known as: Deltasone; Take 1.5 tablets   (3.75 mg) by mouth 2 times a day.   Tymlos 80 mcg (3,120 mcg/1.56 mL) pen injector; Generic drug:   abaloparatide; Inject 80 mcg under the skin once daily.   venlafaxine XR 37.5 mg 24 hr capsule; Commonly known as: Effexor-XR;   Take 1 capsule (37.5 mg) by mouth  once daily. Do not crush or chew.     STOP taking these medications     Glucagon Emergency Kit (human) 1 mg injection; Generic drug: glucagon   ibuprofen 600 mg tablet   magnesium oxide 400 mg tablet; Commonly known as: Mag-Ox   sodium phosphates 9.5-3.5 gram/59 mL enema; Commonly known as: Fleet   (Pediatric)     ASK your doctor about these medications     atorvastatin 40 mg tablet; Commonly known as: Lipitor   haloperidol 0.5 mg tablet; Commonly known as: Haldol       Outpatient Follow-Up  Future Appointments   Date Time Provider Department [place]   10/28/2024 11:30 AM Dat Pagan MD [id number] [facility]   2/27/2025 10:30 AM Slim Dover MD [id number] [facility]     Ronnell Blue MD  PGY-1 General Surgery  Trauma [id number]

## 2024-11-07 ENCOUNTER — NURSING HOME VISIT (OUTPATIENT)
Dept: POST ACUTE CARE | Facility: EXTERNAL LOCATION | Age: 84
End: 2024-11-07
Payer: COMMERCIAL

## 2024-11-07 DIAGNOSIS — G20.A1 PARKINSON'S DISEASE, UNSPECIFIED WHETHER DYSKINESIA PRESENT, UNSPECIFIED WHETHER MANIFESTATIONS FLUCTUATE: ICD-10-CM

## 2024-11-07 DIAGNOSIS — S22.069A CLOSED FRACTURE OF EIGHTH THORACIC VERTEBRA, UNSPECIFIED FRACTURE MORPHOLOGY, INITIAL ENCOUNTER (MULTI): ICD-10-CM

## 2024-11-07 PROCEDURE — 99309 SBSQ NF CARE MODERATE MDM 30: CPT | Performed by: FAMILY MEDICINE

## 2024-11-07 NOTE — LETTER
Patient: Jennifer Mancini  : 1940    Encounter Date: 2024    Resident Seen 24 -- MP    CC: SNF ([facility]) Recheck    : 1940  SNF H&P done 10/1/20, 24  DC Summary dated 24 reviewed 24  ALLERGY: Dust, Mold  DNR-CC    S: 83 yo woman with PD, HTN, DM, schizoaffective disorder. In TSLO brasce for non-operable T8 vertebral fx. No cp, sob. Med list & problem list reviewed.    O: VSS AFEB Wt 138# (down 4#) Awake, alert, pleasantly confused, NAD. Chest cta. Heart rrr, 2/6 MADELYN. Ext no c/c/e. TLSO back brace on. HAHN.    LAB (24) Na 130, K 4.3, Gluc 238, Cr 0.37, Alb 2.9, Hgb 11.3    A/P:  # PD: OFF Sinemet, completed PT/OT. LTC.  # T8 compression fx: TLSO brace. 10/28/24 NS (Latasha) Requested follow-up T-spine xrays before discontinuing brace. Will order follow up xray (last 10/9/24) and copy to DR Pagan.  # Obstructive uropathy: HAHN  # SIADH: Na stable OFF FR. NaCL 1g daily.  # Osteoporosis; Tymlos injection  # Dermatomyositis: prednisone, MTX, Clobetasol.  # Hx Right Humerus Fx: healed. .  # Anemia: Iron  # HTN: ACEi  # Schizoaffective DO: lamictal, Zyprexa, Haldol, clonazepam.  # insomnia: tolerating trazodone 50 mg at hs.  # DM: metformin, Linagliptin, Humalog SSI, statin.      Electronically Signed By: Bo Batista MD   24  2:11 PM

## 2024-11-18 NOTE — PROGRESS NOTES
Resident Seen 24 -- MP    CC: [facility] ([facility]) Recheck    : 1940  SNF H&P done 10/1/20, 24  DC Summary dated 24 reviewed 24  ALLERGY: Dust, Mold  DNR-CC    S: 83 yo woman with PD, HTN, DM, schizoaffective disorder. In TSLO brasce for non-operable T8 vertebral fx. No cp, sob. Med list & problem list reviewed.    O: VSS AFEB Wt 138# (down 4#) Awake, alert, pleasantly confused, NAD. Chest cta. Heart rrr, 2/6 MADELYN. Ext no c/c/e. TLSO back brace on. HAHN.    LAB (24) Na 130, K 4.3, Gluc 238, Cr 0.37, Alb 2.9, Hgb 11.3    A/P:  # PD: OFF Sinemet, completed PT/OT. LTC.  # T8 compression fx: TLSO brace. 10/28/24 NS (Latasha) Requested follow-up T-spine xrays before discontinuing brace. Will order follow up xray (last 10/9/24) and copy to DR Pagan.  # Obstructive uropathy: HAHN  # SIADH: Na stable OFF FR. NaCL 1g daily.  # Osteoporosis; Tymlos injection  # Dermatomyositis: prednisone, MTX, Clobetasol.  # Hx Right Humerus Fx: healed. .  # Anemia: Iron  # HTN: ACEi  # Schizoaffective DO: lamictal, Zyprexa, Haldol, clonazepam.  # insomnia: tolerating trazodone 50 mg at hs.  # DM: metformin, Linagliptin, Humalog SSI, statin.

## 2024-11-19 ENCOUNTER — NURSING HOME VISIT (OUTPATIENT)
Dept: POST ACUTE CARE | Facility: EXTERNAL LOCATION | Age: 84
End: 2024-11-19
Payer: COMMERCIAL

## 2024-11-19 DIAGNOSIS — J02.9 PHARYNGITIS, UNSPECIFIED ETIOLOGY: ICD-10-CM

## 2024-11-19 DIAGNOSIS — S22.49XS CLOSED FRACTURE OF MULTIPLE RIBS, UNSPECIFIED LATERALITY, SEQUELA: ICD-10-CM

## 2024-11-19 DIAGNOSIS — E78.5 DYSLIPIDEMIA: ICD-10-CM

## 2024-11-19 DIAGNOSIS — M33.13 DERMATOMYOSITIS (MULTI): ICD-10-CM

## 2024-11-19 DIAGNOSIS — J31.0 RHINITIS, UNSPECIFIED TYPE: ICD-10-CM

## 2024-11-19 DIAGNOSIS — S22.009S: ICD-10-CM

## 2024-11-19 DIAGNOSIS — E56.9 VITAMIN DEFICIENCY: ICD-10-CM

## 2024-11-19 DIAGNOSIS — E11.9 TYPE 2 DIABETES MELLITUS WITHOUT COMPLICATION, WITH LONG-TERM CURRENT USE OF INSULIN (MULTI): ICD-10-CM

## 2024-11-19 DIAGNOSIS — I10 HYPERTENSION, UNSPECIFIED TYPE: ICD-10-CM

## 2024-11-19 DIAGNOSIS — E22.2 SIADH (SYNDROME OF INAPPROPRIATE ADH PRODUCTION) (MULTI): ICD-10-CM

## 2024-11-19 DIAGNOSIS — Z79.4 TYPE 2 DIABETES MELLITUS WITHOUT COMPLICATION, WITH LONG-TERM CURRENT USE OF INSULIN (MULTI): ICD-10-CM

## 2024-11-19 DIAGNOSIS — N13.9 OBSTRUCTIVE UROPATHY: ICD-10-CM

## 2024-11-19 DIAGNOSIS — E87.8 ELECTROLYTE DISORDER: ICD-10-CM

## 2024-11-19 DIAGNOSIS — U07.1 COVID: Primary | ICD-10-CM

## 2024-11-19 DIAGNOSIS — K58.9 IRRITABLE BOWEL SYNDROME, UNSPECIFIED TYPE: ICD-10-CM

## 2024-11-19 DIAGNOSIS — K21.9 GASTROESOPHAGEAL REFLUX DISEASE, UNSPECIFIED WHETHER ESOPHAGITIS PRESENT: ICD-10-CM

## 2024-11-19 DIAGNOSIS — M81.0 OSTEOPOROSIS, UNSPECIFIED OSTEOPOROSIS TYPE, UNSPECIFIED PATHOLOGICAL FRACTURE PRESENCE: ICD-10-CM

## 2024-11-19 DIAGNOSIS — E03.8 SUBCLINICAL HYPOTHYROIDISM: ICD-10-CM

## 2024-11-19 PROCEDURE — 99309 SBSQ NF CARE MODERATE MDM 30: CPT | Performed by: NURSE PRACTITIONER

## 2024-11-21 ENCOUNTER — NURSING HOME VISIT (OUTPATIENT)
Dept: POST ACUTE CARE | Facility: EXTERNAL LOCATION | Age: 84
End: 2024-11-21
Payer: COMMERCIAL

## 2024-11-21 DIAGNOSIS — G20.A1 PARKINSON'S DISEASE, UNSPECIFIED WHETHER DYSKINESIA PRESENT, UNSPECIFIED WHETHER MANIFESTATIONS FLUCTUATE: ICD-10-CM

## 2024-11-21 DIAGNOSIS — U07.1 COVID: ICD-10-CM

## 2024-11-21 PROCEDURE — 99309 SBSQ NF CARE MODERATE MDM 30: CPT | Performed by: FAMILY MEDICINE

## 2024-11-21 NOTE — LETTER
Patient: Jennifer Mancini  : 1940    Encounter Date: 2024    Resident Seen 24 -- MP    CC: SNF ([facility]) Recheck    : 1940  SNF H&P done 10/1/20, 24  DC Summary dated 24 reviewed 24  ALLERGY: Dust, Mold  DNR-CC    S: 85 yo woman with PD, HTN, DM, schizoaffective disorder. Recent non-operable T8 vertebral fx. No cp, sob. + COVID. Cough and dyspnea -- no hypoxia. Med list & problem list reviewed.    O: VSS AFEB Wt 138# (stable) Awake, alert, pleasantly confused, NAD. Chest cta. Heart rrr, / MADELYN. Ext no c/c/e. TLSO back brace on. HAHN.    LAB (24) Na 130, K 4.3, Gluc 238, Cr 0.37, Alb 2.9, Hgb 11.3    A/P:  # COVID+: mildly symptomatic. Agree with Paxlovid.  # PD: OFF Sinemet, completed PT/OT. LTC.  # T8 compression fx: TLSO brace. 10/28/24 NS (Latasha) Requested follow-up T-spine xrays before discontinuing brace. Will order follow up xray (last 10/9/24) and copy to DR Pagan.  # Obstructive uropathy: HAHN  # SIADH: Na stable OFF FR. NaCL 1g daily.  # Osteoporosis; Tymlos injection  # Dermatomyositis: prednisone, MTX, Clobetasol.  # Hx Right Humerus Fx: healed. .  # Anemia: Iron  # HTN: ACEi  # Schizoaffective DO: lamictal, Zyprexa, Haldol, clonazepam.  # insomnia: tolerating trazodone 50 mg at hs.  # DM: metformin, Linagliptin, Humalog SSI, statin.      Electronically Signed By: Bo Batista MD   12/15/24  2:22 PM

## 2024-11-22 ENCOUNTER — LAB REQUISITION (OUTPATIENT)
Dept: LAB | Facility: HOSPITAL | Age: 84
End: 2024-11-22
Payer: COMMERCIAL

## 2024-11-22 DIAGNOSIS — U07.1 COVID-19: ICD-10-CM

## 2024-11-22 LAB
ALBUMIN SERPL BCP-MCNC: 2.9 G/DL (ref 3.4–5)
ALP SERPL-CCNC: 83 U/L (ref 33–136)
ALT SERPL W P-5'-P-CCNC: 16 U/L (ref 7–45)
ANION GAP SERPL CALC-SCNC: 12 MMOL/L (ref 10–20)
AST SERPL W P-5'-P-CCNC: 14 U/L (ref 9–39)
BILIRUB SERPL-MCNC: 0.3 MG/DL (ref 0–1.2)
BNP SERPL-MCNC: 111 PG/ML (ref 0–99)
BUN SERPL-MCNC: 10 MG/DL (ref 6–23)
CALCIUM SERPL-MCNC: 8.7 MG/DL (ref 8.6–10.3)
CHLORIDE SERPL-SCNC: 96 MMOL/L (ref 98–107)
CO2 SERPL-SCNC: 28 MMOL/L (ref 21–32)
CREAT SERPL-MCNC: 0.29 MG/DL (ref 0.5–1.05)
D DIMER PPP FEU-MCNC: 5385 NG/ML FEU
EGFRCR SERPLBLD CKD-EPI 2021: >90 ML/MIN/1.73M*2
ERYTHROCYTE [DISTWIDTH] IN BLOOD BY AUTOMATED COUNT: 18.9 % (ref 11.5–14.5)
GLUCOSE SERPL-MCNC: 91 MG/DL (ref 74–99)
HCT VFR BLD AUTO: 32.1 % (ref 36–46)
HGB BLD-MCNC: 10 G/DL (ref 12–16)
MCH RBC QN AUTO: 30.6 PG (ref 26–34)
MCHC RBC AUTO-ENTMCNC: 31.2 G/DL (ref 32–36)
MCV RBC AUTO: 98 FL (ref 80–100)
NRBC BLD-RTO: 0 /100 WBCS (ref 0–0)
PLATELET # BLD AUTO: 249 X10*3/UL (ref 150–450)
POTASSIUM SERPL-SCNC: 4.1 MMOL/L (ref 3.5–5.3)
PROT SERPL-MCNC: 5.2 G/DL (ref 6.4–8.2)
RBC # BLD AUTO: 3.27 X10*6/UL (ref 4–5.2)
SODIUM SERPL-SCNC: 132 MMOL/L (ref 136–145)
TSH SERPL-ACNC: 6.1 MIU/L (ref 0.44–3.98)
WBC # BLD AUTO: 4.9 X10*3/UL (ref 4.4–11.3)

## 2024-11-22 PROCEDURE — 85379 FIBRIN DEGRADATION QUANT: CPT | Mod: OUT | Performed by: FAMILY MEDICINE

## 2024-11-22 PROCEDURE — 36415 COLL VENOUS BLD VENIPUNCTURE: CPT | Mod: OUT | Performed by: FAMILY MEDICINE

## 2024-11-22 PROCEDURE — 84075 ASSAY ALKALINE PHOSPHATASE: CPT | Mod: OUT | Performed by: FAMILY MEDICINE

## 2024-11-22 PROCEDURE — 83880 ASSAY OF NATRIURETIC PEPTIDE: CPT | Mod: OUT | Performed by: FAMILY MEDICINE

## 2024-11-22 PROCEDURE — 84443 ASSAY THYROID STIM HORMONE: CPT | Mod: OUT | Performed by: FAMILY MEDICINE

## 2024-11-22 PROCEDURE — 85027 COMPLETE CBC AUTOMATED: CPT | Mod: OUT | Performed by: FAMILY MEDICINE

## 2024-11-25 NOTE — PROGRESS NOTES
*Provider Impression*    Patient is an 84 year old female who is seen today for management of multiple medical problems       #COVID / Rhinitis / Pharyngitis - isolation, supportive care, flonase 50mcg daily, guaifenesin 200mg q4h PRN, cepacol q2h PRN  albuterol 2 puff q4h PRN, Robitussin DM 200mg/20mg/10mL q4h PRN, add Paxlovid, check CXR 2v,  #T8 vertebral body fracture / Rib fractures / Osteoporosis - TLSO, oxycodone 5mg q4h PRN for pain 4-6, oxycodone 10mg q6h PRN for pain 7-10, ibuprofen 600mg q6h PRN, lovenox 30mg BID, acetaminophen 975mg TID, lidocaine 4% BID, Tymlos 80mcg daily, calcium+D 600mg/800units BID, methocarbamol 750mg BID and 500mg q6h PRN, Assess and document pain q 4 hours and medicate for pain per parameters (4-6= 5mg, 7-10 = mg), f/u w/ neurosurgery Dr. Pagan  #HTN / HLD - atorvastatin 40mg QHS, metoprolol ER 25mg daily  #T2DM - linagliptin 5mg daily, metformin 500mg BID, insulin lispro SSI  #GERD / IBS - colace 100mg daily, pepcid 40mg QHS, calcium carbonate 500mg daily, imodium 2mg q6h PRN, sennokot-S 8.6/50mg 2 tabs QHS, miralax 17grams daily  #SIADH - NaCl 1gram TID, f/u w/ nephrology  #Subclinical hypothyroidism - repeat TSH, f/u w/ endocrinology in October  #Obstructive uropathy - [place], tamsulosin 0.4mg daily, f/u w/ urology, plan for void trial  #Dermatomyositis - triamcinolone 0.05% BID PRN, hydrocortisone 2.5% TID, methotrexate 15mg weekly, prednisone 4mg BID  #Vitamin deficiency / Electrolyte disorder - folic acid 1mg daily, magnesium oxide 400mg daily,   #Glaucoma - mgmt per ophthalmology - latanoprost, azopt  #Schizophrenia / Insomnia - mgmt per psych - haldol, melatonin, olanzapine, venlafaxine  #ACP - DNC  Follow up as needed      *Chief Complaint*     COVID    *History of Present Illness*    Patient is an 83 y/o female LTC resident of [facility] w/ [facility] as below who is seen today for f/u and management of multiple medical problems.    She had f/u w/ Dr. Pagan, note  reviewed.  She was incidentally diagnosed with COVID.    She is seen sitting up in her room today and denies any f/c, sweats, some cough, congestion, no CP, SOB, n/v/d, constipation, LUTS, some back pain still. No other new c/o presently.     Allergies - Dust, Mold  PMH - HFpEF, HTN, HLD, MI, aortic stenosis, T2DM, hyponatremia, dermatomyositis, schizoaffective disorder, GERD, anemia, depression, T8 fracture, uterine prolapse  PSH - Tonsillectomy, Rotator cuff repair, wrist surgery, uterine surgery  FH - none reported  SocHx - None reported, No EtOH    *Review of Systems*  All other systems reviewed are negative except as noted in the HPI     *Vital Signs*   Date: 11/17/24  - T: 97.0  P: 80  R: 18  BP: 116/64  SpO2: 93% on RA ; Date: 9/25/24  Wt: 138.6    *Results / Data*  CBC - Date: 9/6/24  WBC: 10.4  HGB: 11.3  HCT: 36.7  PLT: 550  ;   BMP - Date: 9/6/24  Na: 130  K: 4.3  Cl: 95  Bicarb: 23  BUN: 8  Cr: 0.37  Glu: 238  Ca: 8.2  ;   LFT - Date: 9/6/24  AST: 16  ALT: 11  ALP: 140  Tbili: 0.5  ;   Coags - Date:   INR:   PT:  Other - Date: 9/6/24  BNP: 75    *Physical Exam*  Gen: (+) NAD, (+) well-appearing  HEENT: (+) normocephalic, (+) MMM  Neck: (+) supple  Lungs: (+) CTAB, (-) wheezes, (-) rales, (-) rhonchi  Heart: (+) RRR, (+) S1 S2, (-) murmurs  Pulses: (+) palpable  Abd: (+) soft, (+) NT, (+) ND, (+) BS+  Ext: (-) edema, (-) deformity  MSK: (-) joint swelling  Skin: (+) warm, (+) dry, (-) rash  Neuro: (+) follows commands, (-) tremor, (+) alert

## 2024-12-03 ENCOUNTER — TELEPHONE (OUTPATIENT)
Dept: NEUROSURGERY | Facility: HOSPITAL | Age: 84
End: 2024-12-03
Payer: COMMERCIAL

## 2024-12-03 NOTE — TELEPHONE ENCOUNTER
Juana called and asked if pt can come out of brace. I called he back at [phone] and left her a voice mail that pt needs xray, have it done a a  facility, let us know when it is done and I will have Dr. Pagan call.

## 2024-12-12 ENCOUNTER — NURSING HOME VISIT (OUTPATIENT)
Dept: POST ACUTE CARE | Facility: EXTERNAL LOCATION | Age: 84
End: 2024-12-12
Payer: COMMERCIAL

## 2024-12-12 DIAGNOSIS — G20.A1 PARKINSON'S DISEASE, UNSPECIFIED WHETHER DYSKINESIA PRESENT, UNSPECIFIED WHETHER MANIFESTATIONS FLUCTUATE: ICD-10-CM

## 2024-12-12 DIAGNOSIS — S22.069A CLOSED FRACTURE OF EIGHTH THORACIC VERTEBRA, UNSPECIFIED FRACTURE MORPHOLOGY, INITIAL ENCOUNTER (MULTI): ICD-10-CM

## 2024-12-12 PROBLEM — U07.1 COVID: Status: ACTIVE | Noted: 2024-12-12

## 2024-12-12 PROCEDURE — 99309 SBSQ NF CARE MODERATE MDM 30: CPT | Performed by: FAMILY MEDICINE

## 2024-12-12 NOTE — PROGRESS NOTES
Resident Seen 24 -- MP    CC: [facility] ([facility]) Recheck    : 1940  SNF H&P done 10/1/20, 24  DC Summary dated 24 reviewed 24  ALLERGY: Dust, Mold  DNR-CC    S: 83 yo woman with PD, HTN, DM, schizoaffective disorder. Recent non-operable T8 vertebral fx. No cp, sob. Cough and dyspnea -- no hypoxia. Med list & problem list reviewed.    O: VSS AFEB Wt 144# (up 6#) Awake, alert, pleasantly confused, NAD. Chest cta. Heart rrr, 2/ MADELYN. Ext no c/c/e. TLSO back brace on. HAHN.    LAB (24) Na 130, K 4.3, Gluc 238, Cr 0.37, Alb 2.9, Hgb 11.3    A/P:  # Recent COVID+ 2024: resolved.  # PD: OFF Sinemet, completed PT/OT. LTC.  # T8 compression fx: TLSO brace. 10/28/24 NS (Latasha) Requested follow-up T-spine xrays before discontinuing brace. Will order follow up xray (last 10/9/24) and copy to DR Pagan. Order TSpine xray at [facility].  # Obstructive uropathy: HAHN  # SIADH: Na stable OFF FR. NaCL 1g daily.  # Osteoporosis; Tymlos injection  # Dermatomyositis: prednisone, MTX, Clobetasol.  # Hx Right Humerus Fx: healed. .  # Anemia: Iron  # HTN: ACEi  # Schizoaffective DO: lamictal, Zyprexa, Haldol, clonazepam.  # insomnia: tolerating trazodone 50 mg at hs.  # DM: metformin, Linagliptin, Humalog SSI, statin.

## 2024-12-12 NOTE — LETTER
Patient: Jennifer Mancini  : 1940    Encounter Date: 2024    Resident Seen 24 -- MP    CC: SNF ([facility]) Recheck    : 1940  SNF H&P done 10/1/20, 24  DC Summary dated 24 reviewed 24  ALLERGY: Dust, Mold  DNR-CC    S: 85 yo woman with PD, HTN, DM, schizoaffective disorder. Recent non-operable T8 vertebral fx. No cp, sob. + COVID. Cough and dyspnea -- no hypoxia. Med list & problem list reviewed.    O: VSS AFEB Wt 138# (stable) Awake, alert, pleasantly confused, NAD. Chest cta. Heart rrr, / MADELYN. Ext no c/c/e. TLSO back brace on. HAHN.    LAB (24) Na 130, K 4.3, Gluc 238, Cr 0.37, Alb 2.9, Hgb 11.3    A/P:  # COVID+: mildly symptomatic. Agree with Paxlovid.  # PD: OFF Sinemet, completed PT/OT. LTC.  # T8 compression fx: TLSO brace. 10/28/24 NS (Latasha) Requested follow-up T-spine xrays before discontinuing brace. Will order follow up xray (last 10/9/24) and copy to DR Pagan.  # Obstructive uropathy: HAHN  # SIADH: Na stable OFF FR. NaCL 1g daily.  # Osteoporosis; Tymlos injection  # Dermatomyositis: prednisone, MTX, Clobetasol.  # Hx Right Humerus Fx: healed. .  # Anemia: Iron  # HTN: ACEi  # Schizoaffective DO: lamictal, Zyprexa, Haldol, clonazepam.  # insomnia: tolerating trazodone 50 mg at hs.  # DM: metformin, Linagliptin, Humalog SSI, statin.      Electronically Signed By: Bo Batista MD   12/15/24  2:24 PM  
DISPLAY PLAN FREE TEXT

## 2024-12-12 NOTE — PROGRESS NOTES
Resident Seen 24 -- MP    CC: [facility] ([facility]) Recheck    : 1940  SNF H&P done 10/1/20, 24  DC Summary dated 24 reviewed 24  ALLERGY: Dust, Mold  DNR-CC    S: 83 yo woman with PD, HTN, DM, schizoaffective disorder. Recent non-operable T8 vertebral fx. No cp, sob. + COVID. Cough and dyspnea -- no hypoxia. Med list & problem list reviewed.    O: VSS AFEB Wt 138# (stable) Awake, alert, pleasantly confused, NAD. Chest cta. Heart rrr, 2/6 MADELYN. Ext no c/c/e. TLSO back brace on. SELMA.    LAB (24) Na 130, K 4.3, Gluc 238, Cr 0.37, Alb 2.9, Hgb 11.3    A/P:  # COVID+: mildly symptomatic. Agree with Paxlovid.  # PD: OFF Sinemet, completed PT/OT. LTC.  # T8 compression fx: TLSO brace. 10/28/24 NS (Latasha) Requested follow-up T-spine xrays before discontinuing brace. Will order follow up xray (last 10/9/24) and copy to DR Pagan.  # Obstructive uropathy: SELMA  # SIADH: Na stable OFF FR. NaCL 1g daily.  # Osteoporosis; Tymlos injection  # Dermatomyositis: prednisone, MTX, Clobetasol.  # Hx Right Humerus Fx: healed. .  # Anemia: Iron  # HTN: ACEi  # Schizoaffective DO: lamictal, Zyprexa, Haldol, clonazepam.  # insomnia: tolerating trazodone 50 mg at hs.  # DM: metformin, Linagliptin, Humalog SSI, statin.

## 2024-12-13 ENCOUNTER — TELEPHONE (OUTPATIENT)
Dept: NEUROSURGERY | Facility: HOSPITAL | Age: 84
End: 2024-12-13
Payer: COMMERCIAL

## 2024-12-13 NOTE — TELEPHONE ENCOUNTER
Dinesh from [facility] needs an order for pt to get a T-spine x-ray to see if she can get rid of TLSO brace.

## 2024-12-13 NOTE — TELEPHONE ENCOUNTER
Dinesh from [facility] is calling again regarding order for x-rays of T-spine to see if pt can take off brace.  Her skin is breaking down and they need to know what to do.

## 2024-12-20 ENCOUNTER — APPOINTMENT (OUTPATIENT)
Dept: RADIOLOGY | Facility: CLINIC | Age: 84
End: 2024-12-20
Payer: COMMERCIAL

## 2025-01-09 ENCOUNTER — NURSING HOME VISIT (OUTPATIENT)
Dept: POST ACUTE CARE | Facility: EXTERNAL LOCATION | Age: 85
End: 2025-01-09
Payer: COMMERCIAL

## 2025-01-09 DIAGNOSIS — E11.9 TYPE 2 DIABETES MELLITUS WITHOUT COMPLICATION, WITH LONG-TERM CURRENT USE OF INSULIN (MULTI): ICD-10-CM

## 2025-01-09 DIAGNOSIS — Z79.4 TYPE 2 DIABETES MELLITUS WITHOUT COMPLICATION, WITH LONG-TERM CURRENT USE OF INSULIN (MULTI): ICD-10-CM

## 2025-01-09 DIAGNOSIS — J32.9 SINUSITIS, UNSPECIFIED CHRONICITY, UNSPECIFIED LOCATION: ICD-10-CM

## 2025-01-09 NOTE — LETTER
Patient: Jennifer Mancini  : 1940    Encounter Date: 2025    Resident Seen 25 -- MP    CC: [facility] ([facility]) Recheck    : 1940  SNF H&P done 10/1/20, 24  DC Summary dated 24 reviewed 24  ALLERGY: Dust, Mold  DNR-CC    S: 83 yo woman with PD, HTN, DM, schizoaffective disorder. Pain from recent T8 vertebral fx has resolved. No cp, sob. + SInus pain and congestion. Med list & problem list reviewed.    O: VSS AFEB Wt 143# (down 1#) Awake, alert, pleasantly confused, NAD. Chest cta. Sinus ttp. Heart rrr, 2/6 MADELYN. Ext no c/c/e. HAHN.    LAB (24) Na 130, K 4.3, Gluc 238, Cr 0.37, Alb 2.9, Hgb 11.3    A/P:  # Sinusitis: treat with augmentin 875 bid x 7 days.  # DM: metformin, Linagliptin, Humalog SSI, statin.  # PD: OFF Sinemet, completed PT/OT. LTC.  # T8 compression fx: TLSO brace. 10/28/24 NS ([facility]) Requested follow-up T-spine xrays before discontinuing brace. Will order follow up xray (last 10/9/24) and copy to DR Pagan. Order TSpine xray at [facility].  # Obstructive uropathy: HAHN  # SIADH: Na stable OFF FR. NaCL 1g daily.  # Osteoporosis; Tymlos injection  # Dermatomyositis: prednisone, MTX, Clobetasol.  # Hx Right Humerus Fx: healed. .  # Anemia: Iron  # HTN: ACEi  # Schizoaffective DO: lamictal, Zyprexa, Haldol, clonazepam.  # insomnia: tolerating trazodone 50 mg at hs.  # Hx COVID+ 2024: resolved.      Electronically Signed By: Bo Batista MD   25  5:51 PM

## 2025-01-09 NOTE — PROGRESS NOTES
Resident Seen 25 -- MP    CC: [facility] ([facility]) Recheck    : 1940  SNF H&P done 10/1/20, 24  DC Summary dated 24 reviewed 24  ALLERGY: Dust, Mold  DNR-CC    S: 83 yo woman with PD, HTN, DM, schizoaffective disorder. Pain from recent T8 vertebral fx has resolved. No cp, sob. + SInus pain and congestion. Med list & problem list reviewed.    O: VSS AFEB Wt 143# (down 1#) Awake, alert, pleasantly confused, NAD. Chest cta. Sinus ttp. Heart rrr, 2/6 MADELYN. Ext no c/c/e. HAHN.    LAB (24) Na 130, K 4.3, Gluc 238, Cr 0.37, Alb 2.9, Hgb 11.3    A/P:  # Sinusitis: treat with augmentin 875 bid x 7 days.  # DM: metformin, Linagliptin, Humalog SSI, statin.  # PD: OFF Sinemet, completed PT/OT. LTC.  # T8 compression fx: TLSO brace. 10/28/24 NS ([place]) Requested follow-up T-spine xrays before discontinuing brace. Will order follow up xray (last 10/9/24) and copy to DR Pagan. Order TSpine xray at [facility].  # Obstructive uropathy: HAHN  # SIADH: Na stable OFF FR. NaCL 1g daily.  # Osteoporosis; Tymlos injection  # Dermatomyositis: prednisone, MTX, Clobetasol.  # Hx Right Humerus Fx: healed. .  # Anemia: Iron  # HTN: ACEi  # Schizoaffective DO: lamictal, Zyprexa, Haldol, clonazepam.  # insomnia: tolerating trazodone 50 mg at hs.  # Hx COVID+ 2024: resolved.

## 2025-01-31 ENCOUNTER — NURSING HOME VISIT (OUTPATIENT)
Dept: POST ACUTE CARE | Facility: EXTERNAL LOCATION | Age: 85
End: 2025-01-31
Payer: COMMERCIAL

## 2025-01-31 ENCOUNTER — LAB REQUISITION (OUTPATIENT)
Dept: LAB | Facility: HOSPITAL | Age: 85
End: 2025-01-31
Payer: COMMERCIAL

## 2025-01-31 DIAGNOSIS — I10 HYPERTENSION, UNSPECIFIED TYPE: ICD-10-CM

## 2025-01-31 DIAGNOSIS — E78.5 DYSLIPIDEMIA: ICD-10-CM

## 2025-01-31 DIAGNOSIS — E87.8 ELECTROLYTE DISORDER: ICD-10-CM

## 2025-01-31 DIAGNOSIS — E22.2 SIADH (SYNDROME OF INAPPROPRIATE ADH PRODUCTION) (MULTI): Primary | ICD-10-CM

## 2025-01-31 DIAGNOSIS — M33.13 DERMATOMYOSITIS (MULTI): ICD-10-CM

## 2025-01-31 DIAGNOSIS — Z86.718 HISTORY OF DVT (DEEP VEIN THROMBOSIS): ICD-10-CM

## 2025-01-31 DIAGNOSIS — E11.9 TYPE 2 DIABETES MELLITUS WITHOUT COMPLICATION, WITH LONG-TERM CURRENT USE OF INSULIN (MULTI): ICD-10-CM

## 2025-01-31 DIAGNOSIS — J02.9 PHARYNGITIS, UNSPECIFIED ETIOLOGY: ICD-10-CM

## 2025-01-31 DIAGNOSIS — K58.9 IRRITABLE BOWEL SYNDROME, UNSPECIFIED TYPE: ICD-10-CM

## 2025-01-31 DIAGNOSIS — E56.9 VITAMIN DEFICIENCY: ICD-10-CM

## 2025-01-31 DIAGNOSIS — R26.2 DIFFICULTY IN WALKING, NOT ELSEWHERE CLASSIFIED: ICD-10-CM

## 2025-01-31 DIAGNOSIS — J31.0 RHINITIS, UNSPECIFIED TYPE: ICD-10-CM

## 2025-01-31 DIAGNOSIS — E03.8 SUBCLINICAL HYPOTHYROIDISM: ICD-10-CM

## 2025-01-31 DIAGNOSIS — S22.49XS CLOSED FRACTURE OF MULTIPLE RIBS, UNSPECIFIED LATERALITY, SEQUELA: ICD-10-CM

## 2025-01-31 DIAGNOSIS — M81.0 OSTEOPOROSIS, UNSPECIFIED OSTEOPOROSIS TYPE, UNSPECIFIED PATHOLOGICAL FRACTURE PRESENCE: ICD-10-CM

## 2025-01-31 DIAGNOSIS — K21.9 GASTROESOPHAGEAL REFLUX DISEASE, UNSPECIFIED WHETHER ESOPHAGITIS PRESENT: ICD-10-CM

## 2025-01-31 DIAGNOSIS — R26.89 OTHER ABNORMALITIES OF GAIT AND MOBILITY: ICD-10-CM

## 2025-01-31 DIAGNOSIS — Z79.4 TYPE 2 DIABETES MELLITUS WITHOUT COMPLICATION, WITH LONG-TERM CURRENT USE OF INSULIN (MULTI): ICD-10-CM

## 2025-01-31 DIAGNOSIS — N13.9 OBSTRUCTIVE UROPATHY: ICD-10-CM

## 2025-01-31 DIAGNOSIS — S22.009S: ICD-10-CM

## 2025-01-31 LAB
ALBUMIN SERPL BCP-MCNC: 3.5 G/DL (ref 3.4–5)
ALP SERPL-CCNC: 81 U/L (ref 33–136)
ALT SERPL W P-5'-P-CCNC: 15 U/L (ref 7–45)
ANION GAP SERPL CALC-SCNC: 15 MMOL/L (ref 10–20)
AST SERPL W P-5'-P-CCNC: 12 U/L (ref 9–39)
BASOPHILS # BLD AUTO: 0.03 X10*3/UL (ref 0–0.1)
BASOPHILS NFR BLD AUTO: 0.4 %
BILIRUB SERPL-MCNC: 0.3 MG/DL (ref 0–1.2)
BUN SERPL-MCNC: 11 MG/DL (ref 6–23)
CALCIUM SERPL-MCNC: 9 MG/DL (ref 8.6–10.3)
CHLORIDE SERPL-SCNC: 94 MMOL/L (ref 98–107)
CO2 SERPL-SCNC: 27 MMOL/L (ref 21–32)
CREAT SERPL-MCNC: 0.51 MG/DL (ref 0.5–1.05)
CREAT UR-MCNC: 14.1 MG/DL (ref 20–320)
EGFRCR SERPLBLD CKD-EPI 2021: >90 ML/MIN/1.73M*2
EOSINOPHIL # BLD AUTO: 0.07 X10*3/UL (ref 0–0.4)
EOSINOPHIL NFR BLD AUTO: 0.9 %
ERYTHROCYTE [DISTWIDTH] IN BLOOD BY AUTOMATED COUNT: 14.6 % (ref 11.5–14.5)
FOLATE SERPL-MCNC: >24 NG/ML
GLUCOSE SERPL-MCNC: 226 MG/DL (ref 74–99)
HCT VFR BLD AUTO: 38.4 % (ref 36–46)
HGB BLD-MCNC: 12.1 G/DL (ref 12–16)
IMM GRANULOCYTES # BLD AUTO: 0.08 X10*3/UL (ref 0–0.5)
IMM GRANULOCYTES NFR BLD AUTO: 1.1 % (ref 0–0.9)
LYMPHOCYTES # BLD AUTO: 1.26 X10*3/UL (ref 0.8–3)
LYMPHOCYTES NFR BLD AUTO: 16.7 %
MCH RBC QN AUTO: 32 PG (ref 26–34)
MCHC RBC AUTO-ENTMCNC: 31.5 G/DL (ref 32–36)
MCV RBC AUTO: 102 FL (ref 80–100)
MONOCYTES # BLD AUTO: 0.56 X10*3/UL (ref 0.05–0.8)
MONOCYTES NFR BLD AUTO: 7.4 %
NEUTROPHILS # BLD AUTO: 5.56 X10*3/UL (ref 1.6–5.5)
NEUTROPHILS NFR BLD AUTO: 73.5 %
NRBC BLD-RTO: 0 /100 WBCS (ref 0–0)
OSMOLALITY SERPL: 283 MOSM/KG (ref 280–300)
OSMOLALITY UR: 267 MOSM/KG (ref 200–1200)
PLATELET # BLD AUTO: 436 X10*3/UL (ref 150–450)
POTASSIUM SERPL-SCNC: 4.6 MMOL/L (ref 3.5–5.3)
PROT SERPL-MCNC: 6.1 G/DL (ref 6.4–8.2)
RBC # BLD AUTO: 3.78 X10*6/UL (ref 4–5.2)
SODIUM SERPL-SCNC: 131 MMOL/L (ref 136–145)
SODIUM UR-SCNC: 69 MMOL/L
SODIUM/CREAT UR-RTO: 489 MMOL/G CREAT
WBC # BLD AUTO: 7.6 X10*3/UL (ref 4.4–11.3)

## 2025-01-31 PROCEDURE — 99309 SBSQ NF CARE MODERATE MDM 30: CPT | Performed by: NURSE PRACTITIONER

## 2025-01-31 PROCEDURE — 82570 ASSAY OF URINE CREATININE: CPT | Mod: OUT,GEALAB | Performed by: FAMILY MEDICINE

## 2025-01-31 PROCEDURE — 85025 COMPLETE CBC W/AUTO DIFF WBC: CPT | Mod: OUT | Performed by: FAMILY MEDICINE

## 2025-01-31 PROCEDURE — 83930 ASSAY OF BLOOD OSMOLALITY: CPT | Mod: OUT,GEALAB | Performed by: FAMILY MEDICINE

## 2025-01-31 PROCEDURE — 82746 ASSAY OF FOLIC ACID SERUM: CPT | Mod: OUT,GEALAB | Performed by: FAMILY MEDICINE

## 2025-01-31 PROCEDURE — 36415 COLL VENOUS BLD VENIPUNCTURE: CPT | Mod: OUT | Performed by: FAMILY MEDICINE

## 2025-01-31 PROCEDURE — 80051 ELECTROLYTE PANEL: CPT | Mod: OUT | Performed by: FAMILY MEDICINE

## 2025-01-31 PROCEDURE — 83935 ASSAY OF URINE OSMOLALITY: CPT | Mod: OUT,GEALAB | Performed by: FAMILY MEDICINE

## 2025-02-04 NOTE — PROGRESS NOTES
*Provider Impression*    Patient is an 84 year old female who is seen today for management of multiple medical problems       #SIADH - NaCl 1gram TID, f/u w/ nephrology  #Rhinitis / Pharyngitis - isolation, supportive care, flonase 50mcg daily, guaifenesin 200mg q4h PRN, cepacol q2h PRN  albuterol 2 puff q4h PRN, Robitussin DM 200mg/20mg/10mL q4h PRN  #T8 vertebral body fracture / Rib fractures / Osteoporosis - TLSO, oxycodone 5mg q4h PRN, ibuprofen 600mg q6h PRN, acetaminophen 975mg TID, lidocaine 4% BID PRN, Tymlos 80mcg daily, calcium+D 600mg/800units BID, methocarbamol 750mg BID and 500mg q6h PRN, f/u w/ neurosurgery Dr. Pagan  #HTN / HLD - atorvastatin 40mg QHS, metoprolol ER 25mg daily, lasix 40mg daily  #DVT - eliquis 5mg BID  #T2DM - linagliptin 5mg daily, metformin 500mg BID, insulin lispro SSI  #GERD / IBS - colace 100mg daily, pepcid 40mg QHS, calcium carbonate 500mg daily, imodium 2mg q6h PRN, sennokot-S 8.6/50mg 2 tabs QHS, miralax 17grams daily  #Subclinical hypothyroidism - synthroid 25mcg daily, f/u w/ endocrinology  #Obstructive uropathy - [place], tamsulosin 0.4mg daily, f/u w/ urology, plan for void trial  #Dermatomyositis - triamcinolone 0.05% BID PRN, hydrocortisone 2.5% TID, methotrexate 15mg weekly, prednisone 4mg BID  #Vitamin deficiency / Electrolyte disorder - folic acid 1mg daily, magnesium oxide 400mg daily,   #Glaucoma - mgmt per ophthalmology - latanoprost, azopt  #Schizophrenia / Insomnia - mgmt per psych - haldol, melatonin, olanzapine, venlafaxine  #ACP - DNC  Follow up as needed      *Chief Complaint*     SIADH    *History of Present Illness*    Patient is an 85 y/o female LTC resident of [facility] w/ PMH as below who is seen today for f/u and management of multiple medical problems.    Her oxy adjusted, started on eliquis, lovenox d/c, started on synthroid. Nursing staff reporting high volume urine output up to over 6L per day. Ordered serum and urine osmolalities and lytes,  still pending today.    She is seen sitting up in her WC today and reports she doesn't think she's drinking too much, no f/c, sweats, dizziness, CP, SOB, no new cough, n/v, constipation, diarrhea, LUTS, numbness, tingling, paresthesia, output ok, and no other new c/o presently.     Allergies - Dust, Mold  PMH - HFpEF, HTN, HLD, MI, aortic stenosis, T2DM, hyponatremia, dermatomyositis, schizoaffective disorder, GERD, anemia, depression, T8 fracture, uterine prolapse  PSH - Tonsillectomy, Rotator cuff repair, wrist surgery, uterine surgery  FH - none reported  SocHx - None reported, No EtOH    *Review of Systems*  All other systems reviewed are negative except as noted in the HPI     *Vital Signs*   Date: 1/5/25  - T: 97.8  P: 76  R: 18  BP: 113/78  SpO2: 96% on RA ; Date: 1/31/24  Wt: 140.4    *Results / Data*  CBC - Date: 1/31/25  WBC: 7.6  HGB: 12.1  HCT: 38.4  PLT: 436  ;   BMP - Date: 1/31/25  Na: 131  K: 4.6  Cl: 94  Bicarb: 27  BUN: 11  Cr: 0.51  Glu: 226  Ca: 9.0  ;   LFT - Date: 1/31/25  AST: 12  ALT: 15  ALP: 81  Tbili: 0.3  ;   Coags - Date:   INR:   PT:  Other - Date: 9/6/24  BNP: 75    *Physical Exam*  Gen: (+) NAD, (+) well-appearing  HEENT: (+) normocephalic, (+) MMM  Neck: (+) supple  Lungs: (+) CTAB, (-) wheezes, (-) rales, (-) rhonchi  Heart: (+) RRR, (+) S1 S2, (-) murmurs  Pulses: (+) palpable  Abd: (+) soft, (+) NT, (+) ND, (+) BS+  Ext: (-) edema, (-) deformity  MSK: (-) joint swelling  Skin: (+) warm, (+) dry, (-) rash  Neuro: (+) follows commands, (-) tremor, (+) alert

## 2025-02-05 ENCOUNTER — NURSING HOME VISIT (OUTPATIENT)
Dept: POST ACUTE CARE | Facility: EXTERNAL LOCATION | Age: 85
End: 2025-02-05
Payer: COMMERCIAL

## 2025-02-05 DIAGNOSIS — K58.9 IRRITABLE BOWEL SYNDROME, UNSPECIFIED TYPE: ICD-10-CM

## 2025-02-05 DIAGNOSIS — S22.49XS CLOSED FRACTURE OF MULTIPLE RIBS, UNSPECIFIED LATERALITY, SEQUELA: ICD-10-CM

## 2025-02-05 DIAGNOSIS — I10 HYPERTENSION, UNSPECIFIED TYPE: ICD-10-CM

## 2025-02-05 DIAGNOSIS — E78.5 DYSLIPIDEMIA: ICD-10-CM

## 2025-02-05 DIAGNOSIS — K21.9 GASTROESOPHAGEAL REFLUX DISEASE, UNSPECIFIED WHETHER ESOPHAGITIS PRESENT: ICD-10-CM

## 2025-02-05 DIAGNOSIS — Z79.4 TYPE 2 DIABETES MELLITUS WITHOUT COMPLICATION, WITH LONG-TERM CURRENT USE OF INSULIN (MULTI): ICD-10-CM

## 2025-02-05 DIAGNOSIS — M33.13 DERMATOMYOSITIS (MULTI): ICD-10-CM

## 2025-02-05 DIAGNOSIS — M81.0 OSTEOPOROSIS, UNSPECIFIED OSTEOPOROSIS TYPE, UNSPECIFIED PATHOLOGICAL FRACTURE PRESENCE: ICD-10-CM

## 2025-02-05 DIAGNOSIS — Z86.718 HISTORY OF DVT (DEEP VEIN THROMBOSIS): ICD-10-CM

## 2025-02-05 DIAGNOSIS — E22.2 SIADH (SYNDROME OF INAPPROPRIATE ADH PRODUCTION) (MULTI): ICD-10-CM

## 2025-02-05 DIAGNOSIS — J02.9 PHARYNGITIS, UNSPECIFIED ETIOLOGY: ICD-10-CM

## 2025-02-05 DIAGNOSIS — S22.009S: ICD-10-CM

## 2025-02-05 DIAGNOSIS — R55 NEAR SYNCOPE: Primary | ICD-10-CM

## 2025-02-05 DIAGNOSIS — N13.9 OBSTRUCTIVE UROPATHY: ICD-10-CM

## 2025-02-05 DIAGNOSIS — E03.8 SUBCLINICAL HYPOTHYROIDISM: ICD-10-CM

## 2025-02-05 DIAGNOSIS — E11.9 TYPE 2 DIABETES MELLITUS WITHOUT COMPLICATION, WITH LONG-TERM CURRENT USE OF INSULIN (MULTI): ICD-10-CM

## 2025-02-05 DIAGNOSIS — J31.0 RHINITIS, UNSPECIFIED TYPE: ICD-10-CM

## 2025-02-05 PROCEDURE — 99309 SBSQ NF CARE MODERATE MDM 30: CPT | Performed by: NURSE PRACTITIONER

## 2025-02-10 NOTE — PROGRESS NOTES
*Provider Impression*    Patient is an 84 year old female who is seen today for management of multiple medical problems       #Near syncope / HTN / HLD - atorvastatin 40mg QHS, metoprolol ER 25mg daily, lasix 40mg daily, add midodrine 5mg daily at noon - hold for SBP>140, Guardian to determine if they want to proceed with checking echocardiogram, and bilateral carotid duplex US  #SIADH - NaCl 1gram TID, f/u w/ nephrology, consider checking TSH, uric acid, lipid  #Rhinitis / Pharyngitis - flonase 50mcg daily, guaifenesin 200mg q4h PRN, cepacol q2h PRN  albuterol 2 puff q4h PRN, Robitussin DM 200mg/20mg/10mL q4h PRN  #T8 vertebral body fracture / Rib fractures / Osteoporosis - TLSO, oxycodone 5mg q4h PRN, ibuprofen 600mg q6h PRN, acetaminophen 975mg TID, lidocaine 4% BID PRN, Tymlos 80mcg daily, calcium+D 600mg/800units BID, methocarbamol 750mg BID and 500mg q6h PRN, f/u w/ neurosurgery Dr. Pagan  #DVT - eliquis 5mg BID  #T2DM - linagliptin 5mg daily, metformin 500mg BID, insulin lispro SSI  #GERD / IBS - colace 100mg daily, pepcid 40mg QHS, calcium carbonate 500mg daily, imodium 2mg q6h PRN, sennokot-S 8.6/50mg 2 tabs QHS, miralax 17grams daily  #Hypothyroidism - synthroid 25mcg daily, f/u w/ endocrinology  #Obstructive uropathy - [place], tamsulosin 0.4mg daily, f/u w/ urology, plan for void trial  #Dermatomyositis - triamcinolone 0.05% BID PRN, hydrocortisone 2.5% TID, methotrexate 15mg weekly, prednisone 4mg BID  #Vitamin deficiency / Electrolyte disorder - folic acid 1mg daily, magnesium oxide 400mg daily,   #Glaucoma - mgmt per ophthalmology - latanoprost, azopt  #Schizophrenia / Insomnia - mgmt per psych - haldol, melatonin, olanzapine, venlafaxine  #[facility] - DNC  Follow up as needed      *Chief Complaint*     near syncope    *History of Present Illness*    Patient is an 83 y/o female LTC resident of [facility] w/ PM as below who is seen today for f/u and management of multiple medical problems.    Per  nursing staff she had near-syncopal episode at lunch 2 days in a row.    She is seen sitting up in her room today and denies any dizziness, little hypoxic, no CP, SOB, palpitations, n/v, constipation, diarrhea, LUTS, or other new c/o presently.     Recommend additional evaluation, however, family uncertain about pursuing any further evaluation and management. They will discuss and decide.     Allergies - Dust, Mold  PMH - HFpEF, HTN, HLD, MI, aortic stenosis, T2DM, hyponatremia, dermatomyositis, schizoaffective disorder, GERD, anemia, depression, T8 fracture, uterine prolapse  PSH - Tonsillectomy, Rotator cuff repair, wrist surgery, uterine surgery  FH - none reported  SocHx - None reported, No EtOH    *Review of Systems*  All other systems reviewed are negative except as noted in the HPI     *Vital Signs*   Date: 1/5/25  - T: 97.8  P: 76  R: 18  BP: 113/78  SpO2: 96% on RA ; Date: 1/31/24  Wt: 140.4    *Results / Data*  CBC - Date: 1/31/25  WBC: 7.6  HGB: 12.1  HCT: 38.4  PLT: 436  ;   BMP - Date: 1/31/25  Na: 131  K: 4.6  Cl: 94  Bicarb: 27  BUN: 11  Cr: 0.51  Glu: 226  Ca: 9.0  ;   LFT - Date: 1/31/25  AST: 12  ALT: 15  ALP: 81  Tbili: 0.3  ;   Coags - Date:   INR:   PT:  Other - Date: 9/6/24  BNP: 75 ;  11/22/24  TSH: 6.10;  1/31/25  Urine osm: 267  Serum osm: 283  Urine Na: 69  Total protein: 6.1  1    *Physical Exam*  Gen: (+) NAD, (+) well-appearing  HEENT: (+) normocephalic, (+) MMM  Neck: (+) supple  Lungs: (+) CTAB, (-) wheezes, (-) rales, (-) rhonchi  Heart: (+) RRR, (+) S1 S2, (-) murmurs  Pulses: (+) palpable  Abd: (+) soft, (+) NT, (+) ND, (+) BS+  Ext: (-) edema, (-) deformity  MSK: (-) joint swelling  Skin: (+) warm, (+) dry, (-) rash  Neuro: (+) follows commands, (-) tremor, (+) alert

## 2025-02-11 ENCOUNTER — LAB REQUISITION (OUTPATIENT)
Dept: LAB | Facility: HOSPITAL | Age: 85
End: 2025-02-11
Payer: COMMERCIAL

## 2025-02-11 DIAGNOSIS — J10.00 INFLUENZA DUE TO OTHER IDENTIFIED INFLUENZA VIRUS WITH UNSPECIFIED TYPE OF PNEUMONIA: ICD-10-CM

## 2025-02-11 PROCEDURE — 87636 SARSCOV2 & INF A&B AMP PRB: CPT | Mod: OUT,GEALAB | Performed by: FAMILY MEDICINE

## 2025-02-11 PROCEDURE — 87637 SARSCOV2&INF A&B&RSV AMP PRB: CPT | Mod: OUT,GEALAB | Performed by: FAMILY MEDICINE

## 2025-02-12 LAB
FLUAV RNA RESP QL NAA+PROBE: DETECTED
FLUBV RNA RESP QL NAA+PROBE: NOT DETECTED
RSV RNA RESP QL NAA+PROBE: NOT DETECTED
SARS-COV-2 ORF1AB RESP QL NAA+PROBE: NOT DETECTED

## 2025-02-27 ENCOUNTER — APPOINTMENT (OUTPATIENT)
Dept: ORTHOPEDIC SURGERY | Facility: HOSPITAL | Age: 85
End: 2025-02-27
Payer: COMMERCIAL

## 2025-02-27 ENCOUNTER — NURSING HOME VISIT (OUTPATIENT)
Dept: POST ACUTE CARE | Facility: EXTERNAL LOCATION | Age: 85
End: 2025-02-27
Payer: COMMERCIAL

## 2025-02-27 DIAGNOSIS — I10 HYPERTENSION, UNSPECIFIED TYPE: ICD-10-CM

## 2025-02-27 DIAGNOSIS — M33.13 DERMATOMYOSITIS (MULTI): Primary | ICD-10-CM

## 2025-02-27 PROCEDURE — 99309 SBSQ NF CARE MODERATE MDM 30: CPT | Performed by: FAMILY MEDICINE

## 2025-02-27 NOTE — PROGRESS NOTES
Resident Seen 25 -- MP    CC: [facility] ([facility]) Recheck    : 1940  SNF H&P done 10/1/20, 24  DC Summary dated 24 reviewed 24  ALLERGY: Dust, Mold  DNR-CC    S: 83 yo woman with PD, HTN, DM, schizoaffective disorder. Blisters forming on lower extremities. No cp, sob.  Med list & problem list reviewed.    O: VSS AFEB Wt 142# (down 1#) Awake, alert, pleasantly confused, NAD. Chest cta. Sinus ttp. Heart rrr,  MADELYN. Ext no c/c/e. HAHN.  LE with scabbed blisters.     LAB (24) Na 130, K 4.3, Gluc 238, Cr 0.37, Alb 2.9, Hgb 11.3    A/P:  # DM: metformin, Linagliptin, Humalog SSI, statin.  # PD: OFF Sinemet, completed PT/OT. LTC.  # T8 compression fx: TLSO brace discontinued.  # Obstructive uropathy: HAHN  # SIADH: Na stable OFF FR. NaCL 1g daily.  # Osteoporosis; Tymlos injection  # Dermatomyositis: resume prednisone burst and taper, continue MTX, Clobetasol.  # Hx Right Humerus Fx: healed. .  # Anemia: Iron  # HTN: ACEi  # Schizoaffective DO: lamictal, Zyprexa, Haldol, clonazepam.  # insomnia: tolerating trazodone 50 mg at hs.  # Hx COVID+ 2024: resolved.

## 2025-02-27 NOTE — LETTER
Patient: Jennifer Mancini  : 1940    Encounter Date: 2025    Resident Seen 25 -- MP    CC: [facility] ([facility]) Recheck    : 1940  SNF H&P done 10/1/20, 24  DC Summary dated 24 reviewed 24  ALLERGY: Dust, Mold  DNR-CC    S: 85 yo woman with PD, HTN, DM, schizoaffective disorder. Blisters forming on lower extremities. No cp, sob.  Med list & problem list reviewed.    O: VSS AFEB Wt 142# (down 1#) Awake, alert, pleasantly confused, NAD. Chest cta. Sinus ttp. Heart rrr,  MADELYN. Ext no c/c/e. HAHN.  LE with scabbed blisters.     LAB (24) Na 130, K 4.3, Gluc 238, Cr 0.37, Alb 2.9, Hgb 11.3    A/P:  # DM: metformin, Linagliptin, Humalog SSI, statin.  # PD: OFF Sinemet, completed PT/OT. LTC.  # T8 compression fx: TLSO brace discontinued.  # Obstructive uropathy: HAHN  # SIADH: Na stable OFF FR. NaCL 1g daily.  # Osteoporosis; Tymlos injection  # Dermatomyositis: resume prednisone burst and taper, continue MTX, Clobetasol.  # Hx Right Humerus Fx: healed. .  # Anemia: Iron  # HTN: ACEi  # Schizoaffective DO: lamictal, Zyprexa, Haldol, clonazepam.  # insomnia: tolerating trazodone 50 mg at hs.  # Hx COVID+ 2024: resolved.      Electronically Signed By: Bo Batista MD   25  2:41 PM

## 2025-03-13 ENCOUNTER — NURSING HOME VISIT (OUTPATIENT)
Dept: POST ACUTE CARE | Facility: EXTERNAL LOCATION | Age: 85
End: 2025-03-13
Payer: COMMERCIAL

## 2025-03-13 DIAGNOSIS — E78.5 DYSLIPIDEMIA: ICD-10-CM

## 2025-03-13 DIAGNOSIS — R13.12 OROPHARYNGEAL DYSPHAGIA: ICD-10-CM

## 2025-03-13 DIAGNOSIS — M33.13 DERMATOMYOSITIS (MULTI): Primary | ICD-10-CM

## 2025-03-13 PROCEDURE — 99309 SBSQ NF CARE MODERATE MDM 30: CPT | Performed by: FAMILY MEDICINE

## 2025-03-13 NOTE — LETTER
Patient: Jennifer Mancini  : 1940    Encounter Date: 2025    Resident Seen 3/13/25 -- MP    CC: [facility] ([facility]) Recheck    : 1940  SNF H&P done 10/1/20, 24  DC Summary dated 24 reviewed 24  ALLERGY: Dust, Mold  DNR-CC    S: 83 yo woman with PD, HTN, DM, schizoaffective disorder. Mild cough with drinking.  No cp, sob. Med list & problem list reviewed.    O: VSS AFEB Wt 137# (down 5#) Awake, alert, pleasantly confused, NAD. Chest cta. Sinus ttp. Heart rrr, 2/6 MADELYN. Ext no c/c/e. HAHN. LE with healing blisters.    LAB (24) Na 130, K 4.3, Gluc 238, Cr 0.37, Alb 2.9, Hgb 11.3    A/P:  # DM: metformin, Linagliptin, Humalog SSI, statin.  # Dysphagia: nectar thick liquids.   # PD: OFF Sinemet, completed PT/OT. LTC.  # T8 compression fx: TLSO brace discontinued.  # Obstructive uropathy: HAHN  # SIADH: Na stable OFF FR. NaCL 1g daily.  # Osteoporosis; Tymlos injection  # Dermatomyositis: resume prednisone burst and taper, continue MTX, Clobetasol.  # Hx Right Humerus Fx: healed. .  # Anemia: Iron  # HTN: ACEi  # Schizoaffective DO: lamictal, Zyprexa, Haldol, clonazepam.  # insomnia: tolerating trazodone 50 mg at hs.  # Hx COVID+ 2024: resolved.      Electronically Signed By: Bo Batista MD   3/13/25  9:47 PM

## 2025-03-14 NOTE — PROGRESS NOTES
Resident Seen 3/13/25 -- MP    CC: [facility] ([facility]) Recheck    : 1940  SNF H&P done 10/1/20, 24  DC Summary dated 24 reviewed 24  ALLERGY: Dust, Mold  DNR-CC    S: 83 yo woman with PD, HTN, DM, schizoaffective disorder. Mild cough with drinking.  No cp, sob. Med list & problem list reviewed.    O: VSS AFEB Wt 137# (down 5#) Awake, alert, pleasantly confused, NAD. Chest cta. Sinus ttp. Heart rrr, 2 MADELYN. Ext no c/c/e. HAHN. LE with healing blisters.    LAB (24) Na 130, K 4.3, Gluc 238, Cr 0.37, Alb 2.9, Hgb 11.3    A/P:  # DM: metformin, Linagliptin, Humalog SSI, statin.  # Dysphagia: nectar thick liquids.   # PD: OFF Sinemet, completed PT/OT. LTC.  # T8 compression fx: TLSO brace discontinued.  # Obstructive uropathy: HAHN  # SIADH: Na stable OFF FR. NaCL 1g daily.  # Osteoporosis; Tymlos injection  # Dermatomyositis: resume prednisone burst and taper, continue MTX, Clobetasol.  # Hx Right Humerus Fx: healed. .  # Anemia: Iron  # HTN: ACEi  # Schizoaffective DO: lamictal, Zyprexa, Haldol, clonazepam.  # insomnia: tolerating trazodone 50 mg at hs.  # Hx COVID+ 2024: resolved.

## 2025-04-10 ENCOUNTER — NURSING HOME VISIT (OUTPATIENT)
Dept: POST ACUTE CARE | Facility: EXTERNAL LOCATION | Age: 85
End: 2025-04-10
Payer: COMMERCIAL

## 2025-04-10 DIAGNOSIS — E11.9 TYPE 2 DIABETES MELLITUS WITHOUT COMPLICATION, WITH LONG-TERM CURRENT USE OF INSULIN: ICD-10-CM

## 2025-04-10 DIAGNOSIS — I10 HYPERTENSION, UNSPECIFIED TYPE: ICD-10-CM

## 2025-04-10 DIAGNOSIS — Z79.4 TYPE 2 DIABETES MELLITUS WITHOUT COMPLICATION, WITH LONG-TERM CURRENT USE OF INSULIN: ICD-10-CM

## 2025-04-10 DIAGNOSIS — M33.13 DERMATOMYOSITIS (MULTI): Primary | ICD-10-CM

## 2025-04-10 NOTE — LETTER
Patient: Jennifer Mancini  : 1940    Encounter Date: 04/10/2025    Resident Seen 4/10/25 -- MP    CC: [facility] ([facility]) Recheck    : 1940  SNF H&P done 10/1/20, 24  DC Summary dated 24 reviewed 24  ALLERGY: Dust, Mold  DNR-CC    S: 83 yo woman with PD, HTN, DM, schizoaffective disorder. Worsening LE blisters. No cp, sob. Med list & problem list reviewed.    O: VSS AFEB Wt 137# Awake, alert, pleasantly confused, NAD. Chest cta. Sinus ttp. Heart rrr, 2/6 MADELYN. Ext no c/c/e. HAHN. LE with spreading blisters.    LAB (24) Na 130, K 4.3, Gluc 238, Cr 0.37, Alb 2.9, Hgb 11.3    A/P:  # DM: metformin, Linagliptin, Humalog SSI, statin.  # Dysphagia: nectar thick liquids.  # PD: OFF Sinemet, completed PT/OT. LTC.  # Hx T8 compression fx: TLSO brace discontinued.  # Obstructive uropathy: HAHN  # SIADH: Na stable OFF FR. NaCL 1g daily.  # Osteoporosis; Tymlos injection  # Dermatomyositis: resume prednisone burst and taper 20 mg x 14d, 15 mg x 14 days then 10 mg DAILY, continue MTX, Clobetasol.  # Hx Right Humerus Fx: healed. .  # Anemia: Iron  # HTN: ACEi  # Schizoaffective DO: lamictal, Zyprexa, Haldol, clonazepam.  # insomnia: tolerating trazodone 50 mg at hs.  # Hx COVID+ 2024: resolved.      Electronically Signed By: oB Batista MD   25  4:40 PM

## 2025-04-12 NOTE — PROGRESS NOTES
Resident Seen 4/10/25 -- MP    CC: [facility] ([facility]) Recheck    : 1940  SNF H&P done 10/1/20, 24  DC Summary dated 24 reviewed 24  ALLERGY: Dust, Mold  DNR-CC    S: 83 yo woman with PD, HTN, DM, schizoaffective disorder. Worsening LE blisters. No cp, sob. Med list & problem list reviewed.    O: VSS AFEB Wt 137# Awake, alert, pleasantly confused, NAD. Chest cta. Sinus ttp. Heart rrr, 2/6 MADELYN. Ext no c/c/e. HAHN. LE with spreading blisters.    LAB (24) Na 130, K 4.3, Gluc 238, Cr 0.37, Alb 2.9, Hgb 11.3    A/P:  # DM: metformin, Linagliptin, Humalog SSI, statin.  # Dysphagia: nectar thick liquids.  # PD: OFF Sinemet, completed PT/OT. LTC.  # Hx T8 compression fx: TLSO brace discontinued.  # Obstructive uropathy: HAHN  # SIADH: Na stable OFF FR. NaCL 1g daily.  # Osteoporosis; Tymlos injection  # Dermatomyositis: resume prednisone burst and taper 20 mg x 14d, 15 mg x 14 days then 10 mg DAILY, continue MTX, Clobetasol.  # Hx Right Humerus Fx: healed. .  # Anemia: Iron  # HTN: ACEi  # Schizoaffective DO: lamictal, Zyprexa, Haldol, clonazepam.  # insomnia: tolerating trazodone 50 mg at hs.  # Hx COVID+ 2024: resolved.

## 2025-04-14 ENCOUNTER — LAB REQUISITION (OUTPATIENT)
Dept: LAB | Facility: HOSPITAL | Age: 85
End: 2025-04-14
Payer: COMMERCIAL

## 2025-04-14 DIAGNOSIS — E87.1 HYPO-OSMOLALITY AND HYPONATREMIA: ICD-10-CM

## 2025-04-14 DIAGNOSIS — E11.3311 TYPE 2 DIABETES MELLITUS WITH MODERATE NONPROLIFERATIVE DIABETIC RETINOPATHY WITH MACULAR EDEMA, RIGHT EYE: ICD-10-CM

## 2025-04-14 DIAGNOSIS — I50.9 HEART FAILURE, UNSPECIFIED: ICD-10-CM

## 2025-04-14 LAB
ALBUMIN SERPL BCP-MCNC: 2.4 G/DL (ref 3.4–5)
ALP SERPL-CCNC: 99 U/L (ref 33–136)
ALT SERPL W P-5'-P-CCNC: 20 U/L (ref 7–45)
ANION GAP SERPL CALC-SCNC: 14 MMOL/L (ref 10–20)
AST SERPL W P-5'-P-CCNC: 17 U/L (ref 9–39)
BILIRUB SERPL-MCNC: 0.5 MG/DL (ref 0–1.2)
BUN SERPL-MCNC: 22 MG/DL (ref 6–23)
CALCIUM SERPL-MCNC: 7.5 MG/DL (ref 8.6–10.3)
CHLORIDE SERPL-SCNC: 97 MMOL/L (ref 98–107)
CO2 SERPL-SCNC: 24 MMOL/L (ref 21–32)
CREAT SERPL-MCNC: 0.26 MG/DL (ref 0.5–1.05)
EGFRCR SERPLBLD CKD-EPI 2021: >90 ML/MIN/1.73M*2
GLUCOSE SERPL-MCNC: 126 MG/DL (ref 74–99)
POTASSIUM SERPL-SCNC: 4.1 MMOL/L (ref 3.5–5.3)
PROT SERPL-MCNC: 4.6 G/DL (ref 6.4–8.2)
SODIUM SERPL-SCNC: 131 MMOL/L (ref 136–145)

## 2025-04-14 PROCEDURE — 84075 ASSAY ALKALINE PHOSPHATASE: CPT | Mod: OUT | Performed by: FAMILY MEDICINE

## 2025-04-14 PROCEDURE — 36415 COLL VENOUS BLD VENIPUNCTURE: CPT | Mod: OUT | Performed by: FAMILY MEDICINE

## 2025-04-25 ENCOUNTER — LAB REQUISITION (OUTPATIENT)
Dept: LAB | Facility: HOSPITAL | Age: 85
End: 2025-04-25
Payer: COMMERCIAL

## 2025-04-25 ENCOUNTER — NURSING HOME VISIT (OUTPATIENT)
Dept: POST ACUTE CARE | Facility: EXTERNAL LOCATION | Age: 85
End: 2025-04-25
Payer: COMMERCIAL

## 2025-04-25 DIAGNOSIS — K21.9 GASTROESOPHAGEAL REFLUX DISEASE, UNSPECIFIED WHETHER ESOPHAGITIS PRESENT: ICD-10-CM

## 2025-04-25 DIAGNOSIS — Z79.4 TYPE 2 DIABETES MELLITUS WITHOUT COMPLICATION, WITH LONG-TERM CURRENT USE OF INSULIN: ICD-10-CM

## 2025-04-25 DIAGNOSIS — N13.9 OBSTRUCTIVE UROPATHY: ICD-10-CM

## 2025-04-25 DIAGNOSIS — M33.13 DERMATOMYOSITIS (MULTI): ICD-10-CM

## 2025-04-25 DIAGNOSIS — E22.2 SIADH (SYNDROME OF INAPPROPRIATE ADH PRODUCTION) (MULTI): ICD-10-CM

## 2025-04-25 DIAGNOSIS — E78.5 DYSLIPIDEMIA: ICD-10-CM

## 2025-04-25 DIAGNOSIS — J02.9 PHARYNGITIS, UNSPECIFIED ETIOLOGY: ICD-10-CM

## 2025-04-25 DIAGNOSIS — S22.009S: ICD-10-CM

## 2025-04-25 DIAGNOSIS — I10 ESSENTIAL (PRIMARY) HYPERTENSION: ICD-10-CM

## 2025-04-25 DIAGNOSIS — E87.8 ELECTROLYTE DISORDER: ICD-10-CM

## 2025-04-25 DIAGNOSIS — E11.9 TYPE 2 DIABETES MELLITUS WITHOUT COMPLICATION, WITH LONG-TERM CURRENT USE OF INSULIN: ICD-10-CM

## 2025-04-25 DIAGNOSIS — E03.8 SUBCLINICAL HYPOTHYROIDISM: ICD-10-CM

## 2025-04-25 DIAGNOSIS — S22.49XS CLOSED FRACTURE OF MULTIPLE RIBS, UNSPECIFIED LATERALITY, SEQUELA: ICD-10-CM

## 2025-04-25 DIAGNOSIS — M81.0 OSTEOPOROSIS, UNSPECIFIED OSTEOPOROSIS TYPE, UNSPECIFIED PATHOLOGICAL FRACTURE PRESENCE: ICD-10-CM

## 2025-04-25 DIAGNOSIS — Z86.718 HISTORY OF DVT (DEEP VEIN THROMBOSIS): ICD-10-CM

## 2025-04-25 DIAGNOSIS — J31.0 RHINITIS, UNSPECIFIED TYPE: ICD-10-CM

## 2025-04-25 DIAGNOSIS — I10 HYPERTENSION, UNSPECIFIED TYPE: ICD-10-CM

## 2025-04-25 DIAGNOSIS — E56.9 VITAMIN DEFICIENCY: ICD-10-CM

## 2025-04-25 DIAGNOSIS — I50.9 HEART FAILURE, UNSPECIFIED: ICD-10-CM

## 2025-04-25 DIAGNOSIS — K58.9 IRRITABLE BOWEL SYNDROME, UNSPECIFIED TYPE: Primary | ICD-10-CM

## 2025-04-25 LAB
ALBUMIN SERPL BCP-MCNC: 2.1 G/DL (ref 3.4–5)
ANION GAP SERPL CALC-SCNC: 13 MMOL/L (ref 10–20)
BNP SERPL-MCNC: 78 PG/ML (ref 0–99)
BUN SERPL-MCNC: 17 MG/DL (ref 6–23)
CALCIUM SERPL-MCNC: 7.7 MG/DL (ref 8.6–10.3)
CHLORIDE SERPL-SCNC: 101 MMOL/L (ref 98–107)
CO2 SERPL-SCNC: 25 MMOL/L (ref 21–32)
CREAT SERPL-MCNC: 0.32 MG/DL (ref 0.5–1.05)
EGFRCR SERPLBLD CKD-EPI 2021: >90 ML/MIN/1.73M*2
GLUCOSE SERPL-MCNC: 90 MG/DL (ref 74–99)
PHOSPHATE SERPL-MCNC: 3.4 MG/DL (ref 2.5–4.9)
POTASSIUM SERPL-SCNC: 3.8 MMOL/L (ref 3.5–5.3)
SODIUM SERPL-SCNC: 135 MMOL/L (ref 136–145)

## 2025-04-25 PROCEDURE — 36415 COLL VENOUS BLD VENIPUNCTURE: CPT | Mod: OUT | Performed by: NURSE PRACTITIONER

## 2025-04-25 PROCEDURE — 84134 ASSAY OF PREALBUMIN: CPT | Mod: OUT,GEALAB | Performed by: NURSE PRACTITIONER

## 2025-04-25 PROCEDURE — 99309 SBSQ NF CARE MODERATE MDM 30: CPT | Performed by: NURSE PRACTITIONER

## 2025-04-25 PROCEDURE — 84100 ASSAY OF PHOSPHORUS: CPT | Mod: OUT | Performed by: NURSE PRACTITIONER

## 2025-04-25 PROCEDURE — 83880 ASSAY OF NATRIURETIC PEPTIDE: CPT | Mod: OUT | Performed by: NURSE PRACTITIONER

## 2025-04-26 LAB — PREALB SERPL-MCNC: 14.5 MG/DL (ref 18–40)

## 2025-04-29 NOTE — PROGRESS NOTES
*Provider Impression*    Patient is an 84 year old female who is seen today for management of multiple medical problems       #IBS / GERD - colace 100mg daily, pepcid 40mg QHS, calcium carbonate 500mg daily, imodium 2mg q6h PRN, sennokot-S 8.6/50mg 2 tabs QHS, miralax 17grams daily PRN, add florastor 250mg BID, add cholestyramine 4grams daily,  #T2DM - linagliptin 5mg daily, metformin 500mg BID, d/c SSI  #HTN / HLD - atorvastatin 40mg QHS, metoprolol 25mg BID, lasix 40mg daily, midodrine 5mg daily at noon - hold for SBP>140  #SIADH - NaCl 1gram TID, f/u w/ nephrology  #Rhinitis / Pharyngitis - flonase 50mcg daily, guaifenesin 200mg q4h PRN, cepacol q2h PRN  albuterol 2 puff q4h PRN, Robitussin DM 200mg/20mg/10mL q4h PRN  #T8 vertebral body fracture / Rib fractures / Osteoporosis - TLSO, oxycodone 5mg q4h PRN, ibuprofen 600mg q6h PRN, acetaminophen 975mg TID, lidocaine 4% BID PRN, Tymlos 80mcg daily, calcium+D 600mg/800units BID, methocarbamol 750mg BID and 500mg q6h PRN, f/u w/ neurosurgery Dr. Pagan  #DVT - eliquis 5mg BID  #Hypothyroidism - synthroid 25mcg daily, f/u w/ endocrinology  #Obstructive uropathy - [place], tamsulosin 0.4mg daily, f/u w/ urology, plan for void trial  #Dermatomyositis - clobetasol 0.05% BID, hydrocortisone 2.5% TID, methotrexate 15mg weekly, prednisone 20mg daily  #Vitamin deficiency / Electrolyte disorder - folic acid 1mg daily, magnesium oxide 400mg daily,   #Glaucoma - mgmt per ophthalmology - latanoprost, dorzolamide  #Schizophrenia / Insomnia - mgmt per psych - haldol, melatonin, olanzapine, venlafaxine  #ACP - DNRCC  Follow up as needed      *Chief Complaint*     IBS    *History of Present Illness*    Patient is an 85 y/o female LTC resident of [facility] w/ PMH as below who is seen today for f/u and management of multiple medical problems.    Labs appreciated today as below. Prednisone changed, clobetasol changed. Nursing staff report wts up, some diarrhea, glucose controlled on  SSI, no abd pain, no blood in stool, f/c, sweats, CP, SOB, dry cough, n/v, or any other new c/o presently.     Allergies - Dust, Mold  PMH - HFpEF, HTN, HLD, MI, aortic stenosis, T2DM, hyponatremia, dermatomyositis, schizoaffective disorder, GERD, anemia, depression, T8 fracture, uterine prolapse  PSH - Tonsillectomy, Rotator cuff repair, wrist surgery, uterine surgery  FH - none reported  SocHx - None reported, No EtOH    *Review of Systems*  All other systems reviewed are negative except as noted in the HPI     *Vital Signs*   Date: 4/10/25  - T: 97.2  P: 92  R: 18  BP: 122/80  SpO2: 96% on RA ; Date: 4/25/24  Wt: 148.1    *Results / Data*  CBC - Date: 1/31/25  WBC: 7.6  HGB: 12.1  HCT: 38.4  PLT: 436  ;   BMP - Date: 4/25/25  Na: 135  K: 3.8  Cl: 101  Bicarb: 25  BUN: 17  Cr: 0.32  Glu: 90  Ca: 7.7  Phos: 3.4  Alb: 2.1  ;   LFT - Date: 4/14/25  AST: 17  ALT: 20  ALP: 99  Tbili: 0.5  ;   Coags - Date:   INR:   PT:  Other - Date: 9/6/24  BNP: 75 ;  11/22/24  TSH: 6.10;  1/31/25  Urine osm: 267  Serum osm: 283  Urine Na: 69 ; 4/25/25  BNP: 78  Prealbumin: 14.5    *Physical Exam*  Gen: (+) NAD, (+) well-appearing  HEENT: (+) normocephalic, (+) MMM  Neck: (+) supple  Lungs: (+) CTAB, (-) wheezes, (-) rales, (-) rhonchi  Heart: (+) RRR, (+) S1 S2, (-) murmurs  Pulses: (+) palpable  Abd: (+) soft, (+) NT, (+) ND, (+) BS+  Ext: (-) edema, (-) deformity  MSK: (-) joint swelling  Skin: (+) warm, (+) dry, (-) rash  Neuro: (+) follows commands, (-) tremor, (+) alert

## 2025-05-05 PROCEDURE — 87493 C DIFF AMPLIFIED PROBE: CPT | Mod: OUT,GEALAB | Performed by: FAMILY MEDICINE

## 2025-05-06 ENCOUNTER — LAB REQUISITION (OUTPATIENT)
Dept: LAB | Facility: HOSPITAL | Age: 85
End: 2025-05-06
Payer: COMMERCIAL

## 2025-05-06 DIAGNOSIS — K58.0 IRRITABLE BOWEL SYNDROME WITH DIARRHEA: ICD-10-CM

## 2025-05-06 DIAGNOSIS — M33.90 DERMATOPOLYMYOSITIS, UNSPECIFIED, ORGAN INVOLVEMENT UNSPECIFIED (MULTI): ICD-10-CM

## 2025-05-06 DIAGNOSIS — I11.9 HYPERTENSIVE HEART DISEASE WITHOUT HEART FAILURE: ICD-10-CM

## 2025-05-06 LAB — C DIF TOX TCDA+TCDB STL QL NAA+PROBE: NOT DETECTED

## 2025-05-08 ENCOUNTER — NURSING HOME VISIT (OUTPATIENT)
Dept: POST ACUTE CARE | Facility: EXTERNAL LOCATION | Age: 85
End: 2025-05-08
Payer: COMMERCIAL

## 2025-05-08 DIAGNOSIS — I10 HYPERTENSION, UNSPECIFIED TYPE: Primary | ICD-10-CM

## 2025-05-08 DIAGNOSIS — E11.9 TYPE 2 DIABETES MELLITUS WITHOUT COMPLICATION, WITH LONG-TERM CURRENT USE OF INSULIN: ICD-10-CM

## 2025-05-08 DIAGNOSIS — M33.13 DERMATOMYOSITIS (MULTI): ICD-10-CM

## 2025-05-08 DIAGNOSIS — Z79.4 TYPE 2 DIABETES MELLITUS WITHOUT COMPLICATION, WITH LONG-TERM CURRENT USE OF INSULIN: ICD-10-CM

## 2025-05-08 PROCEDURE — 99309 SBSQ NF CARE MODERATE MDM 30: CPT | Performed by: FAMILY MEDICINE

## 2025-05-08 NOTE — LETTER
Patient: Jennifer Mancini  : 1940    Encounter Date: 2025    Resident Seen 25 -- MP    CC: [facility] ([facility]) Recheck    : 1940  SNF H&P done 10/1/20, 24  DC Summary dated 24 reviewed 24  ALLERGY: Dust, Mold  DNR-CC    S: 83 yo woman with PD, HTN, DM, schizoaffective disorder. Worsening LE blisters. No cp, sob. Med list & problem list reviewed.    O: VSS AFEB Wt 146# (up 9# on prednisone) Awake, alert, pleasantly confused, NAD. Chest cta. Sinus ttp. Heart rrr, 2/6 MADELYN. Ext no c/c/e. HAHN. LE with wounds dressed, c/d/i.    LAB (24) Na 130, K 4.3, Gluc 238, Cr 0.37, Alb 2.9, Hgb 11.3    A/P:  # DM: metformin, Linagliptin, Humalog SSI, statin.  # Dysphagia: nectar thick liquids.  # PD: OFF Sinemet, completed PT/OT. LTC.  # Hx T8 compression fx: TLSO brace discontinued.  # Obstructive uropathy: HAHN  # SIADH: Na stable OFF FR. NaCL 1g daily.  # Osteoporosis; Tymlos injection  # Dermatomyositis: continue prednisone 10 mg DAILY, continue MTX, Clobetasol.  # Hx Right Humerus Fx: healed. .  # Anemia: Iron  # HTN: ACEi  # Schizoaffective DO: lamictal, Zyprexa, Haldol, clonazepam.  # insomnia: tolerating trazodone 50 mg at hs.  # Hx COVID+ 2024: resolved.    Electronically Signed By: Bo Batista MD   25 10:29 PM

## 2025-05-09 NOTE — PROGRESS NOTES
Resident Seen 25 -- MP    CC: [facility] ([facility]) Recheck    : 1940  SNF H&P done 10/1/20, 24  DC Summary dated 24 reviewed 24  ALLERGY: Dust, Mold  DNR-CC    S: 83 yo woman with PD, HTN, DM, schizoaffective disorder. Worsening LE blisters. No cp, sob. Med list & problem list reviewed.    O: VSS AFEB Wt 146# (up 9# on prednisone) Awake, alert, pleasantly confused, NAD. Chest cta. Sinus ttp. Heart rrr, 2/6 MADELYN. Ext no c/c/e. HAHN. LE with wounds dressed, c/d/i.    LAB (24) Na 130, K 4.3, Gluc 238, Cr 0.37, Alb 2.9, Hgb 11.3    A/P:  # DM: metformin, Linagliptin, Humalog SSI, statin.  # Dysphagia: nectar thick liquids.  # PD: OFF Sinemet, completed PT/OT. LTC.  # Hx T8 compression fx: TLSO brace discontinued.  # Obstructive uropathy: HAHN  # SIADH: Na stable OFF FR. NaCL 1g daily.  # Osteoporosis; Tymlos injection  # Dermatomyositis: continue prednisone 10 mg DAILY, continue MTX, Clobetasol.  # Hx Right Humerus Fx: healed. .  # Anemia: Iron  # HTN: ACEi  # Schizoaffective DO: lamictal, Zyprexa, Haldol, clonazepam.  # insomnia: tolerating trazodone 50 mg at hs.  # Hx COVID+ 2024: resolved.

## 2025-08-25 ENCOUNTER — APPOINTMENT (OUTPATIENT)
Dept: DERMATOLOGY | Facility: CLINIC | Age: 85
End: 2025-08-25
Payer: COMMERCIAL

## (undated) DEVICE — TIP, SUCTION, FRAZIER, W/CONTROL VENT, 12 FR

## (undated) DEVICE — PADDING, WEBRIL, UNDERCAST, STERILE, 4 IN

## (undated) DEVICE — Device

## (undated) DEVICE — COVER, CART, 45 X 27 X 48 IN, CLEAR

## (undated) DEVICE — BANDAGE, ELASTIC, MATRIX, SELF-CLOSURE, 6 IN X 5 YD, LF

## (undated) DEVICE — STAPLER, SKIN PROXIMATE, 35 WIDE

## (undated) DEVICE — DRILL BIT, 2.0MM, QC, 140MM, W/DEPTH MARK

## (undated) DEVICE — DRILL, SYNTHES 2.8 F/LCP PLATE

## (undated) DEVICE — APPLICATOR, PREP, CHLORAPREP, W/ORANGE TINT, 10.5ML

## (undated) DEVICE — TOURNIQUET, HEMACLEAR MEDIUM YELLOW

## (undated) DEVICE — ELECTRODE, ELECTROSURGICAL, BLADE, INSULATED, ENT/IMA, STERILE

## (undated) DEVICE — DRAPE, SHEET, U, W/ADHESIVE STRIP, IMPERVIOUS, 60 X 70 IN, DISPOSABLE, LF, STERILE

## (undated) DEVICE — SUTURE, MONOCRYL, 2-0, 27 IN, SH/V-20 , UNDYED

## (undated) DEVICE — DRAPE, INCISE, ANTIMICROBIAL, IOBAN 2, LARGE, 17 X 23 IN, DISPOSABLE, STERILE

## (undated) DEVICE — ADHESIVE, SKIN, LIQUIBAND EXCEED

## (undated) DEVICE — SUTURE, PDS II, 0, 27 IN, CT-2, VIOLET

## (undated) DEVICE — SUTURE, MONOSOF, 2-0, 30 IN, C15, BLACK

## (undated) DEVICE — BANDAGE, GAUZE, CONFORMING, KERLIX, 6 PLY, 4.5 IN X 4.1 YD

## (undated) DEVICE — BANDAGE, COFLEX, 4 X 5 YDS, TAN, STERILE, LF

## (undated) DEVICE — IRRIGATION SET, Y, LARGE BORE

## (undated) DEVICE — BANDAGE, ELASTIC, MATRIX, SELF-CLOSURE, 4 IN X 5 YD, LF

## (undated) DEVICE — SUTURE, MONOCRYL, 4-0, 18 IN, PS2, UNDYED

## (undated) DEVICE — DRAPE, SHEET, THREE QUARTER, FAN FOLD, 57 X 77 IN

## (undated) DEVICE — COVER, BACK TABLE, 65 X 90, HVY REINFORCED

## (undated) DEVICE — COVER, C-ARM W/CLIPS, OEC GE

## (undated) DEVICE — BIT, DRILL, QUICK-COUPLING, 2.5 X 110 MM, STAINLESS STEEL, GOLD

## (undated) DEVICE — APPLICATOR, CHLORAPREP, W/ORANGE TINT, 26ML